# Patient Record
Sex: MALE | Race: WHITE | NOT HISPANIC OR LATINO | Employment: OTHER | URBAN - METROPOLITAN AREA
[De-identification: names, ages, dates, MRNs, and addresses within clinical notes are randomized per-mention and may not be internally consistent; named-entity substitution may affect disease eponyms.]

---

## 2018-02-13 ENCOUNTER — APPOINTMENT (EMERGENCY)
Dept: CT IMAGING | Facility: HOSPITAL | Age: 73
DRG: 394 | End: 2018-02-13
Payer: MEDICARE

## 2018-02-13 ENCOUNTER — HOSPITAL ENCOUNTER (INPATIENT)
Facility: HOSPITAL | Age: 73
LOS: 2 days | Discharge: HOME | DRG: 394 | End: 2018-02-15
Attending: EMERGENCY MEDICINE | Admitting: SURGERY
Payer: MEDICARE

## 2018-02-13 ENCOUNTER — APPOINTMENT (EMERGENCY)
Dept: RADIOLOGY | Facility: HOSPITAL | Age: 73
DRG: 394 | End: 2018-02-13
Payer: MEDICARE

## 2018-02-13 ENCOUNTER — APPOINTMENT (INPATIENT)
Dept: RADIOLOGY | Facility: HOSPITAL | Age: 73
DRG: 394 | End: 2018-02-13
Payer: MEDICARE

## 2018-02-13 DIAGNOSIS — I10 HYPERTENSION, UNSPECIFIED TYPE: ICD-10-CM

## 2018-02-13 DIAGNOSIS — Z86.73 HISTORY OF CARDIOEMBOLIC CEREBROVASCULAR ACCIDENT (CVA): ICD-10-CM

## 2018-02-13 DIAGNOSIS — E78.49 OTHER HYPERLIPIDEMIA: ICD-10-CM

## 2018-02-13 DIAGNOSIS — N17.9 AKI (ACUTE KIDNEY INJURY) (HCC): ICD-10-CM

## 2018-02-13 DIAGNOSIS — K56.609 SMALL BOWEL OBSTRUCTION (HCC): Primary | ICD-10-CM

## 2018-02-13 PROBLEM — R73.09 ELEVATED RANDOM BLOOD GLUCOSE LEVEL: Status: ACTIVE | Noted: 2018-02-13

## 2018-02-13 PROBLEM — F03.90 DEMENTIA (HCC): Status: ACTIVE | Noted: 2018-02-13

## 2018-02-13 LAB
ALBUMIN SERPL BCP-MCNC: 3.7 G/DL (ref 3.5–5)
ALP SERPL-CCNC: 113 U/L (ref 46–116)
ALT SERPL W P-5'-P-CCNC: 48 U/L (ref 12–78)
ANION GAP SERPL CALCULATED.3IONS-SCNC: 11 MMOL/L (ref 4–13)
AST SERPL W P-5'-P-CCNC: 31 U/L (ref 5–45)
BASOPHILS # BLD MANUAL: 0 THOUSAND/UL (ref 0–0.1)
BASOPHILS NFR MAR MANUAL: 0 % (ref 0–1)
BILIRUB SERPL-MCNC: 0.7 MG/DL (ref 0.2–1)
BUN SERPL-MCNC: 33 MG/DL (ref 5–25)
CALCIUM SERPL-MCNC: 9.7 MG/DL (ref 8.3–10.1)
CHLORIDE SERPL-SCNC: 96 MMOL/L (ref 100–108)
CO2 SERPL-SCNC: 29 MMOL/L (ref 21–32)
CREAT SERPL-MCNC: 1.99 MG/DL (ref 0.6–1.3)
EOSINOPHIL # BLD MANUAL: 0 THOUSAND/UL (ref 0–0.4)
EOSINOPHIL NFR BLD MANUAL: 0 % (ref 0–6)
ERYTHROCYTE [DISTWIDTH] IN BLOOD BY AUTOMATED COUNT: 12.5 % (ref 11.6–15.1)
GFR SERPL CREATININE-BSD FRML MDRD: 33 ML/MIN/1.73SQ M
GIANT PLATELETS BLD QL SMEAR: PRESENT
GLUCOSE SERPL-MCNC: 174 MG/DL (ref 65–140)
GLUCOSE SERPL-MCNC: 80 MG/DL (ref 65–140)
HCT VFR BLD AUTO: 44.5 % (ref 36.5–49.3)
HGB BLD-MCNC: 15 G/DL (ref 12–17)
LG PLATELETS BLD QL SMEAR: PRESENT
LIPASE SERPL-CCNC: 396 U/L (ref 73–393)
LYMPHOCYTES # BLD AUTO: 0.8 THOUSAND/UL (ref 0.6–4.47)
LYMPHOCYTES # BLD AUTO: 4 % (ref 14–44)
MCH RBC QN AUTO: 29.9 PG (ref 26.8–34.3)
MCHC RBC AUTO-ENTMCNC: 33.7 G/DL (ref 31.4–37.4)
MCV RBC AUTO: 89 FL (ref 82–98)
MONOCYTES # BLD AUTO: 0.8 THOUSAND/UL (ref 0–1.22)
MONOCYTES NFR BLD: 4 % (ref 4–12)
NEUTROPHILS # BLD MANUAL: 18.49 THOUSAND/UL (ref 1.85–7.62)
NEUTS SEG NFR BLD AUTO: 92 % (ref 43–75)
NRBC BLD AUTO-RTO: 0 /100 WBCS
PLATELET # BLD AUTO: 264 THOUSANDS/UL (ref 149–390)
PLATELET BLD QL SMEAR: ADEQUATE
PMV BLD AUTO: 10.7 FL (ref 8.9–12.7)
POTASSIUM SERPL-SCNC: 4.8 MMOL/L (ref 3.5–5.3)
PROT SERPL-MCNC: 8.2 G/DL (ref 6.4–8.2)
RBC # BLD AUTO: 5.02 MILLION/UL (ref 3.88–5.62)
SODIUM SERPL-SCNC: 136 MMOL/L (ref 136–145)
TOTAL CELLS COUNTED SPEC: 100
WBC # BLD AUTO: 20.1 THOUSAND/UL (ref 4.31–10.16)

## 2018-02-13 PROCEDURE — 71045 X-RAY EXAM CHEST 1 VIEW: CPT

## 2018-02-13 PROCEDURE — 96374 THER/PROPH/DIAG INJ IV PUSH: CPT

## 2018-02-13 PROCEDURE — 82948 REAGENT STRIP/BLOOD GLUCOSE: CPT

## 2018-02-13 PROCEDURE — 85007 BL SMEAR W/DIFF WBC COUNT: CPT | Performed by: EMERGENCY MEDICINE

## 2018-02-13 PROCEDURE — 85027 COMPLETE CBC AUTOMATED: CPT | Performed by: EMERGENCY MEDICINE

## 2018-02-13 PROCEDURE — 99222 1ST HOSP IP/OBS MODERATE 55: CPT | Performed by: PHYSICIAN ASSISTANT

## 2018-02-13 PROCEDURE — 74176 CT ABD & PELVIS W/O CONTRAST: CPT

## 2018-02-13 PROCEDURE — 99232 SBSQ HOSP IP/OBS MODERATE 35: CPT | Performed by: PHYSICIAN ASSISTANT

## 2018-02-13 PROCEDURE — 80053 COMPREHEN METABOLIC PANEL: CPT | Performed by: EMERGENCY MEDICINE

## 2018-02-13 PROCEDURE — 74022 RADEX COMPL AQT ABD SERIES: CPT

## 2018-02-13 PROCEDURE — 36415 COLL VENOUS BLD VENIPUNCTURE: CPT | Performed by: EMERGENCY MEDICINE

## 2018-02-13 PROCEDURE — 99285 EMERGENCY DEPT VISIT HI MDM: CPT

## 2018-02-13 PROCEDURE — 96361 HYDRATE IV INFUSION ADD-ON: CPT

## 2018-02-13 PROCEDURE — 83690 ASSAY OF LIPASE: CPT | Performed by: EMERGENCY MEDICINE

## 2018-02-13 RX ORDER — DEXTROSE AND SODIUM CHLORIDE 5; .9 G/100ML; G/100ML
50 INJECTION, SOLUTION INTRAVENOUS CONTINUOUS
Status: DISCONTINUED | OUTPATIENT
Start: 2018-02-13 | End: 2018-02-13

## 2018-02-13 RX ORDER — ONDANSETRON 2 MG/ML
4 INJECTION INTRAMUSCULAR; INTRAVENOUS EVERY 6 HOURS PRN
Status: DISCONTINUED | OUTPATIENT
Start: 2018-02-13 | End: 2018-02-15 | Stop reason: HOSPADM

## 2018-02-13 RX ORDER — SODIUM CHLORIDE 9 MG/ML
125 INJECTION, SOLUTION INTRAVENOUS CONTINUOUS
Status: DISCONTINUED | OUTPATIENT
Start: 2018-02-13 | End: 2018-02-13

## 2018-02-13 RX ORDER — ATORVASTATIN CALCIUM 10 MG/1
10 TABLET, FILM COATED ORAL DAILY
Status: ON HOLD | COMMUNITY
End: 2018-11-10

## 2018-02-13 RX ORDER — AMLODIPINE BESYLATE 5 MG/1
5 TABLET ORAL DAILY
Status: DISCONTINUED | OUTPATIENT
Start: 2018-02-14 | End: 2018-02-15 | Stop reason: HOSPADM

## 2018-02-13 RX ORDER — FUROSEMIDE 40 MG/1
40 TABLET ORAL DAILY
Status: DISCONTINUED | OUTPATIENT
Start: 2018-02-13 | End: 2018-02-15 | Stop reason: HOSPADM

## 2018-02-13 RX ORDER — FUROSEMIDE 40 MG/1
40 TABLET ORAL 2 TIMES DAILY
Status: ON HOLD | COMMUNITY
End: 2018-11-10

## 2018-02-13 RX ORDER — TRIAMTERENE AND HYDROCHLOROTHIAZIDE 37.5; 25 MG/1; MG/1
1 CAPSULE ORAL EVERY MORNING
COMMUNITY
End: 2018-02-15 | Stop reason: HOSPADM

## 2018-02-13 RX ORDER — DEXTROSE AND SODIUM CHLORIDE 5; .45 G/100ML; G/100ML
125 INJECTION, SOLUTION INTRAVENOUS CONTINUOUS
Status: DISCONTINUED | OUTPATIENT
Start: 2018-02-13 | End: 2018-02-13

## 2018-02-13 RX ORDER — DONEPEZIL HYDROCHLORIDE 5 MG/1
5 TABLET, FILM COATED ORAL 2 TIMES DAILY
Status: ON HOLD | COMMUNITY
End: 2018-11-10

## 2018-02-13 RX ORDER — HEPARIN SODIUM 5000 [USP'U]/ML
5000 INJECTION, SOLUTION INTRAVENOUS; SUBCUTANEOUS EVERY 8 HOURS SCHEDULED
Status: DISCONTINUED | OUTPATIENT
Start: 2018-02-13 | End: 2018-02-15 | Stop reason: HOSPADM

## 2018-02-13 RX ORDER — DONEPEZIL HYDROCHLORIDE 5 MG/1
5 TABLET, FILM COATED ORAL 2 TIMES DAILY
Status: DISCONTINUED | OUTPATIENT
Start: 2018-02-13 | End: 2018-02-15 | Stop reason: HOSPADM

## 2018-02-13 RX ORDER — LIDOCAINE HYDROCHLORIDE 20 MG/ML
JELLY TOPICAL ONCE
Status: COMPLETED | OUTPATIENT
Start: 2018-02-13 | End: 2018-02-13

## 2018-02-13 RX ORDER — TRIAMTERENE AND HYDROCHLOROTHIAZIDE 37.5; 25 MG/1; MG/1
1 TABLET ORAL DAILY
Status: DISCONTINUED | OUTPATIENT
Start: 2018-02-14 | End: 2018-02-13

## 2018-02-13 RX ORDER — AMLODIPINE BESYLATE 10 MG/1
10 TABLET ORAL DAILY
Status: ON HOLD | COMMUNITY
End: 2018-11-10

## 2018-02-13 RX ORDER — DEXTROSE AND SODIUM CHLORIDE 5; .9 G/100ML; G/100ML
50 INJECTION, SOLUTION INTRAVENOUS CONTINUOUS
Status: DISCONTINUED | OUTPATIENT
Start: 2018-02-13 | End: 2018-02-14

## 2018-02-13 RX ORDER — OXYMETAZOLINE HYDROCHLORIDE 0.05 G/100ML
1 SPRAY NASAL ONCE
Status: COMPLETED | OUTPATIENT
Start: 2018-02-13 | End: 2018-02-13

## 2018-02-13 RX ORDER — SPIRONOLACTONE 25 MG/1
12.5 TABLET ORAL EVERY 12 HOURS
Status: ON HOLD | COMMUNITY
End: 2018-11-10

## 2018-02-13 RX ORDER — ATORVASTATIN CALCIUM 10 MG/1
10 TABLET, FILM COATED ORAL
Status: DISCONTINUED | OUTPATIENT
Start: 2018-02-14 | End: 2018-02-15 | Stop reason: HOSPADM

## 2018-02-13 RX ADMIN — HYDROMORPHONE HYDROCHLORIDE 1 MG: 1 INJECTION, SOLUTION INTRAMUSCULAR; INTRAVENOUS; SUBCUTANEOUS at 09:05

## 2018-02-13 RX ADMIN — LIDOCAINE HYDROCHLORIDE: 20 JELLY TOPICAL at 14:54

## 2018-02-13 RX ADMIN — HYDROMORPHONE HYDROCHLORIDE 1 MG: 1 INJECTION, SOLUTION INTRAMUSCULAR; INTRAVENOUS; SUBCUTANEOUS at 16:10

## 2018-02-13 RX ADMIN — SODIUM CHLORIDE 125 ML/HR: 0.9 INJECTION, SOLUTION INTRAVENOUS at 16:11

## 2018-02-13 RX ADMIN — HYDROMORPHONE HYDROCHLORIDE 1 MG: 1 INJECTION, SOLUTION INTRAMUSCULAR; INTRAVENOUS; SUBCUTANEOUS at 12:20

## 2018-02-13 RX ADMIN — OXYMETAZOLINE HYDROCHLORIDE 1 SPRAY: 5 SPRAY NASAL at 15:04

## 2018-02-13 RX ADMIN — SODIUM CHLORIDE 1000 ML: 0.9 INJECTION, SOLUTION INTRAVENOUS at 09:09

## 2018-02-13 RX ADMIN — FUROSEMIDE 40 MG: 40 TABLET ORAL at 22:42

## 2018-02-13 RX ADMIN — DEXTROSE AND SODIUM CHLORIDE 50 ML/HR: 5; 900 INJECTION, SOLUTION INTRAVENOUS at 23:20

## 2018-02-13 RX ADMIN — HEPARIN SODIUM 5000 UNITS: 5000 INJECTION, SOLUTION INTRAVENOUS; SUBCUTANEOUS at 22:42

## 2018-02-13 RX ADMIN — DONEPEZIL HYDROCHLORIDE 5 MG: 5 TABLET ORAL at 22:42

## 2018-02-13 NOTE — PROGRESS NOTES
The clarification for NGT order obtained from Dr Sharan Mills  Low intermittent suction applied as ordered via phone

## 2018-02-13 NOTE — ED NOTES
Pt  Transported to X ray       Jf Pinto, RIVKA  02/13/18 Mari 69, RN  02/13/18 [place] Sherren Curia  02/13/18 2455

## 2018-02-13 NOTE — H&P
GENERAL SURGERY HISTORY AND PHYSICAL      Tutu Saba 67 y o  male MRN: [id number]  Unit/Bed#: ED 26 Encounter: [id number]      Assessment/Plan   SBO (Small Bowel Obstruction) -CT shows dilated small bowel loops in the upper mid abdomen with normal caliber of distal ileal loop suggesting partial small bowel obstruction  Wound transition mid abdomen and likely an adhesive obstruction  abdominal x-ray with no obstructive but bowel gas pattern  - abdominal pain controlled on IV medication, abdomen soft, large distension secondary to loss of domain  - hypoactive bowel sounds, no BM or flatus since last evening  HTN  HLD  History of CVA  Peripheral vascular disease-history of right femoral popliteal bypass    Plan:  Diet NPO  Place NG tube for abdominal decompression  Morning labs  Serial abdominal exams  Consider obstruction series if improved tomorrow  Consult SLIM for medical management, appreciate input  Pain medication and antiemetics, p r n  Chief Complaint I have a bowel obstruction    HPI: Tutu Saba is a 67y o  year old male with a past medical history of hypertension, hyperlipidemia, peripheral vascular disease status post right popliteal femoral bypass, history of 2 CVAs (1 hemorrhagic, 1 ischemic), small-bowel obstruction in December, who presented to the emergency department this morning with sharp abdominal pain since 7:00 p m  last night after eating dinner  He states the pain reminded him of his small-bowel obstruction a few months ago  The patient rates the pain as 9/10 when he arrived  Pain is now rated as a 0 with medication control  Patient denies any associated fever or chills  He denies nausea or vomiting however he did force himself to have 1 episode of emesis  Patient does have a previous history of small-bowel obstruction in December 2017 where he was treated at [facility]  He did have NG tube decompression at that time    Patient states that insertion of the NG tube caused epistaxis and a persistent migraine headache, therefore he is apprehensive about NG tube placement  The patient was treated with IV pain medication in the emergency department which help this pain  Patient denies any diarrhea or constipation  He recently had a colonoscopy and GI workup in the fall of 2017, for hematochezia which was unremarkable  He denies any current melena or hematochezia  Last bowel movement was yesterday afternoon  Patient's denies passing flatus since last evening  Patient has previous abdominal surgery history of open cholecystectomy, and 2 ventral hernia surgeries  He does have a large mesh placement in his abdominal wall  Other surgical history includes of popliteal femoral bypass of the right lower extremity  Review of Systems negative except as noted in HPI    Meds/Allergies     PTA meds:   Prior to Admission Medications   Prescriptions Last Dose Informant Patient Reported? Taking?    amLODIPine (NORVASC) 10 mg tablet   Yes Yes   Sig: Take 10 mg by mouth daily   atorvastatin (LIPITOR) 10 mg tablet   Yes Yes   Sig: Take 10 mg by mouth daily   donepezil (ARICEPT) 5 mg tablet   Yes Yes   Sig: Take 5 mg by mouth daily at bedtime   furosemide (LASIX) 40 mg tablet   Yes Yes   Sig: Take 40 mg by mouth 2 (two) times a day   spironolactone (ALDACTONE) 25 mg tablet   Yes Yes   Sig: Take 25 mg by mouth daily   triamterene-hydrochlorothiazide (DYAZIDE) 37 5-25 mg per capsule   Yes Yes   Sig: Take 1 capsule by mouth every morning      Facility-Administered Medications: None       Historical Information   Past Medical History:   Diagnosis Date    Hyperlipidemia     Hypertension     PVD (peripheral vascular disease) ([facility] [address] )     Small bowel obstruction     Ventral hernia      Past Surgical History:   Procedure Laterality Date    ABDOMINAL HERNIA REPAIR      ABDOMINAL SURGERY      CHOLECYSTECTOMY      open    FEMORAL BYPASS Right      Social History   History   Alcohol Use No     History   Drug Use No History   Smoking Status    Never Smoker   Smokeless Tobacco    Never Used     Family History: no pertinent family history  No Known Allergies        Objective   Vitals: Blood pressure 162/68, pulse 99, temperature 97 9 °F (36 6 °C), resp  rate 18, height 5' 9" (1 753 m), weight 124 kg (274 lb), SpO2 96 %  ,Body mass index is 40 46 kg/m²    No intake or output data in the 24 hours ending 02/13/18 1316  Invasive Devices     Peripheral Intravenous Line            Peripheral IV 02/13/18 Right Antecubital less than 1 day                Physical Exam      General appearance: alert, appears stated age and cooperative, NAD  HEENT: PERRLA, EOMI, sclera clear, anicterus, oral mucosa is moist  Neck: No carotid bruits, no thyromegaly, trachea is midline  midline  Back: no tenderness,deformity,   Lungs:clear throughout, no wheezes  Heart[de-identified] RRR, S1, S2 normal, no murmur  Abdomen: hypoactive BS, no tenderness to palpation, Distended abdomen 2/2 loss of domain, Large ventral hernia, no masses, soft  Extremities: FROM no joint deformities, motor,sensory intact,pedal edema mild, b/l LE venous stasis skin changes  Neurologic: CN II-XII grossly intact, no tremor, affect appropriate    Lab Results:   CBC with diff:   Lab Results   Component Value Date    WBC 20 10 (H) 02/13/2018    HGB 15 0 02/13/2018    HCT 44 5 02/13/2018    MCV 89 02/13/2018     02/13/2018    MCH 29 9 02/13/2018    MCHC 33 7 02/13/2018    RDW 12 5 02/13/2018    MPV 10 7 02/13/2018    NRBC 0 02/13/2018   , BMP/CMP:   Lab Results   Component Value Date     02/13/2018    K 4 8 02/13/2018    CL 96 (L) 02/13/2018    CO2 29 02/13/2018    ANIONGAP 11 02/13/2018    BUN 33 (H) 02/13/2018    CREATININE 1 99 (H) 02/13/2018    GLUCOSE 174 (H) 02/13/2018    CALCIUM 9 7 02/13/2018    AST 31 02/13/2018    ALT 48 02/13/2018    ALKPHOS 113 02/13/2018    PROT 8 2 02/13/2018    BILITOT 0 70 02/13/2018    EGFR 33 02/13/2018     Imaging Studies: Ct Abdomen Pelvis Wo Contrast    Result Date: 2/13/2018  Impression: Dilated the small bowel loops in the upper and mid abdomen with normal caliber of the distal ileal loop suggest partial small bowel obstruction  The zone of transition lies in the mid abdomen in image 42 this is likely adhesive small bowel obstruction Fatty infiltration of the liver Exophytic density from the posterior aspect of the left kidney with attenuation of 25 Hounsfield unit probable cyst   Would suggest a an ultrasound for confirmation  I personally discussed this study with Tania Fontanez on 2/13/2018 at 9:42 AM  Workstation performed: [id number]     Xr Abdomen Obstruction Series    Result Date: 2/13/2018  Impression: Nonobstructive bowel gas pattern  Workstation performed: [id number]     EKG, Pathology, and Other Studies: I have personally reviewed pertinent reports      VTE Prophylaxis: Sequential compression device (Venodyne)  and Enoxaparin (Lovenox)     Code Status: Level 1 - Full Code    Guero Morel PA-C  2/13/2018

## 2018-02-13 NOTE — ED PROVIDER NOTES
History  Chief Complaint   Patient presents with    Abdominal Pain     "I think I have a blockage, it hurts" pt last bm was yesterday, having nausea, hx of bowel obstruction     Patient presents to the emergency department for evaluation of abdominal pain that is increasing in intensity which began yesterday  He has no fever chills nausea vomiting or diarrhea  Does have a history of bowel obstruction which she is concerned about  He denies trauma fall or injury  Denies urinary symptoms  Denies ill contacts  Prior to Admission Medications   Prescriptions Last Dose Informant Patient Reported? Taking? amLODIPine (NORVASC) 10 mg tablet   Yes Yes   Sig: Take 10 mg by mouth daily   atorvastatin (LIPITOR) 10 mg tablet   Yes Yes   Sig: Take 10 mg by mouth daily   donepezil (ARICEPT) 5 mg tablet   Yes Yes   Sig: Take 5 mg by mouth daily at bedtime   furosemide (LASIX) 40 mg tablet   Yes Yes   Sig: Take 40 mg by mouth 2 (two) times a day   spironolactone (ALDACTONE) 25 mg tablet   Yes Yes   Sig: Take 25 mg by mouth daily   triamterene-hydrochlorothiazide (DYAZIDE) 37 5-25 mg per capsule   Yes Yes   Sig: Take 1 capsule by mouth every morning      Facility-Administered Medications: None       Past Medical History:   Diagnosis Date    Hyperlipidemia     Hypertension        Past Surgical History:   Procedure Laterality Date    ABDOMINAL SURGERY         History reviewed  No pertinent family history  I have reviewed and agree with the history as documented  Social History   Substance Use Topics    Smoking status: Never Smoker    Smokeless tobacco: Never Used    Alcohol use No        Review of Systems   Constitutional: Negative  Negative for activity change, appetite change, chills, diaphoresis, fatigue and fever  HENT: Negative  Negative for drooling  Eyes: Negative  Negative for photophobia and visual disturbance  Respiratory: Negative    Negative for cough, chest tightness and shortness of breath  Cardiovascular: Negative  Negative for chest pain, palpitations and leg swelling  Gastrointestinal: Positive for abdominal pain  Negative for diarrhea, nausea, rectal pain and vomiting  Endocrine: Negative  Genitourinary: Negative  Negative for decreased urine volume, dysuria, frequency, hematuria and urgency  Musculoskeletal: Negative  Negative for back pain, neck pain and neck stiffness  Skin: Negative  Negative for rash and wound  Allergic/Immunologic: Negative  Neurological: Negative  Negative for dizziness, tremors, seizures, syncope, facial asymmetry, speech difficulty, weakness, light-headedness, numbness and headaches  Hematological: Negative  Does not bruise/bleed easily  Psychiatric/Behavioral: Negative  Physical Exam  ED Triage Vitals   Temperature Pulse Respirations Blood Pressure SpO2   02/13/18 0751 02/13/18 0751 02/13/18 0751 02/13/18 0751 02/13/18 0751   97 9 °F (36 6 °C) 102 18 165/77 98 %      Temp src Heart Rate Source Patient Position - Orthostatic VS BP Location FiO2 (%)   -- 02/13/18 0951 02/13/18 0951 02/13/18 0951 --    Monitor Lying Left arm       Pain Score       02/13/18 0751       1           Orthostatic Vital Signs  Vitals:    02/13/18 0751 02/13/18 0951   BP: 165/77 146/57   Pulse: 102 89   Patient Position - Orthostatic VS:  Lying       Physical Exam   Constitutional: He is oriented to person, place, and time  He appears well-developed and well-nourished  HENT:   Head: Normocephalic and atraumatic  Right Ear: External ear normal    Left Ear: External ear normal    Mouth/Throat: Oropharynx is clear and moist    Eyes: Conjunctivae and EOM are normal  Pupils are equal, round, and reactive to light  Neck: Normal range of motion  Neck supple  Cardiovascular: Normal rate, regular rhythm, normal heart sounds and intact distal pulses  Pulmonary/Chest: Effort normal and breath sounds normal  No respiratory distress  He has no wheezes   He has no rales  He exhibits no tenderness  Abdominal: Soft  Bowel sounds are normal  He exhibits distension  He exhibits no mass  There is tenderness  There is no rebound and no guarding  No hernia  Generalized mild tenderness without peritoneal signs   Musculoskeletal: Normal range of motion  He exhibits no edema, tenderness or deformity  Neurological: He is alert and oriented to person, place, and time  He has normal reflexes  He displays normal reflexes  No cranial nerve deficit or sensory deficit  He exhibits normal muscle tone  Coordination normal    Skin: Skin is warm and dry  Psychiatric: He has a normal mood and affect  His behavior is normal  Judgment and thought content normal    Nursing note and vitals reviewed        ED Medications  Medications   sodium chloride 0 9 % bolus 1,000 mL (1,000 mL Intravenous New Bag 2/13/18 0909)   HYDROmorphone (DILAUDID) injection 1 mg (1 mg Intravenous Given 2/13/18 0905)       Diagnostic Studies  Results Reviewed     Procedure Component Value Units Date/Time    CBC and differential [[id number]]  (Abnormal) Collected:  02/13/18 0805    Lab Status:  Final result Specimen:  Blood from Arm, Right Updated:  02/13/18 0850     WBC 20 10 (H) Thousand/uL      RBC 5 02 Million/uL      Hemoglobin 15 0 g/dL      Hematocrit 44 5 %      MCV 89 fL      MCH 29 9 pg      MCHC 33 7 g/dL      RDW 12 5 %      MPV 10 7 fL      Platelets 157 Thousands/uL      nRBC 0 /100 WBCs     Comprehensive metabolic panel [[id number]]  (Abnormal) Collected:  02/13/18 0805    Lab Status:  Final result Specimen:  Blood from Arm, Right Updated:  02/13/18 0849     Sodium 136 mmol/L      Potassium 4 8 mmol/L      Chloride 96 (L) mmol/L      CO2 29 mmol/L      Anion Gap 11 mmol/L      BUN 33 (H) mg/dL      Creatinine 1 99 (H) mg/dL      Glucose 174 (H) mg/dL      Calcium 9 7 mg/dL      AST 31 U/L      ALT 48 U/L      Alkaline Phosphatase 113 U/L      Total Protein 8 2 g/dL      Albumin 3 7 g/dL      Total Bilirubin 0 70 mg/dL      eGFR 33 ml/min/1 73sq m     Narrative:         National Kidney Disease Education Program recommendations are as follows:  GFR calculation is accurate only with a steady state creatinine  Chronic Kidney disease less than 60 ml/min/1 73 sq  meters  Kidney failure less than 15 ml/min/1 73 sq  meters  Lipase [[id number]]  (Abnormal) Collected:  02/13/18 0805    Lab Status:  Final result Specimen:  Blood from Arm, Right Updated:  02/13/18 0849     Lipase 396 (H) u/L                  CT abdomen pelvis wo contrast   Final Result by Mellissa Mazariegos MD (02/13 1931)      Dilated the small bowel loops in the upper and mid abdomen with normal caliber of the distal ileal loop suggest partial small bowel obstruction  The zone of transition lies in the mid abdomen in image 42 this is likely adhesive small bowel obstruction      Fatty infiltration of the liver      Exophytic density from the posterior aspect of the left kidney with attenuation of 25 Hounsfield unit probable cyst   Would suggest a an ultrasound for confirmation  I personally discussed this study with [employer] on 2/13/2018 at 9:42 AM                   Workstation performed: [id number]         XR abdomen obstruction series   Final Result by Nilesh Gray MD (02/13 8870)      Nonobstructive bowel gas pattern  Workstation performed: [id number]                    Procedures  Procedures       Phone Contacts  ED Phone Contact    ED Course  ED Course as of Feb 13 1030   Tue Feb 13, 2018   2858 Attempted to get in touch with Dr Mendez the surgical attending on-call without success  Waiting to hear from the surgical physician assistant  0 CaseDiscussed with Dr Franky Lopez general surgery was accepted this patient on his service  Patient does not want an NG tube at this time as the last time he had 1 but [facility] he got a severe migraine    I discussed this with the general surgeon who is aware of this refusal at this time  MDM  CritCare Time    Disposition  Final diagnoses:   Small bowel obstruction     Time reflects when diagnosis was documented in both MDM as applicable and the Disposition within this note     Time User Action Codes Description Comment    2/13/2018  9:56 AM Henrry Castro Add [K56 609] Small bowel obstruction       ED Disposition     ED Disposition Condition Comment    Admit  Case was discussed with Dr Julia Damon  and the patient's admission status was agreed to be Admission Status: inpatient status to the service of Dr Robert Muñiz    None       Patient's Medications   Discharge Prescriptions    No medications on file     No discharge procedures on file      ED Provider  Electronically Signed by           Tana Cortez MD  02/13/18 5522

## 2018-02-14 ENCOUNTER — APPOINTMENT (INPATIENT)
Dept: RADIOLOGY | Facility: HOSPITAL | Age: 73
DRG: 394 | End: 2018-02-14
Payer: MEDICARE

## 2018-02-14 ENCOUNTER — APPOINTMENT (INPATIENT)
Dept: ULTRASOUND IMAGING | Facility: HOSPITAL | Age: 73
DRG: 394 | End: 2018-02-14
Payer: MEDICARE

## 2018-02-14 PROBLEM — N28.1 RENAL CYST, LEFT: Status: ACTIVE | Noted: 2018-02-14

## 2018-02-14 LAB
ALBUMIN SERPL BCP-MCNC: 2.9 G/DL (ref 3.5–5)
ALP SERPL-CCNC: 91 U/L (ref 46–116)
ALT SERPL W P-5'-P-CCNC: 38 U/L (ref 12–78)
ANION GAP SERPL CALCULATED.3IONS-SCNC: 8 MMOL/L (ref 4–13)
AST SERPL W P-5'-P-CCNC: 26 U/L (ref 5–45)
BILIRUB SERPL-MCNC: 0.7 MG/DL (ref 0.2–1)
BUN SERPL-MCNC: 33 MG/DL (ref 5–25)
CALCIUM SERPL-MCNC: 8.3 MG/DL (ref 8.3–10.1)
CHLORIDE SERPL-SCNC: 102 MMOL/L (ref 100–108)
CO2 SERPL-SCNC: 28 MMOL/L (ref 21–32)
CREAT SERPL-MCNC: 1.69 MG/DL (ref 0.6–1.3)
ERYTHROCYTE [DISTWIDTH] IN BLOOD BY AUTOMATED COUNT: 12.7 % (ref 11.6–15.1)
GFR SERPL CREATININE-BSD FRML MDRD: 40 ML/MIN/1.73SQ M
GLUCOSE SERPL-MCNC: 102 MG/DL (ref 65–140)
GLUCOSE SERPL-MCNC: 128 MG/DL (ref 65–140)
GLUCOSE SERPL-MCNC: 79 MG/DL (ref 65–140)
GLUCOSE SERPL-MCNC: 82 MG/DL (ref 65–140)
GLUCOSE SERPL-MCNC: 86 MG/DL (ref 65–140)
HCT VFR BLD AUTO: 39.2 % (ref 36.5–49.3)
HGB BLD-MCNC: 13.1 G/DL (ref 12–17)
MAGNESIUM SERPL-MCNC: 2 MG/DL (ref 1.6–2.6)
MCH RBC QN AUTO: 30.4 PG (ref 26.8–34.3)
MCHC RBC AUTO-ENTMCNC: 33.4 G/DL (ref 31.4–37.4)
MCV RBC AUTO: 91 FL (ref 82–98)
PLATELET # BLD AUTO: 203 THOUSANDS/UL (ref 149–390)
PMV BLD AUTO: 10.5 FL (ref 8.9–12.7)
POTASSIUM SERPL-SCNC: 4.1 MMOL/L (ref 3.5–5.3)
PROT SERPL-MCNC: 6.4 G/DL (ref 6.4–8.2)
RBC # BLD AUTO: 4.31 MILLION/UL (ref 3.88–5.62)
SODIUM SERPL-SCNC: 138 MMOL/L (ref 136–145)
WBC # BLD AUTO: 11.31 THOUSAND/UL (ref 4.31–10.16)

## 2018-02-14 PROCEDURE — 80053 COMPREHEN METABOLIC PANEL: CPT | Performed by: PHYSICIAN ASSISTANT

## 2018-02-14 PROCEDURE — 99232 SBSQ HOSP IP/OBS MODERATE 35: CPT | Performed by: PHYSICIAN ASSISTANT

## 2018-02-14 PROCEDURE — 83735 ASSAY OF MAGNESIUM: CPT | Performed by: PHYSICIAN ASSISTANT

## 2018-02-14 PROCEDURE — 76770 US EXAM ABDO BACK WALL COMP: CPT

## 2018-02-14 PROCEDURE — 82948 REAGENT STRIP/BLOOD GLUCOSE: CPT

## 2018-02-14 PROCEDURE — 85027 COMPLETE CBC AUTOMATED: CPT | Performed by: PHYSICIAN ASSISTANT

## 2018-02-14 PROCEDURE — 74022 RADEX COMPL AQT ABD SERIES: CPT

## 2018-02-14 RX ORDER — FUROSEMIDE 10 MG/ML
20 INJECTION INTRAMUSCULAR; INTRAVENOUS ONCE
Status: COMPLETED | OUTPATIENT
Start: 2018-02-14 | End: 2018-02-14

## 2018-02-14 RX ORDER — HYDRALAZINE HYDROCHLORIDE 20 MG/ML
5 INJECTION INTRAMUSCULAR; INTRAVENOUS EVERY 6 HOURS PRN
Status: DISCONTINUED | OUTPATIENT
Start: 2018-02-14 | End: 2018-02-15 | Stop reason: HOSPADM

## 2018-02-14 RX ORDER — FUROSEMIDE 10 MG/ML
20 INJECTION INTRAMUSCULAR; INTRAVENOUS ONCE
Status: DISCONTINUED | OUTPATIENT
Start: 2018-02-14 | End: 2018-02-15 | Stop reason: HOSPADM

## 2018-02-14 RX ADMIN — HEPARIN SODIUM 5000 UNITS: 5000 INJECTION, SOLUTION INTRAVENOUS; SUBCUTANEOUS at 05:53

## 2018-02-14 RX ADMIN — ATORVASTATIN CALCIUM 10 MG: 10 TABLET, FILM COATED ORAL at 18:01

## 2018-02-14 RX ADMIN — DONEPEZIL HYDROCHLORIDE 5 MG: 5 TABLET ORAL at 09:07

## 2018-02-14 RX ADMIN — FUROSEMIDE 20 MG: 10 INJECTION, SOLUTION INTRAMUSCULAR; INTRAVENOUS at 19:20

## 2018-02-14 RX ADMIN — AMLODIPINE BESYLATE 5 MG: 5 TABLET ORAL at 09:07

## 2018-02-14 RX ADMIN — HEPARIN SODIUM 5000 UNITS: 5000 INJECTION, SOLUTION INTRAVENOUS; SUBCUTANEOUS at 22:54

## 2018-02-14 RX ADMIN — HEPARIN SODIUM 5000 UNITS: 5000 INJECTION, SOLUTION INTRAVENOUS; SUBCUTANEOUS at 14:02

## 2018-02-14 RX ADMIN — DONEPEZIL HYDROCHLORIDE 5 MG: 5 TABLET ORAL at 18:01

## 2018-02-14 RX ADMIN — FUROSEMIDE 40 MG: 40 TABLET ORAL at 09:07

## 2018-02-14 NOTE — ASSESSMENT & PLAN NOTE
· Most recent /61  · Resume home antihypertensive medications including amlodipine 5 mg daily and Lasix 40 mg daily (start dose tonight due to lower extremity edema)  · Hold spirinolactone 25 mg daily and triamterene-hctz 37 5-25 mg daily due to creatinine   · Trend BPs

## 2018-02-14 NOTE — PLAN OF CARE

## 2018-02-14 NOTE — CONSULTS
Consult- Christopher Barrera 1945, 67 y o  male MRN: [id number]    Unit/Bed#: -01 Encounter: [id number]    Primary Care Provider: Edna Leonard DO   Date and time admitted to hospital: 2/13/2018  7:49 AM       DOS: 2/13/2018    Inpatient consult to Internal Medicine  Consult performed by: Christiana Baeza ordered by: Eleazar Layton          * Small bowel obstruction   Assessment & Plan    · Management per surgery  · Continue NGT  · Serial abdominal exams, no tenderness, bowel sounds normoactive  · Pain management and antiemetics per surgery  · CT - dilated small bowel loops in the upper mid abdomen with normal caliber distal ileal loops suggesting partial small bowel obstruction  · Leukocytosis of 20 10, monitor CBC tomorrow         Elevated random blood glucose level   Assessment & Plan    · Elevated at 174  · Patient denies history of diabetes  · Start glucose checks and low algorithm sliding scale coverage  · Obtain hemoglobin A1c        Dementia   Assessment & Plan    · Continue aricept 5 mg bid        HARVINDER (acute kidney injury) ([facility] [address] )   Assessment & Plan    · Cr  1 99 today, baseline unknown per records  · Likely due to patient fluid overloaded on exam with bilateral pitting edema, will continue diuresis with lasix 40 mg daily   · Monitor BMP in AM  · Switch DVT prophylaxis from lovenox to heparin         Hyperlipidemia   Assessment & Plan    · Continue lipitor 10 mg daily         Hypertension   Assessment & Plan    · Most recent /61  · Resume home antihypertensive medications including amlodipine 5 mg daily and Lasix 40 mg daily (start dose tonight due to lower extremity edema)  · Hold spirinolactone 25 mg daily and triamterene-hctz 37 5-25 mg daily due to creatinine   · Trend BPs     Left Kidney cyst  · Found on CT, with recommendations of further evaluation with ultrasound  · Obtain ultrasound kidney   · Monitor     VTE Prophylaxis: Heparin and Enoxaparin (Lovenox)  / sequential compression device     Recommendations for Discharge:  · Pending surgery evaluation  Patient still with NGT in place  Counseling / Coordination of Care Time: 30 minutes  Greater than 50% of total time spent on patient counseling and coordination of care  Collaboration of Care: Were Recommendations Directly Discussed with Primary Treatment Team? - No     History of Present Illness:    Su Chawla is a 67 y o  male with significant past medical history of hypertension , hyperlipidemia, dementia, peripheral vascular disease, and history of bowel obstruction who is originally admitted to the general surgery service on 2/13/2018 due to abdominal pain and decrease in bowel movements, resulting in partial bowel obstruction  We are consulted for medical management  Patient reports that he has a history of bowel obstruction in the past   States that he was having severe abdominal pain prior to coming into the hospital   States that his abdominal pain is minimal now  Currently denies nausea and vomiting  Reports that the IV in the right antecubital fossa is bothering him and he would like it to be taken out  Patient reports history of hypertension, but does not know any of the medications he takes  Denies history of kidney disease or diabetes mellitus  He currently does not have any other complaints besides his IV  Patient's pharmacy was contacted and medication list was updated  Patient is a history of popliteal femoral bypass and multiple abdominal surgeries  Review of Systems:    Review of Systems   Constitutional: Negative for chills, fatigue and fever  HENT: Negative for congestion, hearing loss, rhinorrhea, sinus pain, sinus pressure, sore throat and tinnitus  Eyes: Negative for pain, redness and visual disturbance  Respiratory: Negative for cough, chest tightness and shortness of breath  Cardiovascular: Positive for leg swelling  Negative for chest pain     Gastrointestinal: Positive for abdominal pain, constipation and nausea  Negative for diarrhea and vomiting  Genitourinary: Negative for difficulty urinating, dysuria and hematuria  Musculoskeletal: Negative for joint swelling and myalgias  Skin: Negative for pallor and rash  Neurological: Negative for dizziness, light-headedness and headaches  Past Medical and Surgical History:     Past Medical History:   Diagnosis Date    Hyperlipidemia     Hypertension     [facility]VD [facility]per[facility] vascular disease) ([facility] [address] [facility]     Small bowel obstruction     Ventral hernia        Past Surgical History:   Procedure Laterality Date    ABDOMINAL HERNIA REPAIR      ABDOMINAL SURGERY      CHOLECYSTECTOMY      open    FEMORAL BYPASS Right        Meds/Allergies:    all medications and allergies reviewed    Allergies: No Known Allergies    Social History:     Marital Status: /Civil Union    Substance Use History:   History   Alcohol Use No     History   Smoking Status    Never Smoker   Smokeless Tobacco    Never Used     History   Drug Use No       Family History:    History of diabetes on maternal and paternal sides    Physical Exam:     Vitals:   Blood Pressure: 142/61 (02/13/18 1536)  Pulse: 86 (02/13/18 1536)  Temperature: 98 5 °F (36 9 °C) (02/13/18 1536)  Temp Source: Oral (02/13/18 1536)  Respirations: 18 (02/13/18 1536)  Height: 5' 9" (175 3 cm) (02/13/18 0751)  Weight - Scale: 124 kg (274 lb) (02/13/18 0751)  SpO2: 92 % (02/13/18 1536)    Physical Exam   Constitutional: No distress  Patient is in no acute distress lying in hospital bed sleeping  Patient stating that he wants his IV taken out  NG tube in place   HENT:   Head: Normocephalic and atraumatic  Eyes: Conjunctivae are normal    Cardiovascular: Normal rate, regular rhythm and intact distal pulses  Murmur heard  Pulmonary/Chest: Effort normal  No respiratory distress  He has no wheezes  Decreased breath sounds bilaterally   Abdominal: Soft  Bowel sounds are normal  He exhibits distension  There is no tenderness  There is no rebound  Musculoskeletal: He exhibits edema (2+ bilateral pitting edema lower extremities bilaterally)  Neurological: He is alert  Skin: Skin is warm and dry  He is not diaphoretic  No erythema  Psychiatric: He has a normal mood and affect  Vitals reviewed  Additional Data:     Lab Results: I have personally reviewed pertinent reports  Results from last 7 days  Lab Units 02/13/18  0805   WBC Thousand/uL 20 10*   HEMOGLOBIN g/dL 15 0   HEMATOCRIT % 44 5   PLATELETS Thousands/uL 264   LYMPHO PCT % 4*   MONO PCT MAN % 4   EOSINO PCT MANUAL % 0       Results from last 7 days  Lab Units 02/13/18  0805   SODIUM mmol/L 136   POTASSIUM mmol/L 4 8   CHLORIDE mmol/L 96*   CO2 mmol/L 29   BUN mg/dL 33*   CREATININE mg/dL 1 99*   CALCIUM mg/dL 9 7   TOTAL PROTEIN g/dL 8 2   BILIRUBIN TOTAL mg/dL 0 70   ALK PHOS U/L 113   ALT U/L 48   AST U/L 31   GLUCOSE RANDOM mg/dL 174*             No results found for: HGBA1C    Imaging: I have personally reviewed pertinent reports  XR chest portable   Final Result by Magi Joshi DO (02/13 1623)   No consolidation or effusion  Chronic interstitial changes both lower lobes  NG tube extends into the stomach, with tip approximately 5 cm beyond the GE junction and should be advanced further into the stomach for optimal position         Workstation performed: [id number]         CT abdomen pelvis wo contrast   Final Result by Ladonna Marcus MD (02/13 1263)      Dilated the small bowel loops in the upper and mid abdomen with normal caliber of the distal ileal loop suggest partial small bowel obstruction  The zone of transition lies in the mid abdomen in image 42 this is likely adhesive small bowel obstruction      Fatty infiltration of the liver      Exophytic density from the posterior aspect of the left kidney with attenuation of 25 Hounsfield unit probable cyst   Would suggest a an ultrasound for confirmation  I personally discussed this study with Yovia on 2/13/2018 at 9:42 AM                   Workstation performed: [id number]         XR abdomen obstruction series   Final Result by Chayito Kuo MD (02/13 9312)      Nonobstructive bowel gas pattern  Workstation performed: [id number]             EKG, Pathology, and Other Studies Reviewed on Admission:   · Chest x-ray, CT abdomen, x-ray abdomen obstruction series reviewed    ** Please Note: This note has been constructed using a voice recognition system   **

## 2018-02-14 NOTE — PROGRESS NOTES
NGT placement verified by xray  Recommendations made to advance NGT further into the stomach for optimal placement  NGT advanced approximately 2  Inches, patient tolerated procedure well  Will continue to monitor patient

## 2018-02-14 NOTE — ASSESSMENT & PLAN NOTE
· Management per surgery  · Continue NGT  · Serial abdominal exams, no tenderness, bowel sounds normoactive  · Pain management and antiemetics per surgery  · CT - dilated small bowel loops in the upper mid abdomen with normal caliber distal ileal loops suggesting partial small bowel obstruction

## 2018-02-14 NOTE — PROGRESS NOTES
30ml of residual at this time, patient denies any pain, discomfort, or nausea, PATTIE Baum made aware of same- instructed to continue to keep NGT clamped

## 2018-02-14 NOTE — ASSESSMENT & PLAN NOTE
· Cr  1 69 today, baseline unknown per records  · Improved with addition of diuresis  · Patient appears fluid overloaded on exam with bilateral pitting edema, will continue diuresis with lasix 40 mg daily and 1 time dose Lasix 20 mg tonight   · Monitor BMP in AM

## 2018-02-14 NOTE — ASSESSMENT & PLAN NOTE
· Cr  1 99 today, baseline unknown per records  · Likely due to patient fluid overloaded on exam with bilateral pitting edema, will continue diuresis with lasix 40 mg daily   · Monitor BMP in AM

## 2018-02-14 NOTE — ASSESSMENT & PLAN NOTE
· Incidental finding on CT  · Ultrasound kidney revealing Bilateral simple renal cysts  No hydronephrosis    No solid lesions are detected

## 2018-02-14 NOTE — ASSESSMENT & PLAN NOTE
· Elevated at 174  · Patient denies history of diabetes  · Start glucose checks and low algorithm sliding scale coverage  · Obtain hemoglobin A1c

## 2018-02-14 NOTE — CASE MANAGEMENT
Initial Clinical Review    Admission: Date/Time/Statement: 2/13/18 @ 1030     Orders Placed This Encounter   Procedures    Inpatient Admission (expected length of stay for this patient is greater than two midnights)     Standing Status:   Standing     Number of Occurrences:   1     Order Specific Question:   Admitting Physician     Answer:   Gilbert Abbott [[id number]]     Order Specific Question:   Level of Care     Answer:   Med Surg [[id number]]     Order Specific Question:   Estimated length of stay     Answer:   More than 2 Midnights     Order Specific Question:   Certification     Answer:   I certify that inpatient services are medically necessary for this patient for a duration of greater than two midnights  See H&P and MD Progress Notes for additional information about the patient's course of treatment  ED: Date/Time/Mode of Arrival:   ED Arrival Information     Expected Arrival Acuity Means of Arrival Escorted By Service Admission Type    - 2/13/2018 07:45 Urgent Walk-In Self Surgery-General Urgent    Arrival Complaint    ABDOMINAL PAIN          Chief Complaint:   Chief Complaint   Patient presents with    Abdominal Pain     "I think I have a blockage, it hurts" pt last bm was yesterday, having nausea, hx of bowel obstruction       History of Illness: Gamal Doe is a 67y o  year old male with a past medical history of hypertension, hyperlipidemia, peripheral vascular disease status post right popliteal femoral bypass, history of 2 CVAs (1 hemorrhagic, 1 ischemic), small-bowel obstruction in December, who presented to the emergency department this morning with sharp abdominal pain since 7:00 p m  last night after eating dinner  He states the pain reminded him of his small-bowel obstruction a few months ago  The patient rates the pain as 9/10 when he arrived  Pain is now rated as a 0 with medication control  Patient denies any associated fever or chills    He denies nausea or vomiting however he did force himself to have 1 episode of emesis  Patient does have a previous history of small-bowel obstruction in December 2017 where he was treated at [facility]  He did have NG tube decompression at that time  Patient states that insertion of the NG tube caused epistaxis and a persistent migraine headache, therefore he is apprehensive about NG tube placement  The patient was treated with IV pain medication in the emergency department which help this pain  Patient denies any diarrhea or constipation  He recently had a colonoscopy and GI workup in the fall of 2017, for hematochezia which was unremarkable  He denies any current melena or hematochezia  Last bowel movement was yesterday afternoon  Patient's denies passing flatus since last evening  Patient has previous abdominal surgery history of open cholecystectomy, and 2 ventral hernia surgeries  He does have a large mesh placement in his abdominal wall  Other surgical history includes of popliteal femoral bypass of the right lower extremity  ED Vital Signs:   ED Triage Vitals   Temperature Pulse Respirations Blood Pressure SpO2   02/13/18 0751 02/13/18 0751 02/13/18 0751 02/13/18 0751 02/13/18 0751   97 9 °F (36 6 °C) 102 18 165/77 98 %      Temp Source Heart Rate Source Patient Position - Orthostatic VS BP Location FiO2 (%)   02/13/18 1300 02/13/18 0951 02/13/18 0951 02/13/18 0951 --   Oral Monitor Lying Left arm       Pain Score       02/13/18 0751       1        Wt Readings from Last 1 Encounters:   02/13/18 124 kg (274 lb)       Vital Signs (abnormal): WNL    Abnormal Labs/Diagnostic Test Results: WBC 20 10, CL  99, BUN CREAT  33  1 99, GLUC  174, LIPASE  396  CT ABD - Dilated the small bowel loops in the upper and mid abdomen with normal caliber of the distal ileal loop suggest partial small bowel obstruction    The zone of transition lies in the mid abdomen inimage 42 this is likely adhesive small bowel obstruction  Fatty infiltration of the liver  Exophytic density from the posterior aspect of the left kidney with attenuation of 25 Hounsfield unit probable cyst   Would suggest a an ultrasound for confirmation  OBS SERIES- Nonobstructive bowel gas pattern  ED Treatment:   Medication Administration from 02/13/2018 0745 to 02/13/2018 1327       Date/Time Order Dose Route Action Action by Comments     02/13/2018 1146 sodium chloride 0 9 % bolus 1,000 mL 0 mL Intravenous Stopped Ashley Bolden RN      02/13/2018 0909 sodium chloride 0 9 % bolus 1,000 mL 1,000 mL Intravenous New Bag Irene Li RN      02/13/2018 6265 HYDROmorphone (DILAUDID) injection 1 mg 1 mg Intravenous Given Irene Li RN      02/13/2018 1220 HYDROmorphone (DILAUDID) injection 1 mg 1 mg Intravenous Given Ashley Bolden RN           Past Medical/Surgical History: Active Ambulatory Problems     Diagnosis Date Noted    PVD (peripheral vascular disease) ([address] )      Resolved Ambulatory Problems     Diagnosis Date Noted    No Resolved Ambulatory Problems     Past Medical History:   Diagnosis Date    Hyperlipidemia     Hypertension     [facility]     Small bowel obstruction     Ventral hernia        Admitting Diagnosis: Small bowel obstruction [K56 609]  Abdominal pain [R10 9]  History of cardioembolic cerebrovascular accident (CVA) [Z86 73]  Other hyperlipidemia [E78 4]  Hypertension, unspecified type [I10]    Age/Sex: 67 y o  male    Assessment/Plan: Assessment/Plan      SBO (Small Bowel Obstruction) -CT shows dilated small bowel loops in the upper mid abdomen with normal caliber of distal ileal loop suggesting partial small bowel obstruction  Wound transition mid abdomen and likely an adhesive obstruction  abdominal x-ray with no obstructive but bowel gas pattern  - abdominal pain controlled on IV medication, abdomen soft, large distension secondary to loss of domain  - hypoactive bowel sounds, no BM or flatus since last evening    HTN  HLD  History of CVA  Peripheral vascular disease-history of right femoral popliteal bypass     Plan:  Diet NPO  Place NG tube for abdominal decompression  Morning labs  Serial abdominal exams  Consider obstruction series if improved tomorrow  Consult [facility] for medical management, appreciate input  Pain medication and antiemetics, p r n      Admission Orders:  Scheduled Meds:   Current Facility-Administered Medications:  amLODIPine 5 mg Oral Daily Neena B Blakeslee, PA-BHUPENDRA   atorvastatin 10 mg Oral Daily With Dinner Neena B PATTIE Paz-BHUPENDRA   donepezil 5 mg Oral BID Neena B Blakeslee, PA-BHUPENDRA   furosemide 40 mg Oral Daily Neena B PATTIE Paz-BHUPENDRA   heparin (porcine) 5,000 Units Subcutaneous Q8H [address] Karenrikki Miller PA-C   HYDROmorphone 1 mg Intravenous Q3H PRN Elzbieta Lucas PA-C   insulin lispro 1-5 Units Subcutaneous TID AC Neena B Blakeslee, ANGEL   insulin lispro 1-5 Units Subcutaneous HS Neena B ANGEL Paz   ondansetron 4 mg Intravenous Q6H PRN Elzbieta Lucas PA-C     Continuous Infusions:    PRN Meds: HYDROmorphone  Q3H PRN X1    ondansetron       FINGERSTICK AC AND HS   NPO   UP AND OOB AS [place]   INC SPIROM   NG TUBE  LIS  US KIDNEY AND BLADDER   SCD  CONSULT IM      IM CONSULT 2/13  Small bowel obstruction   Assessment & Plan     · Management per surgery  · Continue NGT  · Serial abdominal exams, no tenderness, bowel sounds normoactive  · Pain management and antiemetics per surgery  · CT - dilated small bowel loops in the upper mid abdomen with normal caliber distal ileal loops suggesting partial small bowel obstruction  · Leukocytosis of 20 10, monitor CBC tomorrow           Elevated random blood glucose level   Assessment & Plan     · Elevated at 174  · Patient denies history of diabetes  · Start glucose checks and low algorithm sliding scale coverage  · Obtain hemoglobin A1c          Dementia   Assessment & Plan     · Continue aricept 5 mg bid          HARVINDER (acute kidney injury) ([facility] [address] )   Assessment & Plan     · Cr  1 99 today, baseline unknown per records  · Likely due to patient fluid overloaded on exam with bilateral pitting edema, will continue diuresis with lasix 40 mg daily   · Monitor BMP in AM  · Switch DVT prophylaxis from lovenox to heparin           Hyperlipidemia   Assessment & Plan     · Continue lipitor 10 mg daily           Hypertension   Assessment & Plan     · Most recent /61  · Resume home antihypertensive medications including amlodipine 5 mg daily and Lasix 40 mg daily (start dose tonight due to lower extremity edema)  ? Hold spirinolactone 25 mg daily and triamterene-hctz 37 5-25 mg daily due to creatinine   ? Trend BPs      Left Kidney cyst  · Found on CT, with recommendations of further evaluation with ultrasound  · Obtain ultrasound kidney   · Monitor      VTE Prophylaxis: Heparin and Enoxaparin (Lovenox)  / sequential compression device      Recommendations for Discharge:  · Pending surgery evaluation   Patient still with NGT in place

## 2018-02-14 NOTE — MALNUTRITION/BMI
This medical record reflects one or more clinical indicators suggestive of malnutrition and/or morbid obesity  BMI Findings:  BMI Classifications: Morbid Obesity 40-44 9     Body mass index is 40 46 kg/m²  Morbid obesity related to energy intake > output over time as evidenced by BMI  See Nutrition note dated 2/14/18 for additional details  Completed nutrition assessment is viewable in the nutrition documentation

## 2018-02-14 NOTE — ASSESSMENT & PLAN NOTE
· Resolved  · Patient denies history of diabetes  · Continue glucose checks and low algorithm sliding scale

## 2018-02-14 NOTE — PROGRESS NOTES
Progress Note -Surgery PATTIE Cam Ra 67 y o  male MRN: [id number]  Unit/Bed#: -01 Encounter: [id number]      Assessment/Plan   SBO (Small Bowel Obstruction)   -improved today  Active bowel function  Bowel movement this morning  No abdominal pain  300 cc with NG tube repositioning at midnight  No output since that time  Leukocytosis -improved 11 today from 21 yesterday  HTN  HLD  History of CVA  Peripheral vascular disease-history of right femoral popliteal bypass     Plan:  Continue diet NPO, may advance to clear liquids later today  Clamp NG to, reconnect later today to check for residual   Abdominal obstruction series  Serial abdominal exams  SLIM following, input appreciated  Pain medication and antiemetics, p r n       ______________________________________________________________________  CC:  I am ready to have this thing out of my nose  Subjective:   Feeling much better today  No abdominal pain  No nausea or vomiting  Bowel movement this morning  Passing flatus  Denies fever chills  Denies diarrhea  Objective:       Vitals:  /65 (BP Location: Left arm)   Pulse 85   Temp 98 1 °F (36 7 °C) (Oral)   Resp 18   Ht 5' 9" (1 753 m)   Wt 124 kg (274 lb)   SpO2 94%   BMI 40 46 kg/m²     I/Os:  I/O last 3 completed shifts:  In: -   Out: 300 [Emesis/NG output:300]    I/O this shift:  In: -   Out: 200 [Urine:200]    Invasive Devices     Peripheral Intravenous Line            Peripheral IV 02/14/18 Ventral (anterior); Left Forearm less than 1 day          Drain            NG/OG/Enteral Tube Nasogastric Left nares less than 1 day                Medications:  Current Facility-Administered Medications   Medication Dose Route Frequency    amLODIPine (NORVASC) tablet 5 mg  5 mg Oral Daily    atorvastatin (LIPITOR) tablet 10 mg  10 mg Oral Daily With Dinner    donepezil (ARICEPT) tablet 5 mg  5 mg Oral BID    furosemide (LASIX) tablet 40 mg  40 mg Oral Daily    heparin (porcine) subcutaneous injection 5,000 Units  5,000 Units Subcutaneous Q8H [facility]    HYDROmorphone (DILAUDID) injection 1 mg  1 mg Intravenous Q3H PRN    insulin lispro (HumaLOG) 100 units/mL subcutaneous injection 1-5 Units  1-5 Units Subcutaneous TID AC    insulin lispro (HumaLOG) 100 units/mL subcutaneous injection 1-5 Units  1-5 Units Subcutaneous HS    ondansetron (ZOFRAN) injection 4 mg  4 mg Intravenous Q6H PRN                 Lab Results and Cultures:   CBC with diff:   Lab Results   Component Value Date    WBC 11 31 (H) 02/14/2018    HGB 13 1 02/14/2018    HCT 39 2 02/14/2018    MCV 91 02/14/2018     02/14/2018    MCH 30 4 02/14/2018    MCHC 33 4 02/14/2018    RDW 12 7 02/14/2018    MPV 10 5 02/14/2018    NRBC 0 02/13/2018       BMP/CMP:  Lab Results   Component Value Date     02/14/2018    K 4 1 02/14/2018     02/14/2018    CO2 28 02/14/2018    ANIONGAP 8 02/14/2018    BUN 33 (H) 02/14/2018    CREATININE 1 69 (H) 02/14/2018    GLUCOSE 128 02/14/2018    CALCIUM 8 3 02/14/2018    AST 26 02/14/2018    ALT 38 02/14/2018    ALKPHOS 91 02/14/2018    PROT 6 4 02/14/2018    BILITOT 0 70 02/14/2018    EGFR 40 02/14/2018           Physical Exam:  General Appearance:    Alert and orientated x 3, cooperative, no distress, appears stated age   Lungs:     Clear to auscultation bilaterally, respirations unlabored, no wheezes    Heart:    Regular rate and rhythm, S1 and S2 normal, no murmur   Abdomen:    Normoactive BS, soft, abdomen with large ventral hernia, likely secondary to loss of domain, non rigid, nontender no masses, no palpated organomegaly   Extremities:  Extremities normal, no calf tenderness, no cyanosis or edema   Pulses:   2+ and symmetric all extremities   Skin:   Skin color, texture, turgor normal, no rashes   Neurologic:   CNII-XII intact, normal strength, affect appropriate       Imaging:  Ct Abdomen Pelvis Wo Contrast    Result Date: 2/13/2018  Impression: Dilated the small bowel loops in the upper and mid abdomen with normal caliber of the distal ileal loop suggest partial small bowel obstruction  The zone of transition lies in the mid abdomen in image 42 this is likely adhesive small bowel obstruction Fatty infiltration of the liver Exophytic density from the posterior aspect of the left kidney with attenuation of 25 Hounsfield unit probable cyst   Would suggest a an ultrasound for confirmation  I personally discussed this study with Trixie Saucedo on 2/13/2018 at 9:42 AM  Workstation performed: [id number]     Xr Chest Portable    Result Date: 2/13/2018  Impression: No consolidation or effusion  Chronic interstitial changes both lower lobes  NG tube extends into the stomach, with tip approximately 5 cm beyond the GE junction and should be advanced further into the stomach for optimal position Workstation performed: [id number]     Xr Abdomen Obstruction Series    Result Date: 2/13/2018  Impression: Nonobstructive bowel gas pattern   Workstation performed: [id number]       VTE Pharmacologic Prophylaxis: Enoxaparin (Lovenox)  VTE Mechanical Prophylaxis: sequential compression device    Elyssa Hilario PA-C   2/14/2018

## 2018-02-14 NOTE — PROGRESS NOTES
Progress Note - Christopher Barrera 1945, 67 y o  male MRN: [id number]    Unit/Bed#: -01 Encounter: [id number]    Primary Care Provider: Edna Leonard DO   Date and time admitted to hospital: 2/13/2018  7:49 AM       DOS: 2/14/2018      * Small bowel obstruction   Assessment & Plan    · Management per surgery  · NGT removed today, start clear liquid diet and advance as tolerated, if patient to continue tolerate shin of the diet, likely to be discharged tomorrow per surgery  · Serial abdominal exams, no tenderness, bowel sounds normoactive  · Pain management and antiemetics per surgery  · CT - dilated small bowel loops in the upper mid abdomen with normal caliber distal ileal loops suggesting partial small bowel obstruction  · Leukocytosis improved today, continue to trend        HARVINDER (acute kidney injury) ([facility] [address] )   Assessment & Plan    · Cr  1 69 today, baseline unknown per records  · Improved with addition of diuresis  · Patient appears fluid overloaded on exam with bilateral pitting edema, will continue diuresis with lasix 40 mg daily and 1 time dose Lasix 20 mg tonight   · Monitor BMP in AM        Renal cyst, left   Assessment & Plan    · Incidental finding on CT  · Ultrasound kidney revealing Bilateral simple renal cysts  No hydronephrosis  No solid lesions are detected         Elevated random blood glucose level   Assessment & Plan    · Resolved  · Patient denies history of diabetes  · Continue glucose checks and low algorithm sliding scale          Dementia   Assessment & Plan    · Continue aricept 5 mg bid        Hyperlipidemia   Assessment & Plan    · Continue lipitor 10 mg daily         Hypertension   Assessment & Plan    · Most recent /71, mild elevations  · Continue amlodipine 5 mg daily and Lasix 40 mg daily (will give 1 time dose Lasix 20 mg due to volume overload)  · Continue to hold spirinolactone 25 mg daily and triamterene-hctz 37 5-25 mg daily due to creatinine   · Trend Bps  · P r n  hydralazine for systolic blood pressure over 170          VTE Pharmacologic Prophylaxis:   Pharmacologic: Heparin  Mechanical VTE Prophylaxis in Place: No    Patient Centered Rounds: I have performed bedside rounds with nursing staff today  Discussions with Specialists or Other Care Team Provider:  Discussed with surgery, RN and cm    Education and Discussions with Family / Patient:  Discussed with patient and patient's son at bedside  Time Spent for Care: 20 minutes  More than 50% of total time spent on counseling and coordination of care as described above  Current Length of Stay: 1 day(s)    Current Patient Status: Inpatient   Certification Statement: The patient will continue to require additional inpatient hospital stay due to Advancement and toleration of diet    Discharge Plan:  Likely tomorrow per surgery, continue to monitor creatinine  Code Status: Level 1 - Full Code      Subjective:   Patient states that he feels great today  Reports that he is very happy that his NG tube is out  Currently denies lightheadedness, dizziness, chest pain, shortness of breath, abdominal pain, nausea, vomiting, diarrhea, constipation or urinary difficulties  Patient reports that he is very hungry  Patient's son reports the patient has lower extremity edema is nothing compared to what he usually experiences  Objective:     Vitals:   Temp (24hrs), Av 1 °F (36 7 °C), Min:97 7 °F (36 5 °C), Max:98 6 °F (37 °C)    HR:  [78-90] 90  Resp:  [18] 18  BP: (131-167)/(56-71) 167/71  SpO2:  [92 %-100 %] 100 %  Body mass index is 40 46 kg/m²  Input and Output Summary (last 24 hours): Intake/Output Summary (Last 24 hours) at 18 1829  Last data filed at 18 1700   Gross per 24 hour   Intake              360 ml   Output             1250 ml   Net             -890 ml       Physical Exam:     Physical Exam   Constitutional: No distress     Patient is in no acute distress sitting in his hospital chair eating soup accompanied by his son  NOAM:   Head: Normocephalic and atraumatic  Eyes: Conjunctivae are normal    Cardiovascular: Normal rate, regular rhythm and intact distal pulses  Murmur heard  Pulmonary/Chest: No respiratory distress  He has no wheezes  Decreased breath sounds bilaterally   Abdominal: Soft  Bowel sounds are normal  He exhibits distension  There is tenderness (Mild generalized tenderness to palpation)  There is no rebound  Musculoskeletal: He exhibits edema (2+ pitting edema lower extremities bilaterally)  Neurological: He is alert  Skin: Skin is warm and dry  He is not diaphoretic  Psychiatric: He has a normal mood and affect  Vitals reviewed  Additional Data:     Labs:      Results from last 7 days  Lab Units 02/14/18  0533 02/13/18  0805   WBC Thousand/uL 11 31* 20 10*   HEMOGLOBIN g/dL 13 1 15 0   HEMATOCRIT % 39 2 44 5   PLATELETS Thousands/uL 203 264   LYMPHO PCT %  --  4*   MONO PCT MAN %  --  4   EOSINO PCT MANUAL %  --  0       Results from last 7 days  Lab Units 02/14/18  0533   SODIUM mmol/L 138   POTASSIUM mmol/L 4 1   CHLORIDE mmol/L 102   CO2 mmol/L 28   BUN mg/dL 33*   CREATININE mg/dL 1 69*   CALCIUM mg/dL 8 3   TOTAL PROTEIN g/dL 6 4   BILIRUBIN TOTAL mg/dL 0 70   ALK PHOS U/L 91   ALT U/L 38   AST U/L 26   GLUCOSE RANDOM mg/dL 128           * I Have Reviewed All Lab Data Listed Above  * Additional Pertinent Lab Tests Reviewed:  All Labs Within Last 24 Hours Reviewed    Imaging:    Imaging Reports Reviewed Today Include:  Ultrasound kidney  Imaging Personally Reviewed by Myself Includes:  None    Recent Cultures (last 7 days):           Last 24 Hours Medication List:     Current Facility-Administered Medications:  amLODIPine 5 mg Oral Daily Neena Paz PA-C   atorvastatin 10 mg Oral Daily With Dinner Neena Paz PA-C   donepezil 5 mg Oral BID Neena Paz PA-C   furosemide 20 mg Intravenous Once Neena Paz PA-C   furosemide 40 mg Oral Daily Debbie Paz PA-C   heparin (porcine) 5,000 Units Subcutaneous [facility] Latricia Pascal PA-C   HYDROmorphone 1 mg Intravenous Q3H PRN Kimberly Lucas PA-C   insulin lispro 1-5 Units Subcutaneous TID AC Neena Paz PA-C   insulin lispro 1-5 Units Subcutaneous HS Neena Paz PA-C   ondansetron 4 mg Intravenous Q6H PRN Steffanie Low PA-C        Today, Patient Was Seen By: Latricia Pascal PA-C    ** Please Note: Dictation voice to text software may have been used in the creation of this document   **

## 2018-02-14 NOTE — ASSESSMENT & PLAN NOTE
· Management per surgery  · NGT removed today, start clear liquid diet and advance as tolerated, if patient to continue tolerate shin of the diet, likely to be discharged tomorrow per surgery  · Serial abdominal exams, no tenderness, bowel sounds normoactive  · Pain management and antiemetics per surgery  · CT - dilated small bowel loops in the upper mid abdomen with normal caliber distal ileal loops suggesting partial small bowel obstruction  · Leukocytosis improved today, continue to trend

## 2018-02-14 NOTE — PLAN OF CARE
Problem: PAIN - ADULT  Goal: Verbalizes/displays adequate comfort level or baseline comfort level  Interventions:  - Encourage patient to monitor pain and request assistance  - Assess pain using appropriate pain scale  - Administer analgesics based on type and severity of pain and evaluate response  - Implement non-pharmacological measures as appropriate and evaluate response  - Consider cultural and social influences on pain and pain management  - Notify physician/advanced practitioner if interventions unsuccessful or patient reports new pain   Outcome: Progressing      Problem: INFECTION - ADULT  Goal: Absence or prevention of progression during hospitalization  INTERVENTIONS:  - Assess and monitor for signs and symptoms of infection  - Monitor lab/diagnostic results  - Monitor all insertion sites, i e  indwelling lines, tubes, and drains  - Monitor endotracheal (as able) and nasal secretions for changes in amount and color  - [place] appropriate cooling/warming therapies per order  - Administer medications as ordered  - Instruct and encourage patient and family to use good hand hygiene technique  - Identify and instruct in appropriate isolation precautions for identified infection/condition   Outcome: Progressing    Goal: Absence of fever/infection during neutropenic period  INTERVENTIONS:  - Monitor WBC  - Implement neutropenic guidelines   Outcome: Progressing      Problem: SAFETY ADULT  Goal: Patient will remain free of falls  INTERVENTIONS:  - Assess patient frequently for physical needs  -  Identify cognitive and physical deficits and behaviors that affect risk of falls    -  [place] fall precautions as indicated by assessment   - Educate patient/family on patient safety including physical limitations  - Instruct patient to call for assistance with activity based on assessment  - Modify environment to reduce risk of injury  - Consider OT/PT consult to assist with strengthening/mobility    Outcome: Progressing    Goal: Maintain or return to baseline ADL function  INTERVENTIONS:  -  Assess patient's ability to carry out ADLs; assess patient's baseline for ADL function and identify physical deficits which impact ability to perform ADLs (bathing, care of mouth/teeth, toileting, grooming, dressing, etc )  - Assess/evaluate cause of self-care deficits   - Assess range of motion  - Assess patient's mobility; develop plan if impaired  - Assess patient's need for assistive devices and provide as appropriate  - Encourage maximum independence but intervene and supervise when necessary  ¯ Involve family in performance of ADLs  ¯ Assess for home care needs following discharge   ¯ Request OT consult to assist with ADL evaluation and planning for discharge  ¯ Provide patient education as appropriate   Outcome: Progressing    Goal: Maintain or return mobility status to optimal level  INTERVENTIONS:  - Assess patient's baseline mobility status (ambulation, transfers, stairs, etc )    - Identify cognitive and physical deficits and behaviors that affect mobility  - Identify mobility aids required to assist with transfers and/or ambulation (gait belt, sit-to-stand, lift, walker, cane, etc )  - [place] fall precautions as indicated by assessment  - Record patient progress and toleration of activity level on Mobility SBAR; progress patient to next Phase/Stage  - Instruct patient to call for assistance with activity based on assessment  - Request Rehabilitation consult to assist with strengthening/weightbearing, etc    Outcome: Progressing      Problem: DISCHARGE PLANNING  Goal: Discharge to home or other facility with appropriate resources  INTERVENTIONS:  - Identify barriers to discharge w/patient and caregiver  - Arrange for needed discharge resources and transportation as appropriate  - Identify discharge learning needs (meds, wound care, etc )  - Arrange for interpretive services to assist at discharge as needed  - Refer to Case Management Department for coordinating discharge planning if the patient needs post-hospital services based on physician/advanced practitioner order or complex needs related to functional status, cognitive ability, or social support system   Outcome: Progressing      Problem: Knowledge Deficit  Goal: Patient/family/caregiver demonstrates understanding of disease process, treatment plan, medications, and discharge instructions  Complete learning assessment and assess knowledge base  Interventions:  - Provide teaching at level of understanding  - Provide teaching via preferred learning methods   Outcome: Progressing      Problem: Potential for Falls  Goal: Patient will remain free of falls  INTERVENTIONS:  - Assess patient frequently for physical needs  -  Identify cognitive and physical deficits and behaviors that affect risk of falls    -  [place] fall precautions as indicated by assessment   - Educate patient/family on patient safety including physical limitations  - Instruct patient to call for assistance with activity based on assessment  - Modify environment to reduce risk of injury  - Consider OT/PT consult to assist with strengthening/mobility    Outcome: Progressing

## 2018-02-14 NOTE — ASSESSMENT & PLAN NOTE
· Most recent /71, mild elevations  · Continue amlodipine 5 mg daily and Lasix 40 mg daily (will give 1 time dose Lasix 20 mg due to volume overload)  · Continue to hold spirinolactone 25 mg daily and triamterene-hctz 37 5-25 mg daily due to creatinine   · Trend Bps  · P r n  hydralazine for systolic blood pressure over 170

## 2018-02-15 VITALS
HEART RATE: 86 BPM | SYSTOLIC BLOOD PRESSURE: 151 MMHG | TEMPERATURE: 97.7 F | DIASTOLIC BLOOD PRESSURE: 65 MMHG | RESPIRATION RATE: 20 BRPM | BODY MASS INDEX: 40.58 KG/M2 | OXYGEN SATURATION: 97 % | WEIGHT: 274 LBS | HEIGHT: 69 IN

## 2018-02-15 LAB
ANION GAP SERPL CALCULATED.3IONS-SCNC: 8 MMOL/L (ref 4–13)
BUN SERPL-MCNC: 31 MG/DL (ref 5–25)
CALCIUM SERPL-MCNC: 8.7 MG/DL (ref 8.3–10.1)
CHLORIDE SERPL-SCNC: 102 MMOL/L (ref 100–108)
CO2 SERPL-SCNC: 29 MMOL/L (ref 21–32)
CREAT SERPL-MCNC: 1.72 MG/DL (ref 0.6–1.3)
GFR SERPL CREATININE-BSD FRML MDRD: 39 ML/MIN/1.73SQ M
GLUCOSE SERPL-MCNC: 102 MG/DL (ref 65–140)
GLUCOSE SERPL-MCNC: 102 MG/DL (ref 65–140)
POTASSIUM SERPL-SCNC: 3.7 MMOL/L (ref 3.5–5.3)
SODIUM SERPL-SCNC: 139 MMOL/L (ref 136–145)

## 2018-02-15 PROCEDURE — 80048 BASIC METABOLIC PNL TOTAL CA: CPT | Performed by: PHYSICIAN ASSISTANT

## 2018-02-15 PROCEDURE — 99232 SBSQ HOSP IP/OBS MODERATE 35: CPT | Performed by: NURSE PRACTITIONER

## 2018-02-15 PROCEDURE — G8979 MOBILITY GOAL STATUS: HCPCS

## 2018-02-15 PROCEDURE — 82948 REAGENT STRIP/BLOOD GLUCOSE: CPT

## 2018-02-15 PROCEDURE — G8978 MOBILITY CURRENT STATUS: HCPCS

## 2018-02-15 PROCEDURE — 99238 HOSP IP/OBS DSCHRG MGMT 30/<: CPT | Performed by: PHYSICIAN ASSISTANT

## 2018-02-15 PROCEDURE — 97162 PT EVAL MOD COMPLEX 30 MIN: CPT

## 2018-02-15 PROCEDURE — G8980 MOBILITY D/C STATUS: HCPCS

## 2018-02-15 RX ADMIN — AMLODIPINE BESYLATE 5 MG: 5 TABLET ORAL at 09:34

## 2018-02-15 RX ADMIN — HEPARIN SODIUM 5000 UNITS: 5000 INJECTION, SOLUTION INTRAVENOUS; SUBCUTANEOUS at 05:34

## 2018-02-15 RX ADMIN — DONEPEZIL HYDROCHLORIDE 5 MG: 5 TABLET ORAL at 09:34

## 2018-02-15 RX ADMIN — FUROSEMIDE 40 MG: 40 TABLET ORAL at 09:34

## 2018-02-15 NOTE — DISCHARGE INSTRUCTIONS
Follow up with your General Practitioner next week for blood work and discussion of medications  You have been given a prescription for blood work  Please have this taken early next week (Monday), a day or two before your appointment with your family Doctor  Stop taking Dyazide until you follow up with you Doctor and discuss further  Make an appointment in the next month with Dr Pedro Kang:   What is a bowel obstruction? A bowel obstruction occurs when your large or small intestine is completely or partly blocked  The blockage prevents food and waste from passing through normally  What causes a bowel obstruction? · Adhesions  are bands of scar tissue that may form after a surgery  An adhesion attaches your intestine to a nearby organ or to the wall of your abdomen  This may pull your intestine out of place and cause an obstruction  · A hernia  occurs when part of the intestine bulges through the muscle wall of the abdomen  The hernia may cause an obstruction if the intestine becomes trapped  · A tumor  may cause a blockage of the intestine  · A foreign body  can block the intestine  A foreign body is something other than food that is swallowed  · A sliding or folding of part of the intestine  into another portion of the intestine may cause a bowel obstruction  · Medical conditions  such as Crohn disease and diverticulitis cause changes to the intestine that may cause a bowel obstruction  · A twisting of the intestine  may cause a bowel obstruction  What are the signs and symptoms of a bowel obstruction? · Nausea and vomiting    · Abdominal pain    · Enlarged abdomen    · Decreased or no bowel movements or gas  How is a bowel obstruction diagnosed? · Blood tests  may show if you have an infection, or if you are dehydrated  Dehydration can develop when your intestines cannot absorb liquid properly       · An x-ray  takes pictures of the organs inside your abdomen  The pictures are used to look for an obstruction  · A CT or MRI scan  may be used to take pictures of your intestines  The pictures may show the location and cause of your blockage  You may be given a dye before the pictures are taken to help healthcare providers see the blockage better  Tell the healthcare provider if you have ever had an allergic reaction to contrast dye  Do not enter the MRI room with anything metal  Metal can cause serious injury  Tell the healthcare provider if you have any metal in or on your body  · An ultrasound  uses sound waves to show pictures of your intestines on a monitor  An ultrasound may be done to find the location of your obstruction  How is a bowel obstruction treated? · An IV  may be used to give you liquids and nutrition  You may not be able to eat or drink anything until your healthcare provider says it is okay  · A nasogastric tube  may be put into your nose  The tube passes through your throat and is guided into your stomach  The tube will be attached to a suction device that removes air and fluid from your stomach  · Antibiotics  may be given to help treat or prevent an infection caused by bacteria  · Surgery  may be done to treat the cause of the blockage  When should I contact my healthcare provider? · You have nausea and are vomiting  · Your abdomen is enlarged  · You cannot pass a bowel movement or gas  · You lose weight without trying  · You have blood in your bowel movement  · You have questions or concerns about your condition or care  When should I seek immediate care or call 911? · You have severe abdominal pain that does not get better  · Your heart is beating faster than normal for you  · You have a fever  CARE AGREEMENT:   You have the right to help plan your care  Learn about your health condition and how it may be treated   Discuss treatment options with your caregivers to decide what care you want to receive  You always have the right to refuse treatment  The above information is an  only  It is not intended as medical advice for individual conditions or treatments  Talk to your doctor, nurse or pharmacist before following any medical regimen to see if it is safe and effective for you  © 2017 [address] Information is for End User's use only and may not be sold, redistributed or otherwise used for commercial purposes  All illustrations and images included in CareNotes® are the copyrighted property of [employer]  or [employer]

## 2018-02-15 NOTE — DISCHARGE SUMMARY
Discharge Summary - General Quintero 67 y o  male MRN: [id number]    Unit/Bed#: -01 Encounter: [id number]      Admission Date: 2/13/2018   Discharge Date: 02/15/18    Admitting Diagnosis:  Small bowel obstruction [K56 609]  Abdominal pain [R10 9]  History of cardioembolic cerebrovascular accident (CVA) [Z86 73]  Other hyperlipidemia [E78 4]  Hypertension, unspecified type [I10]  Discharge Diagnoses: Principal Problem:    Small bowel obstruction  Active Problems:    Hypertension    Hyperlipidemia    Ventral hernia    HARVINDER (acute kidney injury) ([facility] [address] )    Dementia    Elevated random blood glucose level    Renal cyst, left      Consultations: SLIM    Procedures Performed: n/a    HPI: General Quintero is a 67y o  year old male with a past medical history of hypertension, hyperlipidemia, peripheral vascular disease status post right popliteal femoral bypass, history of 2 CVAs (1 hemorrhagic, 1 ischemic), small-bowel obstruction in December, who presented to the emergency department this morning with sharp abdominal pain since 7:00 p m  last night after eating dinner  He states the pain reminded him of his small-bowel obstruction a few months ago  The patient rates the pain as 9/10 when he arrived  Pain is now rated as a 0 with medication control  Patient denies any associated fever or chills  He denies nausea or vomiting however he did force himself to have 1 episode of emesis  Patient does have a previous history of small-bowel obstruction in December 2017 where he was treated at [facility]  He did have NG tube decompression at that time  Patient states that insertion of the NG tube caused epistaxis and a persistent migraine headache, therefore he is apprehensive about NG tube placement  The patient was treated with IV pain medication in the emergency department which help this pain  Patient denies any diarrhea or constipation    He recently had a colonoscopy and GI workup in the fall of 2017, for hematochezia which was unremarkable  He denies any current melena or hematochezia  Last bowel movement was yesterday afternoon  Patient's denies passing flatus since last evening  Patient has previous abdominal surgery history of open cholecystectomy, and 2 ventral hernia surgeries  He does have a large mesh placement in his abdominal wall  Other surgical history includes of popliteal femoral bypass of the right lower extremity  Hospital Course: Kaye Carter is a 67 y o  male admitted for small-bowel obstruction  He was made NPO and NG tube was placed  Slim was consulted for management of patient's other medical history  Overnight the patient has the abdominal pain was controlled  In the morning of 2/14/18, patient had bowel movement and was passing flatus  Obstruction series showed nonobstructive bowel gas pattern  Patient's diet was advanced to clear liquids, which he tolerated without nausea or vomiting or return of abdominal pain  His diet was advanced as tolerated  Patient tolerated regular diet in the evening and in the morning of 2/16/2018  The patient had a history of CKD and had a KI on admission  Internal medicine followed patient and discontinued his Dyazide  At discharge his creatinine was 1 7 to and patient is noted to have a baseline creatinine of 1 6  He was advised to have a BMP completed on Monday of next week and to follow up with his PCP as well  This was discussed with the patient and his son  And they are in understanding  The patient was discharged on 2/15/2018  He is tolerating regular diet  No abdominal pain, no nausea or vomiting, and having normal bowel function  He was also instructed that he could follow up in the General surgery office for discussion future possible repair of hernia  All discharge instructions were discussed with the patient and his son  They were in understanding  All questions were answered to their satisfaction    They were instructed to call with any other questions  See H and P for full details of admission and op note  Patient was seen and examined prior to discharge  COR: RRR, S1 and S2 normal, no murmur  LUNGS: No wheezes, CTA b/l  Abdomen: Ventral hernia with distension due to loss of domain, non rigid, no pain  To palpation, soft, normoactive bowel sounds  Condition at Discharge: good       Discharge instructions/Information to patient and family:   See after visit summary for information provided to patient and family  Post op instructions reviewed with patient and/or family  No RX given for discharge  Provisions for Follow-Up Care:  See after visit summary for information related to follow-up care and any pertinent home health orders  Disposition: Home    Planned Readmission: No    Discharge Statement   I spent 20 minutes discharging the patient  This time was spent on the day of discharge  I had direct contact with the patient on the day of discharge  Additional documentation is required if more than 30 minutes were spent on discharge  Discharge Medications:  See after visit summary for reconciled discharge medications provided to patient and family          Signature:   Mariam Forrest PA-C  Date: 2/15/2018 Time: 11:40 AM

## 2018-02-15 NOTE — PHYSICAL THERAPY NOTE
Physical Therapy Evaluation    Patient's Name: Casey Givens    Admitting Diagnosis  Small bowel obstruction [K56 609]  Abdominal pain [R10 9]  History of cardioembolic cerebrovascular accident (CVA) [Z86 73]  Other hyperlipidemia [E78 4]  Hypertension, unspecified type [I10]    Problem List  Patient Active Problem List   Diagnosis    Hypertension    Hyperlipidemia    Ventral hernia    Small bowel obstruction    PVD (peripheral vascular disease) ([facility] [address] )    [facility]    Dementia    Elevated random blood glucose level    Renal cyst, left       Past Medical History  Past Medical History:   Diagnosis Date    Hyperlipidemia     Hypertension     PVD [facility]per[facility]) [facility] )     Small bowel obstruction     Ventral hernia        Past Surgical History  Past Surgical History:   Procedure Laterality Date    ABDOMINAL HERNIA REPAIR      ABDOMINAL SURGERY      CHOLECYSTECTOMY      open    FEMORAL BYPASS Right       02/15/18 1045   Note Type   Note type Eval only   Pain Assessment   Pain Assessment No/denies pain   Pain Score No Pain   Home Living   Type of Home House  (bi-level)   Home Layout Two level;Performs ADLs on one level; Able to live on main level with bedroom/bathroom  (0 ROLA via garage, 7+7 steps up to 2nd floor)   [address] (no DME used at baseline)   Home Equipment (no AD at baseline)   Additional Comments Pt's son reporting that pt's wife has assistive device and stair glide  Prior Function   Level of [place] Independent with ADLs and functional mobility   Lives With Spouse; Son   Arnold Help From Family   ADL Assistance Independent   IADLs Independent   Falls in the last 6 months 0  ((+) fall history, none recently)   Vocational Retired   Comments (+) driving   Restrictions/Precautions   Wells Kayla Bearing Precautions Per Order No   Braces or Orthoses (none per pt)   Other Precautions Cognitive   General   Family/Caregiver Present Yes  (son)   Cognition   Overall Cognitive Status WFL   Arousal/Participation Alert   Orientation Level Oriented to person;Oriented to place;Oriented to situation  (inconsistent to time, knew February 2018)   Memory Within functional limits   Following Commands Follows all commands and directions without difficulty   Comments pt agreeable to PT IE, h/o dementia   RUE Assessment   RUE Assessment WFL   LUE Assessment   LUE Assessment WFL   RLE Assessment   RLE Assessment WFL   LLE Assessment   LLE Assessment WFL   Coordination   Movements are Fluid and Coordinated 1   Sensation WFL   Light Touch   RLE Light Touch Grossly intact   LLE Light Touch Grossly intact   Bed Mobility   Rolling R 7  Independent   Rolling L 7  Independent   Supine to Sit 7  Independent   Sit to Supine 7  Independent   Transfers   Sit to Stand 7  Independent   Stand to Sit 7  Independent   Toilet transfer 7  Independent   Additional items Standard toilet   Ambulation/Elevation   Gait pattern WNL   Gait Assistance 7  Independent   Assistive Device None   Distance 350'   Stair Management Assistance 7  Independent   Stair Management Technique No rails; Alternating pattern; Foreward   Number of Stairs 13   Balance   Static Sitting Normal   Dynamic Sitting Normal   Static Standing Normal   Dynamic Standing Normal   Ambulatory Normal   Higher level balance (Tinetti: 27/28)   Endurance Deficit   Endurance Deficit No   Activity Tolerance   Activity Tolerance Patient tolerated treatment well   Nurse Made Aware yes, RIVKA Lugo Second verbalized pt appropriate to see, made aware of session outcome/recs   Assessment   Prognosis Excellent   Assessment Pt is 67 y o  male seen for PT evaluation s/p admit to [facility] on 2/13/2018 w/ Small bowel obstruction  Pt presents with severe abdmoinal pain, decrease in bowel movements  PT consulted to assess pt's functional mobility and d/c needs  Order placed for PT eval and tx, w/ up and OOB as tolerated order  Comorbidities affecting pt's physical performance at time of assessment include: HLD, HTN, PVD, small bowel obstruction, ventral vernia  PTA, pt was independent w/ all functional mobility w/ no AD/DME, ambulates unrestricted distances and all terrain, has 0 ROLA, lives w/ wife and son in bi- level home and retired  Personal factors affecting pt at time of IE include: positive fall history  Please find objective findings from PT assessment regarding body systems outlined above  The following objective measures performed on IE: Barthel Index: 100/100, Modified [place]: 1 (no significant disability) and Tinetti/FLAVIO: 27/28 (low risk for falls)  Note pt able to navigate level tile surfaces and FF of stairs without LOB, able to change directions and velocity without gait deviations, as well as negotiate around obstacles without LOB  Pt's clinical presentation is currently evolving seen in pt's presentation of pending further medical/surgical management  From PT/mobility standpoint, pt appears to be functioning close to or at mobility baseline, therefore no further immediate skilled PT needs are warranted at this time  Pt currently performing all phases of functional mobility at independent level without need for AD  Recommend pt continue to mobilize ad khushbu while here  Recommend pt return to previous living environment once medically cleared and with continued family support  Will sign off, D/C PT  Please reconsult if further immediate skilled PT needs are warranted  Barriers to Discharge None   Goals   Patient Goals to return home   Recommendation   Recommendation D/C PT;Home with family support   Equipment Recommended (none)   PT - OK to Discharge Yes  (when medically cleared)   Additional Comments Pt educated to ease back into PLOF upon d/c home, pt verbalized understanding     Modified [place] Scale   Modified Kiera Scale 1   Barthel Index   Feeding 10   Bathing 5   Grooming Score 5   Dressing Score 10   Bladder Score 10   Bowels Score 10   Toilet Use Score 10   Transfers (Bed/Chair) Score 15   Mobility (Level Surface) Score 15   Stairs Score 10   Barthel Index Score 100           Linda Davis, PT, DPT

## 2018-02-15 NOTE — PROGRESS NOTES
[address] Internal Medicine Progress Note  Patient: Denis Solano 67 y o  male   MRN: [id number]  PCP: Eugene Madrid DO  Unit/Bed#: -01 Encounter: [id number]  Date Of Visit: 02/15/18    Assessment:    Principal Problem:    Small bowel obstruction  Active Problems:    Hypertension    Hyperlipidemia    Ventral hernia    HARVINDER (acute kidney injury) ([facility] [address] )    Dementia    Elevated random blood glucose level    Renal cyst, left      Plan:    · Small Bowel Obstruction  · Management per primary surgical team  Patient for d/c today  Tolerating regular diet without any n/v  No abd pain  Passing flatus  ·  ? CKD  · Patient has had elevated creatinine throughout hospitalization, spoke with RIVKA Dempsey from PCP office, patient does have baseline creatinine of 1 6  Creatinine 1 72 today  · Would recommend that he continue home medications at this point, with the exception of Dyazide  · Will have patient have a BMP done on Monday of next week with results to PCP and recommend follow up with PCP next week  Discussed this with patient and son   · Dementia  · Stable  · Continue Aricept  · HLD  · Continue statin  · Chronic lower extremity edema  · Continue oral lasix at d/c   · Son states that legs are actually better than baseline today  · HTN  · Continue Norvasc and lasix  Would recommend that Dyazide is held at discharge as creatinine is slightly above baseline  Patient to have repeat bmp done on Monday with results to PCP  Should also see PCP next week for follow up  Discussed with patient and son  VTE Pharmacologic Prophylaxis:   Pharmacologic: Heparin  Mechanical VTE Prophylaxis in Place: Yes    Patient Centered Rounds: I have performed bedside rounds with nursing staff today  Erma    Discussions with Specialists or Other Care Team Provider: nursing, case management, surgery    Education and Discussions with Family / Patient: Patient and patients son     Time Spent for Care: 30 minutes    More than 50% of total time spent on counseling and coordination of care as described above  Current Length of Stay: 2 day(s)    Current Patient Status: Inpatient   Certification Statement: The patient will continue to require additional inpatient hospital stay due to management per primary team, appears patient is for d/c today  Discharge Plan / Estimated Discharge Date: today     Code Status: Level 1 - Full Code      Subjective:   Patient feels "great" ready to go home  Denies any abd pain, n/v/  Reports passing flatus  Objective:     Vitals:   Temp (24hrs), Av 7 °F (36 5 °C), Min:97 7 °F (36 5 °C), Max:97 8 °F (36 6 °C)    HR:  [78-90] 86  Resp:  [18-20] 20  BP: (112-167)/(51-71) 151/65  SpO2:  [93 %-100 %] 97 %  Body mass index is 40 46 kg/m²  Input and Output Summary (last 24 hours): Intake/Output Summary (Last 24 hours) at 02/15/18 1036  Last data filed at 02/15/18 0901   Gross per 24 hour   Intake              970 ml   Output             1150 ml   Net             -180 ml       Physical Exam:     Physical Exam   Constitutional: He is oriented to person, place, and time  No distress  HENT:   Head: Normocephalic  Eyes: Pupils are equal, round, and reactive to light  Neck: Normal range of motion  Cardiovascular: Normal rate, regular rhythm, normal heart sounds and intact distal pulses  Bilateral LE edema, mod +1 pitting, chronic per patient and son   Pulmonary/Chest: Effort normal and breath sounds normal    Abdominal: Soft  Bowel sounds are normal  He exhibits no distension  There is no tenderness  Musculoskeletal: Normal range of motion  He exhibits edema  Neurological: He is alert and oriented to person, place, and time  Skin: Skin is warm and dry  Psychiatric: He has a normal mood and affect  Nursing note and vitals reviewed            Additional Data:     Labs:      Results from last 7 days  Lab Units 18  0533 18  0805   WBC Thousand/uL 11 31* 20 10* HEMOGLOBIN g/dL 13 1 15 0   HEMATOCRIT % 39 2 44 5   PLATELETS Thousands/uL 203 264   LYMPHO PCT %  --  4*   MONO PCT MAN %  --  4   EOSINO PCT MANUAL %  --  0       Results from last 7 days  Lab Units 02/15/18  0448 02/14/18  0533   SODIUM mmol/L 139 138   POTASSIUM mmol/L 3 7 4 1   CHLORIDE mmol/L 102 102   CO2 mmol/L 29 28   BUN mg/dL 31* 33*   CREATININE mg/dL 1 72* 1 69*   CALCIUM mg/dL 8 7 8 3   TOTAL PROTEIN g/dL  --  6 4   BILIRUBIN TOTAL mg/dL  --  0 70   ALK PHOS U/L  --  91   ALT U/L  --  38   AST U/L  --  26   GLUCOSE RANDOM mg/dL 102 128           * I Have Reviewed All Lab Data Listed Above  * Additional Pertinent Lab Tests Reviewed: All Labs Within Last 24 Hours Reviewed    Imaging:    Imaging Reports Reviewed Today Include: reviewed       Recent Cultures (last 7 days):           Last 24 Hours Medication List:     Current Facility-Administered Medications:  amLODIPine 5 mg Oral Daily Neena Paz PA-C   atorvastatin 10 mg Oral Daily With Dinner Neena Paz PA-C   donepezil 5 mg Oral BID Neena Paz PA-C   furosemide 20 mg Intravenous Once Catia Lebron PA-C   furosemide 40 mg Oral Daily Neena Paz PA-C   heparin (porcine) 5,000 Units Subcutaneous Q8H [address] Neena Paz PA-C   hydrALAZINE 5 mg Intravenous Q6H PRN Catia Lebron PA-C   HYDROmorphone 1 mg Intravenous Q3H PRN Elzbieta Lucas PA-C   insulin lispro 1-5 Units Subcutaneous TID AC Neena Paz PA-C   insulin lispro 1-5 Units Subcutaneous HS Neena Paz PA-C   ondansetron 4 mg Intravenous Q6H PRN Rosalba Zelaya PA-C        Today, Patient Was Seen By: ZACHARIAH Gonzalez    ** Please Note: This note has been constructed using a voice recognition system   **

## 2018-02-15 NOTE — ASSESSMENT & PLAN NOTE
· Management per surgery  · NGT removed yesterday, patient now tolerated diet without abd pain/n/v    · Serial abdominal exams, no tenderness, bowel sounds normoactive  · Patient for d/c today per primary team

## 2018-02-15 NOTE — PLAN OF CARE
Problem: PAIN - ADULT  Goal: Verbalizes/displays adequate comfort level or baseline comfort level  Interventions:  - Encourage patient to monitor pain and request assistance  - Assess pain using appropriate pain scale  - Administer analgesics based on type and severity of pain and evaluate response  - Implement non-pharmacological measures as appropriate and evaluate response  - Consider cultural and social influences on pain and pain management  - Notify physician/advanced practitioner if interventions unsuccessful or patient reports new pain   Outcome: Progressing      Problem: INFECTION - ADULT  Goal: Absence or prevention of progression during hospitalization  INTERVENTIONS:  - Assess and monitor for signs and symptoms of infection  - Monitor lab/diagnostic results  - Monitor all insertion sites, i e  indwelling lines, tubes, and drains  - Monitor endotracheal (as able) and nasal secretions for changes in amount and color  - [place] appropriate cooling/warming therapies per order  - Administer medications as ordered  - Instruct and encourage patient and family to use good hand hygiene technique  - Identify and instruct in appropriate isolation precautions for identified infection/condition   Outcome: Progressing    Goal: Absence of fever/infection during neutropenic period  INTERVENTIONS:  - Monitor WBC  - Implement neutropenic guidelines   Outcome: Progressing      Problem: SAFETY ADULT  Goal: Patient will remain free of falls  INTERVENTIONS:  - Assess patient frequently for physical needs  -  Identify cognitive and physical deficits and behaviors that affect risk of falls    -  [place] fall precautions as indicated by assessment   - Educate patient/family on patient safety including physical limitations  - Instruct patient to call for assistance with activity based on assessment  - Modify environment to reduce risk of injury  - Consider OT/PT consult to assist with strengthening/mobility    Outcome: Progressing    Goal: Maintain or return to baseline ADL function  INTERVENTIONS:  -  Assess patient's ability to carry out ADLs; assess patient's baseline for ADL function and identify physical deficits which impact ability to perform ADLs (bathing, care of mouth/teeth, toileting, grooming, dressing, etc )  - Assess/evaluate cause of self-care deficits   - Assess range of motion  - Assess patient's mobility; develop plan if impaired  - Assess patient's need for assistive devices and provide as appropriate  - Encourage maximum independence but intervene and supervise when necessary  ¯ Involve family in performance of ADLs  ¯ Assess for home care needs following discharge   ¯ Request OT consult to assist with ADL evaluation and planning for discharge  ¯ Provide patient education as appropriate   Outcome: Progressing    Goal: Maintain or return mobility status to optimal level  INTERVENTIONS:  - Assess patient's baseline mobility status (ambulation, transfers, stairs, etc )    - Identify cognitive and physical deficits and behaviors that affect mobility  - Identify mobility aids required to assist with transfers and/or ambulation (gait belt, sit-to-stand, lift, walker, cane, etc )  - [place] fall precautions as indicated by assessment  - Record patient progress and toleration of activity level on Mobility SBAR; progress patient to next Phase/Stage  - Instruct patient to call for assistance with activity based on assessment  - Request Rehabilitation consult to assist with strengthening/weightbearing, etc    Outcome: Progressing      Problem: DISCHARGE PLANNING  Goal: Discharge to home or other facility with appropriate resources  INTERVENTIONS:  - Identify barriers to discharge w/patient and caregiver  - Arrange for needed discharge resources and transportation as appropriate  - Identify discharge learning needs (meds, wound care, etc )  - Arrange for interpretive services to assist at discharge as needed  - Refer to Case Management Department for coordinating discharge planning if the patient needs post-hospital services based on physician/advanced practitioner order or complex needs related to functional status, cognitive ability, or social support system   Outcome: Progressing      Problem: Knowledge Deficit  Goal: Patient/family/caregiver demonstrates understanding of disease process, treatment plan, medications, and discharge instructions  Complete learning assessment and assess knowledge base  Interventions:  - Provide teaching at level of understanding  - Provide teaching via preferred learning methods   Outcome: Progressing      Problem: Potential for Falls  Goal: Patient will remain free of falls  INTERVENTIONS:  - Assess patient frequently for physical needs  -  Identify cognitive and physical deficits and behaviors that affect risk of falls  -  [place] fall precautions as indicated by assessment   - Educate patient/family on patient safety including physical limitations  - Instruct patient to call for assistance with activity based on assessment  - Modify environment to reduce risk of injury  - Consider OT/PT consult to assist with strengthening/mobility    Outcome: Progressing      Problem: Nutrition/Hydration-ADULT  Goal: Nutrient/Hydration intake appropriate for improving, restoring or maintaining nutritional needs  Monitor and assess patient's nutrition/hydration status for malnutrition (ex- brittle hair, bruises, dry skin, pale skin and conjunctiva, muscle wasting, smooth red tongue, and disorientation)  Collaborate with interdisciplinary team and initiate plan and interventions as ordered  Monitor patient's weight and dietary intake as ordered or per policy  Utilize nutrition screening tool and intervene per policy  Determine patient's food preferences and provide high-protein, high-caloric foods as appropriate       INTERVENTIONS:  - Monitor oral intake, urinary output, labs, and treatment plans  - Assess nutrition and hydration status and recommend course of action  - Evaluate amount of meals eaten  - Assist patient with eating if necessary   - Allow adequate time for meals  - Recommend/ encourage appropriate diets, oral nutritional supplements, and vitamin/mineral supplements  - Order, calculate, and assess calorie counts as needed  - Recommend, monitor, and adjust tube feedings and TPN/PPN based on assessed needs  - Assess need for intravenous fluids  - Provide specific nutrition/hydration education as appropriate  - Include patient/family/caregiver in decisions related to nutrition   Outcome: Progressing

## 2018-11-07 ENCOUNTER — HOSPITAL ENCOUNTER (INPATIENT)
Facility: HOSPITAL | Age: 73
LOS: 2 days | Discharge: HOME | DRG: 390 | End: 2018-11-10
Attending: EMERGENCY MEDICINE | Admitting: SURGERY
Payer: MEDICARE

## 2018-11-07 ENCOUNTER — APPOINTMENT (EMERGENCY)
Dept: CT IMAGING | Facility: HOSPITAL | Age: 73
DRG: 390 | End: 2018-11-07
Payer: MEDICARE

## 2018-11-07 DIAGNOSIS — I10 ESSENTIAL HYPERTENSION: Primary | ICD-10-CM

## 2018-11-07 DIAGNOSIS — K56.609 SBO (SMALL BOWEL OBSTRUCTION) (HCC): ICD-10-CM

## 2018-11-07 DIAGNOSIS — E78.5 HYPERLIPIDEMIA, UNSPECIFIED HYPERLIPIDEMIA TYPE: ICD-10-CM

## 2018-11-07 LAB
ALBUMIN SERPL BCP-MCNC: 3.4 G/DL (ref 3.5–5)
ALP SERPL-CCNC: 112 U/L (ref 46–116)
ALT SERPL W P-5'-P-CCNC: 32 U/L (ref 12–78)
ANION GAP SERPL CALCULATED.3IONS-SCNC: 6 MMOL/L (ref 4–13)
AST SERPL W P-5'-P-CCNC: 22 U/L (ref 5–45)
BASOPHILS # BLD AUTO: 0.04 THOUSANDS/ΜL (ref 0–0.1)
BASOPHILS NFR BLD AUTO: 0 % (ref 0–1)
BILIRUB SERPL-MCNC: 0.5 MG/DL (ref 0.2–1)
BUN SERPL-MCNC: 23 MG/DL (ref 5–25)
CALCIUM SERPL-MCNC: 9.4 MG/DL (ref 8.3–10.1)
CHLORIDE SERPL-SCNC: 100 MMOL/L (ref 100–108)
CO2 SERPL-SCNC: 31 MMOL/L (ref 21–32)
CREAT SERPL-MCNC: 1.68 MG/DL (ref 0.6–1.3)
EOSINOPHIL # BLD AUTO: 0.14 THOUSAND/ΜL (ref 0–0.61)
EOSINOPHIL NFR BLD AUTO: 1 % (ref 0–6)
ERYTHROCYTE [DISTWIDTH] IN BLOOD BY AUTOMATED COUNT: 12.4 % (ref 11.6–15.1)
GFR SERPL CREATININE-BSD FRML MDRD: 40 ML/MIN/1.73SQ M
GLUCOSE SERPL-MCNC: 116 MG/DL (ref 65–140)
HCT VFR BLD AUTO: 43 % (ref 36.5–49.3)
HGB BLD-MCNC: 14.5 G/DL (ref 12–17)
IMM GRANULOCYTES # BLD AUTO: 0.1 THOUSAND/UL (ref 0–0.2)
IMM GRANULOCYTES NFR BLD AUTO: 1 % (ref 0–2)
LACTATE SERPL-SCNC: 1.3 MMOL/L (ref 0.5–2)
LIPASE SERPL-CCNC: 147 U/L (ref 73–393)
LYMPHOCYTES # BLD AUTO: 1.02 THOUSANDS/ΜL (ref 0.6–4.47)
LYMPHOCYTES NFR BLD AUTO: 6 % (ref 14–44)
MCH RBC QN AUTO: 30.3 PG (ref 26.8–34.3)
MCHC RBC AUTO-ENTMCNC: 33.7 G/DL (ref 31.4–37.4)
MCV RBC AUTO: 90 FL (ref 82–98)
MONOCYTES # BLD AUTO: 0.96 THOUSAND/ΜL (ref 0.17–1.22)
MONOCYTES NFR BLD AUTO: 6 % (ref 4–12)
NEUTROPHILS # BLD AUTO: 13.76 THOUSANDS/ΜL (ref 1.85–7.62)
NEUTS SEG NFR BLD AUTO: 86 % (ref 43–75)
NRBC BLD AUTO-RTO: 0 /100 WBCS
PLATELET # BLD AUTO: 219 THOUSANDS/UL (ref 149–390)
PMV BLD AUTO: 10.6 FL (ref 8.9–12.7)
POTASSIUM SERPL-SCNC: 4.2 MMOL/L (ref 3.5–5.3)
PROT SERPL-MCNC: 7.4 G/DL (ref 6.4–8.2)
RBC # BLD AUTO: 4.78 MILLION/UL (ref 3.88–5.62)
SODIUM SERPL-SCNC: 137 MMOL/L (ref 136–145)
TROPONIN I SERPL-MCNC: <0.02 NG/ML
WBC # BLD AUTO: 16.02 THOUSAND/UL (ref 4.31–10.16)

## 2018-11-07 PROCEDURE — 83690 ASSAY OF LIPASE: CPT | Performed by: EMERGENCY MEDICINE

## 2018-11-07 PROCEDURE — 85025 COMPLETE CBC W/AUTO DIFF WBC: CPT | Performed by: EMERGENCY MEDICINE

## 2018-11-07 PROCEDURE — 36415 COLL VENOUS BLD VENIPUNCTURE: CPT | Performed by: EMERGENCY MEDICINE

## 2018-11-07 PROCEDURE — 84484 ASSAY OF TROPONIN QUANT: CPT | Performed by: EMERGENCY MEDICINE

## 2018-11-07 PROCEDURE — 80053 COMPREHEN METABOLIC PANEL: CPT | Performed by: EMERGENCY MEDICINE

## 2018-11-07 PROCEDURE — 83605 ASSAY OF LACTIC ACID: CPT | Performed by: EMERGENCY MEDICINE

## 2018-11-07 PROCEDURE — 1124F ACP DISCUSS-NO DSCNMKR DOCD: CPT | Performed by: NURSE PRACTITIONER

## 2018-11-07 PROCEDURE — 93005 ELECTROCARDIOGRAM TRACING: CPT

## 2018-11-07 PROCEDURE — 99285 EMERGENCY DEPT VISIT HI MDM: CPT

## 2018-11-07 PROCEDURE — 74176 CT ABD & PELVIS W/O CONTRAST: CPT

## 2018-11-08 ENCOUNTER — APPOINTMENT (INPATIENT)
Dept: RADIOLOGY | Facility: HOSPITAL | Age: 73
DRG: 390 | End: 2018-11-08
Payer: MEDICARE

## 2018-11-08 PROBLEM — Z86.73 HISTORY OF CVA (CEREBROVASCULAR ACCIDENT): Status: ACTIVE | Noted: 2018-09-21

## 2018-11-08 PROBLEM — N18.9 CKD (CHRONIC KIDNEY DISEASE): Status: ACTIVE | Noted: 2018-11-08

## 2018-11-08 PROBLEM — E78.5 DYSLIPIDEMIA: Status: ACTIVE | Noted: 2018-09-21

## 2018-11-08 LAB
ALBUMIN SERPL BCP-MCNC: 3.1 G/DL (ref 3.5–5)
ALP SERPL-CCNC: 100 U/L (ref 46–116)
ALT SERPL W P-5'-P-CCNC: 30 U/L (ref 12–78)
ANION GAP SERPL CALCULATED.3IONS-SCNC: 5 MMOL/L (ref 4–13)
ANION GAP SERPL CALCULATED.3IONS-SCNC: 7 MMOL/L (ref 4–13)
AST SERPL W P-5'-P-CCNC: 19 U/L (ref 5–45)
ATRIAL RATE: 84 BPM
BASOPHILS # BLD AUTO: 0.03 THOUSANDS/ΜL (ref 0–0.1)
BASOPHILS # BLD AUTO: 0.04 THOUSANDS/ΜL (ref 0–0.1)
BASOPHILS NFR BLD AUTO: 0 % (ref 0–1)
BASOPHILS NFR BLD AUTO: 0 % (ref 0–1)
BILIRUB SERPL-MCNC: 0.6 MG/DL (ref 0.2–1)
BILIRUB UR QL STRIP: NEGATIVE
BUN SERPL-MCNC: 24 MG/DL (ref 5–25)
BUN SERPL-MCNC: 24 MG/DL (ref 5–25)
CALCIUM SERPL-MCNC: 9 MG/DL (ref 8.3–10.1)
CALCIUM SERPL-MCNC: 9.1 MG/DL (ref 8.3–10.1)
CHLORIDE SERPL-SCNC: 103 MMOL/L (ref 100–108)
CHLORIDE SERPL-SCNC: 103 MMOL/L (ref 100–108)
CLARITY UR: CLEAR
CO2 SERPL-SCNC: 29 MMOL/L (ref 21–32)
CO2 SERPL-SCNC: 32 MMOL/L (ref 21–32)
COLOR UR: YELLOW
CREAT SERPL-MCNC: 1.64 MG/DL (ref 0.6–1.3)
CREAT SERPL-MCNC: 1.67 MG/DL (ref 0.6–1.3)
EOSINOPHIL # BLD AUTO: 0.12 THOUSAND/ΜL (ref 0–0.61)
EOSINOPHIL # BLD AUTO: 0.26 THOUSAND/ΜL (ref 0–0.61)
EOSINOPHIL NFR BLD AUTO: 1 % (ref 0–6)
EOSINOPHIL NFR BLD AUTO: 2 % (ref 0–6)
ERYTHROCYTE [DISTWIDTH] IN BLOOD BY AUTOMATED COUNT: 12.5 % (ref 11.6–15.1)
ERYTHROCYTE [DISTWIDTH] IN BLOOD BY AUTOMATED COUNT: 12.6 % (ref 11.6–15.1)
GFR SERPL CREATININE-BSD FRML MDRD: 40 ML/MIN/1.73SQ M
GFR SERPL CREATININE-BSD FRML MDRD: 41 ML/MIN/1.73SQ M
GLUCOSE SERPL-MCNC: 106 MG/DL (ref 65–140)
GLUCOSE SERPL-MCNC: 81 MG/DL (ref 65–140)
GLUCOSE UR STRIP-MCNC: NEGATIVE MG/DL
HCT VFR BLD AUTO: 41 % (ref 36.5–49.3)
HCT VFR BLD AUTO: 43 % (ref 36.5–49.3)
HGB BLD-MCNC: 13.7 G/DL (ref 12–17)
HGB BLD-MCNC: 14.3 G/DL (ref 12–17)
HGB UR QL STRIP.AUTO: NEGATIVE
IMM GRANULOCYTES # BLD AUTO: 0.09 THOUSAND/UL (ref 0–0.2)
IMM GRANULOCYTES # BLD AUTO: 0.1 THOUSAND/UL (ref 0–0.2)
IMM GRANULOCYTES NFR BLD AUTO: 1 % (ref 0–2)
IMM GRANULOCYTES NFR BLD AUTO: 1 % (ref 0–2)
KETONES UR STRIP-MCNC: NEGATIVE MG/DL
LEUKOCYTE ESTERASE UR QL STRIP: NEGATIVE
LYMPHOCYTES # BLD AUTO: 1.79 THOUSANDS/ΜL (ref 0.6–4.47)
LYMPHOCYTES # BLD AUTO: 2.02 THOUSANDS/ΜL (ref 0.6–4.47)
LYMPHOCYTES NFR BLD AUTO: 12 % (ref 14–44)
LYMPHOCYTES NFR BLD AUTO: 16 % (ref 14–44)
MAGNESIUM SERPL-MCNC: 1.8 MG/DL (ref 1.6–2.6)
MCH RBC QN AUTO: 30.1 PG (ref 26.8–34.3)
MCH RBC QN AUTO: 30.6 PG (ref 26.8–34.3)
MCHC RBC AUTO-ENTMCNC: 33.3 G/DL (ref 31.4–37.4)
MCHC RBC AUTO-ENTMCNC: 33.4 G/DL (ref 31.4–37.4)
MCV RBC AUTO: 90 FL (ref 82–98)
MCV RBC AUTO: 92 FL (ref 82–98)
MONOCYTES # BLD AUTO: 0.96 THOUSAND/ΜL (ref 0.17–1.22)
MONOCYTES # BLD AUTO: 1 THOUSAND/ΜL (ref 0.17–1.22)
MONOCYTES NFR BLD AUTO: 7 % (ref 4–12)
MONOCYTES NFR BLD AUTO: 8 % (ref 4–12)
NEUTROPHILS # BLD AUTO: 11.97 THOUSANDS/ΜL (ref 1.85–7.62)
NEUTROPHILS # BLD AUTO: 9.17 THOUSANDS/ΜL (ref 1.85–7.62)
NEUTS SEG NFR BLD AUTO: 73 % (ref 43–75)
NEUTS SEG NFR BLD AUTO: 79 % (ref 43–75)
NITRITE UR QL STRIP: NEGATIVE
NRBC BLD AUTO-RTO: 0 /100 WBCS
NRBC BLD AUTO-RTO: 0 /100 WBCS
P AXIS: 73 DEGREES
PH UR STRIP.AUTO: 6 [PH] (ref 4.5–8)
PHOSPHATE SERPL-MCNC: 4.1 MG/DL (ref 2.3–4.1)
PLATELET # BLD AUTO: 199 THOUSANDS/UL (ref 149–390)
PLATELET # BLD AUTO: 218 THOUSANDS/UL (ref 149–390)
PMV BLD AUTO: 10.5 FL (ref 8.9–12.7)
PMV BLD AUTO: 10.6 FL (ref 8.9–12.7)
POTASSIUM SERPL-SCNC: 4.2 MMOL/L (ref 3.5–5.3)
POTASSIUM SERPL-SCNC: 4.2 MMOL/L (ref 3.5–5.3)
PR INTERVAL: 224 MS
PROT SERPL-MCNC: 6.9 G/DL (ref 6.4–8.2)
PROT UR STRIP-MCNC: NEGATIVE MG/DL
QRS AXIS: 63 DEGREES
QRSD INTERVAL: 148 MS
QT INTERVAL: 426 MS
QTC INTERVAL: 503 MS
RBC # BLD AUTO: 4.55 MILLION/UL (ref 3.88–5.62)
RBC # BLD AUTO: 4.67 MILLION/UL (ref 3.88–5.62)
SODIUM SERPL-SCNC: 139 MMOL/L (ref 136–145)
SODIUM SERPL-SCNC: 140 MMOL/L (ref 136–145)
SP GR UR STRIP.AUTO: 1.02 (ref 1–1.03)
T WAVE AXIS: 39 DEGREES
UROBILINOGEN UR QL STRIP.AUTO: 0.2 E.U./DL
VENTRICULAR RATE: 84 BPM
WBC # BLD AUTO: 12.54 THOUSAND/UL (ref 4.31–10.16)
WBC # BLD AUTO: 15.01 THOUSAND/UL (ref 4.31–10.16)

## 2018-11-08 PROCEDURE — 99232 SBSQ HOSP IP/OBS MODERATE 35: CPT | Performed by: NURSE PRACTITIONER

## 2018-11-08 PROCEDURE — 96374 THER/PROPH/DIAG INJ IV PUSH: CPT

## 2018-11-08 PROCEDURE — 85025 COMPLETE CBC W/AUTO DIFF WBC: CPT | Performed by: SURGERY

## 2018-11-08 PROCEDURE — 74018 RADEX ABDOMEN 1 VIEW: CPT

## 2018-11-08 PROCEDURE — 81003 URINALYSIS AUTO W/O SCOPE: CPT | Performed by: EMERGENCY MEDICINE

## 2018-11-08 PROCEDURE — 84100 ASSAY OF PHOSPHORUS: CPT | Performed by: SURGERY

## 2018-11-08 PROCEDURE — 85025 COMPLETE CBC W/AUTO DIFF WBC: CPT | Performed by: PHYSICIAN ASSISTANT

## 2018-11-08 PROCEDURE — 83735 ASSAY OF MAGNESIUM: CPT | Performed by: SURGERY

## 2018-11-08 PROCEDURE — 80053 COMPREHEN METABOLIC PANEL: CPT | Performed by: SURGERY

## 2018-11-08 PROCEDURE — 80048 BASIC METABOLIC PNL TOTAL CA: CPT | Performed by: PHYSICIAN ASSISTANT

## 2018-11-08 PROCEDURE — 99223 1ST HOSP IP/OBS HIGH 75: CPT | Performed by: SURGERY

## 2018-11-08 PROCEDURE — 93010 ELECTROCARDIOGRAM REPORT: CPT | Performed by: INTERNAL MEDICINE

## 2018-11-08 PROCEDURE — 74022 RADEX COMPL AQT ABD SERIES: CPT

## 2018-11-08 RX ORDER — ONDANSETRON 2 MG/ML
INJECTION INTRAMUSCULAR; INTRAVENOUS
Status: COMPLETED
Start: 2018-11-08 | End: 2018-11-08

## 2018-11-08 RX ORDER — SODIUM CHLORIDE 9 MG/ML
125 INJECTION, SOLUTION INTRAVENOUS CONTINUOUS
Status: DISCONTINUED | OUTPATIENT
Start: 2018-11-08 | End: 2018-11-09

## 2018-11-08 RX ORDER — ONDANSETRON 2 MG/ML
4 INJECTION INTRAMUSCULAR; INTRAVENOUS EVERY 6 HOURS PRN
Status: DISCONTINUED | OUTPATIENT
Start: 2018-11-08 | End: 2018-11-10 | Stop reason: HOSPADM

## 2018-11-08 RX ORDER — FENTANYL CITRATE 50 UG/ML
25 INJECTION, SOLUTION INTRAMUSCULAR; INTRAVENOUS EVERY 2 HOUR PRN
Status: DISCONTINUED | OUTPATIENT
Start: 2018-11-08 | End: 2018-11-10 | Stop reason: HOSPADM

## 2018-11-08 RX ORDER — SODIUM CHLORIDE, SODIUM GLUCONATE, SODIUM ACETATE, POTASSIUM CHLORIDE, MAGNESIUM CHLORIDE, SODIUM PHOSPHATE, DIBASIC, AND POTASSIUM PHOSPHATE .53; .5; .37; .037; .03; .012; .00082 G/100ML; G/100ML; G/100ML; G/100ML; G/100ML; G/100ML; G/100ML
125 INJECTION, SOLUTION INTRAVENOUS CONTINUOUS
Status: DISCONTINUED | OUTPATIENT
Start: 2018-11-08 | End: 2018-11-09

## 2018-11-08 RX ORDER — FUROSEMIDE 10 MG/ML
40 INJECTION INTRAMUSCULAR; INTRAVENOUS
Status: DISCONTINUED | OUTPATIENT
Start: 2018-11-08 | End: 2018-11-09

## 2018-11-08 RX ADMIN — SODIUM CHLORIDE, SODIUM GLUCONATE, SODIUM ACETATE, POTASSIUM CHLORIDE, MAGNESIUM CHLORIDE, SODIUM PHOSPHATE, DIBASIC, AND POTASSIUM PHOSPHATE 125 ML/HR: .53; .5; .37; .037; .03; .012; .00082 INJECTION, SOLUTION INTRAVENOUS at 00:37

## 2018-11-08 RX ADMIN — FUROSEMIDE 40 MG: 10 INJECTION, SOLUTION INTRAMUSCULAR; INTRAVENOUS at 17:14

## 2018-11-08 RX ADMIN — MORPHINE SULFATE 2 MG: 2 INJECTION, SOLUTION INTRAMUSCULAR; INTRAVENOUS at 05:10

## 2018-11-08 RX ADMIN — FENTANYL CITRATE 25 MCG: 50 INJECTION INTRAMUSCULAR; INTRAVENOUS at 01:01

## 2018-11-08 RX ADMIN — ENOXAPARIN SODIUM 40 MG: 40 INJECTION SUBCUTANEOUS at 09:48

## 2018-11-08 RX ADMIN — FAMOTIDINE 20 MG: 10 INJECTION, SOLUTION INTRAVENOUS at 17:14

## 2018-11-08 RX ADMIN — SODIUM CHLORIDE 125 ML/HR: 0.9 INJECTION, SOLUTION INTRAVENOUS at 17:15

## 2018-11-08 RX ADMIN — FAMOTIDINE 20 MG: 10 INJECTION, SOLUTION INTRAVENOUS at 09:48

## 2018-11-08 RX ADMIN — SODIUM CHLORIDE 125 ML/HR: 0.9 INJECTION, SOLUTION INTRAVENOUS at 02:41

## 2018-11-08 RX ADMIN — ONDANSETRON HYDROCHLORIDE 4 MG: 2 SOLUTION INTRAMUSCULAR; INTRAVENOUS at 00:12

## 2018-11-08 NOTE — H&P
GENERAL SURGERY HISTORY AND PHYSICAL    Alaina Rosa 68 y o  male MRN: [id number]  Unit/Bed#: -01 Encounter: [id number]      Assessment/Plan   Past Medical History:   Diagnosis Date    CKD (chronic kidney disease) 11/8/2018    Hyperlipidemia     Hypertension     [facility]VD [facility]per[facility]) [facility] [address]     [facility] bowel obstruction ([facility] )     Ventral hernia      77-year-old male with past history of small-bowel obstructions with acute onset of abdominal pain around noon yesterday  No associated emesis  Leukocytosis of 15 and CT scan finding consistent with small-bowel obstruction with transition point in the right lower quadrant proximal to cecum  Obstruction series shows nonspecific bowel gas pattern with past study of small bowel air  Patient with intermittent pain and significant distention he states this has improved since yesterday  Patient is refusing NG tube currently, but hemodynamically stable with no current nausea or vomiting  Will continue with NPO and IV fluids  Did discuss with patient that his symptoms would become worse he may require an NG tube at that time  Plan  - diet NPO  - IV fluids  - serial abdominal exams and labs  - bowel rest  - DVT prophylaxis  - internal medicine consult for medical management, appreciate input    ______________________________________________________________________  Chief Complaint: I feel okay right now except for my stomach  HPI: Alaina Rosa is a 68y o  year old male with PMHx of small-bowel obstruction left seen here for similar symptoms in February of 2018  He was in his normal state of health yesterday and till around noon he started to experience sharp onset of cramping abdominal pain around noon yesterday  Occasional nausea and "queasiness", but no emesis  He states that the pain would come and go but it most resolved by the time he presented to the emergency department    This pain is similar to previous episodes of small-bowel obstruction in the past   He will occasionally get similar pain but it will resolve on its own  He states that staying hydrated and watching when he is eating helps in avoid these episodes  He did have intermittent pain overnight  Currently more comfortable this morning but still feeling distended with occasional pains     But states he had an episode of sharp cramping pain when he went down to x-ray early this morning  He denies fever, chills, vomiting, diarrhea, constipation, hematochezia, melena  He denies recent sick contacts or feeling of change in health before this episode started yesterday  He had a normal breakfast of eggs yesterday morning  Previous history of multiple abdominal surgeries in the past has large ventral hernia  Denies chest pain, shortness of breath, palpitations, headache, of cramping or tenderness and extremities  Past medical history includes hypertension, hyperlipidemia, chronic kidney disease, peripheral vascular disease, ventral hernia      Review of Systems   Negative except as noted in HPI    Meds/Allergies   No Known Allergies  current meds:   Current Facility-Administered Medications   Medication Dose Route Frequency    enoxaparin (LOVENOX) subcutaneous injection 40 mg  40 mg Subcutaneous Daily    famotidine (PEPCID) injection 20 mg  20 mg Intravenous BID    fentanyl citrate (PF) 100 MCG/2ML 25 mcg  25 mcg Intravenous Q2H PRN    morphine injection 2 mg  2 mg Intravenous Q1H PRN    multi-electrolyte (ISOLYTE-S PH 7 4 equivalent) IV solution  125 mL/hr Intravenous Continuous    ondansetron (ZOFRAN) injection 4 mg  4 mg Intravenous Q6H PRN    sodium chloride 0 9 % infusion  125 mL/hr Intravenous Continuous       Historical Information   Past Medical History:   Diagnosis Date    CKD (chronic kidney disease) 11/8/2018    Hyperlipidemia     Hypertension     [facility]VD [facility]peripheral [facility]) [facility] [address] [facility]     Small bowel obstruction (HCC)     Ventral hernia      Past Surgical History:   Procedure Laterality Date    ABDOMINAL HERNIA REPAIR      ABDOMINAL SURGERY      CHOLECYSTECTOMY      open    FEMORAL BYPASS Right      Social History   History   Alcohol Use No     History   Drug Use No     History   Smoking Status    Never Smoker   Smokeless Tobacco    Never Used       Family History:  Negative/unremarkable except as detailed in HPI  Objective   Vitals: /62 (BP Location: Left arm)   Pulse 79   Temp 98 9 °F (37 2 °C) (Oral)   Resp 18   Ht 5' 10" (1 778 m)   Wt 126 kg (278 lb)   SpO2 93%   BMI 39 89 kg/m² ,Body mass index is 39 89 kg/m²  No intake or output data in the 24 hours ending 11/08/18 0934  Invasive Devices     Peripheral Intravenous Line            Peripheral IV 11/07/18 Right Antecubital less than 1 day                Lab Results:   CBC with diff:   Lab Results   Component Value Date    WBC 15 01 (H) 11/08/2018    HGB 13 7 11/08/2018    HCT 41 0 11/08/2018    MCV 90 11/08/2018     11/08/2018    MCH 30 1 11/08/2018    MCHC 33 4 11/08/2018    RDW 12 5 11/08/2018    MPV 10 5 11/08/2018    NRBC 0 11/08/2018   , BMP/CMP:   Lab Results   Component Value Date    SODIUM 139 11/08/2018    K 4 2 11/08/2018     11/08/2018    CO2 29 11/08/2018    BUN 24 11/08/2018    CREATININE 1 67 (H) 11/08/2018    CALCIUM 9 1 11/08/2018    AST 19 11/08/2018    ALT 30 11/08/2018    ALKPHOS 100 11/08/2018    EGFR 40 11/08/2018       Physical Exam  Vitals: /62 (BP Location: Left arm)   Pulse 79   Temp 98 9 °F (37 2 °C) (Oral)   Resp 18   Ht 5' 10" (1 778 m)   Wt 126 kg (278 lb)   SpO2 93%   BMI 39 89 kg/m² ,Body mass index is 39 89 kg/m²    General appearance: AAO x3, no distress, appears stated age, cooperative  HEENT: PERRL, sclera clear, anicterus, oral mucosa is moist  Neck: No carotid bruits, trachea is midline    Back: no tenderness,deformity,   Lungs:clear throughout, no wheezes or rhonchi  Heart[de-identified] RRR, S1, S2 normal, no murmur  Abdomen:  Tympanic bowel sounds, abdomen markedly distended, tympany to percussion, the supraumbilical tenderness to palpation  Extremities:  Bilateral peripheral edema in lower extremities  Skin:  Warm, dry, no rashes  Neurologic: CN II-XII grossly intact, no tremor, affect appropriate    Imaging Studies: Ct Abdomen Pelvis Wo Contrast    Result Date: 11/7/2018  Impression: Small bowel obstruction at the level the distal ileum  Workstation performed: [id number]     Xr Abdomen Obstruction Series    Result Date: 11/8/2018  Impression: 1  Nonspecific bowel gas pattern due to paucity of small bowel air 2  Diffuse interstitial opacities are unchanged  Workstation performed: [id number]     EKG, Pathology, and Other Studies: I have personally reviewed pertinent reports      VTE Prophylaxis: Sequential compression device (Venodyne)  and Enoxaparin (Lovenox)     Code Status: Level 1 - Full Code    Ritika Johnson PA-C  11/8/2018

## 2018-11-08 NOTE — ASSESSMENT & PLAN NOTE
· Hypertensive episode during admission, may be related to abdominal pain, appears to be improving  · Hold amlodipine at this time given NPO status will initiate IV Lasix  · Monitor closely may need additional p r n   Meds if hypertension reoccurs

## 2018-11-08 NOTE — UTILIZATION REVIEW
Initial Clinical Review    Admission: Date/Time/Statement: 11/8/18 @ 0043     Orders Placed This Encounter   Procedures    Inpatient Admission     Standing Status:   Standing     Number of Occurrences:   1     Order Specific Question:   Admitting Physician     Answer:   Valdez Black     Order Specific Question:   Level of Care     Answer:   Med Surg [16]     Order Specific Question:   Estimated length of stay     Answer:   More than 2 Midnights     Order Specific Question:   Certification     Answer:   I certify that inpatient services are medically necessary for this patient for a duration of greater than two midnights  See H&P and MD Progress Notes for additional information about the patient's course of treatment  ED: Date/Time/Mode of Arrival:   ED Arrival Information     Expected Arrival Acuity Means of Arrival Escorted By Service Admission Type    - 11/7/2018 20:05 Urgent [facility]In [facility] Surgery-General Urgent    Arrival Complaint    ABDOMINAL PAIN          Chief Complaint:   Chief Complaint   Patient presents with    Abdominal Pain     Pt presents to ED with mid abdominal pain starting today  Pt denies n/v/d Pt has history of bowel obstruction        History of Illness:  43-year-old male with past history of small-bowel obstructions with acute onset of abdominal pain around noon yesterday  No associated emesis     - CT scan finding consistent with small-bowel obstruction with transition point in the right lower quadrant proximal to cecum     - Obstruction series shows nonspecific bowel gas pattern with past study of small bowel air     - intermittent pain and significant distention he states this has improved since yesterday  -  refusing NG tube currently, but hemodynamically stable with no current nausea or vomiting                     11/08/18 0125  --  80  20  146/65  94 %  None (Room air)  Lying   11/07/18 2315  --  87  15   175/74  94 %  None (Room air)  Lying 11/07/18 2016  97 9 °F (36 6 °C)  93  17   216/88  93 %  None (Room air)  Sitting     126 kg (278 lb)    Abnormal Labs/Diagnostic Test Results:   crt  1 68   1 67  gfr  40    40  Wbc  16 02   15 01  hgb  14 5   13 7    11/8  OBS series -    1   Nonspecific bowel gas pattern due to paucity of small bowel air  2   Diffuse interstitial opacities are unchanged  ED Treatment:   Medication Administration from 11/07/2018 2004 to 11/08/2018 0226       Date/Time Order Dose Route Action Action by Comments     11/08/2018 0012 ondansetron (ZOFRAN) 4 mg/2 mL injection **ADS Override Pull** 4 mg  Given Lieutenant Claudy RN      11/08/2018 0037 multi-electrolyte (ISOLYTE-S PH 7 4 equivalent) IV solution 125 mL/hr Intravenous [address] [address] [facility]      11/08/2018 0101 fentanyl citrate (PF) 100 MCG/2ML 25 mcg 25 mcg Intravenous Given Lieutenant Claudy RN           Past Medical/Surgical History:    Active Ambulatory Problems     Diagnosis Date Noted    Hypertension     Hyperlipidemia     Ventral hernia     Small bowel obstruction (HCC)     [facility]) [facility] [address] [facility]     HARVINDER (acute kidney injury) ([facility] [address] ) 02/13/2018    Dementia 02/13/2018    Elevated random blood glucose level 02/13/2018    Renal cyst, left 02/14/2018    Dyslipidemia 09/21/2018    History of CVA (cerebrovascular accident) 09/21/2018     Resolved Ambulatory Problems     Diagnosis Date Noted    No Resolved Ambulatory Problems     Past Medical History:   Diagnosis Date    CKD (chronic kidney disease) 11/8/2018    Hyperlipidemia     Hypertension     PVD (peripheral vascular disease) (HCC)     Small bowel obstruction (HCC)     Ventral hernia        Admitting Diagnosis: Essential hypertension [I10]  Abdominal pain [R10 9]  Hyperlipidemia, unspecified hyperlipidemia type [E78 5]    Assessment/Plan:   SBO  Plan  - diet NPO  - IV fluids  - serial abdominal exams and labs  - bowel rest  - DVT prophylaxis  - internal medicine consult for medical management, appreciate input       Admission Orders:   diet NPO  IV fluids @ 125  serial abdominal exams and labs  Sequential compression device to b/l LE  I/O q shift    Scheduled Meds:   Current Facility-Administered Medications:  enoxaparin 40 mg Subcutaneous Daily Maykel Marroquin MD    famotidine 20 mg Intravenous BID Maykel Marroquin MD    fentanyl citrate (PF) 25 mcg Intravenous Q2H PRN Johnny Ocasio MD    furosemide 40 mg Intravenous BID (diuretic) ZACHARIAH Parrish    morphine injection 2 mg Intravenous Q1H PRN Maykel Marroquin MD    multi-electrolyte 125 mL/hr Intravenous Continuous Johnny Ocasio MD Last Rate: 125 mL/hr (11/08/18 0037)   ondansetron 4 mg Intravenous Q6H PRN Maykel Marroquin MD    sodium chloride 125 mL/hr Intravenous Continuous Maykel Marroquin MD Last Rate: 125 mL/hr (11/08/18 0241)

## 2018-11-08 NOTE — CONSULTS
Consult- Israel Sanches 1945, 68 y o  male MRN: [id number]    Unit/Bed#: -01 Encounter: [id number]    Primary Care Provider: Gordon Maldonado DO   Date and time admitted to hospital: 11/7/2018 10:01 PM      Inpatient consult to Internal Medicine  Consult performed by: aCrlos Arango ordered by: Kae Storm          CKD (chronic kidney disease)   Assessment & Plan    · Stable at 1 6 baseline appears to be anywhere from 1 6-1 7  · Continue IVF     Hyperlipidemia   Assessment & Plan    · Patient is on statin at home, will hold off currently given patient's NPO status and need for bowel rest can resume once there is resolution of small-bowel obstruction     Hypertension   Assessment & Plan    · Hypertensive episode during admission, may be related to abdominal pain, appears to be improving  · Hold amlodipine at this time given NPO status will initiate IV Lasix  · Monitor closely may need additional p r n  Meds if hypertension reoccurs     * Small bowel obstruction (HCC)   Assessment & Plan    · Being managed primarily by surgery, patient is NPO with bowel rest currently refusing an NG tube, if symptoms persist NG tube may need to be initiated, however this will be handled by surgery            History of Present Illness     HPI: Israel Sanches is a 68y o  year old male who presents with nausea and sharp onset of cramping abdominal pain yesterday afternoon he reports nausea and queasiness however heat no emesis at this time  He has a history of small-bowel obstructions  Reports pain is rather transient and would come and go and resolve on its own, reports that pain is similar to his previous episodes of small-bowel obstructions reports feeling distended with occasional cramping pain  Denies fevers chills vomiting diarrhea constipation  Denies any chest pain chest tightness shortness of breath or difficulty breathing    Review of Systems   Constitutional: Positive for appetite change     HENT: Negative  Eyes: Negative  Respiratory: Negative for chest tightness and shortness of breath  Gastrointestinal: Positive for abdominal distention, abdominal pain, nausea and vomiting  Endocrine: Negative  Genitourinary: Negative  Musculoskeletal: Negative  Skin: Negative  Historical Information   Past Medical History:   Diagnosis Date    CKD (chronic kidney disease) 11/8/2018    Hyperlipidemia     Hypertension     [facility]peripheral [facility]) [facility] [address]     Small bowel obstruction (HCC)     Ventral hernia      Past Surgical History:   Procedure Laterality Date    ABDOMINAL HERNIA REPAIR      ABDOMINAL SURGERY      CHOLECYSTECTOMY      open    FEMORAL BYPASS Right      Social History   History   Alcohol Use No     History   Drug Use No     History   Smoking Status    Never Smoker   Smokeless Tobacco    Never Used     Family History: History reviewed  No pertinent family history  Meds/Allergies   all current active meds have been reviewed  No Known Allergies    Objective   Vitals: Blood pressure 129/62, pulse 79, temperature 98 9 °F (37 2 °C), temperature source Oral, resp  rate 18, height 5' 10" (1 778 m), weight 126 kg (278 lb), SpO2 93 %  No intake or output data in the 24 hours ending 11/08/18 1208  Invasive Devices     Peripheral Intravenous Line            Peripheral IV 11/07/18 Right Antecubital less than 1 day                Physical Exam   Constitutional: He is oriented to person, place, and time  He appears well-developed  HENT:   Head: Normocephalic  Neck: Normal range of motion  Neck supple  Cardiovascular: Normal rate  Pulmonary/Chest: Effort normal and breath sounds normal    Abdominal: He exhibits distension  Bowel sounds are decreased  There is tenderness  Tinkaling bowel sounds, positive flatus   Neurological: He is alert and oriented to person, place, and time  Skin: Skin is warm and dry  Psychiatric: He has a normal mood and affect   His behavior is normal  Judgment and thought content normal    Nursing note and vitals reviewed  Lab Results: I have personally reviewed pertinent reports  Imaging Studies: I have personally reviewed pertinent reports  EKG, Pathology, and Other Studies: I have personally reviewed pertinent reports  VTE Prophylaxis: Sequential compression device (Venodyne)     Code Status: Level 1 - Full Code  Advance Directive and Living Will:      Power of :    POLST:      Counseling / Coordination of Care  Total floor / unit time spent today 30 minutes  Greater than 50% of total time was spent with the patient and / or family counseling and / or coordination of care  A description of the counseling / coordination of care:  Reviewed patient's prior to admission medications, and reviewed current plan from surgery    Initiated appropriate medications given patient's NPO status and will monitor patient's blood pressure and creatinine during hospitalization and initiate appropriate medications when diet advancement is achieved

## 2018-11-08 NOTE — PLAN OF CARE
DISCHARGE PLANNING     Discharge to home or other facility with appropriate resources Not Progressing        INFECTION - ADULT     Absence or prevention of progression during hospitalization Not Progressing        Knowledge Deficit     Patient/family/caregiver demonstrates understanding of disease process, treatment plan, medications, and discharge instructions Not Progressing        PAIN - ADULT     Verbalizes/displays adequate comfort level or baseline comfort level Not Progressing        Potential for Falls     Patient will remain free of falls Not Progressing        SAFETY ADULT     Patient will remain free of falls Not Progressing     Maintain or return to baseline ADL function Not Progressing     Maintain or return mobility status to optimal level Not Progressing

## 2018-11-08 NOTE — ASSESSMENT & PLAN NOTE
· Being managed primarily by surgery, patient is NPO with bowel rest currently refusing an NG tube, if symptoms persist NG tube may need to be initiated, however this will be handled by surgery

## 2018-11-08 NOTE — ED PROVIDER NOTES
History  Chief Complaint   Patient presents with    Abdominal Pain     Pt presents to ED with mid abdominal pain starting today  Pt denies n/v/d Pt has history of bowel obstruction      68year-old male presents with 10 hours of mid abdominal pain without radiation  Patient describes moderate sharp pain that waxes and wanes in intensity and is currently mostly resolved  Patient states nothing clearly aggravates the pain and nothing obviously alleviates it  ROS: Patient affirms abdominal distension and denies fever/chills, nausea/vomiting, diarrhea, dyspnea, anorexia, constipation, diaphoresis, chest pain, groin pain, dysuria, hematuria, melena, or back/neck pain  All other systems reviewed and negative  Patient had normal bowel movement this morning  Patient denies any recent illnesses  Patient denies any recent use of antibiotics, international travel, or trauma  Objective:   Vital signs reviewed  Constitutional:  acute distress  Eyes: No scleral icterus  HENT: Head normocephalic  Pharynx moist    CV: Regular rate and rhythm  Respiratory: Lungs clear to auscultation bilaterally without adventitious sounds  Abdomen: Inspection of an abdomen with previous abdominal surgical incisions noted without erythema, rashes or ecchymosis noted  No abdominal pulsations noted  Normal bowel sounds with no bruit auscultated  Soft abdomen  Palpitation noted diffuse tenderness with no apparent reducible mass  No masses or pulsatile aorta noted on examination  No rebound or guarding noted on examination, non-peritoneal exam    Back: Normal inspection with no rash or signs of herpes zoster  Costovertebral tenderness not present  Skin: No ecchymosis of the umbilicus (negative Roge's sign) or flank (negative Grey Lua's sign)  Warm and dry  Extremities: Non-tender lower extremities without asymmetry  Neuro: Alert  Answers questions appropriately  Psych: Normal mood and affect       Medical Decision Making   Abdominal pain with a broad differential  Will establish IV access and make patient NPO considering possibility of surgical intervention required  Will administer fentanyl for pain control  Will initiate fluids at this point in the assessment  Differential includes significant likelihood of intra-abdominal pathology and based on patient's exam findings and history  Will obtain EKG and troponin to evaluate for potential referred pain related to ACS  Will obtain CBC to evaluate for anemia and leukocytosis  Will obtain CMP to evaluate electrolytes, renal and hepatic function  Will obtain lipase to evaluate for potential pancreatitis  Will obtain lactate to evaluate for mesenteric ischemia and sepsis  Will obtain urinalysis to evaluate for possible urinary infectious sources and ketones to evaluate potential hydration state  Based on patient's history, physical exam and presenting complaint, will obtain contrast enhanced CT imaging of patient's abdomen and pelvis to further evaluate for possible intraabdominal pathology including appendicitis, diverticulitis, or obstruction  Prior to Admission Medications   Prescriptions Last Dose Informant Patient Reported? Taking?    Cholecalciferol 2000 units CAPS   Yes No   Sig: Take 1 capsule by mouth   Omega-3 Fatty Acids (FISH OIL PO)   Yes No   Sig: Take 2 g by mouth   amLODIPine (NORVASC) 5 mg tablet   Yes Yes   Sig: TK 1 T PO D   atorvastatin (LIPITOR) 10 mg tablet   Yes Yes   Sig: TK 1 T PO D   donepezil (ARICEPT) 10 mg tablet   Yes Yes   Sig: TK 1 T PO HS   furosemide (LASIX) 40 mg tablet   Yes Yes   Sig: TK 1 T PO D IN THE MORNING   omeprazole (PriLOSEC) 40 MG capsule   Yes No   Sig: Take 40 mg by mouth   spironolactone (ALDACTONE) 25 mg tablet   Yes No   Sig: Take 12 5 mg by mouth   triamterene-hydrochlorothiazide (MAXZIDE-25) 37 5-25 mg per tablet   Yes Yes   Sig: Take by mouth      Facility-Administered Medications: None       Past Medical History:   Diagnosis Date    CKD (chronic kidney disease) 11/8/2018    Hyperlipidemia     Hypertension     PVD [facility]peripheral vascular disease) ([facility] [address] )     Small bowel obstruction (HCC)     Ventral hernia        Past Surgical History:   Procedure Laterality Date    ABDOMINAL HERNIA REPAIR      ABDOMINAL SURGERY      CHOLECYSTECTOMY      open    FEMORAL BYPASS Right        History reviewed  No pertinent family history  I have reviewed and agree with the history as documented  Social History   Substance Use Topics    Smoking status: Never Smoker    Smokeless tobacco: Never Used    Alcohol use No        Review of Systems   All other systems reviewed and are negative        Physical Exam  Physical Exam    Vital Signs  ED Triage Vitals [11/07/18 2016]   Temperature Pulse Respirations Blood Pressure SpO2   97 9 °F (36 6 °C) 93 17 (!) 216/88 93 %      Temp Source Heart Rate Source Patient Position - Orthostatic VS BP Location FiO2 (%)   Oral Monitor Sitting Left arm --      Pain Score       3           Vitals:    11/09/18 0700 11/09/18 1500 11/09/18 2300 11/10/18 0700   BP: 134/100 135/62 127/56 161/71   Pulse: 74 70 73 65   Patient Position - Orthostatic VS: Lying Lying Lying Sitting       Visual Acuity      ED Medications  Medications   ondansetron (ZOFRAN) 4 mg/2 mL injection **ADS Override Pull** (4 mg  Given 11/8/18 0012)       Diagnostic Studies  Results Reviewed     Procedure Component Value Units Date/Time    UA w Reflex to Microscopic [[id number]] Collected:  11/08/18 1741    Lab Status:  Final result Specimen:  Urine from Urine, Clean Catch Updated:  11/08/18 1751     Color, UA Yellow     Clarity, UA Clear     Specific [address], UA 1 020     pH, UA 6 0     Leukocytes, UA Negative     Nitrite, UA Negative     Protein, UA Negative mg/dl      Glucose, UA Negative mg/dl      Ketones, UA Negative mg/dl      Urobilinogen, UA 0 2 E U /dl      Bilirubin, UA Negative     Blood, UA Negative    Comprehensive metabolic panel [[id number]]  (Abnormal) Collected:  11/08/18 0452    Lab Status:  Final result Specimen:  Blood from Hand, Right Updated:  11/08/18 0534     Sodium 139 mmol/L      Potassium 4 2 mmol/L      Chloride 103 mmol/L      CO2 29 mmol/L      ANION GAP 7 mmol/L      BUN 24 mg/dL      Creatinine 1 67 (H) mg/dL      Glucose 106 mg/dL      Calcium 9 1 mg/dL      AST 19 U/L      ALT 30 U/L      Alkaline Phosphatase 100 U/L      Total Protein 6 9 g/dL      Albumin 3 1 (L) g/dL      Total Bilirubin 0 60 mg/dL      eGFR 40 ml/min/1 73sq m     Narrative:         National Kidney Disease Education Program recommendations are as follows:  GFR calculation is accurate only with a steady state creatinine  Chronic Kidney disease less than 60 ml/min/1 73 sq  meters  Kidney failure less than 15 ml/min/1 73 sq  meters      Magnesium [[id number]]  (Normal) Collected:  11/08/18 0452    Lab Status:  Final result Specimen:  Blood from Hand, Right Updated:  11/08/18 0534     Magnesium 1 8 mg/dL     Phosphorus [[id number]]  (Normal) Collected:  11/08/18 0452    Lab Status:  Final result Specimen:  Blood from Hand, Right Updated:  11/08/18 0534     Phosphorus 4 1 mg/dL     CBC and differential [[id number]]  (Abnormal) Collected:  11/08/18 0452    Lab Status:  Final result Specimen:  Blood from Hand, Right Updated:  11/08/18 0459     WBC 15 01 (H) Thousand/uL      RBC 4 55 Million/uL      Hemoglobin 13 7 g/dL      Hematocrit 41 0 %      MCV 90 fL      MCH 30 1 pg      MCHC 33 4 g/dL      RDW 12 5 %      MPV 10 5 fL      Platelets 782 Thousands/uL      nRBC 0 /100 WBCs      Neutrophils Relative 79 (H) %      Immat GRANS % 1 %      Lymphocytes Relative 12 (L) %      Monocytes Relative 7 %      Eosinophils Relative 1 %      Basophils Relative 0 %      Neutrophils Absolute 11 97 (H) Thousands/µL      Immature Grans Absolute 0 09 Thousand/uL      Lymphocytes Absolute 1 79 Thousands/µL      Monocytes Absolute 1 00 Thousand/µL      Eosinophils Absolute 0 12 Thousand/µL      Basophils Absolute 0 04 Thousands/µL     Lactic acid, plasma [[id number]]  (Normal) Collected:  11/07/18 2301    Lab Status:  Final result Specimen:  Blood from Arm, Right Updated:  11/07/18 2335     LACTIC ACID 1 3 mmol/L     Narrative:         Result may be elevated if tourniquet was used during collection  Troponin I [[id number]]  (Normal) Collected:  11/07/18 2301    Lab Status:  Final result Specimen:  Blood from Arm, Right Updated:  11/07/18 2334     Troponin I <0 02 ng/mL     CMP [[id number]]  (Abnormal) Collected:  11/07/18 2301    Lab Status:  Final result Specimen:  Blood from Arm, Right Updated:  11/07/18 2328     Sodium 137 mmol/L      Potassium 4 2 mmol/L      Chloride 100 mmol/L      CO2 31 mmol/L      ANION GAP 6 mmol/L      BUN 23 mg/dL      Creatinine 1 68 (H) mg/dL      Glucose 116 mg/dL      Calcium 9 4 mg/dL      AST 22 U/L      ALT 32 U/L      Alkaline Phosphatase 112 U/L      Total Protein 7 4 g/dL      Albumin 3 4 (L) g/dL      Total Bilirubin 0 50 mg/dL      eGFR 40 ml/min/1 73sq m     Narrative:         National Kidney Disease Education Program recommendations are as follows:  GFR calculation is accurate only with a steady state creatinine  Chronic Kidney disease less than 60 ml/min/1 73 sq  meters  Kidney failure less than 15 ml/min/1 73 sq  meters      Lipase [[id number]]  (Normal) Collected:  11/07/18 2301    Lab Status:  Final result Specimen:  Blood from Arm, Right Updated:  11/07/18 2328     Lipase 147 u/L     CBC and differential [[id number]]  (Abnormal) Collected:  11/07/18 2301    Lab Status:  Final result Specimen:  Blood from Arm, Right Updated:  11/07/18 2310     WBC 16 02 (H) Thousand/uL      RBC 4 78 Million/uL      Hemoglobin 14 5 g/dL      Hematocrit 43 0 %      MCV 90 fL      MCH 30 3 pg      MCHC 33 7 g/dL      RDW 12 4 %      MPV 10 6 fL      Platelets 834 Thousands/uL      nRBC 0 /100 WBCs      Neutrophils Relative 86 (H) %      Immat GRANS % 1 %      Lymphocytes Relative 6 (L) %      Monocytes Relative 6 %      Eosinophils Relative 1 %      Basophils Relative 0 %      Neutrophils Absolute 13 76 (H) Thousands/µL      Immature Grans Absolute 0 10 Thousand/uL      Lymphocytes Absolute 1 02 Thousands/µL      Monocytes Absolute 0 96 Thousand/µL      Eosinophils Absolute 0 14 Thousand/µL      Basophils Absolute 0 04 Thousands/µL                  XR abdomen 1 view kub   Final Result by Jey Jameson MD (11/09 4738)      Paucity of small bowel gas, nonspecific pattern      Workstation performed: [id number]         XR abdomen obstruction series   Final Result by Jey Jameson MD (11/08 0831)      1  Nonspecific bowel gas pattern due to paucity of small bowel air   2  Diffuse interstitial opacities are unchanged  Workstation performed: [id number]         CT abdomen pelvis wo contrast   Final Result by Maya Walker MD (11/07 4060)      Small bowel obstruction at the level the distal ileum  Workstation performed: [id number]                    Procedures  Procedures       Phone Contacts  ED Phone Contact    ED Course  ED Course as of Nov 11 2246   Thu Nov 08, 2018   0044 EKG demonstrates sinus rhythm with prolonged MO interval   Nonspecific changes, no prior to compare against   No acute ST segment changes  Identification of Seniors at Risk      Most Recent Value   (ISAR) Identification of Seniors at Risk   Before the illness or injury that brought you to the Emergency, did you need someone to help you on a regular basis? 0 Filed at: 11/07/2018 2017   In the last 24 hours, have you needed more help than usual?  0 Filed at: 11/07/2018 2017   Have you been hospitalized for one or more nights during the past 6 months? 0 Filed at: 11/07/2018 2017   In general, do you see well?  0 Filed at: 11/07/2018 2017   In general, do you have serious problems with your memory?   1 Filed at: 11/07/2018 2017   Do you take more than three different medications every day? 1 Filed at: 11/07/2018 2017   ISAR Score  2 Filed at: 11/07/2018 2017                          MDM  CritCare Time    Disposition  Final diagnoses:   SBO (small bowel obstruction) ([address] )     Time reflects when diagnosis was documented in both MDM as applicable and the Disposition within this note     Time User Action Codes Description Comment    11/8/2018 12:43 AM Raphael, [address] Essential hypertension     11/8/2018 12:43 AM Terence De La Cruz Modify [I10] Essential hypertension     11/8/2018 12:43 AM Terence De La Cruz Add [E78 5] Hyperlipidemia, unspecified hyperlipidemia type     11/8/2018 12:43 AM Terence De La Cruz Modify [E78 5] Hyperlipidemia, unspecified hyperlipidemia type     11/11/2018 10:44 PM Franci Puri Add [P58 534] SBO (small bowel obstruction) [facility])       ED Disposition     ED Disposition Condition Comment    Admit  Case was discussed with surgery and the patient's admission status was agreed to be Admission Status: inpatient status to the service of Dr Ambrose Oakley           Follow-up Information     Follow up With Specialties Details Why [address] [place] [address],  Internal Medicine Schedule an appointment as soon as possible for a visit in 1 week(s) Follow up with BMP to monitor kidney functioning  [address]  [phone]            Discharge Medication List as of 11/10/2018 11:29 AM      CONTINUE these medications which have NOT CHANGED    Details   amLODIPine (NORVASC) 10 mg tablet Take 10 mg by mouth daily, Historical Med      atorvastatin (LIPITOR) 10 mg tablet Take 10 mg by mouth daily, Historical Med      donepezil (ARICEPT) 5 mg tablet Take 5 mg by mouth 2 (two) times a day  , Historical Med      furosemide (LASIX) 40 mg tablet Take 40 mg by mouth 2 (two) times a day, Historical Med      spironolactone (ALDACTONE) 25 mg tablet Take 12 5 mg by mouth every 12 (twelve) hours  , Historical Med           No discharge procedures on file     ED Provider  Electronically Signed by           George Phelps MD  11/11/18 9766

## 2018-11-08 NOTE — ASSESSMENT & PLAN NOTE
· Patient is on statin at home, will hold off currently given patient's NPO status and need for bowel rest can resume once there is resolution of small-bowel obstruction

## 2018-11-09 LAB
ANION GAP SERPL CALCULATED.3IONS-SCNC: 8 MMOL/L (ref 4–13)
BUN SERPL-MCNC: 26 MG/DL (ref 5–25)
CALCIUM SERPL-MCNC: 9 MG/DL (ref 8.3–10.1)
CHLORIDE SERPL-SCNC: 104 MMOL/L (ref 100–108)
CO2 SERPL-SCNC: 30 MMOL/L (ref 21–32)
CREAT SERPL-MCNC: 1.8 MG/DL (ref 0.6–1.3)
ERYTHROCYTE [DISTWIDTH] IN BLOOD BY AUTOMATED COUNT: 12.6 % (ref 11.6–15.1)
GFR SERPL CREATININE-BSD FRML MDRD: 37 ML/MIN/1.73SQ M
GLUCOSE SERPL-MCNC: 79 MG/DL (ref 65–140)
HCT VFR BLD AUTO: 42 % (ref 36.5–49.3)
HGB BLD-MCNC: 13.9 G/DL (ref 12–17)
MCH RBC QN AUTO: 30.2 PG (ref 26.8–34.3)
MCHC RBC AUTO-ENTMCNC: 33.1 G/DL (ref 31.4–37.4)
MCV RBC AUTO: 91 FL (ref 82–98)
NT-PROBNP SERPL-MCNC: 296 PG/ML
PLATELET # BLD AUTO: 205 THOUSANDS/UL (ref 149–390)
PMV BLD AUTO: 11.2 FL (ref 8.9–12.7)
POTASSIUM SERPL-SCNC: 3.7 MMOL/L (ref 3.5–5.3)
RBC # BLD AUTO: 4.61 MILLION/UL (ref 3.88–5.62)
SODIUM SERPL-SCNC: 142 MMOL/L (ref 136–145)
WBC # BLD AUTO: 9.98 THOUSAND/UL (ref 4.31–10.16)

## 2018-11-09 PROCEDURE — 99232 SBSQ HOSP IP/OBS MODERATE 35: CPT | Performed by: PHYSICIAN ASSISTANT

## 2018-11-09 PROCEDURE — 80048 BASIC METABOLIC PNL TOTAL CA: CPT | Performed by: PHYSICIAN ASSISTANT

## 2018-11-09 PROCEDURE — 85027 COMPLETE CBC AUTOMATED: CPT | Performed by: PHYSICIAN ASSISTANT

## 2018-11-09 PROCEDURE — 83880 ASSAY OF NATRIURETIC PEPTIDE: CPT | Performed by: PHYSICIAN ASSISTANT

## 2018-11-09 RX ORDER — HEPARIN SODIUM 5000 [USP'U]/ML
5000 INJECTION, SOLUTION INTRAVENOUS; SUBCUTANEOUS EVERY 8 HOURS SCHEDULED
Status: DISCONTINUED | OUTPATIENT
Start: 2018-11-09 | End: 2018-11-10 | Stop reason: HOSPADM

## 2018-11-09 RX ORDER — FUROSEMIDE 10 MG/ML
40 INJECTION INTRAMUSCULAR; INTRAVENOUS
Status: DISCONTINUED | OUTPATIENT
Start: 2018-11-09 | End: 2018-11-09

## 2018-11-09 RX ORDER — FUROSEMIDE 10 MG/ML
40 INJECTION INTRAMUSCULAR; INTRAVENOUS DAILY
Status: DISCONTINUED | OUTPATIENT
Start: 2018-11-10 | End: 2018-11-10

## 2018-11-09 RX ORDER — FUROSEMIDE 10 MG/ML
40 INJECTION INTRAMUSCULAR; INTRAVENOUS DAILY
Status: DISCONTINUED | OUTPATIENT
Start: 2018-11-09 | End: 2018-11-09

## 2018-11-09 RX ADMIN — FUROSEMIDE 40 MG: 10 INJECTION, SOLUTION INTRAMUSCULAR; INTRAVENOUS at 08:36

## 2018-11-09 RX ADMIN — FAMOTIDINE 20 MG: 10 INJECTION, SOLUTION INTRAVENOUS at 08:36

## 2018-11-09 RX ADMIN — HEPARIN SODIUM 5000 UNITS: 5000 INJECTION, SOLUTION INTRAVENOUS; SUBCUTANEOUS at 22:17

## 2018-11-09 RX ADMIN — FAMOTIDINE 20 MG: 10 INJECTION, SOLUTION INTRAVENOUS at 17:19

## 2018-11-09 RX ADMIN — ENOXAPARIN SODIUM 40 MG: 40 INJECTION SUBCUTANEOUS at 08:35

## 2018-11-09 NOTE — ASSESSMENT & PLAN NOTE
· Patient is on statin at home, continue to hold PO medications until patient able to tolerate solid foods per surgery

## 2018-11-09 NOTE — ASSESSMENT & PLAN NOTE
· Stable at 1 8, baseline appears to be anywhere from 1 6-1 7  · D/c IVF as patient on clear liquid diet now  · Continue IV lasix as patient appears to be mildly volume overloaded   · Monitor BMP in the Am

## 2018-11-09 NOTE — ASSESSMENT & PLAN NOTE
· Noted on CT and abdominal Xray   · Primary team is general surgery   · Pt advanced to clear liquids as pt reports significant improvement and bowel movement, however patient reports that he will not eat the clear liquids if he doesn't like it  · No need for NGT/surgery at this time, continue to monitor  · Serial abdominal exams

## 2018-11-09 NOTE — ASSESSMENT & PLAN NOTE
· Pt follows with vascular surgery as an outpatient  · Continue outpatient management  · Pt also with history of CVA (1 ischemic and 1 hemorrhagic), pt is not currently on any ASA due to history of hemorrhage per review of records  · Resume statin once able to tolerate solids

## 2018-11-09 NOTE — PROGRESS NOTES
Progress Note - General Surgery   Bobby Kidd 68 y o  male MRN: [id number]  Unit/Bed#: -01 Encounter: [id number]    Assessment:  1  Bobby Kidd is a 68 y o  male presenting with small bowel obstruction  - Abdominal pain resolved  No nausea, vomiting  Passing flatus, had bowel movement this morning   - KUB of abdomen showed paucity of small bowel gas, nonspecific pattern    Plan:  - Will advance diet to a clear liquid diet as tolerated  Informed patient to be judicious with intake and to refrain if he develops any new abdominal pain, nausea, or vomiting   - Encourage ambulation/OOB  - Serial abdominal exams  - Trend albs  - Will decrease IV fluids after patient is able to tolerate adequate PO intake   - DVT prophylaxis  - SLIM following     Subjective/Objective     Subjective: Sitting in chair in no acute distress  States abdominal pain and bloating have completely resolved  Denies any nausea or vomiting  Continues to pass flatus and had a bowel movement this morning  Able to urinate  No fevers, chills, chest pain, shortness of breath, or weakness  No other complaints     Objective:     Blood pressure 134/100, pulse 74, temperature 98 4 °F (36 9 °C), temperature source Oral, resp  rate 18, height 5' 10" (1 778 m), weight 126 kg (278 lb), SpO2 93 %  ,Body mass index is 39 89 kg/m²        Intake/Output Summary (Last 24 hours) at 11/09/18 0859  Last data filed at 11/09/18 0501   Gross per 24 hour   Intake          1820 83 ml   Output              775 ml   Net          1045 83 ml       Invasive Devices     Peripheral Intravenous Line            Peripheral IV 11/07/18 Right Antecubital 1 day                Physical Exam: /100 (BP Location: Left arm)   Pulse 74   Temp 98 4 °F (36 9 °C) (Oral)   Resp 18   Ht 5' 10" (1 778 m)   Wt 126 kg (278 lb)   SpO2 93%   BMI 39 89 kg/m²   General appearance: alert and oriented, in no acute distress  Lungs: clear to auscultation bilaterally  Heart: regular rate and rhythm, S1, S2 normal, no murmur, click, rub or gallop  Abdomen: soft, large ventral hernia, nontympanic to percussion, active bowel sounds, non-tender to palpation    Lab, Imaging and other studies:I have personally reviewed pertinent lab results       VTE Pharmacologic Prophylaxis: Enoxaparin (Lovenox)  VTE Mechanical Prophylaxis: sequential compression device    Recent Results (from the past 36 hour(s))   ECG 12 lead    Collection Time: 11/07/18 10:17 PM   Result Value Ref Range    Ventricular Rate 84 BPM    Atrial Rate 84 BPM    CO Interval 224 ms    QRSD Interval 148 ms    QT Interval 426 ms    QTC Interval 503 ms    P Axis 73 degrees    QRS Axis 63 degrees    T Wave Axis 39 degrees   CBC and differential    Collection Time: 11/07/18 11:01 PM   Result Value Ref Range    WBC 16 02 (H) 4 31 - 10 16 Thousand/uL    RBC 4 78 3 88 - 5 62 Million/uL    Hemoglobin 14 5 12 0 - 17 0 g/dL    Hematocrit 43 0 36 5 - 49 3 %    MCV 90 82 - 98 fL    MCH 30 3 26 8 - 34 3 pg    MCHC 33 7 31 4 - 37 4 g/dL    RDW 12 4 11 6 - 15 1 %    MPV 10 6 8 9 - 12 7 fL    Platelets 655 177 - 502 Thousands/uL    nRBC 0 /100 WBCs    Neutrophils Relative 86 (H) 43 - 75 %    Immat GRANS % 1 0 - 2 %    Lymphocytes Relative 6 (L) 14 - 44 %    Monocytes Relative 6 4 - 12 %    Eosinophils Relative 1 0 - 6 %    Basophils Relative 0 0 - 1 %    Neutrophils Absolute 13 76 (H) 1 85 - 7 62 Thousands/µL    Immature Grans Absolute 0 10 0 00 - 0 20 Thousand/uL    Lymphocytes Absolute 1 02 0 60 - 4 47 Thousands/µL    Monocytes Absolute 0 96 0 17 - 1 22 Thousand/µL    Eosinophils Absolute 0 14 0 00 - 0 61 Thousand/µL    Basophils Absolute 0 04 0 00 - 0 10 Thousands/µL   CMP    Collection Time: 11/07/18 11:01 PM   Result Value Ref Range    Sodium 137 136 - 145 mmol/L    Potassium 4 2 3 5 - 5 3 mmol/L    Chloride 100 100 - 108 mmol/L    CO2 31 21 - 32 mmol/L    ANION GAP 6 4 - 13 mmol/L    BUN 23 5 - 25 mg/dL    Creatinine 1 68 (H) 0 60 - 1 30 mg/dL    Glucose 116 65 - 140 mg/dL    Calcium 9 4 8 3 - 10 1 mg/dL    AST 22 5 - 45 U/L    ALT 32 12 - 78 U/L    Alkaline Phosphatase 112 46 - 116 U/L    Total Protein 7 4 6 4 - 8 2 g/dL    Albumin 3 4 (L) 3 5 - 5 0 g/dL    Total Bilirubin 0 50 0 20 - 1 00 mg/dL    eGFR 40 ml/min/1 73sq m   Lipase    Collection Time: 11/07/18 11:01 PM   Result Value Ref Range    Lipase 147 73 - 393 u/L   Troponin I    Collection Time: 11/07/18 11:01 PM   Result Value Ref Range    Troponin I <0 02 <=0 04 ng/mL   Lactic acid, plasma    Collection Time: 11/07/18 11:01 PM   Result Value Ref Range    LACTIC ACID 1 3 0 5 - 2 0 mmol/L   CBC and differential    Collection Time: 11/08/18  4:52 AM   Result Value Ref Range    WBC 15 01 (H) 4 31 - 10 16 Thousand/uL    RBC 4 55 3 88 - 5 62 Million/uL    Hemoglobin 13 7 12 0 - 17 0 g/dL    Hematocrit 41 0 36 5 - 49 3 %    MCV 90 82 - 98 fL    MCH 30 1 26 8 - 34 3 pg    MCHC 33 4 31 4 - 37 4 g/dL    RDW 12 5 11 6 - 15 1 %    MPV 10 5 8 9 - 12 7 fL    Platelets 845 535 - 382 Thousands/uL    nRBC 0 /100 WBCs    Neutrophils Relative 79 (H) 43 - 75 %    Immat GRANS % 1 0 - 2 %    Lymphocytes Relative 12 (L) 14 - 44 %    Monocytes Relative 7 4 - 12 %    Eosinophils Relative 1 0 - 6 %    Basophils Relative 0 0 - 1 %    Neutrophils Absolute 11 97 (H) 1 85 - 7 62 Thousands/µL    Immature Grans Absolute 0 09 0 00 - 0 20 Thousand/uL    Lymphocytes Absolute 1 79 0 60 - 4 47 Thousands/µL    Monocytes Absolute 1 00 0 17 - 1 22 Thousand/µL    Eosinophils Absolute 0 12 0 00 - 0 61 Thousand/µL    Basophils Absolute 0 04 0 00 - 0 10 Thousands/µL   Comprehensive metabolic panel    Collection Time: 11/08/18  4:52 AM   Result Value Ref Range    Sodium 139 136 - 145 mmol/L    Potassium 4 2 3 5 - 5 3 mmol/L    Chloride 103 100 - 108 mmol/L    CO2 29 21 - 32 mmol/L    ANION GAP 7 4 - 13 mmol/L    BUN 24 5 - 25 mg/dL    Creatinine 1 67 (H) 0 60 - 1 30 mg/dL    Glucose 106 65 - 140 mg/dL    Calcium 9 1 8 3 - 10 1 mg/dL    AST 19 5 - 45 U/L ALT 30 12 - 78 U/L    Alkaline Phosphatase 100 46 - 116 U/L    Total Protein 6 9 6 4 - 8 2 g/dL    Albumin 3 1 (L) 3 5 - 5 0 g/dL    Total Bilirubin 0 60 0 20 - 1 00 mg/dL    eGFR 40 ml/min/1 73sq m   Magnesium    Collection Time: 11/08/18  4:52 AM   Result Value Ref Range    Magnesium 1 8 1 6 - 2 6 mg/dL   Phosphorus    Collection Time: 11/08/18  4:52 AM   Result Value Ref Range    Phosphorus 4 1 2 3 - 4 1 mg/dL   UA w Reflex to Microscopic    Collection Time: 11/08/18  5:41 PM   Result Value Ref Range    Color, UA Yellow     Clarity, UA Clear     Specific Gravity, UA 1 020 1 003 - 1 030    pH, UA 6 0 4 5 - 8 0    Leukocytes, UA Negative Negative    Nitrite, UA Negative Negative    Protein, UA Negative Negative mg/dl    Glucose, UA Negative Negative mg/dl    Ketones, UA Negative Negative mg/dl    Urobilinogen, UA 0 2 0 2, 1 0 E U /dl E U /dl    Bilirubin, UA Negative Negative    Blood, UA Negative Negative   CBC and differential    Collection Time: 11/08/18  5:41 PM   Result Value Ref Range    WBC 12 54 (H) 4 31 - 10 16 Thousand/uL    RBC 4 67 3 88 - 5 62 Million/uL    Hemoglobin 14 3 12 0 - 17 0 g/dL    Hematocrit 43 0 36 5 - 49 3 %    MCV 92 82 - 98 fL    MCH 30 6 26 8 - 34 3 pg    MCHC 33 3 31 4 - 37 4 g/dL    RDW 12 6 11 6 - 15 1 %    MPV 10 6 8 9 - 12 7 fL    Platelets 103 717 - 277 Thousands/uL    nRBC 0 /100 WBCs    Neutrophils Relative 73 43 - 75 %    Immat GRANS % 1 0 - 2 %    Lymphocytes Relative 16 14 - 44 %    Monocytes Relative 8 4 - 12 %    Eosinophils Relative 2 0 - 6 %    Basophils Relative 0 0 - 1 %    Neutrophils Absolute 9 17 (H) 1 85 - 7 62 Thousands/µL    Immature Grans Absolute 0 10 0 00 - 0 20 Thousand/uL    Lymphocytes Absolute 2 02 0 60 - 4 47 Thousands/µL    Monocytes Absolute 0 96 0 17 - 1 22 Thousand/µL    Eosinophils Absolute 0 26 0 00 - 0 61 Thousand/µL    Basophils Absolute 0 03 0 00 - 0 10 Thousands/µL   Basic metabolic panel    Collection Time: 11/08/18  5:41 PM   Result Value Ref Range    Sodium 140 136 - 145 mmol/L    Potassium 4 2 3 5 - 5 3 mmol/L    Chloride 103 100 - 108 mmol/L    CO2 32 21 - 32 mmol/L    ANION GAP 5 4 - 13 mmol/L    BUN 24 5 - 25 mg/dL    Creatinine 1 64 (H) 0 60 - 1 30 mg/dL    Glucose 81 65 - 140 mg/dL    Calcium 9 0 8 3 - 10 1 mg/dL    eGFR 41 ml/min/1 73sq m

## 2018-11-09 NOTE — ASSESSMENT & PLAN NOTE
· Hypertensive episode on admission likely secondary to pain, significantly improved as pain is also improving    · Pt does not know his home medications, based on medication rec, will hold PO amlodipine, spironolactone as pt is only on clear liquids currently    · Pt is also on lasix 40 mg bid at home, continue IV lasix for now until patient able to take PO medications once cleared by surgery   · Continue to monitor, BP now appears to be fairly controlled

## 2018-11-09 NOTE — PROGRESS NOTES
Progress Note - Natalie Harris 1945, 68 y o  male MRN: [id number]    Unit/Bed#: -Jasmin Encounter: [id number]    Primary Care Provider: Albertina Ochoa DO   Date and time admitted to hospital: 11/7/2018 10:01 PM        DOS: 11/9/2018      * Small bowel obstruction ([facility] [address] )   Assessment & Plan    · Noted on CT and abdominal Xray   · Primary team is general surgery   · Pt advanced to clear liquids as pt reports significant improvement and bowel movement, however patient reports that he will not eat the clear liquids if he doesn't like it  · No need for NGT/surgery at this time, continue to monitor  · Serial abdominal exams      CKD (chronic kidney disease)   Assessment & Plan    · Stable at 1 8, baseline appears to be anywhere from 1 6-1 7  · D/c IVF as patient on clear liquid diet now  · Continue IV lasix as patient appears to be mildly volume overloaded   · Monitor BMP in the Am     PVD (peripheral vascular disease) ([facility] [address] )   Assessment & Plan    · Pt follows with vascular surgery as an outpatient  · Continue outpatient management  · Pt also with history of CVA (1 ischemic and 1 hemorrhagic), pt is not currently on any ASA due to history of hemorrhage per review of records  · Resume statin once able to tolerate solids       Hyperlipidemia   Assessment & Plan    · Patient is on statin at home, continue to hold PO medications until patient able to tolerate solid foods per surgery     Hypertension   Assessment & Plan    · Hypertensive episode on admission likely secondary to pain, significantly improved as pain is also improving    · Pt does not know his home medications, based on medication rec, will hold PO amlodipine, spironolactone as pt is only on clear liquids currently    · Pt is also on lasix 40 mg bid at home, continue IV lasix for now until patient able to take PO medications once cleared by surgery   · Continue to monitor, BP now appears to be fairly controlled       VTE Pharmacologic Prophylaxis: Pharmacologic: Heparin  Mechanical VTE Prophylaxis in Place: No    Patient Centered Rounds: I have evaluated patient without nursing staff present due to Speaking to nurse outside patient's room    Discussions with Specialists or Other Care Team Provider:  Discussed with general surgery, RN, cm and reviewed previous notes    Education and Discussions with Family / Patient:  Discussed with patient at bedside, when asked update friends or family members patient politely declined stating that they will be coming in shortly  Time Spent for Care: 20 minutes  More than 50% of total time spent on counseling and coordination of care as described above  Current Length of Stay: 1 day(s)    Current Patient Status: Inpatient   Certification Statement: The patient will continue to require additional inpatient hospital stay due to Treatment for small-bowel obstruction    Discharge Plan:  Per General surgery    Code Status: Level 1 - Full Code      Subjective:   Patient reports that he feels much better today  States that his abdomen is less distended and he has no more abdominal pain  He denies any lightheadedness, dizziness, chest pain, shortness of breath, nausea, vomiting, diarrhea, constipation or urinary difficulties  He reports he is passing flatus and did have a bowel movement this morning  No additional complaints  Patient does report that he does not like clear liquid diet and not when he eat broth  Advised patient that he will need to eat before being discharged  Objective:     Vitals:   Temp (24hrs), Av 4 °F (36 9 °C), Min:98 4 °F (36 9 °C), Max:98 4 °F (36 9 °C)    Temp:  [98 4 °F (36 9 °C)] 98 4 °F (36 9 °C)  HR:  [68-74] 74  Resp:  [18] 18  BP: (130-151)/() 134/100  SpO2:  [93 %] 93 %  Body mass index is 39 89 kg/m²  Input and Output Summary (last 24 hours):        Intake/Output Summary (Last 24 hours) at 18 1459  Last data filed at 18 1436   Gross per 24 hour   Intake 4489 58 ml   Output             1750 ml   Net          2739 58 ml       Physical Exam:     Physical Exam   Constitutional: No distress  Patient is in no acute distress standing in his hospital room  Obese abdomen  Slow to respond at times  HENT:   Head: Normocephalic and atraumatic  Eyes: Conjunctivae are normal  No scleral icterus  Cardiovascular: Normal rate, regular rhythm and intact distal pulses  Pulmonary/Chest: Effort normal  No respiratory distress  He has no wheezes  He has no rales  Decreased breath sounds bilaterally   Abdominal: Soft  He exhibits distension  There is no tenderness  There is no rebound  Hypoactive bowel sounds   Musculoskeletal: He exhibits edema (Trace lower extremities bilaterally)  Neurological: He is alert  Skin: Skin is warm and dry  He is not diaphoretic  No erythema  Vitals reviewed  Additional Data:     Labs:      Results from last 7 days  Lab Units 11/09/18  1055 11/08/18  1741   WBC Thousand/uL 9 98 12 54*   HEMOGLOBIN g/dL 13 9 14 3   HEMATOCRIT % 42 0 43 0   PLATELETS Thousands/uL 205 218   NEUTROS PCT %  --  73   LYMPHS PCT %  --  16   MONOS PCT %  --  8   EOS PCT %  --  2       Results from last 7 days  Lab Units 11/09/18  1055  11/08/18  0452   POTASSIUM mmol/L 3 7  < > 4 2   CHLORIDE mmol/L 104  < > 103   CO2 mmol/L 30  < > 29   BUN mg/dL 26*  < > 24   CREATININE mg/dL 1 80*  < > 1 67*   CALCIUM mg/dL 9 0  < > 9 1   ALK PHOS U/L  --   --  100   ALT U/L  --   --  30   AST U/L  --   --  19   < > = values in this interval not displayed  * I Have Reviewed All Lab Data Listed Above  * Additional Pertinent Lab Tests Reviewed:  All Labs Within Last 24 Hours Reviewed    Imaging:    Imaging Reports Reviewed Today Include: XR abdomen, CT a/p  Imaging Personally Reviewed by Myself Includes:  None    Recent Cultures (last 7 days):           Last 24 Hours Medication List:     Current Facility-Administered Medications:  enoxaparin 40 mg Subcutaneous Daily Leonid Herrera MD   famotidine 20 mg Intravenous BID Leonid Herrera MD   fentanyl citrate (PF) 25 mcg Intravenous Q2H PRN Demetra Baron MD   furosemide 40 mg Intravenous Daily Clifford Peña PA-C   morphine injection 2 mg Intravenous Q1H PRN Leonid Herrera MD   ondansetron 4 mg Intravenous Q6H PRN Leonid Herrera MD        Today, Patient Was Seen By: Clifford Peña PA-C    ** Please Note: Dictation voice to text software may have been used in the creation of this document   **

## 2018-11-10 VITALS
WEIGHT: 278 LBS | RESPIRATION RATE: 18 BRPM | TEMPERATURE: 97.6 F | OXYGEN SATURATION: 95 % | HEIGHT: 70 IN | HEART RATE: 65 BPM | DIASTOLIC BLOOD PRESSURE: 71 MMHG | SYSTOLIC BLOOD PRESSURE: 161 MMHG | BODY MASS INDEX: 39.8 KG/M2

## 2018-11-10 LAB
ANION GAP SERPL CALCULATED.3IONS-SCNC: 8 MMOL/L (ref 4–13)
BUN SERPL-MCNC: 25 MG/DL (ref 5–25)
CALCIUM SERPL-MCNC: 8.6 MG/DL (ref 8.3–10.1)
CHLORIDE SERPL-SCNC: 103 MMOL/L (ref 100–108)
CO2 SERPL-SCNC: 29 MMOL/L (ref 21–32)
CREAT SERPL-MCNC: 1.67 MG/DL (ref 0.6–1.3)
GFR SERPL CREATININE-BSD FRML MDRD: 40 ML/MIN/1.73SQ M
GLUCOSE SERPL-MCNC: 89 MG/DL (ref 65–140)
POTASSIUM SERPL-SCNC: 3.7 MMOL/L (ref 3.5–5.3)
SODIUM SERPL-SCNC: 140 MMOL/L (ref 136–145)

## 2018-11-10 PROCEDURE — 99239 HOSP IP/OBS DSCHRG MGMT >30: CPT | Performed by: SURGERY

## 2018-11-10 PROCEDURE — 99231 SBSQ HOSP IP/OBS SF/LOW 25: CPT | Performed by: PHYSICIAN ASSISTANT

## 2018-11-10 PROCEDURE — 80048 BASIC METABOLIC PNL TOTAL CA: CPT | Performed by: PHYSICIAN ASSISTANT

## 2018-11-10 RX ORDER — FUROSEMIDE 40 MG/1
80 TABLET ORAL DAILY
COMMUNITY
Start: 2018-09-12

## 2018-11-10 RX ORDER — TRIAMTERENE AND HYDROCHLOROTHIAZIDE 37.5; 25 MG/1; MG/1
TABLET ORAL DAILY
COMMUNITY
Start: 2018-09-13

## 2018-11-10 RX ORDER — DONEPEZIL HYDROCHLORIDE 10 MG/1
TABLET, FILM COATED ORAL
COMMUNITY
Start: 2018-08-28

## 2018-11-10 RX ORDER — AMLODIPINE BESYLATE 5 MG/1
TABLET ORAL
COMMUNITY
Start: 2018-07-11

## 2018-11-10 RX ORDER — ATORVASTATIN CALCIUM 10 MG/1
TABLET, FILM COATED ORAL
COMMUNITY
Start: 2018-07-11

## 2018-11-10 RX ORDER — OMEPRAZOLE 40 MG/1
40 CAPSULE, DELAYED RELEASE ORAL
COMMUNITY

## 2018-11-10 RX ORDER — SPIRONOLACTONE 25 MG/1
12.5 TABLET ORAL 2 TIMES DAILY
COMMUNITY

## 2018-11-10 RX ADMIN — FUROSEMIDE 40 MG: 10 INJECTION, SOLUTION INTRAMUSCULAR; INTRAVENOUS at 08:18

## 2018-11-10 RX ADMIN — FAMOTIDINE 20 MG: 10 INJECTION, SOLUTION INTRAVENOUS at 08:19

## 2018-11-10 RX ADMIN — HEPARIN SODIUM 5000 UNITS: 5000 INJECTION, SOLUTION INTRAVENOUS; SUBCUTANEOUS at 05:29

## 2018-11-10 NOTE — PROGRESS NOTES
Progress Note - Renée Coreas 1945, 68 y o  male MRN: [id number]    Unit/Bed#: -Jasmin Encounter: [id number]    Primary Care Provider: Judy Henley DO   Date and time admitted to hospital: 11/7/2018 10:01 PM       DOS: 11/10/2018      * Small bowel obstruction ([facility] [address] )   Assessment & Plan    · Noted on CT and abdominal Xray   · Primary team is general surgery   · Pt advanced to a regular diet per surgery, tolerating well   · No need for NGT/surgery  · Pt cleared for discharge from surgery standpoint with follow up in the office     CKD (chronic kidney disease)   Assessment & Plan    · Stable at 1 67, baseline appears to be anywhere from 1 6-1 7  · D/c IV lasix, pt to resume home diuretics as outpatient  · Pt to follow up with PCP as an outpatient to monitor renal functioning      PVD (peripheral vascular disease) ([facility] [address] )   Assessment & Plan    · Pt follows with vascular surgery as an outpatient  · Continue outpatient management  · Pt also with history of CVA (1 ischemic and 1 hemorrhagic), pt is not currently on any ASA due to history of hemorrhage per review of records  · Pt advised to resume statin at home     Ventral hernia   Assessment & Plan    · Large, not incarcerated   · Stable      Hyperlipidemia   Assessment & Plan    · Patient is on statin at home, resume as pt able to take PO     Hypertension   Assessment & Plan    · Hypertensive episode on admission likely secondary to pain, significantly improved as pain is also improving    · D/c IV lasix, pt to resume his outpatient blood pressure regimen as he is now tolerating PO   · Bp stable on review       VTE Pharmacologic Prophylaxis:   Pharmacologic: Heparin  Mechanical VTE Prophylaxis in Place: No    Patient Centered Rounds: I have evaluated patient without nursing staff present due to Speaking to nurse outside patient's room    Discussions with Specialists or Other Care Team Provider:  Discussed with RN    Education and Discussions with Family / Patient: Discussed with patient and patient's wife at bedside    Time Spent for Care: 15 minutes  More than 50% of total time spent on counseling and coordination of care as described above  Current Length of Stay: 2 day(s)    Current Patient Status: Inpatient   Certification Statement: The patient will continue to require additional inpatient hospital stay due to treatment of bowel obstruction    Discharge Plan: D/c today per general surgery     Code Status: Level 1 - Full Code      Subjective:   Pt reports that he feels well today  Reports that he was tolerating his solid diet  Currently denies any lightheadedness, dizziness, chest pain, shortness of breath, abdominal pain, nausea, vomiting, diarrhea, constipation or urinary difficulties  Patient reports he had a large bowel movement today and is passing flatus  He is ready to go home  Advised patient and patient's wife that patient should continue his blood pressure medications as an outpatient and resume all of his home medications  Objective:     Vitals:   Temp (24hrs), Av 6 °F (36 4 °C), Min:97 5 °F (36 4 °C), Max:97 6 °F (36 4 °C)    Temp:  [97 5 °F (36 4 °C)-97 6 °F (36 4 °C)] 97 6 °F (36 4 °C)  HR:  [65-73] 65  Resp:  [18] 18  BP: (127-161)/(56-71) 161/71  SpO2:  [93 %-95 %] 95 %  Body mass index is 39 89 kg/m²  Input and Output Summary (last 24 hours): Intake/Output Summary (Last 24 hours) at 11/10/18 1101  Last data filed at 11/10/18 0801   Gross per 24 hour   Intake          2668 75 ml   Output             1200 ml   Net          1468 75 ml       Physical Exam:     Physical Exam   Constitutional: No distress  Patient is in no acute distress sitting in his hospital chair resting comfortably  Accompanied by his wife  HENT:   Head: Normocephalic and atraumatic  Eyes: Conjunctivae are normal  No scleral icterus  Cardiovascular: Normal rate, regular rhythm and intact distal pulses      Pulmonary/Chest: Effort normal and breath sounds normal  No respiratory distress  He has no wheezes  He has no rales  Abdominal: Soft  Bowel sounds are normal  He exhibits distension (Improved, with noted ventral hernia)  There is no tenderness  There is no rebound  Musculoskeletal: He exhibits edema (Trace lower extremities bilaterally, reports is baseline)  Neurological: He is alert  Skin: Skin is warm and dry  He is not diaphoretic  No erythema  Psychiatric: He has a normal mood and affect  Vitals reviewed  Additional Data:     Labs:      Results from last 7 days  Lab Units 11/09/18  1055 11/08/18  1741   WBC Thousand/uL 9 98 12 54*   HEMOGLOBIN g/dL 13 9 14 3   HEMATOCRIT % 42 0 43 0   PLATELETS Thousands/uL 205 218   NEUTROS PCT %  --  73   LYMPHS PCT %  --  16   MONOS PCT %  --  8   EOS PCT %  --  2       Results from last 7 days  Lab Units 11/10/18  0545  11/08/18  0452   POTASSIUM mmol/L 3 7  < > 4 2   CHLORIDE mmol/L 103  < > 103   CO2 mmol/L 29  < > 29   BUN mg/dL 25  < > 24   CREATININE mg/dL 1 67*  < > 1 67*   CALCIUM mg/dL 8 6  < > 9 1   ALK PHOS U/L  --   --  100   ALT U/L  --   --  30   AST U/L  --   --  19   < > = values in this interval not displayed  * I Have Reviewed All Lab Data Listed Above  * Additional Pertinent Lab Tests Reviewed:  All Labs Within Last 24 Hours Reviewed    Imaging:    Imaging Reports Reviewed Today Include: XR abdomen  Imaging Personally Reviewed by Myself Includes:  None    Recent Cultures (last 7 days):           Last 24 Hours Medication List:     Current Facility-Administered Medications:  famotidine 20 mg Intravenous BID Poonam Farley MD   fentanyl citrate (PF) 25 mcg Intravenous Q2H PRN Pastora Barnes MD   furosemide 40 mg Intravenous Daily Kenia Montejo PA-C   heparin (porcine) 5,000 Units Subcutaneous [facility] Kenia Montejo PA-C   morphine injection 2 mg Intravenous Q1H PRN Poonam Farley MD   ondansetron 4 mg Intravenous Q6H PRN Poonam Farley MD        Today, Patient Was Seen By: Kristy Calderon PA-C    ** Please Note: Dictation voice to text software may have been used in the creation of this document   **

## 2018-11-10 NOTE — ASSESSMENT & PLAN NOTE
· Hypertensive episode on admission likely secondary to pain, significantly improved as pain is also improving    · D/c IV lasix, pt to resume his outpatient blood pressure regimen as he is now tolerating PO   · Bp stable on review

## 2018-11-10 NOTE — DISCHARGE INSTRUCTIONS
Call office with recurrence of the symptoms  [phone]   No diet restrictions  May shower every day  Call office for appointment in 2 weeks  Resume home meds

## 2018-11-10 NOTE — PLAN OF CARE
DISCHARGE PLANNING     Discharge to home or other facility with appropriate resources Adequate for Discharge        INFECTION - ADULT     Absence or prevention of progression during hospitalization Adequate for Discharge        Knowledge Deficit     Patient/family/caregiver demonstrates understanding of disease process, treatment plan, medications, and discharge instructions Adequate for Discharge        PAIN - ADULT     Verbalizes/displays adequate comfort level or baseline comfort level Adequate for Discharge        Potential for Falls     Patient will remain free of falls Adequate for Discharge        SAFETY ADULT     Patient will remain free of falls Adequate for Discharge     Maintain or return to baseline ADL function Adequate for Discharge     Maintain or return mobility status to optimal level Adequate for Discharge

## 2018-11-10 NOTE — ASSESSMENT & PLAN NOTE
· Pt follows with vascular surgery as an outpatient  · Continue outpatient management  · Pt also with history of CVA (1 ischemic and 1 hemorrhagic), pt is not currently on any ASA due to history of hemorrhage per review of records  · Pt advised to resume statin at home

## 2018-11-10 NOTE — ASSESSMENT & PLAN NOTE
· Stable at 1 67, baseline appears to be anywhere from 1 6-1 7  · D/c IV lasix, pt to resume home diuretics as outpatient  · Pt to follow up with PCP as an outpatient to monitor renal functioning

## 2018-11-10 NOTE — ASSESSMENT & PLAN NOTE
· Noted on CT and abdominal Xray   · Primary team is general surgery   · Pt advanced to a regular diet per surgery, tolerating well   · No need for NGT/surgery  · Pt cleared for discharge from surgery standpoint with follow up in the office

## 2019-03-02 ENCOUNTER — HOSPITAL ENCOUNTER (INPATIENT)
Facility: HOSPITAL | Age: 74
LOS: 1 days | Discharge: HOME | DRG: 389 | End: 2019-03-04
Attending: EMERGENCY MEDICINE | Admitting: INTERNAL MEDICINE
Payer: MEDICARE

## 2019-03-02 DIAGNOSIS — K56.600 PARTIAL SMALL BOWEL OBSTRUCTION (HCC): Primary | ICD-10-CM

## 2019-03-02 PROCEDURE — 99285 EMERGENCY DEPT VISIT HI MDM: CPT

## 2019-03-03 ENCOUNTER — APPOINTMENT (EMERGENCY)
Dept: CT IMAGING | Facility: HOSPITAL | Age: 74
DRG: 389 | End: 2019-03-03
Payer: MEDICARE

## 2019-03-03 LAB
ALBUMIN SERPL BCP-MCNC: 3.8 G/DL (ref 3.5–5)
ALP SERPL-CCNC: 99 U/L (ref 46–116)
ALT SERPL W P-5'-P-CCNC: 34 U/L (ref 12–78)
ANION GAP SERPL CALCULATED.3IONS-SCNC: 8 MMOL/L (ref 4–13)
AST SERPL W P-5'-P-CCNC: 26 U/L (ref 5–45)
BASOPHILS # BLD AUTO: 0.03 THOUSANDS/ΜL (ref 0–0.1)
BASOPHILS # BLD AUTO: 0.04 THOUSANDS/ΜL (ref 0–0.1)
BASOPHILS NFR BLD AUTO: 0 % (ref 0–1)
BASOPHILS NFR BLD AUTO: 0 % (ref 0–1)
BILIRUB SERPL-MCNC: 0.6 MG/DL (ref 0.2–1)
BUN SERPL-MCNC: 23 MG/DL (ref 5–25)
CALCIUM SERPL-MCNC: 9.7 MG/DL (ref 8.3–10.1)
CHLORIDE SERPL-SCNC: 98 MMOL/L (ref 100–108)
CO2 SERPL-SCNC: 30 MMOL/L (ref 21–32)
CREAT SERPL-MCNC: 1.72 MG/DL (ref 0.6–1.3)
EOSINOPHIL # BLD AUTO: 0.08 THOUSAND/ΜL (ref 0–0.61)
EOSINOPHIL # BLD AUTO: 0.19 THOUSAND/ΜL (ref 0–0.61)
EOSINOPHIL NFR BLD AUTO: 1 % (ref 0–6)
EOSINOPHIL NFR BLD AUTO: 1 % (ref 0–6)
ERYTHROCYTE [DISTWIDTH] IN BLOOD BY AUTOMATED COUNT: 12.4 % (ref 11.6–15.1)
ERYTHROCYTE [DISTWIDTH] IN BLOOD BY AUTOMATED COUNT: 12.6 % (ref 11.6–15.1)
GFR SERPL CREATININE-BSD FRML MDRD: 39 ML/MIN/1.73SQ M
GLUCOSE SERPL-MCNC: 128 MG/DL (ref 65–140)
HCT VFR BLD AUTO: 39.3 % (ref 36.5–49.3)
HCT VFR BLD AUTO: 46.4 % (ref 36.5–49.3)
HGB BLD-MCNC: 13 G/DL (ref 12–17)
HGB BLD-MCNC: 15.3 G/DL (ref 12–17)
IMM GRANULOCYTES # BLD AUTO: 0.07 THOUSAND/UL (ref 0–0.2)
IMM GRANULOCYTES # BLD AUTO: 0.11 THOUSAND/UL (ref 0–0.2)
IMM GRANULOCYTES NFR BLD AUTO: 1 % (ref 0–2)
IMM GRANULOCYTES NFR BLD AUTO: 1 % (ref 0–2)
LACTATE SERPL-SCNC: 2.1 MMOL/L (ref 0.5–2)
LIPASE SERPL-CCNC: 239 U/L (ref 73–393)
LYMPHOCYTES # BLD AUTO: 1.53 THOUSANDS/ΜL (ref 0.6–4.47)
LYMPHOCYTES # BLD AUTO: 1.53 THOUSANDS/ΜL (ref 0.6–4.47)
LYMPHOCYTES NFR BLD AUTO: 11 % (ref 14–44)
LYMPHOCYTES NFR BLD AUTO: 8 % (ref 14–44)
MCH RBC QN AUTO: 29.8 PG (ref 26.8–34.3)
MCH RBC QN AUTO: 30 PG (ref 26.8–34.3)
MCHC RBC AUTO-ENTMCNC: 33 G/DL (ref 31.4–37.4)
MCHC RBC AUTO-ENTMCNC: 33.1 G/DL (ref 31.4–37.4)
MCV RBC AUTO: 90 FL (ref 82–98)
MCV RBC AUTO: 91 FL (ref 82–98)
MONOCYTES # BLD AUTO: 1.05 THOUSAND/ΜL (ref 0.17–1.22)
MONOCYTES # BLD AUTO: 1.09 THOUSAND/ΜL (ref 0.17–1.22)
MONOCYTES NFR BLD AUTO: 6 % (ref 4–12)
MONOCYTES NFR BLD AUTO: 7 % (ref 4–12)
NEUTROPHILS # BLD AUTO: 11.59 THOUSANDS/ΜL (ref 1.85–7.62)
NEUTROPHILS # BLD AUTO: 15.27 THOUSANDS/ΜL (ref 1.85–7.62)
NEUTS SEG NFR BLD AUTO: 80 % (ref 43–75)
NEUTS SEG NFR BLD AUTO: 84 % (ref 43–75)
NRBC BLD AUTO-RTO: 0 /100 WBCS
NRBC BLD AUTO-RTO: 0 /100 WBCS
PLATELET # BLD AUTO: 249 THOUSANDS/UL (ref 149–390)
PLATELET # BLD AUTO: 294 THOUSANDS/UL (ref 149–390)
PMV BLD AUTO: 10.8 FL (ref 8.9–12.7)
PMV BLD AUTO: 11.3 FL (ref 8.9–12.7)
POTASSIUM SERPL-SCNC: 4.3 MMOL/L (ref 3.5–5.3)
PROT SERPL-MCNC: 8.1 G/DL (ref 6.4–8.2)
RBC # BLD AUTO: 4.34 MILLION/UL (ref 3.88–5.62)
RBC # BLD AUTO: 5.14 MILLION/UL (ref 3.88–5.62)
SODIUM SERPL-SCNC: 136 MMOL/L (ref 136–145)
WBC # BLD AUTO: 14.35 THOUSAND/UL (ref 4.31–10.16)
WBC # BLD AUTO: 18.23 THOUSAND/UL (ref 4.31–10.16)

## 2019-03-03 PROCEDURE — 74177 CT ABD & PELVIS W/CONTRAST: CPT

## 2019-03-03 PROCEDURE — 83690 ASSAY OF LIPASE: CPT | Performed by: PHYSICIAN ASSISTANT

## 2019-03-03 PROCEDURE — 85025 COMPLETE CBC W/AUTO DIFF WBC: CPT | Performed by: PHYSICIAN ASSISTANT

## 2019-03-03 PROCEDURE — 96361 HYDRATE IV INFUSION ADD-ON: CPT

## 2019-03-03 PROCEDURE — 85025 COMPLETE CBC W/AUTO DIFF WBC: CPT | Performed by: INTERNAL MEDICINE

## 2019-03-03 PROCEDURE — 99222 1ST HOSP IP/OBS MODERATE 55: CPT | Performed by: SURGERY

## 2019-03-03 PROCEDURE — 36415 COLL VENOUS BLD VENIPUNCTURE: CPT | Performed by: PHYSICIAN ASSISTANT

## 2019-03-03 PROCEDURE — 96374 THER/PROPH/DIAG INJ IV PUSH: CPT

## 2019-03-03 PROCEDURE — 96375 TX/PRO/DX INJ NEW DRUG ADDON: CPT

## 2019-03-03 PROCEDURE — 80053 COMPREHEN METABOLIC PANEL: CPT | Performed by: PHYSICIAN ASSISTANT

## 2019-03-03 PROCEDURE — 96376 TX/PRO/DX INJ SAME DRUG ADON: CPT

## 2019-03-03 PROCEDURE — 83605 ASSAY OF LACTIC ACID: CPT | Performed by: PHYSICIAN ASSISTANT

## 2019-03-03 PROCEDURE — 99223 1ST HOSP IP/OBS HIGH 75: CPT | Performed by: INTERNAL MEDICINE

## 2019-03-03 RX ORDER — SODIUM CHLORIDE 9 MG/ML
100 INJECTION, SOLUTION INTRAVENOUS CONTINUOUS
Status: DISCONTINUED | OUTPATIENT
Start: 2019-03-03 | End: 2019-03-04

## 2019-03-03 RX ORDER — HYDROMORPHONE HCL/PF 1 MG/ML
0.5 SYRINGE (ML) INJECTION ONCE
Status: COMPLETED | OUTPATIENT
Start: 2019-03-03 | End: 2019-03-03

## 2019-03-03 RX ORDER — ONDANSETRON 2 MG/ML
4 INJECTION INTRAMUSCULAR; INTRAVENOUS EVERY 6 HOURS PRN
Status: DISCONTINUED | OUTPATIENT
Start: 2019-03-03 | End: 2019-03-04 | Stop reason: HOSPADM

## 2019-03-03 RX ORDER — ONDANSETRON 2 MG/ML
4 INJECTION INTRAMUSCULAR; INTRAVENOUS ONCE
Status: COMPLETED | OUTPATIENT
Start: 2019-03-03 | End: 2019-03-03

## 2019-03-03 RX ORDER — HYDRALAZINE HYDROCHLORIDE 20 MG/ML
5 INJECTION INTRAMUSCULAR; INTRAVENOUS EVERY 6 HOURS PRN
Status: DISCONTINUED | OUTPATIENT
Start: 2019-03-03 | End: 2019-03-04 | Stop reason: HOSPADM

## 2019-03-03 RX ORDER — MORPHINE SULFATE 4 MG/ML
4 INJECTION, SOLUTION INTRAMUSCULAR; INTRAVENOUS EVERY 4 HOURS PRN
Status: DISCONTINUED | OUTPATIENT
Start: 2019-03-03 | End: 2019-03-04 | Stop reason: HOSPADM

## 2019-03-03 RX ADMIN — MORPHINE SULFATE 2 MG: 2 INJECTION, SOLUTION INTRAMUSCULAR; INTRAVENOUS at 00:19

## 2019-03-03 RX ADMIN — HYDROMORPHONE HYDROCHLORIDE 0.5 MG: 1 INJECTION, SOLUTION INTRAMUSCULAR; INTRAVENOUS; SUBCUTANEOUS at 03:13

## 2019-03-03 RX ADMIN — SODIUM CHLORIDE 1000 ML: 0.9 INJECTION, SOLUTION INTRAVENOUS at 00:17

## 2019-03-03 RX ADMIN — MORPHINE SULFATE 1 MG: 2 INJECTION, SOLUTION INTRAMUSCULAR; INTRAVENOUS at 01:45

## 2019-03-03 RX ADMIN — SODIUM CHLORIDE 100 ML/HR: 0.9 INJECTION, SOLUTION INTRAVENOUS at 19:27

## 2019-03-03 RX ADMIN — SODIUM CHLORIDE 1000 ML: 0.9 INJECTION, SOLUTION INTRAVENOUS at 03:13

## 2019-03-03 RX ADMIN — ONDANSETRON 4 MG: 2 INJECTION INTRAMUSCULAR; INTRAVENOUS at 00:20

## 2019-03-03 RX ADMIN — ENOXAPARIN SODIUM 40 MG: 40 INJECTION SUBCUTANEOUS at 08:25

## 2019-03-03 RX ADMIN — IODIXANOL 100 ML: 320 INJECTION, SOLUTION INTRAVASCULAR at 01:10

## 2019-03-03 RX ADMIN — SODIUM CHLORIDE 100 ML/HR: 0.9 INJECTION, SOLUTION INTRAVENOUS at 05:57

## 2019-03-03 NOTE — ED PROVIDER NOTES
History  Chief Complaint   Patient presents with    Abdominal Pain     Pt c/o of abdominal pain that started earlier today, +n/v, pt states he had a normal bowel movement earlier today     Patient is a 77-year-old male that presents emergency department complaints of abdominal pain, nausea, vomiting  He states symptoms began this afternoon  He patient states he has history of bowel obstruction and feels as though he has another bowel obstruction  Patient states he has never required surgical procedure 1st bowel obstructions  He states that occasionally he requires an NG tube but not always  Patient has had multiple abdominal surgeries in the past for bypass surgeries and cholecystectomy  Patient denies fever, chills, chest pain, shortness of breath  Prior to Admission Medications   Prescriptions Last Dose Informant Patient Reported? Taking?    Cholecalciferol 2000 units CAPS   Yes Yes   Sig: Take 1 capsule by mouth   Omega-3 Fatty Acids (FISH OIL PO)   Yes Yes   Sig: Take 2 g by mouth   amLODIPine (NORVASC) 5 mg tablet   Yes Yes   Sig: TK 1 T PO D   atorvastatin (LIPITOR) 10 mg tablet   Yes Yes   Sig: TK 1 T PO D   donepezil (ARICEPT) 10 mg tablet   Yes Yes   Sig: TK 1 T PO HS   furosemide (LASIX) 40 mg tablet   Yes Yes   Sig: TK 1 T PO D IN THE MORNING   omeprazole (PriLOSEC) 40 MG capsule   Yes Yes   Sig: Take 40 mg by mouth   spironolactone (ALDACTONE) 25 mg tablet   Yes Yes   Sig: Take 12 5 mg by mouth   triamterene-hydrochlorothiazide (MAXZIDE-25) 37 5-25 mg per tablet   Yes Yes   Sig: Take by mouth      Facility-Administered Medications: None       Past Medical History:   Diagnosis Date    CKD (chronic kidney disease) 11/8/2018    Hyperlipidemia     Hypertension     PVD [facility]peripheral vascular disease) ([facility] [address] )     Small bowel obstruction (HCC)     Ventral hernia        Past Surgical History:   Procedure Laterality Date    ABDOMINAL HERNIA REPAIR      ABDOMINAL SURGERY      CHOLECYSTECTOMY open    FEMORAL BYPASS Right        History reviewed  No pertinent family history  I have reviewed and agree with the history as documented  Social History     Tobacco Use    Smoking status: Never Smoker    Smokeless tobacco: Never Used   Substance Use Topics    Alcohol use: No    Drug use: No        Review of Systems   Constitutional: Negative for fever  Gastrointestinal: Positive for abdominal pain, nausea and vomiting  All other systems reviewed and are negative  Physical Exam  Physical Exam   Constitutional: He is oriented to person, place, and time  He appears well-developed and well-nourished  HENT:   Head: Normocephalic and atraumatic  Eyes: Pupils are equal, round, and reactive to light  EOM are normal    Cardiovascular: Normal rate, regular rhythm, normal heart sounds and intact distal pulses  Pulmonary/Chest: Effort normal and breath sounds normal    Abdominal: Normal appearance  Bowel sounds are decreased  There is generalized tenderness and tenderness in the epigastric area  Neurological: He is alert and oriented to person, place, and time  Skin: Skin is warm  Psychiatric: He has a normal mood and affect  His behavior is normal    Vitals reviewed        Vital Signs  ED Triage Vitals [03/02/19 2354]   Temperature Pulse Respirations Blood Pressure SpO2   (!) 97 4 °F (36 3 °C) 99 19 162/73 96 %      Temp Source Heart Rate Source Patient Position - Orthostatic VS BP Location FiO2 (%)   Oral Monitor Sitting Right arm --      Pain Score       2           Vitals:    03/02/19 2354   BP: 162/73   Pulse: 99   Patient Position - Orthostatic VS: Sitting       Visual Acuity      ED Medications  Medications   sodium chloride 0 9 % bolus 1,000 mL (0 mL Intravenous Stopped 3/3/19 0147)   ondansetron (ZOFRAN) injection 4 mg (4 mg Intravenous Given 3/3/19 0020)   morphine injection 2 mg (2 mg Intravenous Given 3/3/19 0019)   iodixanol (VISIPAQUE) 320 MG/ML injection 100 mL (100 mL Intravenous Given 3/3/19 0110)   morphine injection 1 mg (1 mg Intravenous Given 3/3/19 0145)       Diagnostic Studies  Results Reviewed     Procedure Component Value Units Date/Time    Lactic acid, plasma [[id number]]  (Abnormal) Collected:  03/03/19 0017    Lab Status:  Final result Specimen:  Blood from Arm, Right Updated:  03/03/19 0052     LACTIC ACID 2 1 mmol/L     Narrative:       Result may be elevated if tourniquet was used during collection  Comprehensive metabolic panel [[id number]]  (Abnormal) Collected:  03/03/19 0017    Lab Status:  Final result Specimen:  Blood from Arm, Right Updated:  03/03/19 0046     Sodium 136 mmol/L      Potassium 4 3 mmol/L      Chloride 98 mmol/L      CO2 30 mmol/L      ANION GAP 8 mmol/L      BUN 23 mg/dL      Creatinine 1 72 mg/dL      Glucose 128 mg/dL      Calcium 9 7 mg/dL      AST 26 U/L      ALT 34 U/L      Alkaline Phosphatase 99 U/L      Total Protein 8 1 g/dL      Albumin 3 8 g/dL      Total Bilirubin 0 60 mg/dL      eGFR 39 ml/min/1 73sq m     Narrative:       National Kidney Disease Education Program recommendations are as follows:  GFR calculation is accurate only with a steady state creatinine  Chronic Kidney disease less than 60 ml/min/1 73 sq  meters  Kidney failure less than 15 ml/min/1 73 sq  meters      Lipase [[id number]]  (Normal) Collected:  03/03/19 0017    Lab Status:  Final result Specimen:  Blood from Arm, Right Updated:  03/03/19 0046     Lipase 239 u/L     CBC and differential [[id number]]  (Abnormal) Collected:  03/03/19 0017    Lab Status:  Final result Specimen:  Blood from Arm, Right Updated:  03/03/19 0029     WBC 18 23 Thousand/uL      RBC 5 14 Million/uL      Hemoglobin 15 3 g/dL      Hematocrit 46 4 %      MCV 90 fL      MCH 29 8 pg      MCHC 33 0 g/dL      RDW 12 6 %      MPV 11 3 fL      Platelets 305 Thousands/uL      nRBC 0 /100 WBCs      Neutrophils Relative 84 %      Immat GRANS % 1 %      Lymphocytes Relative 8 %      Monocytes Relative 6 %      Eosinophils Relative 1 %      Basophils Relative 0 %      Neutrophils Absolute 15 27 Thousands/µL      Immature Grans Absolute 0 11 Thousand/uL      Lymphocytes Absolute 1 53 Thousands/µL      Monocytes Absolute 1 09 Thousand/µL      Eosinophils Absolute 0 19 Thousand/µL      Basophils Absolute 0 04 Thousands/µL                  CT abdomen pelvis w contrast   Final Result by Hunter Melvin MD (03/03 0126)      There is moderate small bowel dilatation with a focal diameter change in the mid abdomen  Stool is seen within the colon  Favor ileus/incomplete obstruction over a high-grade obstruction  Rectus diastases  Supraumbilical fat-containing hernia  I personally discussed this study with Freida Bonilla MD on 3/3/2019 at 1:25 AM                Workstation performed: [id number]                    Procedures  Procedures       Phone Contacts  ED Phone Contact    ED Course                               MDM  Number of Diagnoses or Management Options  Partial small bowel obstruction [facility]):   Diagnosis management comments: Patient 77-year-old male with history of bowel obstruction that presents to emergency department with were recurrent abdominal pain  Patient relates he has a bowel obstruction because feels similar to in the past   CT scan of the abdomen pelvis with and without contrast was obtained and is consistent with partial small-bowel obstruction  Patient will be admitted to the hospital for bowel rest and IV fluids  Patient was discussed with internal medicine and inpatient admission was agreed         Amount and/or Complexity of Data Reviewed  Clinical lab tests: ordered and reviewed  Tests in the radiology section of CPT®: ordered and reviewed  Discuss the patient with other providers: yes    Risk of Complications, Morbidity, and/or Mortality  Presenting problems: moderate  Diagnostic procedures: moderate  Management options: moderate    Patient Progress  Patient progress: stable      Disposition  Final diagnoses:   Partial small bowel obstruction ([address] )     Time reflects when diagnosis was documented in both MDM as applicable and the Disposition within this note     Time User Action Codes Description Comment    3/3/2019  2:11 AM Andrei Cheung Add [K56 600] Partial small bowel obstruction [facility])       ED Disposition     ED Disposition Condition Date/Time Comment    Admit Joey Mckenzie Mar 3, 2019  2:11 AM Case was discussed with Dr Hossein Adams and the patient's admission status was agreed to be Admission Status: inpatient status to the service of Dr Hossein Adams  Follow-up Information    None         Patient's Medications   Discharge Prescriptions    No medications on file     No discharge procedures on file      ED Provider  Electronically Signed by           Ambar Parks PA-C  03/03/19 0221

## 2019-03-03 NOTE — H&P
H&P- Jody Kwan 1945, 68 y o  male MRN: [id number]    Unit/Bed#: ED 17 Encounter: [id number]    Primary Care Provider: Araceli Becker DO   Date and time admitted to hospital: 3/2/2019 11:48 PM        * Partial small bowel obstruction ([facility] [address] )  Assessment & Plan  -admit to [facility]  -NPO  -IVF  -Consult Surgery  -Morphine PRN  -Zofran PRN    CKD (chronic kidney disease)  Assessment & Plan  Stage IV  -stable, cont to monitor    Hypertension  Assessment & Plan  -Hydralazine IV PRN while NPO    VTE Prophylaxis: Enoxaparin (Lovenox)  / sequential compression device   Code Status:  Full code  POLST: POLST form is not discussed and not completed at this time  Discussion with family:  Patient    Anticipated Length of Stay:  Patient will be admitted on an Inpatient basis with an anticipated length of stay of  more than 2 midnights  Justification for Hospital Stay:  Management of partial small bowel obstruction    Total Time for Visit, including Counseling / Coordination of Care: 45 minutes  Greater than 50% of this total time spent on direct patient counseling and coordination of care  Chief Complaint:   Abdominal pain    History of Present Illness:    Jody Kwan is a 68 y o  male who presents with abdominal pain sudden onset at noon, intermittent, episode lasted 1-2 min, every 5-10 minutes, sharp pain, supraumbilical and epigastric, associated with multiple episodes of nausea and vomiting  Patient has similar episode in the diagnosed as partial small bowel obstruction, so symptoms were similar can't emergency room  ER course:  CT abdomen suggestive for PSBO, started on IV fluids symptomatic medication will admit for bowel rest and surgical evaluation  Review of Systems:    Review of Systems  A comprehensive 10 point review system conducted all negative except as mentioned in HPI     Past Medical and Surgical History:     Past Medical History:   Diagnosis Date    CKD (chronic kidney disease) 11/8/2018    Hyperlipidemia     Hypertension     PVD (peripheral vascular disease) ([facility] [address] )     Small bowel obstruction (HCC)     Ventral hernia        Past Surgical History:   Procedure Laterality Date    ABDOMINAL HERNIA REPAIR      ABDOMINAL SURGERY      CHOLECYSTECTOMY      open    FEMORAL BYPASS Right        Meds/Allergies:    Prior to Admission medications    Medication Sig Start Date End Date Taking? Authorizing Provider   amLODIPine (NORVASC) 5 mg tablet TK 1 T PO D 7/11/18  Yes Historical Provider, MD   atorvastatin (LIPITOR) 10 mg tablet TK 1 T PO D 7/11/18  Yes Historical Provider, MD   Cholecalciferol 2000 units CAPS Take 1 capsule by mouth   Yes Historical Provider, MD   donepezil (ARICEPT) 10 mg tablet TK 1 T PO HS 8/28/18  Yes Historical Provider, MD   furosemide (LASIX) 40 mg tablet TK 1 T PO D IN THE MORNING 9/12/18  Yes Historical Provider, MD   Omega-3 Fatty Acids (FISH OIL PO) Take 2 g by mouth   Yes Historical Provider, MD   omeprazole (PriLOSEC) 40 MG capsule Take 40 mg by mouth   Yes Historical Provider, MD   spironolactone (ALDACTONE) 25 mg tablet Take 12 5 mg by mouth   Yes Historical Provider, MD   triamterene-hydrochlorothiazide (MAXZIDE-25) 37 5-25 mg per tablet Take by mouth 9/13/18  Yes Historical Provider, MD     I have reviewed home medications with a medical source (PCP, Pharmacy, other)      Allergies: No Known Allergies    Social History:     Marital Status: /Civil Union   Occupation:  Retired  Patient Pre-hospital Living Situation:  Home  Patient Pre-hospital Level of Mobility:  Number  Patient Pre-hospital Diet Restrictions:  Cardiac  Substance Use History:   Social History     Substance and Sexual Activity   Alcohol Use No     Social History     Tobacco Use   Smoking Status Never Smoker   Smokeless Tobacco Never Used     Social History     Substance and Sexual Activity   Drug Use No       Family History:    non-contributory    Physical Exam:     Vitals:   Blood Pressure: 128/62 (03/03/19 0323)  Pulse: 91 (03/03/19 0323)  Temperature: 98 9 °F (37 2 °C) (03/03/19 0323)  Temp Source: Oral (03/03/19 0323)  Respirations: 20 (03/03/19 0323)  Height: 5' 10" (177 8 cm) (03/02/19 2354)  Weight - Scale: 130 kg (285 lb 15 oz) (03/03/19 0003)  SpO2: 93 % (03/03/19 0328)    Physical Exam   Constitutional: He is oriented to person, place, and time  He appears well-developed and well-nourished  Obese male   HENT:   Head: Normocephalic and atraumatic  Eyes: Pupils are equal, round, and reactive to light  EOM are normal    Neck: Normal range of motion  Neck supple  Cardiovascular: Normal rate and regular rhythm  No murmur heard  Pulmonary/Chest: Effort normal and breath sounds normal    Abdominal: Soft  Bowel sounds are normal  He exhibits no distension  There is tenderness (Supraumbilical mild)  There is no rebound and no guarding  Musculoskeletal: Normal range of motion  He exhibits no edema or tenderness  Neurological: He is alert and oriented to person, place, and time  Skin: Skin is warm and dry  Psychiatric: He has a normal mood and affect  Additional Data:     Lab Results: I have personally reviewed pertinent reports  Results from last 7 days   Lab Units 03/03/19  0017   WBC Thousand/uL 18 23*   HEMOGLOBIN g/dL 15 3   HEMATOCRIT % 46 4   PLATELETS Thousands/uL 294   NEUTROS PCT % 84*   LYMPHS PCT % 8*   MONOS PCT % 6   EOS PCT % 1     Results from last 7 days   Lab Units 03/03/19  0017   SODIUM mmol/L 136   POTASSIUM mmol/L 4 3   CHLORIDE mmol/L 98*   CO2 mmol/L 30   BUN mg/dL 23   CREATININE mg/dL 1 72*   ANION GAP mmol/L 8   CALCIUM mg/dL 9 7   ALBUMIN g/dL 3 8   TOTAL BILIRUBIN mg/dL 0 60   ALK PHOS U/L 99   ALT U/L 34   AST U/L 26   GLUCOSE RANDOM mg/dL 128                 Results from last 7 days   Lab Units 03/03/19  0017   LACTIC ACID mmol/L 2 1*       Imaging: I have personally reviewed pertinent reports        CT abdomen pelvis w contrast   Final Result by Hunter Melvin MD (03/03 0126)      There is moderate small bowel dilatation with a focal diameter change in the mid abdomen  Stool is seen within the colon  Favor ileus/incomplete obstruction over a high-grade obstruction  Rectus diastases  Supraumbilical fat-containing hernia  I personally discussed this study with Freida Bonilla MD on 3/3/2019 at 1:25 AM                Workstation performed: [id number]               ** Please Note: This note has been constructed using a voice recognition system   **

## 2019-03-03 NOTE — CONSULTS
Consultation - General Surgery   Loan Velasco 68 y o  male MRN: [id number]  Unit/Bed#: ED 17 Encounter: [id number]    Assessment/Plan     Assessment:  Recurrent small bowel obstruction  Chronic kidney disease  Hypertension  Morbid obesity  Plan:  The patient is doing much better this morning, no further nausea, vomiting or increasing abdominal pain, no passing flatus or bowel movement  No surgical intervention is needed at this time  History of Present Illness     HPI:  Loan Velasco is a 68 y o  male who presents to the emergency room with abdominal pain localized to the mid abdomen since Friday afternoon, associated with nausea and vomiting a couple times, he does not recall any single event the provoked the symptoms  He has had a least 2 episodes in the past the require admission, the last admission was November 2018  He has been in the hospital and February 2018 for the same  He also confessed being at [facility] for the same  He has a history of vascular bypass via abdomen performed at a different facility  He also had open cholecystectomy and incisional hernia repair  His last bowel movement was 2 days ago  He denies passing any flatus or bowel movement today  Consults    Review of Systems   Constitutional: Negative for chills and fever  HENT: Negative for nosebleeds and sore throat  Eyes: Negative for pain and discharge  Respiratory: Negative for cough and shortness of breath  Cardiovascular: Negative for chest pain and palpitations  Gastrointestinal:        As per history of present illness  Endocrine: Negative for cold intolerance and heat intolerance  Genitourinary: Negative for dysuria and hematuria  Neurological: Negative for seizures and headaches  Hematological: Negative for adenopathy  Does not bruise/bleed easily  Psychiatric/Behavioral: Negative for confusion  The patient is not nervous/anxious          Historical Information   Past Medical History:   Diagnosis Date  CKD (chronic kidney disease) 11/8/2018    Hyperlipidemia     Hypertension     PVD (peripheral vascular disease) (HCC)     Small bowel obstruction (HCC)     Ventral hernia      Past Surgical History:   Procedure Laterality Date    ABDOMINAL HERNIA REPAIR      ABDOMINAL SURGERY      CHOLECYSTECTOMY      open    FEMORAL BYPASS Right      Social History   Social History     Substance and Sexual Activity   Alcohol Use No     Social History     Substance and Sexual Activity   Drug Use No     Social History     Tobacco Use   Smoking Status Never Smoker   Smokeless Tobacco Never Used     Family History: non-contributory    Meds/Allergies   all current active meds have been reviewed, current meds:   Current Facility-Administered Medications   Medication Dose Route Frequency    enoxaparin (LOVENOX) subcutaneous injection 40 mg  40 mg Subcutaneous Daily    hydrALAZINE (APRESOLINE) injection 5 mg  5 mg Intravenous Q6H PRN    morphine (PF) 4 mg/mL injection 4 mg  4 mg Intravenous Q4H PRN    morphine injection 2 mg  2 mg Intravenous Q4H PRN    ondansetron (ZOFRAN) injection 4 mg  4 mg Intravenous Q6H PRN    sodium chloride 0 9 % infusion  100 mL/hr Intravenous Continuous    and PTA meds:   Prior to Admission Medications   Prescriptions Last Dose Informant Patient Reported? Taking?    Cholecalciferol 2000 units CAPS   Yes Yes   Sig: Take 1 capsule by mouth   Omega-3 Fatty Acids (FISH OIL PO)   Yes Yes   Sig: Take 2 g by mouth   amLODIPine (NORVASC) 5 mg tablet   Yes Yes   Sig: TK 1 T PO D   atorvastatin (LIPITOR) 10 mg tablet   Yes Yes   Sig: TK 1 T PO D   donepezil (ARICEPT) 10 mg tablet   Yes Yes   Sig: TK 1 T PO HS   furosemide (LASIX) 40 mg tablet   Yes Yes   Sig: TK 1 T PO D IN THE MORNING   omeprazole (PriLOSEC) 40 MG capsule   Yes Yes   Sig: Take 40 mg by mouth   spironolactone (ALDACTONE) 25 mg tablet   Yes Yes   Sig: Take 12 5 mg by mouth   triamterene-hydrochlorothiazide (MAXZIDE-25) 37 5-25 mg per tablet   Yes Yes   Sig: Take by mouth      Facility-Administered Medications: None     No Known Allergies    Objective   First Vitals:   Blood Pressure: 162/73 (03/02/19 2354)  Pulse: 99 (03/02/19 2354)  Temperature: (!) 97 4 °F (36 3 °C) (03/02/19 2354)  Temp Source: Oral (03/02/19 2354)  Respirations: 19 (03/02/19 2354)  Height: 5' 10" (177 8 cm) (03/02/19 2354)  Weight - Scale: 130 kg (285 lb 15 oz) (03/03/19 0003)  SpO2: 96 % (03/02/19 2354)    Current Vitals:   Blood Pressure: 124/59 (03/03/19 1100)  Pulse: 77 (03/03/19 1100)  Temperature: 97 8 °F (36 6 °C) (03/03/19 0727)  Temp Source: Oral (03/03/19 0727)  Respirations: 21 (03/03/19 1100)  Height: 5' 10" (177 8 cm) (03/02/19 2354)  Weight - Scale: 130 kg (285 lb 15 oz) (03/03/19 0003)  SpO2: 92 % (03/03/19 1100)      Intake/Output Summary (Last 24 hours) at 3/3/2019 1110  Last data filed at 3/3/2019 3230  Gross per 24 hour   Intake 2000 ml   Output --   Net 2000 ml       Invasive Devices     Peripheral Intravenous Line            Peripheral IV 03/03/19 Right Forearm less than 1 day                Physical Exam   Constitutional: He is oriented to person, place, and time  He appears well-developed and well-nourished  No distress  Morbidly obese  HENT:   Head: Normocephalic  Mouth/Throat: No oropharyngeal exudate  Eyes: Pupils are equal, round, and reactive to light  No scleral icterus  Neck: Normal range of motion  Cardiovascular: Normal rate and regular rhythm  No murmur heard  Pulmonary/Chest: Effort normal and breath sounds normal  No respiratory distress  Abdominal:   Abdomen is obese, soft, mildly tender in the mid abdomen without guarding or rebound  There is a midline surgical scar and right subcostal surgical scar  There is no evidence of incisional hernia  There is no visceromegaly or mass palpable  Musculoskeletal: He exhibits no edema or deformity  Lymphadenopathy:     He has no cervical adenopathy     Neurological: He is alert and oriented to person, place, and time  No cranial nerve deficit  Skin: No erythema  No pallor  Psychiatric: He has a normal mood and affect  His behavior is normal        Lab Results:   CBC:   Lab Results   Component Value Date    WBC 14 35 (H) 03/03/2019    HGB 13 0 03/03/2019    HCT 39 3 03/03/2019    MCV 91 03/03/2019     03/03/2019    MCH 30 0 03/03/2019    MCHC 33 1 03/03/2019    RDW 12 4 03/03/2019    MPV 10 8 03/03/2019    NRBC 0 03/03/2019   , CMP:   Lab Results   Component Value Date    SODIUM 136 03/03/2019    K 4 3 03/03/2019    CL 98 (L) 03/03/2019    CO2 30 03/03/2019    BUN 23 03/03/2019    CREATININE 1 72 (H) 03/03/2019    CALCIUM 9 7 03/03/2019    AST 26 03/03/2019    ALT 34 03/03/2019    ALKPHOS 99 03/03/2019    EGFR 39 03/03/2019   , Coagulation: No results found for: PT, INR, APTT, Urinalysis: No results found for: Chandana Climes, SPECGRAV, PHUR, LEUKOCYTESUR, NITRITE, PROTEINUA, GLUCOSEU, KETONESU, BILIRUBINUR, BLOODU, Amylase: No results found for: AMYLASE, Lipase:   Lab Results   Component Value Date    LIPASE 239 03/03/2019     Imaging: I have personally reviewed pertinent reports  EKG, Pathology, and Other Studies: I have personally reviewed pertinent films in PACS  CT abdomen pelvis w contrast [[id number]] Collected: 03/03/19 0117   Order Status: Completed Updated: 03/03/19 0127   Narrative:     CT ABDOMEN AND PELVIS WITH IV CONTRAST    INDICATION:   Bowel obstruction, high-grade  COMPARISON:  11/7/2018    TECHNIQUE:  CT examination of the abdomen and pelvis was performed  Axial, sagittal, and coronal 2D reformatted images were created from the source data and submitted for interpretation      Radiation dose length product (DLP) for this visit:  3055 mGy-cm    This examination, like all CT scans performed in the [facility], was performed utilizing techniques to minimize radiation dose exposure, including the use of iterative   reconstruction and automated exposure control  IV Contrast:  100 mL of iodixanol (VISIPAQUE)  Enteric Contrast:  Enteric contrast was not administered  FINDINGS:    ABDOMEN    LOWER CHEST:  No clinically significant abnormality identified in the visualized lower chest     LIVER/BILIARY TREE:  Unremarkable  GALLBLADDER: Quigley Fritter is surgically absent  SPLEEN:  Calcified granulomata are noted in the spleen   No suspicious splenic mass  PANCREAS:  Unremarkable  ADRENAL GLANDS:  Unremarkable  KIDNEYS/URETERS:  One or more simple renal cyst(s) is noted   Otherwise unremarkable kidneys   No hydronephrosis  STOMACH AND BOWEL: Harjit Champ is moderate small bowel dilatation with a focal diameter change in the mid abdomen   Stool is seen within the colon   Favor ileus over for this incomplete obstruction over a high-grade obstruction  There is colonic diverticulosis without evidence of acute diverticulitis  APPENDIX:  A normal appendix was visualized  ABDOMINOPELVIC CAVITY:  No ascites or free intraperitoneal air  No lymphadenopathy  VESSELS:  Unremarkable for patient's age  PELVIS    REPRODUCTIVE ORGANS:  Unremarkable for patient's age  URINARY BLADDER:  Unremarkable  ABDOMINAL WALL/INGUINAL REGIONS:  Rectus diastases   Supraumbilical fat-containing hernia  OSSEOUS STRUCTURES:  No acute fracture or destructive osseous lesion  Impression:       There is moderate small bowel dilatation with a focal diameter change in the mid abdomen   Stool is seen within the colon   Favor ileus/incomplete obstruction over a high-grade obstruction  Rectus diastases   Supraumbilical fat-containing hernia

## 2019-03-04 VITALS
HEIGHT: 70 IN | HEART RATE: 75 BPM | WEIGHT: 285.94 LBS | SYSTOLIC BLOOD PRESSURE: 104 MMHG | RESPIRATION RATE: 18 BRPM | DIASTOLIC BLOOD PRESSURE: 45 MMHG | BODY MASS INDEX: 40.94 KG/M2 | OXYGEN SATURATION: 90 % | TEMPERATURE: 98.2 F

## 2019-03-04 LAB
ANION GAP SERPL CALCULATED.3IONS-SCNC: 8 MMOL/L (ref 4–13)
BUN SERPL-MCNC: 23 MG/DL (ref 5–25)
CALCIUM SERPL-MCNC: 8.8 MG/DL (ref 8.3–10.1)
CHLORIDE SERPL-SCNC: 104 MMOL/L (ref 100–108)
CO2 SERPL-SCNC: 28 MMOL/L (ref 21–32)
CREAT SERPL-MCNC: 1.61 MG/DL (ref 0.6–1.3)
GFR SERPL CREATININE-BSD FRML MDRD: 42 ML/MIN/1.73SQ M
GLUCOSE SERPL-MCNC: 84 MG/DL (ref 65–140)
POTASSIUM SERPL-SCNC: 4.6 MMOL/L (ref 3.5–5.3)
SODIUM SERPL-SCNC: 140 MMOL/L (ref 136–145)

## 2019-03-04 PROCEDURE — 99233 SBSQ HOSP IP/OBS HIGH 50: CPT | Performed by: SURGERY

## 2019-03-04 PROCEDURE — 80048 BASIC METABOLIC PNL TOTAL CA: CPT | Performed by: INTERNAL MEDICINE

## 2019-03-04 PROCEDURE — 99239 HOSP IP/OBS DSCHRG MGMT >30: CPT | Performed by: INTERNAL MEDICINE

## 2019-03-04 RX ADMIN — ENOXAPARIN SODIUM 40 MG: 40 INJECTION SUBCUTANEOUS at 10:09

## 2019-03-04 NOTE — DISCHARGE SUMMARY
Discharge Summary - [facility] Internal Medicine    Patient Information: Emeli Castro 68 y o  male MRN: [id number]  Unit/Bed#: -01 Encounter: [id number]    Discharging Physician / Practitioner: Zuly Boateng MD  PCP: Maranda Barahona DO  Admission Date: 3/2/2019  Discharge Date: 03/04/19    Disposition:     Home    Reason for Admission: Abdominal pain    Discharge Diagnoses:     Principal Problem:    Partial small bowel obstruction ([address] )  Active Problems:    Hypertension    CKD (chronic kidney disease)  Resolved Problems:    * No resolved hospital problems  *      Consultations During Hospital Stay:  · surgery    Procedures Performed:     · none    Significant Findings / Test Results:     · CT abdomen and pelvis with IV contrast  Impression:       There is moderate small bowel dilatation with a focal diameter change in the mid abdomen   Stool is seen within the colon   Favor ileus/incomplete obstruction over a high-grade obstruction  Rectus diastases   Supraumbilical fat-containing hernia  ·     Incidental Findings:   ·      Test Results Pending at Discharge (will require follow up):   ·      Outpatient Tests Requested:  ·     Complications:  None    History of Present Illness:     Emeli Castro is a 68 y o  male who presents with abdominal pain sudden onset at noon, intermittent, episode lasted 1-2 min, every 5-10 minutes, sharp pain, supraumbilical and epigastric, associated with multiple episodes of nausea and vomiting  Patient has similar episode in the diagnosed as partial small bowel obstruction, so symptoms were similar can't emergency room  ER course:  CT abdomen suggestive for PSBO, started on IV fluids symptomatic medication will admit for bowel rest and surgical evaluation            Hospital Course:     Emeli Castro is a 68 y o  male patient who originally presented to the hospital on 3/2/2019 due to complaints of abdominal and found to have partial SBO  He was given IVF and supportive care  Surgery was consulted and no surgical intervention was needed at this time  Patient was restarted back on surgical diet and tolerated it well  He is hemodynamically stable and will be discharged home  Patient to followup with his PCP as outpatient  Condition at Discharge: stable     Discharge Day Visit / Exam:     Subjective:  Patient seen and examined at bedside  Patient has no new complaints  Vitals: Blood Pressure: (!) 104/45 (03/03/19 2308)  Pulse: 75 (03/03/19 2308)  Temperature: 98 2 °F (36 8 °C) (03/03/19 2308)  Temp Source: Oral (03/03/19 1548)  Respirations: 18 (03/03/19 2308)  Height: 5' 10" (177 8 cm) (03/02/19 2354)  Weight - Scale: 130 kg (285 lb 15 oz) (03/03/19 0003)  SpO2: 90 % (03/03/19 2308)  Exam:   Physical Exam   Constitutional: He is oriented to person, place, and time  He appears well-developed and well-nourished  HENT:   Head: Normocephalic and atraumatic  Eyes: Pupils are equal, round, and reactive to light  EOM are normal    Neck: Normal range of motion  Neck supple  No JVD present  No tracheal deviation present  No thyromegaly present  Cardiovascular: Normal rate, regular rhythm and normal heart sounds  Exam reveals no gallop and no friction rub  No murmur heard  Pulmonary/Chest: Effort normal and breath sounds normal  No stridor  No respiratory distress  He has no wheezes  Abdominal: Soft  Bowel sounds are normal  He exhibits no distension  There is no tenderness  There is no guarding  Musculoskeletal: Normal range of motion  He exhibits no edema  Neurological: He is alert and oriented to person, place, and time  Vitals reviewed  Discussion with Family:     Discharge instructions/Information to patient and family:   See after visit summary for information provided to patient and family  Provisions for Follow-Up Care:  See after visit summary for information related to follow-up care and any pertinent home health orders        Planned Readmission: Discharge Statement:  I spent 50 minutes discharging the patient  This time was spent on the day of discharge  I had direct contact with the patient on the day of discharge  Greater than 50% of the total time was spent examining patient, answering all patient questions, arranging and discussing plan of care with patient as well as directly providing post-discharge instructions  Additional time then spent on discharge activities  Discharge Medications:  See after visit summary for reconciled discharge medications provided to patient and family        ** Please Note: This note has been constructed using a voice recognition system **

## 2019-03-04 NOTE — PROGRESS NOTES
Progress Note - General Surgery   Renée Coreas 68 y o  male MRN: [id number]  Unit/Bed#: -01 Encounter: [id number]    Assessment/Plan  Recurrent small bowel obstruction  Chronic kidney disease  Hypertension  Morbid obesity  Plan:  Advance diet as tolerated   No surgical intervention at this time          Chief Complaint: none   I had a bowel movement, and passing  Gas,       Objective Directed Exam: pt sitting in chair NAD   LUNGS CLEAR  RRR  ABD:  Soft, distended, but pt states this is his normalsize, NBS  Extrem:  +2 pitting edema  No calf tenderness   Blood pressure (!) 104/45, pulse 75, temperature 98 2 °F (36 8 °C), resp  rate 18, height 5' 10" (1 778 m), weight 130 kg (285 lb 15 oz), SpO2 90 %  ,Body mass index is 41 03 kg/m²        Intake/Output Summary (Last 24 hours) at 3/4/2019 1126  Last data filed at 3/4/2019 1007  Gross per 24 hour   Intake 2250 ml   Output --   Net 2250 ml       Invasive Devices     Peripheral Intravenous Line            Peripheral IV 03/03/19 Right Forearm 1 day                Physical Exam:   General Appearance:    Alert and orientated x 3, cooperative, no distress   Lungs:     Clear to auscultation bilaterally, respirations unlabored    Heart:    Regular rate and rhythm   Abdomen:     As above          Extremities:   Extremities normal,  no cyanosis or edema   Pulses:   2+ and symmetric all extremities, no calf tenderness   Skin:   Skin color, texture, turgor normal, no rashes or lesions   Neurologic:   CNII-XII intact, normal strength, sensation and reflexes     Throughout, affect appropriate                           Labs:   CBC with diff:   Lab Results   Component Value Date    WBC 14 35 (H) 03/03/2019    HGB 13 0 03/03/2019    HCT 39 3 03/03/2019    MCV 91 03/03/2019     03/03/2019    MCH 30 0 03/03/2019    MCHC 33 1 03/03/2019    RDW 12 4 03/03/2019    MPV 10 8 03/03/2019    NRBC 0 03/03/2019   ,   BMP/CMP:  Lab Results   Component Value Date    K 4 6 03/04/2019    CL 104 03/04/2019    CO2 28 03/04/2019    BUN 23 03/04/2019    CREATININE 1 61 (H) 03/04/2019    CALCIUM 8 8 03/04/2019    AST 26 03/03/2019    ALT 34 03/03/2019    ALKPHOS 99 03/03/2019    EGFR 42 03/04/2019   ,   Lipid Panel: No results found for: CHOL,   Coags: No results found for: PT, PTT, INR,     Blood Culture: No results found for: BLOODCX,   Urinalysis:   Lab Results   Component Value Date    COLORU Yellow 11/08/2018    CLARITYU Clear 11/08/2018    SPECGRAV 1 020 11/08/2018    PHUR 6 0 11/08/2018    LEUKOCYTESUR Negative 11/08/2018    NITRITE Negative 11/08/2018    GLUCOSEU Negative 11/08/2018    KETONESU Negative 11/08/2018    BILIRUBINUR Negative 11/08/2018    BLOODU Negative 11/08/2018   ,   Urine Culture: No results found for: URINECX,   Wound Culure: No results found for: WOUNDCULT      Imaging: Ct Abdomen Pelvis W Contrast    Result Date: 3/3/2019  Impression: There is moderate small bowel dilatation with a focal diameter change in the mid abdomen  Stool is seen within the colon  Favor ileus/incomplete obstruction over a high-grade obstruction  Rectus diastases  Supraumbilical fat-containing hernia    I personally discussed this study with Patrice De Jesus MD on 3/3/2019 at 1:25 AM  Workstation performed: [id number]         Jose Angel Soliz PA-C  3/4/2019

## 2020-04-14 ENCOUNTER — APPOINTMENT (EMERGENCY)
Dept: CT IMAGING | Facility: HOSPITAL | Age: 75
DRG: 389 | End: 2020-04-14
Payer: MEDICARE

## 2020-04-14 ENCOUNTER — HOSPITAL ENCOUNTER (INPATIENT)
Facility: HOSPITAL | Age: 75
LOS: 2 days | Discharge: HOME | DRG: 389 | End: 2020-04-16
Attending: EMERGENCY MEDICINE | Admitting: SURGERY
Payer: MEDICARE

## 2020-04-14 DIAGNOSIS — R73.09 ELEVATED RANDOM BLOOD GLUCOSE LEVEL: ICD-10-CM

## 2020-04-14 DIAGNOSIS — K56.609 SMALL BOWEL OBSTRUCTION (HCC): Primary | ICD-10-CM

## 2020-04-14 DIAGNOSIS — I10 ESSENTIAL HYPERTENSION: ICD-10-CM

## 2020-04-14 DIAGNOSIS — F03.90 DEMENTIA WITHOUT BEHAVIORAL DISTURBANCE, UNSPECIFIED DEMENTIA TYPE (HCC): ICD-10-CM

## 2020-04-14 DIAGNOSIS — N18.4 STAGE 4 CHRONIC KIDNEY DISEASE (HCC): ICD-10-CM

## 2020-04-14 DIAGNOSIS — N17.9 AKI (ACUTE KIDNEY INJURY) (HCC): ICD-10-CM

## 2020-04-14 DIAGNOSIS — E78.5 HYPERLIPIDEMIA, UNSPECIFIED HYPERLIPIDEMIA TYPE: ICD-10-CM

## 2020-04-14 LAB
ALBUMIN SERPL BCP-MCNC: 3.7 G/DL (ref 3.5–5)
ALP SERPL-CCNC: 107 U/L (ref 46–116)
ALT SERPL W P-5'-P-CCNC: 30 U/L (ref 12–78)
ANION GAP SERPL CALCULATED.3IONS-SCNC: 12 MMOL/L (ref 4–13)
AST SERPL W P-5'-P-CCNC: 18 U/L (ref 5–45)
BASOPHILS # BLD AUTO: 0.04 THOUSANDS/ΜL (ref 0–0.1)
BASOPHILS NFR BLD AUTO: 0 % (ref 0–1)
BILIRUB SERPL-MCNC: 0.8 MG/DL (ref 0.2–1)
BUN SERPL-MCNC: 21 MG/DL (ref 5–25)
CALCIUM SERPL-MCNC: 9 MG/DL (ref 8.3–10.1)
CHLORIDE SERPL-SCNC: 96 MMOL/L (ref 100–108)
CO2 SERPL-SCNC: 29 MMOL/L (ref 21–32)
CREAT SERPL-MCNC: 1.82 MG/DL (ref 0.6–1.3)
EOSINOPHIL # BLD AUTO: 0.05 THOUSAND/ΜL (ref 0–0.61)
EOSINOPHIL NFR BLD AUTO: 0 % (ref 0–6)
ERYTHROCYTE [DISTWIDTH] IN BLOOD BY AUTOMATED COUNT: 12.6 % (ref 11.6–15.1)
GFR SERPL CREATININE-BSD FRML MDRD: 36 ML/MIN/1.73SQ M
GLUCOSE SERPL-MCNC: 156 MG/DL (ref 65–140)
HCT VFR BLD AUTO: 49.3 % (ref 36.5–49.3)
HGB BLD-MCNC: 15.7 G/DL (ref 12–17)
IMM GRANULOCYTES # BLD AUTO: 0.13 THOUSAND/UL (ref 0–0.2)
IMM GRANULOCYTES NFR BLD AUTO: 1 % (ref 0–2)
LACTATE SERPL-SCNC: 2 MMOL/L (ref 0.5–2)
LACTATE SERPL-SCNC: 3.5 MMOL/L (ref 0.5–2)
LIPASE SERPL-CCNC: 130 U/L (ref 73–393)
LYMPHOCYTES # BLD AUTO: 1.21 THOUSANDS/ΜL (ref 0.6–4.47)
LYMPHOCYTES NFR BLD AUTO: 7 % (ref 14–44)
MCH RBC QN AUTO: 29.6 PG (ref 26.8–34.3)
MCHC RBC AUTO-ENTMCNC: 31.8 G/DL (ref 31.4–37.4)
MCV RBC AUTO: 93 FL (ref 82–98)
MONOCYTES # BLD AUTO: 0.96 THOUSAND/ΜL (ref 0.17–1.22)
MONOCYTES NFR BLD AUTO: 6 % (ref 4–12)
NEUTROPHILS # BLD AUTO: 15.13 THOUSANDS/ΜL (ref 1.85–7.62)
NEUTS SEG NFR BLD AUTO: 86 % (ref 43–75)
NRBC BLD AUTO-RTO: 0 /100 WBCS
PLATELET # BLD AUTO: 233 THOUSANDS/UL (ref 149–390)
PLATELET # BLD AUTO: 316 THOUSANDS/UL (ref 149–390)
PMV BLD AUTO: 10.5 FL (ref 8.9–12.7)
PMV BLD AUTO: 11.4 FL (ref 8.9–12.7)
POTASSIUM SERPL-SCNC: 4.2 MMOL/L (ref 3.5–5.3)
PROT SERPL-MCNC: 7.9 G/DL (ref 6.4–8.2)
RBC # BLD AUTO: 5.3 MILLION/UL (ref 3.88–5.62)
SODIUM SERPL-SCNC: 137 MMOL/L (ref 136–145)
WBC # BLD AUTO: 17.52 THOUSAND/UL (ref 4.31–10.16)

## 2020-04-14 PROCEDURE — 99222 1ST HOSP IP/OBS MODERATE 55: CPT | Performed by: SURGERY

## 2020-04-14 PROCEDURE — 83605 ASSAY OF LACTIC ACID: CPT | Performed by: EMERGENCY MEDICINE

## 2020-04-14 PROCEDURE — 96374 THER/PROPH/DIAG INJ IV PUSH: CPT

## 2020-04-14 PROCEDURE — 80053 COMPREHEN METABOLIC PANEL: CPT | Performed by: EMERGENCY MEDICINE

## 2020-04-14 PROCEDURE — 96361 HYDRATE IV INFUSION ADD-ON: CPT

## 2020-04-14 PROCEDURE — 85025 COMPLETE CBC W/AUTO DIFF WBC: CPT | Performed by: EMERGENCY MEDICINE

## 2020-04-14 PROCEDURE — 83690 ASSAY OF LIPASE: CPT | Performed by: EMERGENCY MEDICINE

## 2020-04-14 PROCEDURE — 96375 TX/PRO/DX INJ NEW DRUG ADDON: CPT

## 2020-04-14 PROCEDURE — 36415 COLL VENOUS BLD VENIPUNCTURE: CPT | Performed by: EMERGENCY MEDICINE

## 2020-04-14 PROCEDURE — 99285 EMERGENCY DEPT VISIT HI MDM: CPT | Performed by: EMERGENCY MEDICINE

## 2020-04-14 PROCEDURE — 74177 CT ABD & PELVIS W/CONTRAST: CPT

## 2020-04-14 PROCEDURE — 85049 AUTOMATED PLATELET COUNT: CPT | Performed by: CLINICAL NURSE SPECIALIST

## 2020-04-14 PROCEDURE — 99285 EMERGENCY DEPT VISIT HI MDM: CPT

## 2020-04-14 RX ORDER — HEPARIN SODIUM 5000 [USP'U]/ML
5000 INJECTION, SOLUTION INTRAVENOUS; SUBCUTANEOUS EVERY 8 HOURS SCHEDULED
Status: DISCONTINUED | OUTPATIENT
Start: 2020-04-14 | End: 2020-04-16 | Stop reason: HOSPADM

## 2020-04-14 RX ORDER — DEXTROSE, SODIUM CHLORIDE, AND POTASSIUM CHLORIDE 5; .45; .15 G/100ML; G/100ML; G/100ML
100 INJECTION INTRAVENOUS CONTINUOUS
Status: DISCONTINUED | OUTPATIENT
Start: 2020-04-14 | End: 2020-04-16

## 2020-04-14 RX ORDER — ONDANSETRON 2 MG/ML
4 INJECTION INTRAMUSCULAR; INTRAVENOUS ONCE
Status: COMPLETED | OUTPATIENT
Start: 2020-04-14 | End: 2020-04-14

## 2020-04-14 RX ORDER — ONDANSETRON 2 MG/ML
4 INJECTION INTRAMUSCULAR; INTRAVENOUS EVERY 6 HOURS PRN
Status: DISCONTINUED | OUTPATIENT
Start: 2020-04-14 | End: 2020-04-16 | Stop reason: HOSPADM

## 2020-04-14 RX ADMIN — IOHEXOL 100 ML: 350 INJECTION, SOLUTION INTRAVENOUS at 08:54

## 2020-04-14 RX ADMIN — HEPARIN SODIUM 5000 UNITS: 5000 INJECTION INTRAVENOUS; SUBCUTANEOUS at 21:10

## 2020-04-14 RX ADMIN — SODIUM CHLORIDE 1000 ML: 0.9 INJECTION, SOLUTION INTRAVENOUS at 09:11

## 2020-04-14 RX ADMIN — MORPHINE SULFATE 2 MG: 2 INJECTION, SOLUTION INTRAMUSCULAR; INTRAVENOUS at 08:12

## 2020-04-14 RX ADMIN — SODIUM CHLORIDE 1000 ML: 0.9 INJECTION, SOLUTION INTRAVENOUS at 08:11

## 2020-04-14 RX ADMIN — DEXTROSE, SODIUM CHLORIDE, AND POTASSIUM CHLORIDE 100 ML/HR: 5; .45; .15 INJECTION INTRAVENOUS at 12:19

## 2020-04-14 RX ADMIN — HEPARIN SODIUM 5000 UNITS: 5000 INJECTION INTRAVENOUS; SUBCUTANEOUS at 14:19

## 2020-04-14 RX ADMIN — ONDANSETRON 4 MG: 2 INJECTION INTRAMUSCULAR; INTRAVENOUS at 08:12

## 2020-04-15 LAB
ALBUMIN SERPL BCP-MCNC: 3.2 G/DL (ref 3.5–5)
ALP SERPL-CCNC: 91 U/L (ref 46–116)
ALT SERPL W P-5'-P-CCNC: 29 U/L (ref 12–78)
ANION GAP SERPL CALCULATED.3IONS-SCNC: 5 MMOL/L (ref 4–13)
AST SERPL W P-5'-P-CCNC: 19 U/L (ref 5–45)
BILIRUB SERPL-MCNC: 0.9 MG/DL (ref 0.2–1)
BUN SERPL-MCNC: 21 MG/DL (ref 5–25)
CALCIUM SERPL-MCNC: 8 MG/DL (ref 8.3–10.1)
CHLORIDE SERPL-SCNC: 102 MMOL/L (ref 100–108)
CO2 SERPL-SCNC: 32 MMOL/L (ref 21–32)
CREAT SERPL-MCNC: 1.55 MG/DL (ref 0.6–1.3)
ERYTHROCYTE [DISTWIDTH] IN BLOOD BY AUTOMATED COUNT: 12.7 % (ref 11.6–15.1)
GFR SERPL CREATININE-BSD FRML MDRD: 43 ML/MIN/1.73SQ M
GLUCOSE SERPL-MCNC: 122 MG/DL (ref 65–140)
HCT VFR BLD AUTO: 44.7 % (ref 36.5–49.3)
HGB BLD-MCNC: 14.1 G/DL (ref 12–17)
MAGNESIUM SERPL-MCNC: 2 MG/DL (ref 1.6–2.6)
MCH RBC QN AUTO: 30.1 PG (ref 26.8–34.3)
MCHC RBC AUTO-ENTMCNC: 31.5 G/DL (ref 31.4–37.4)
MCV RBC AUTO: 95 FL (ref 82–98)
PHOSPHATE SERPL-MCNC: 2.8 MG/DL (ref 2.3–4.1)
PLATELET # BLD AUTO: 250 THOUSANDS/UL (ref 149–390)
PMV BLD AUTO: 10.6 FL (ref 8.9–12.7)
POTASSIUM SERPL-SCNC: 4 MMOL/L (ref 3.5–5.3)
PROT SERPL-MCNC: 6.8 G/DL (ref 6.4–8.2)
RBC # BLD AUTO: 4.69 MILLION/UL (ref 3.88–5.62)
SODIUM SERPL-SCNC: 139 MMOL/L (ref 136–145)
WBC # BLD AUTO: 13.5 THOUSAND/UL (ref 4.31–10.16)

## 2020-04-15 PROCEDURE — 99222 1ST HOSP IP/OBS MODERATE 55: CPT | Performed by: NURSE PRACTITIONER

## 2020-04-15 PROCEDURE — 84100 ASSAY OF PHOSPHORUS: CPT | Performed by: CLINICAL NURSE SPECIALIST

## 2020-04-15 PROCEDURE — 80053 COMPREHEN METABOLIC PANEL: CPT | Performed by: CLINICAL NURSE SPECIALIST

## 2020-04-15 PROCEDURE — 83735 ASSAY OF MAGNESIUM: CPT | Performed by: CLINICAL NURSE SPECIALIST

## 2020-04-15 PROCEDURE — 85027 COMPLETE CBC AUTOMATED: CPT | Performed by: CLINICAL NURSE SPECIALIST

## 2020-04-15 PROCEDURE — 99232 SBSQ HOSP IP/OBS MODERATE 35: CPT | Performed by: SURGERY

## 2020-04-15 RX ADMIN — HEPARIN SODIUM 5000 UNITS: 5000 INJECTION INTRAVENOUS; SUBCUTANEOUS at 14:30

## 2020-04-15 RX ADMIN — HEPARIN SODIUM 5000 UNITS: 5000 INJECTION INTRAVENOUS; SUBCUTANEOUS at 05:26

## 2020-04-15 RX ADMIN — DEXTROSE, SODIUM CHLORIDE, AND POTASSIUM CHLORIDE 100 ML/HR: 5; .45; .15 INJECTION INTRAVENOUS at 20:06

## 2020-04-15 RX ADMIN — DEXTROSE, SODIUM CHLORIDE, AND POTASSIUM CHLORIDE 100 ML/HR: 5; .45; .15 INJECTION INTRAVENOUS at 08:16

## 2020-04-15 RX ADMIN — HEPARIN SODIUM 5000 UNITS: 5000 INJECTION INTRAVENOUS; SUBCUTANEOUS at 22:17

## 2020-04-16 VITALS
BODY MASS INDEX: 42.07 KG/M2 | HEIGHT: 70 IN | RESPIRATION RATE: 17 BRPM | HEART RATE: 61 BPM | WEIGHT: 293.87 LBS | TEMPERATURE: 98 F | DIASTOLIC BLOOD PRESSURE: 63 MMHG | OXYGEN SATURATION: 96 % | SYSTOLIC BLOOD PRESSURE: 143 MMHG

## 2020-04-16 LAB
ANION GAP SERPL CALCULATED.3IONS-SCNC: 5 MMOL/L (ref 4–13)
BUN SERPL-MCNC: 18 MG/DL (ref 5–25)
CALCIUM SERPL-MCNC: 8 MG/DL (ref 8.3–10.1)
CHLORIDE SERPL-SCNC: 103 MMOL/L (ref 100–108)
CO2 SERPL-SCNC: 32 MMOL/L (ref 21–32)
CREAT SERPL-MCNC: 1.45 MG/DL (ref 0.6–1.3)
ERYTHROCYTE [DISTWIDTH] IN BLOOD BY AUTOMATED COUNT: 12.5 % (ref 11.6–15.1)
GFR SERPL CREATININE-BSD FRML MDRD: 47 ML/MIN/1.73SQ M
GLUCOSE SERPL-MCNC: 103 MG/DL (ref 65–140)
HCT VFR BLD AUTO: 41.7 % (ref 36.5–49.3)
HGB BLD-MCNC: 13.2 G/DL (ref 12–17)
MAGNESIUM SERPL-MCNC: 2 MG/DL (ref 1.6–2.6)
MCH RBC QN AUTO: 30.3 PG (ref 26.8–34.3)
MCHC RBC AUTO-ENTMCNC: 31.7 G/DL (ref 31.4–37.4)
MCV RBC AUTO: 96 FL (ref 82–98)
PLATELET # BLD AUTO: 212 THOUSANDS/UL (ref 149–390)
PMV BLD AUTO: 11.2 FL (ref 8.9–12.7)
POTASSIUM SERPL-SCNC: 4.1 MMOL/L (ref 3.5–5.3)
RBC # BLD AUTO: 4.36 MILLION/UL (ref 3.88–5.62)
SODIUM SERPL-SCNC: 140 MMOL/L (ref 136–145)
WBC # BLD AUTO: 10.08 THOUSAND/UL (ref 4.31–10.16)

## 2020-04-16 PROCEDURE — 80048 BASIC METABOLIC PNL TOTAL CA: CPT | Performed by: NURSE PRACTITIONER

## 2020-04-16 PROCEDURE — 83735 ASSAY OF MAGNESIUM: CPT | Performed by: NURSE PRACTITIONER

## 2020-04-16 PROCEDURE — NC001 PR NO CHARGE: Performed by: SURGERY

## 2020-04-16 PROCEDURE — 99238 HOSP IP/OBS DSCHRG MGMT 30/<: CPT | Performed by: SURGERY

## 2020-04-16 PROCEDURE — 85027 COMPLETE CBC AUTOMATED: CPT | Performed by: NURSE PRACTITIONER

## 2020-04-16 PROCEDURE — 99232 SBSQ HOSP IP/OBS MODERATE 35: CPT | Performed by: NURSE PRACTITIONER

## 2020-04-16 RX ORDER — DONEPEZIL HYDROCHLORIDE 5 MG/1
10 TABLET, FILM COATED ORAL
Status: DISCONTINUED | OUTPATIENT
Start: 2020-04-16 | End: 2020-04-16 | Stop reason: HOSPADM

## 2020-04-16 RX ORDER — PANTOPRAZOLE SODIUM 40 MG/1
40 TABLET, DELAYED RELEASE ORAL
Status: DISCONTINUED | OUTPATIENT
Start: 2020-04-16 | End: 2020-04-16 | Stop reason: HOSPADM

## 2020-04-16 RX ORDER — ATORVASTATIN CALCIUM 10 MG/1
10 TABLET, FILM COATED ORAL
Status: DISCONTINUED | OUTPATIENT
Start: 2020-04-16 | End: 2020-04-16 | Stop reason: HOSPADM

## 2020-04-16 RX ADMIN — HEPARIN SODIUM 5000 UNITS: 5000 INJECTION INTRAVENOUS; SUBCUTANEOUS at 05:59

## 2020-04-16 RX ADMIN — PANTOPRAZOLE SODIUM 40 MG: 40 TABLET, DELAYED RELEASE ORAL at 09:37

## 2020-04-16 RX ADMIN — DEXTROSE, SODIUM CHLORIDE, AND POTASSIUM CHLORIDE 100 ML/HR: 5; .45; .15 INJECTION INTRAVENOUS at 06:01

## 2020-05-04 ENCOUNTER — APPOINTMENT (EMERGENCY)
Dept: CT IMAGING | Facility: HOSPITAL | Age: 75
DRG: 389 | End: 2020-05-04
Payer: MEDICARE

## 2020-05-04 ENCOUNTER — HOSPITAL ENCOUNTER (INPATIENT)
Facility: HOSPITAL | Age: 75
LOS: 1 days | Discharge: HOME | DRG: 389 | End: 2020-05-05
Attending: EMERGENCY MEDICINE | Admitting: SURGERY
Payer: MEDICARE

## 2020-05-04 DIAGNOSIS — I10 ESSENTIAL HYPERTENSION: ICD-10-CM

## 2020-05-04 DIAGNOSIS — R79.89 ELEVATED LACTIC ACID LEVEL: ICD-10-CM

## 2020-05-04 DIAGNOSIS — E78.5 HYPERLIPIDEMIA, UNSPECIFIED HYPERLIPIDEMIA TYPE: ICD-10-CM

## 2020-05-04 DIAGNOSIS — R73.09 ELEVATED RANDOM BLOOD GLUCOSE LEVEL: ICD-10-CM

## 2020-05-04 DIAGNOSIS — D72.829 LEUKOCYTOSIS: ICD-10-CM

## 2020-05-04 DIAGNOSIS — I73.9 PVD (PERIPHERAL VASCULAR DISEASE) (HCC): ICD-10-CM

## 2020-05-04 DIAGNOSIS — N18.30 STAGE 3 CHRONIC KIDNEY DISEASE (HCC): ICD-10-CM

## 2020-05-04 DIAGNOSIS — K56.600 PARTIAL SMALL BOWEL OBSTRUCTION (HCC): Primary | ICD-10-CM

## 2020-05-04 DIAGNOSIS — E78.5 DYSLIPIDEMIA: ICD-10-CM

## 2020-05-04 DIAGNOSIS — R10.9 ABDOMINAL PAIN: ICD-10-CM

## 2020-05-04 DIAGNOSIS — Z86.73 HISTORY OF CVA (CEREBROVASCULAR ACCIDENT): ICD-10-CM

## 2020-05-04 DIAGNOSIS — N17.9 AKI (ACUTE KIDNEY INJURY) (HCC): ICD-10-CM

## 2020-05-04 DIAGNOSIS — R11.2 NAUSEA AND VOMITING: ICD-10-CM

## 2020-05-04 LAB
ANION GAP SERPL CALCULATED.3IONS-SCNC: 9 MMOL/L (ref 4–13)
BASOPHILS # BLD AUTO: 0.05 THOUSANDS/ΜL (ref 0–0.1)
BASOPHILS NFR BLD AUTO: 0 % (ref 0–1)
BUN SERPL-MCNC: 25 MG/DL (ref 5–25)
CALCIUM SERPL-MCNC: 9.6 MG/DL (ref 8.3–10.1)
CHLORIDE SERPL-SCNC: 100 MMOL/L (ref 100–108)
CO2 SERPL-SCNC: 32 MMOL/L (ref 21–32)
CREAT SERPL-MCNC: 1.69 MG/DL (ref 0.6–1.3)
EOSINOPHIL # BLD AUTO: 0.07 THOUSAND/ΜL (ref 0–0.61)
EOSINOPHIL NFR BLD AUTO: 0 % (ref 0–6)
ERYTHROCYTE [DISTWIDTH] IN BLOOD BY AUTOMATED COUNT: 12.7 % (ref 11.6–15.1)
GFR SERPL CREATININE-BSD FRML MDRD: 39 ML/MIN/1.73SQ M
GLUCOSE SERPL-MCNC: 133 MG/DL (ref 65–140)
HCT VFR BLD AUTO: 48.8 % (ref 36.5–49.3)
HGB BLD-MCNC: 15.7 G/DL (ref 12–17)
IMM GRANULOCYTES # BLD AUTO: 0.15 THOUSAND/UL (ref 0–0.2)
IMM GRANULOCYTES NFR BLD AUTO: 1 % (ref 0–2)
LACTATE SERPL-SCNC: 0.9 MMOL/L (ref 0.5–2)
LACTATE SERPL-SCNC: 2.3 MMOL/L (ref 0.5–2)
LACTATE SERPL-SCNC: 2.6 MMOL/L (ref 0.5–2)
LYMPHOCYTES # BLD AUTO: 1.34 THOUSANDS/ΜL (ref 0.6–4.47)
LYMPHOCYTES NFR BLD AUTO: 7 % (ref 14–44)
MCH RBC QN AUTO: 29.8 PG (ref 26.8–34.3)
MCHC RBC AUTO-ENTMCNC: 32.2 G/DL (ref 31.4–37.4)
MCV RBC AUTO: 93 FL (ref 82–98)
MONOCYTES # BLD AUTO: 1.09 THOUSAND/ΜL (ref 0.17–1.22)
MONOCYTES NFR BLD AUTO: 6 % (ref 4–12)
NEUTROPHILS # BLD AUTO: 15.62 THOUSANDS/ΜL (ref 1.85–7.62)
NEUTS SEG NFR BLD AUTO: 86 % (ref 43–75)
NRBC BLD AUTO-RTO: 0 /100 WBCS
PLATELET # BLD AUTO: 239 THOUSANDS/UL (ref 149–390)
PLATELET # BLD AUTO: 321 THOUSANDS/UL (ref 149–390)
PMV BLD AUTO: 10.8 FL (ref 8.9–12.7)
PMV BLD AUTO: 11.2 FL (ref 8.9–12.7)
POTASSIUM SERPL-SCNC: 4.3 MMOL/L (ref 3.5–5.3)
RBC # BLD AUTO: 5.27 MILLION/UL (ref 3.88–5.62)
SARS-COV-2 RNA RESP QL NAA+PROBE: NEGATIVE
SODIUM SERPL-SCNC: 141 MMOL/L (ref 136–145)
WBC # BLD AUTO: 18.32 THOUSAND/UL (ref 4.31–10.16)

## 2020-05-04 PROCEDURE — 80048 BASIC METABOLIC PNL TOTAL CA: CPT | Performed by: EMERGENCY MEDICINE

## 2020-05-04 PROCEDURE — 85025 COMPLETE CBC W/AUTO DIFF WBC: CPT | Performed by: EMERGENCY MEDICINE

## 2020-05-04 PROCEDURE — 36415 COLL VENOUS BLD VENIPUNCTURE: CPT | Performed by: EMERGENCY MEDICINE

## 2020-05-04 PROCEDURE — 74177 CT ABD & PELVIS W/CONTRAST: CPT

## 2020-05-04 PROCEDURE — 99222 1ST HOSP IP/OBS MODERATE 55: CPT | Performed by: SURGERY

## 2020-05-04 PROCEDURE — 99285 EMERGENCY DEPT VISIT HI MDM: CPT

## 2020-05-04 PROCEDURE — 96374 THER/PROPH/DIAG INJ IV PUSH: CPT

## 2020-05-04 PROCEDURE — 85049 AUTOMATED PLATELET COUNT: CPT | Performed by: CLINICAL NURSE SPECIALIST

## 2020-05-04 PROCEDURE — 83605 ASSAY OF LACTIC ACID: CPT | Performed by: CLINICAL NURSE SPECIALIST

## 2020-05-04 PROCEDURE — 96375 TX/PRO/DX INJ NEW DRUG ADDON: CPT

## 2020-05-04 PROCEDURE — 99285 EMERGENCY DEPT VISIT HI MDM: CPT | Performed by: EMERGENCY MEDICINE

## 2020-05-04 PROCEDURE — 83605 ASSAY OF LACTIC ACID: CPT | Performed by: EMERGENCY MEDICINE

## 2020-05-04 PROCEDURE — 87635 SARS-COV-2 COVID-19 AMP PRB: CPT | Performed by: EMERGENCY MEDICINE

## 2020-05-04 PROCEDURE — 96361 HYDRATE IV INFUSION ADD-ON: CPT

## 2020-05-04 RX ORDER — LIDOCAINE HYDROCHLORIDE 10 MG/ML
4 INJECTION, SOLUTION EPIDURAL; INFILTRATION; INTRACAUDAL; PERINEURAL ONCE
Status: DISCONTINUED | OUTPATIENT
Start: 2020-05-04 | End: 2020-05-05 | Stop reason: HOSPADM

## 2020-05-04 RX ORDER — ONDANSETRON 2 MG/ML
4 INJECTION INTRAMUSCULAR; INTRAVENOUS EVERY 6 HOURS PRN
Status: DISCONTINUED | OUTPATIENT
Start: 2020-05-04 | End: 2020-05-05 | Stop reason: HOSPADM

## 2020-05-04 RX ORDER — HEPARIN SODIUM 5000 [USP'U]/ML
5000 INJECTION, SOLUTION INTRAVENOUS; SUBCUTANEOUS EVERY 8 HOURS SCHEDULED
Status: DISCONTINUED | OUTPATIENT
Start: 2020-05-04 | End: 2020-05-05 | Stop reason: HOSPADM

## 2020-05-04 RX ORDER — ACETAMINOPHEN 325 MG/1
650 TABLET ORAL EVERY 6 HOURS PRN
Status: DISCONTINUED | OUTPATIENT
Start: 2020-05-04 | End: 2020-05-05 | Stop reason: HOSPADM

## 2020-05-04 RX ORDER — DEXTROSE AND SODIUM CHLORIDE 5; .45 G/100ML; G/100ML
100 INJECTION, SOLUTION INTRAVENOUS CONTINUOUS
Status: DISCONTINUED | OUTPATIENT
Start: 2020-05-04 | End: 2020-05-05

## 2020-05-04 RX ORDER — ONDANSETRON 2 MG/ML
4 INJECTION INTRAMUSCULAR; INTRAVENOUS ONCE
Status: COMPLETED | OUTPATIENT
Start: 2020-05-04 | End: 2020-05-04

## 2020-05-04 RX ORDER — HYDROCODONE BITARTRATE AND ACETAMINOPHEN 5; 325 MG/1; MG/1
1 TABLET ORAL EVERY 4 HOURS PRN
Status: DISCONTINUED | OUTPATIENT
Start: 2020-05-04 | End: 2020-05-05 | Stop reason: HOSPADM

## 2020-05-04 RX ADMIN — ONDANSETRON 4 MG: 2 INJECTION INTRAMUSCULAR; INTRAVENOUS at 11:57

## 2020-05-04 RX ADMIN — HEPARIN SODIUM 5000 UNITS: 5000 INJECTION INTRAVENOUS; SUBCUTANEOUS at 21:21

## 2020-05-04 RX ADMIN — MORPHINE SULFATE 2 MG: 2 INJECTION, SOLUTION INTRAMUSCULAR; INTRAVENOUS at 11:57

## 2020-05-04 RX ADMIN — SODIUM CHLORIDE 1000 ML: 0.9 INJECTION, SOLUTION INTRAVENOUS at 12:48

## 2020-05-04 RX ADMIN — SODIUM CHLORIDE 1000 ML: 0.9 INJECTION, SOLUTION INTRAVENOUS at 11:57

## 2020-05-04 RX ADMIN — DEXTROSE AND SODIUM CHLORIDE 100 ML/HR: 5; .45 INJECTION, SOLUTION INTRAVENOUS at 16:17

## 2020-05-04 RX ADMIN — HEPARIN SODIUM 5000 UNITS: 5000 INJECTION INTRAVENOUS; SUBCUTANEOUS at 16:23

## 2020-05-04 RX ADMIN — IOHEXOL 100 ML: 350 INJECTION, SOLUTION INTRAVENOUS at 13:32

## 2020-05-05 VITALS
DIASTOLIC BLOOD PRESSURE: 73 MMHG | SYSTOLIC BLOOD PRESSURE: 114 MMHG | TEMPERATURE: 97.9 F | HEART RATE: 72 BPM | WEIGHT: 297.18 LBS | RESPIRATION RATE: 20 BRPM | BODY MASS INDEX: 42.55 KG/M2 | OXYGEN SATURATION: 94 % | HEIGHT: 70 IN

## 2020-05-05 LAB
ALBUMIN SERPL BCP-MCNC: 2.8 G/DL (ref 3.5–5)
ALP SERPL-CCNC: 84 U/L (ref 46–116)
ALT SERPL W P-5'-P-CCNC: 26 U/L (ref 12–78)
ANION GAP SERPL CALCULATED.3IONS-SCNC: 7 MMOL/L (ref 4–13)
AST SERPL W P-5'-P-CCNC: 18 U/L (ref 5–45)
BASOPHILS # BLD AUTO: 0.03 THOUSANDS/ΜL (ref 0–0.1)
BASOPHILS NFR BLD AUTO: 0 % (ref 0–1)
BILIRUB SERPL-MCNC: 0.7 MG/DL (ref 0.2–1)
BUN SERPL-MCNC: 23 MG/DL (ref 5–25)
CALCIUM SERPL-MCNC: 8.4 MG/DL (ref 8.3–10.1)
CHLORIDE SERPL-SCNC: 106 MMOL/L (ref 100–108)
CO2 SERPL-SCNC: 29 MMOL/L (ref 21–32)
CREAT SERPL-MCNC: 1.5 MG/DL (ref 0.6–1.3)
EOSINOPHIL # BLD AUTO: 0.34 THOUSAND/ΜL (ref 0–0.61)
EOSINOPHIL NFR BLD AUTO: 4 % (ref 0–6)
ERYTHROCYTE [DISTWIDTH] IN BLOOD BY AUTOMATED COUNT: 12.9 % (ref 11.6–15.1)
GFR SERPL CREATININE-BSD FRML MDRD: 45 ML/MIN/1.73SQ M
GLUCOSE SERPL-MCNC: 113 MG/DL (ref 65–140)
HCT VFR BLD AUTO: 40.6 % (ref 36.5–49.3)
HGB BLD-MCNC: 12.9 G/DL (ref 12–17)
IMM GRANULOCYTES # BLD AUTO: 0.08 THOUSAND/UL (ref 0–0.2)
IMM GRANULOCYTES NFR BLD AUTO: 1 % (ref 0–2)
LYMPHOCYTES # BLD AUTO: 1.55 THOUSANDS/ΜL (ref 0.6–4.47)
LYMPHOCYTES NFR BLD AUTO: 16 % (ref 14–44)
MCH RBC QN AUTO: 30.3 PG (ref 26.8–34.3)
MCHC RBC AUTO-ENTMCNC: 31.8 G/DL (ref 31.4–37.4)
MCV RBC AUTO: 95 FL (ref 82–98)
MONOCYTES # BLD AUTO: 0.82 THOUSAND/ΜL (ref 0.17–1.22)
MONOCYTES NFR BLD AUTO: 9 % (ref 4–12)
NEUTROPHILS # BLD AUTO: 6.81 THOUSANDS/ΜL (ref 1.85–7.62)
NEUTS SEG NFR BLD AUTO: 70 % (ref 43–75)
NRBC BLD AUTO-RTO: 0 /100 WBCS
PLATELET # BLD AUTO: 217 THOUSANDS/UL (ref 149–390)
PMV BLD AUTO: 10.6 FL (ref 8.9–12.7)
POTASSIUM SERPL-SCNC: 3.9 MMOL/L (ref 3.5–5.3)
PROT SERPL-MCNC: 6.3 G/DL (ref 6.4–8.2)
RBC # BLD AUTO: 4.26 MILLION/UL (ref 3.88–5.62)
SODIUM SERPL-SCNC: 142 MMOL/L (ref 136–145)
WBC # BLD AUTO: 9.63 THOUSAND/UL (ref 4.31–10.16)

## 2020-05-05 PROCEDURE — 80053 COMPREHEN METABOLIC PANEL: CPT | Performed by: CLINICAL NURSE SPECIALIST

## 2020-05-05 PROCEDURE — 85025 COMPLETE CBC W/AUTO DIFF WBC: CPT | Performed by: CLINICAL NURSE SPECIALIST

## 2020-05-05 PROCEDURE — NC001 PR NO CHARGE: Performed by: SURGERY

## 2020-05-05 PROCEDURE — 99238 HOSP IP/OBS DSCHRG MGMT 30/<: CPT | Performed by: SURGERY

## 2020-05-05 PROCEDURE — 99221 1ST HOSP IP/OBS SF/LOW 40: CPT | Performed by: INTERNAL MEDICINE

## 2020-05-05 RX ADMIN — HEPARIN SODIUM 5000 UNITS: 5000 INJECTION INTRAVENOUS; SUBCUTANEOUS at 05:39

## 2020-09-19 NOTE — DISCHARGE SUMMARY
Discharge Summary - Loan Velasco 68 y o  male MRN: [id number]    Unit/Bed#: -01 Encounter: [id number]    Admission Date:   Admission Orders     Ordered        11/08/18 0046  Inpatient Admission  Once               Admitting Diagnosis: Essential hypertension [I10]  Abdominal pain [R10 9]  Hyperlipidemia, unspecified hyperlipidemia type [E78 5]    HPI:  80-year-old male with past history of a small-bowel obstructions with acute onset of abdominal pain around noon the day before admission  The patient had the abdominal pain not associated with nausea or vomiting  He came into the emergency room because of increasing abdominal pain  In the emergency room the patient was found to have elevation of the white cell count to 15,000 and CT scan findings consistent with small-bowel obstruction  The patient had prior admission back in February of this year  The patient refused the NG tube at this time  Procedures Performed:  None    Summary of Hospital Course: The patient was admitted to the hospital kept NPO and IV fluids  He also had obstruction series which revealed nonspecific bowel gas pattern  The patient started passing flatus and bowel movement on admission day 2  He was started on clear liquid diet and advanced to regular diet without having any abdominal pain, nausea, vomiting or any other constitutional symptoms  Significant Findings, Care, Treatment and Services Provided:  CT scan of the abdomen and pelvis, IV fluids, bowel rest    Complications:  None    Discharge Diagnosis:  Partial small bowel obstruction secondary to intra-abdominal adhesions, morbid obesity, hypertension, hypercholesterolemia, early dementia    Resolved Problems  Date Reviewed: 11/10/2018    None          Condition at Discharge: fair         Discharge instructions/Information to patient and family:   See after visit summary for information provided to patient and family        Provisions for Follow-Up Care:  See after visit summary for information related to follow-up care and any pertinent home health orders  PCP: Maranda Barahona DO    Disposition: Home    Planned Readmission: No    Discharge Statement   I spent 30 minutes discharging the patient  This time was spent on the day of discharge  I had direct contact with the patient on the day of discharge  Additional documentation is required if more than 30 minutes were spent on discharge  Discharge Medications:  See after visit summary for reconciled discharge medications provided to patient and family  normal...

## 2021-08-12 ENCOUNTER — APPOINTMENT (EMERGENCY)
Dept: RADIOLOGY | Facility: HOSPITAL | Age: 76
DRG: 389 | End: 2021-08-12
Payer: MEDICARE

## 2021-08-12 ENCOUNTER — HOSPITAL ENCOUNTER (INPATIENT)
Facility: HOSPITAL | Age: 76
LOS: 3 days | Discharge: HOME | DRG: 389 | End: 2021-08-15
Attending: EMERGENCY MEDICINE | Admitting: STUDENT IN AN ORGANIZED HEALTH CARE EDUCATION/TRAINING PROGRAM
Payer: MEDICARE

## 2021-08-12 ENCOUNTER — APPOINTMENT (EMERGENCY)
Dept: CT IMAGING | Facility: HOSPITAL | Age: 76
DRG: 389 | End: 2021-08-12
Payer: MEDICARE

## 2021-08-12 DIAGNOSIS — K59.00 CONSTIPATION: ICD-10-CM

## 2021-08-12 DIAGNOSIS — R11.2 NAUSEA AND VOMITING: ICD-10-CM

## 2021-08-12 DIAGNOSIS — K56.600 PARTIAL SMALL BOWEL OBSTRUCTION (HCC): Primary | ICD-10-CM

## 2021-08-12 DIAGNOSIS — R10.32 ACUTE LEFT LOWER QUADRANT PAIN: ICD-10-CM

## 2021-08-12 DIAGNOSIS — N28.9 ACUTE ON CHRONIC RENAL INSUFFICIENCY: ICD-10-CM

## 2021-08-12 DIAGNOSIS — N18.9 ACUTE ON CHRONIC RENAL INSUFFICIENCY: ICD-10-CM

## 2021-08-12 DIAGNOSIS — R79.89 ELEVATED LACTIC ACID LEVEL: ICD-10-CM

## 2021-08-12 PROBLEM — E87.2 LACTIC ACIDOSIS: Status: ACTIVE | Noted: 2021-08-12

## 2021-08-12 PROBLEM — E87.20 LACTIC ACIDOSIS: Status: ACTIVE | Noted: 2021-08-12

## 2021-08-12 LAB
ALBUMIN SERPL BCP-MCNC: 3.6 G/DL (ref 3.5–5)
ALP SERPL-CCNC: 79 U/L (ref 46–116)
ALT SERPL W P-5'-P-CCNC: 40 U/L (ref 12–78)
ANION GAP SERPL CALCULATED.3IONS-SCNC: 9 MMOL/L (ref 4–13)
AST SERPL W P-5'-P-CCNC: 25 U/L (ref 5–45)
BACTERIA UR QL AUTO: ABNORMAL /HPF
BASOPHILS # BLD AUTO: 0.05 THOUSANDS/ΜL (ref 0–0.1)
BASOPHILS NFR BLD AUTO: 0 % (ref 0–1)
BILIRUB DIRECT SERPL-MCNC: 0.17 MG/DL (ref 0–0.2)
BILIRUB SERPL-MCNC: 0.65 MG/DL (ref 0.2–1)
BILIRUB UR QL STRIP: NEGATIVE
BUN SERPL-MCNC: 35 MG/DL (ref 5–25)
CALCIUM SERPL-MCNC: 9 MG/DL (ref 8.3–10.1)
CHLORIDE SERPL-SCNC: 98 MMOL/L (ref 100–108)
CLARITY UR: CLEAR
CO2 SERPL-SCNC: 31 MMOL/L (ref 21–32)
COLOR UR: YELLOW
CREAT SERPL-MCNC: 2.16 MG/DL (ref 0.6–1.3)
EOSINOPHIL # BLD AUTO: 0.19 THOUSAND/ΜL (ref 0–0.61)
EOSINOPHIL NFR BLD AUTO: 1 % (ref 0–6)
ERYTHROCYTE [DISTWIDTH] IN BLOOD BY AUTOMATED COUNT: 13 % (ref 11.6–15.1)
GFR SERPL CREATININE-BSD FRML MDRD: 29 ML/MIN/1.73SQ M
GLUCOSE SERPL-MCNC: 112 MG/DL (ref 65–140)
GLUCOSE SERPL-MCNC: 182 MG/DL (ref 65–140)
GLUCOSE UR STRIP-MCNC: NEGATIVE MG/DL
HCT VFR BLD AUTO: 45.9 % (ref 36.5–49.3)
HGB BLD-MCNC: 14.9 G/DL (ref 12–17)
HGB UR QL STRIP.AUTO: NEGATIVE
HYALINE CASTS #/AREA URNS LPF: ABNORMAL /LPF
IMM GRANULOCYTES # BLD AUTO: 0.17 THOUSAND/UL (ref 0–0.2)
IMM GRANULOCYTES NFR BLD AUTO: 1 % (ref 0–2)
KETONES UR STRIP-MCNC: NEGATIVE MG/DL
LACTATE SERPL-SCNC: 3.2 MMOL/L (ref 0.5–2)
LACTATE SERPL-SCNC: 5 MMOL/L (ref 0.5–2)
LEUKOCYTE ESTERASE UR QL STRIP: NEGATIVE
LIPASE SERPL-CCNC: 169 U/L (ref 73–393)
LYMPHOCYTES # BLD AUTO: 1.22 THOUSANDS/ΜL (ref 0.6–4.47)
LYMPHOCYTES NFR BLD AUTO: 9 % (ref 14–44)
MAGNESIUM SERPL-MCNC: 1.9 MG/DL (ref 1.6–2.6)
MCH RBC QN AUTO: 29.9 PG (ref 26.8–34.3)
MCHC RBC AUTO-ENTMCNC: 32.5 G/DL (ref 31.4–37.4)
MCV RBC AUTO: 92 FL (ref 82–98)
MONOCYTES # BLD AUTO: 1.06 THOUSAND/ΜL (ref 0.17–1.22)
MONOCYTES NFR BLD AUTO: 8 % (ref 4–12)
NEUTROPHILS # BLD AUTO: 11.22 THOUSANDS/ΜL (ref 1.85–7.62)
NEUTS SEG NFR BLD AUTO: 81 % (ref 43–75)
NITRITE UR QL STRIP: NEGATIVE
NON-SQ EPI CELLS URNS QL MICRO: ABNORMAL /HPF
NRBC BLD AUTO-RTO: 0 /100 WBCS
PH UR STRIP.AUTO: 6 [PH]
PLATELET # BLD AUTO: 240 THOUSANDS/UL (ref 149–390)
PMV BLD AUTO: 11.1 FL (ref 8.9–12.7)
POTASSIUM SERPL-SCNC: 4.2 MMOL/L (ref 3.5–5.3)
PROT SERPL-MCNC: 8 G/DL (ref 6.4–8.2)
PROT UR STRIP-MCNC: ABNORMAL MG/DL
RBC # BLD AUTO: 4.98 MILLION/UL (ref 3.88–5.62)
RBC #/AREA URNS AUTO: ABNORMAL /HPF
SODIUM SERPL-SCNC: 138 MMOL/L (ref 136–145)
SP GR UR STRIP.AUTO: 1.02 (ref 1–1.03)
TROPONIN I SERPL-MCNC: <0.02 NG/ML
UROBILINOGEN UR QL STRIP.AUTO: 0.2 E.U./DL
WBC # BLD AUTO: 13.91 THOUSAND/UL (ref 4.31–10.16)
WBC #/AREA URNS AUTO: ABNORMAL /HPF

## 2021-08-12 PROCEDURE — 80076 HEPATIC FUNCTION PANEL: CPT | Performed by: EMERGENCY MEDICINE

## 2021-08-12 PROCEDURE — 83605 ASSAY OF LACTIC ACID: CPT | Performed by: STUDENT IN AN ORGANIZED HEALTH CARE EDUCATION/TRAINING PROGRAM

## 2021-08-12 PROCEDURE — 96361 HYDRATE IV INFUSION ADD-ON: CPT

## 2021-08-12 PROCEDURE — 74176 CT ABD & PELVIS W/O CONTRAST: CPT

## 2021-08-12 PROCEDURE — 99285 EMERGENCY DEPT VISIT HI MDM: CPT | Performed by: EMERGENCY MEDICINE

## 2021-08-12 PROCEDURE — 96375 TX/PRO/DX INJ NEW DRUG ADDON: CPT

## 2021-08-12 PROCEDURE — 99222 1ST HOSP IP/OBS MODERATE 55: CPT | Performed by: STUDENT IN AN ORGANIZED HEALTH CARE EDUCATION/TRAINING PROGRAM

## 2021-08-12 PROCEDURE — 99285 EMERGENCY DEPT VISIT HI MDM: CPT

## 2021-08-12 PROCEDURE — 81001 URINALYSIS AUTO W/SCOPE: CPT | Performed by: EMERGENCY MEDICINE

## 2021-08-12 PROCEDURE — 83605 ASSAY OF LACTIC ACID: CPT | Performed by: EMERGENCY MEDICINE

## 2021-08-12 PROCEDURE — 71045 X-RAY EXAM CHEST 1 VIEW: CPT

## 2021-08-12 PROCEDURE — G1004 CDSM NDSC: HCPCS

## 2021-08-12 PROCEDURE — 83735 ASSAY OF MAGNESIUM: CPT | Performed by: EMERGENCY MEDICINE

## 2021-08-12 PROCEDURE — 82948 REAGENT STRIP/BLOOD GLUCOSE: CPT

## 2021-08-12 PROCEDURE — 83690 ASSAY OF LIPASE: CPT | Performed by: EMERGENCY MEDICINE

## 2021-08-12 PROCEDURE — 96374 THER/PROPH/DIAG INJ IV PUSH: CPT

## 2021-08-12 PROCEDURE — 36415 COLL VENOUS BLD VENIPUNCTURE: CPT | Performed by: EMERGENCY MEDICINE

## 2021-08-12 PROCEDURE — 84484 ASSAY OF TROPONIN QUANT: CPT | Performed by: EMERGENCY MEDICINE

## 2021-08-12 PROCEDURE — 84145 PROCALCITONIN (PCT): CPT | Performed by: EMERGENCY MEDICINE

## 2021-08-12 PROCEDURE — 93005 ELECTROCARDIOGRAM TRACING: CPT

## 2021-08-12 PROCEDURE — 80048 BASIC METABOLIC PNL TOTAL CA: CPT | Performed by: EMERGENCY MEDICINE

## 2021-08-12 PROCEDURE — 85025 COMPLETE CBC W/AUTO DIFF WBC: CPT | Performed by: EMERGENCY MEDICINE

## 2021-08-12 PROCEDURE — 99223 1ST HOSP IP/OBS HIGH 75: CPT | Performed by: STUDENT IN AN ORGANIZED HEALTH CARE EDUCATION/TRAINING PROGRAM

## 2021-08-12 PROCEDURE — 1124F ACP DISCUSS-NO DSCNMKR DOCD: CPT | Performed by: EMERGENCY MEDICINE

## 2021-08-12 PROCEDURE — 87040 BLOOD CULTURE FOR BACTERIA: CPT | Performed by: EMERGENCY MEDICINE

## 2021-08-12 RX ORDER — HYDROMORPHONE HCL/PF 1 MG/ML
0.5 SYRINGE (ML) INJECTION EVERY 4 HOURS PRN
Status: DISCONTINUED | OUTPATIENT
Start: 2021-08-12 | End: 2021-08-15 | Stop reason: HOSPADM

## 2021-08-12 RX ORDER — SODIUM CHLORIDE, SODIUM GLUCONATE, SODIUM ACETATE, POTASSIUM CHLORIDE, MAGNESIUM CHLORIDE, SODIUM PHOSPHATE, DIBASIC, AND POTASSIUM PHOSPHATE .53; .5; .37; .037; .03; .012; .00082 G/100ML; G/100ML; G/100ML; G/100ML; G/100ML; G/100ML; G/100ML
1000 INJECTION, SOLUTION INTRAVENOUS ONCE
Status: DISCONTINUED | OUTPATIENT
Start: 2021-08-12 | End: 2021-08-12

## 2021-08-12 RX ORDER — SODIUM CHLORIDE, SODIUM GLUCONATE, SODIUM ACETATE, POTASSIUM CHLORIDE, MAGNESIUM CHLORIDE, SODIUM PHOSPHATE, DIBASIC, AND POTASSIUM PHOSPHATE .53; .5; .37; .037; .03; .012; .00082 G/100ML; G/100ML; G/100ML; G/100ML; G/100ML; G/100ML; G/100ML
2000 INJECTION, SOLUTION INTRAVENOUS ONCE
Status: COMPLETED | OUTPATIENT
Start: 2021-08-12 | End: 2021-08-12

## 2021-08-12 RX ORDER — ONDANSETRON 2 MG/ML
4 INJECTION INTRAMUSCULAR; INTRAVENOUS EVERY 6 HOURS PRN
Status: DISCONTINUED | OUTPATIENT
Start: 2021-08-12 | End: 2021-08-15 | Stop reason: HOSPADM

## 2021-08-12 RX ORDER — HYDROMORPHONE HCL/PF 1 MG/ML
1 SYRINGE (ML) INJECTION ONCE
Status: COMPLETED | OUTPATIENT
Start: 2021-08-12 | End: 2021-08-12

## 2021-08-12 RX ORDER — SODIUM CHLORIDE 9 MG/ML
75 INJECTION, SOLUTION INTRAVENOUS CONTINUOUS
Status: DISCONTINUED | OUTPATIENT
Start: 2021-08-12 | End: 2021-08-14

## 2021-08-12 RX ORDER — HEPARIN SODIUM 5000 [USP'U]/ML
5000 INJECTION, SOLUTION INTRAVENOUS; SUBCUTANEOUS EVERY 8 HOURS SCHEDULED
Status: DISCONTINUED | OUTPATIENT
Start: 2021-08-12 | End: 2021-08-15 | Stop reason: HOSPADM

## 2021-08-12 RX ORDER — BUDESONIDE AND FORMOTEROL FUMARATE DIHYDRATE 80; 4.5 UG/1; UG/1
2 AEROSOL RESPIRATORY (INHALATION) 2 TIMES DAILY
COMMUNITY

## 2021-08-12 RX ORDER — HYDROMORPHONE HCL/PF 1 MG/ML
1 SYRINGE (ML) INJECTION ONCE
Status: DISCONTINUED | OUTPATIENT
Start: 2021-08-12 | End: 2021-08-14

## 2021-08-12 RX ORDER — FAMOTIDINE 20 MG/1
20 TABLET, FILM COATED ORAL 2 TIMES DAILY
Status: DISCONTINUED | OUTPATIENT
Start: 2021-08-12 | End: 2021-08-15 | Stop reason: HOSPADM

## 2021-08-12 RX ORDER — ONDANSETRON 2 MG/ML
4 INJECTION INTRAMUSCULAR; INTRAVENOUS ONCE
Status: COMPLETED | OUTPATIENT
Start: 2021-08-12 | End: 2021-08-12

## 2021-08-12 RX ORDER — HYDRALAZINE HYDROCHLORIDE 20 MG/ML
10 INJECTION INTRAMUSCULAR; INTRAVENOUS EVERY 6 HOURS PRN
Status: DISCONTINUED | OUTPATIENT
Start: 2021-08-12 | End: 2021-08-15 | Stop reason: HOSPADM

## 2021-08-12 RX ORDER — HYDROMORPHONE HCL/PF 1 MG/ML
0.5 SYRINGE (ML) INJECTION ONCE
Status: COMPLETED | OUTPATIENT
Start: 2021-08-12 | End: 2021-08-12

## 2021-08-12 RX ADMIN — SODIUM CHLORIDE 125 ML/HR: 0.9 INJECTION, SOLUTION INTRAVENOUS at 22:49

## 2021-08-12 RX ADMIN — SODIUM CHLORIDE, SODIUM GLUCONATE, SODIUM ACETATE, POTASSIUM CHLORIDE, MAGNESIUM CHLORIDE, SODIUM PHOSPHATE, DIBASIC, AND POTASSIUM PHOSPHATE 2000 ML: .53; .5; .37; .037; .03; .012; .00082 INJECTION, SOLUTION INTRAVENOUS at 14:57

## 2021-08-12 RX ADMIN — PIPERACILLIN SODIUM AND TAZOBACTAM SODIUM 3.38 G: 36; 4.5 INJECTION, POWDER, LYOPHILIZED, FOR SOLUTION INTRAVENOUS at 13:23

## 2021-08-12 RX ADMIN — ONDANSETRON 4 MG: 2 INJECTION INTRAMUSCULAR; INTRAVENOUS at 11:36

## 2021-08-12 RX ADMIN — SODIUM CHLORIDE 1000 ML: 0.9 INJECTION, SOLUTION INTRAVENOUS at 09:03

## 2021-08-12 RX ADMIN — HYDROMORPHONE HYDROCHLORIDE 1 MG: 1 INJECTION, SOLUTION INTRAMUSCULAR; INTRAVENOUS; SUBCUTANEOUS at 14:19

## 2021-08-12 RX ADMIN — HEPARIN SODIUM 5000 UNITS: 5000 INJECTION INTRAVENOUS; SUBCUTANEOUS at 17:50

## 2021-08-12 RX ADMIN — HYDROMORPHONE HYDROCHLORIDE 0.5 MG: 1 INJECTION, SOLUTION INTRAMUSCULAR; INTRAVENOUS; SUBCUTANEOUS at 21:57

## 2021-08-12 RX ADMIN — FAMOTIDINE 20 MG: 10 INJECTION, SOLUTION INTRAVENOUS at 17:49

## 2021-08-12 RX ADMIN — HYDROMORPHONE HYDROCHLORIDE 0.5 MG: 1 INJECTION, SOLUTION INTRAMUSCULAR; INTRAVENOUS; SUBCUTANEOUS at 11:36

## 2021-08-12 RX ADMIN — HYDROMORPHONE HYDROCHLORIDE 0.5 MG: 1 INJECTION, SOLUTION INTRAMUSCULAR; INTRAVENOUS; SUBCUTANEOUS at 17:50

## 2021-08-12 RX ADMIN — HEPARIN SODIUM 5000 UNITS: 5000 INJECTION INTRAVENOUS; SUBCUTANEOUS at 23:26

## 2021-08-12 NOTE — ASSESSMENT & PLAN NOTE
· Chronic  · Takes Aricept at home; will continue when able to take PO  · Supportive measures  · Fall precautions

## 2021-08-12 NOTE — ASSESSMENT & PLAN NOTE
Lab Results   Component Value Date    EGFR 29 08/12/2021    EGFR 45 05/05/2020    EGFR 39 05/04/2020    CREATININE 2 16 (H) 08/12/2021    CREATININE 1 50 (H) 05/05/2020    CREATININE 1 69 (H) 05/04/2020     · Present on admission, as evidenced by creatinine of 2 16  · Baseline creatinine appears to be 1 5-1 7, in 2020; no recent 2021 labs to review  · Will check urine sodium, urine osmo  · Likely elevated in setting of dehydration, also noted to have lactic acidosis  · Patient with multiple IVF boluses; continue IVF for hydration    · Urinary retention protocol  · Bladder scan

## 2021-08-12 NOTE — CONSULTS
Consultation - General Surgery   Kassidy Grant 68 y o  male MRN: [id number]  Unit/Bed#: ED 10 Encounter: [id number]      Assessment/ Plan:    Recurrent small bowel obstruction  CT abdomen/pelvis:   1  Dilated small bowel loops in the anterior mid abdomen with transition points adjacent to the anterior abdominal wall suggesting adhesions  Findings compatible with partial small bowel obstruction and essentially unchanged from multiple prior studies   dating back to 2/13/2018  2   Moderate colonic stool burden  3   Severe hepatic steatosis  Mild leukocytosis 13 91; lactic acid 3 2 and 5    Admit to hospitalist service  -Recommend observation with conservative management and bowel rest  -NPO/IVF, NG tube for decompression  Patient declined NG tube after discussion that this was our recommendation for treatment of his condition  He states the NG tube gives him a migraine and refused   -Pain and nausea control prn, currently pain well-controlled  -Serial abdominal exams  -Will follow labs        _______________________________________________________________  Physician Requesting Consult: Tera Goldmann, DO      Reason for Consult / Principal Problem: small bowel obstruction    HPI: Kassidy Grant is a 68y o  year old male with history of recurrent partial small bowel obstruction, ventral hernia following femoral bypass surgery repaired with mesh, HTN, CHF, COPD, CKD, PVD, hyperlipidemia, carotid stenosis, who presents with abdominal pain that started last night and blood streaked stool the past few days  He vomited today while in the ED  He has some memory issues partially limiting his history, but his son Michelle Shukla is also present and is able to provide information  He has had similar symptoms in the past requiring a hospital stay and did well with conservative management  His last partial SBO was in May 2020   He has had some decrease in appetite the past few days but also states he was trying to only eat one meal a day to lose weight  Last food was yesterday at 11pm  He had a colonoscopy several years, which was normal per son  Historical Information   Past Medical History:   Diagnosis Date    CKD (chronic kidney disease) 11/8/2018    Hyperlipidemia     Hypertension     [facility]VD [facility]per[facility] vascular disease) ([facility] [address] [facility]     Sleep apnea     Small bowel obstruction (HCC)     Ventral hernia      Past Surgical History:   Procedure Laterality Date    ABDOMINAL HERNIA REPAIR      ABDOMINAL SURGERY      CHOLECYSTECTOMY      open    FEMORAL BYPASS Right      Social History   Family History: non-contributory    Meds/Allergies   Home meds:   Prior to Admission medications    Medication Sig Start Date End Date Taking?  Authorizing Provider   amLODIPine (NORVASC) 5 mg tablet TK 1 T PO D 7/11/18   Historical Provider, MD   atorvastatin (LIPITOR) 10 mg tablet TK 1 T PO D 7/11/18   Historical Provider, MD   Cholecalciferol 2000 units CAPS Take 1 capsule by mouth    Historical Provider, MD   donepezil (ARICEPT) 10 mg tablet TK 1 T PO HS 8/28/18   Historical Provider, MD   furosemide (LASIX) 40 mg tablet TK 1 T PO D IN THE MORNING 9/12/18   Historical Provider, MD   Omega-3 Fatty Acids (FISH OIL PO) Take 2 g by mouth    Historical Provider, MD   omeprazole (PriLOSEC) 40 MG capsule Take 40 mg by mouth    Historical Provider, MD   spironolactone (ALDACTONE) 25 mg tablet Take 12 5 mg by mouth    Historical Provider, MD   triamterene-hydrochlorothiazide (MAXZIDE-25) 37 5-25 mg per tablet Take by mouth 9/13/18   Historical Provider, MD     Scheduled Meds:  Current Facility-Administered Medications   Medication Dose Route Frequency Provider Last Rate    multi-electrolyte  2,000 mL Intravenous Once Deanne E Aufiero, DO      piperacillin-tazobactam  3 375 g Intravenous Once Deanne E Aufiero, DO      sodium chloride  125 mL/hr Intravenous Continuous Deanne E Aufiero, DO       Continuous Infusions:sodium chloride, 125 mL/hr        ALLERGIES: No Known Allergies    Review of Systems:  General: negative  Cardiovascular: no chest pain  Respiratory: negative, Shortness of breath at baseline for his COPD  Gastrointestinal: positive for - abdominal pain, appetite loss, blood in stools, gas/bloating and nausea/vomiting  Genitourinary: negative  Musculoskeletal: negative  Neurological: no TIA or stroke symptoms, prior history  Hematological and Lymphatic: negative  Dermatological : negative  Psychological: positive for - memory difficulties    Objective   Vitals:  Blood pressure 152/67, pulse 89, temperature 97 9 °F (36 6 °C), temperature source Oral, resp  rate 16, SpO2 95 %  There is no height or weight on file to calculate BMI  I/Os:  I/O       08/10 0701 - 08/11 0700 08/11 0701 - 08/12 0700 08/12 0701 - 08/13 0700    IV Piggyback   1000    Total Intake   1000    Net   +1000                 Invasive Lines/Tubes:  Invasive Devices     Peripheral Intravenous Line            Peripheral IV 08/12/21 Right Antecubital <1 day                Physical Exam  Vitals and nursing note reviewed  Constitutional:       General: He is not in acute distress  Appearance: He is obese  He is not ill-appearing, toxic-appearing or diaphoretic  HENT:      Head: Normocephalic and atraumatic  Right Ear: External ear normal       Left Ear: External ear normal       Mouth/Throat:      Mouth: Mucous membranes are moist    Eyes:      General: No scleral icterus  Extraocular Movements: Extraocular movements intact  Conjunctiva/sclera: Conjunctivae normal    Neck:      Vascular: No carotid bruit  Cardiovascular:      Rate and Rhythm: Normal rate and regular rhythm  Heart sounds: Normal heart sounds  Pulmonary:      Effort: Pulmonary effort is normal  No respiratory distress  Breath sounds: Normal breath sounds  No stridor  No wheezing, rhonchi or rales  Chest:      Chest wall: No tenderness  Abdominal:      General: Abdomen is protuberant   A surgical scar is present  Bowel sounds are decreased  There is distension  Palpations: Abdomen is soft  There is no mass  Tenderness: There is abdominal tenderness  There is no right CVA tenderness, left CVA tenderness, guarding or rebound  Hernia: A hernia is present  Hernia is present in the ventral area  Comments: Surgical scars RUQ and midline; Tenderness mid-abdomen   Musculoskeletal:      Cervical back: Normal range of motion and neck supple  Right lower leg: Edema present  Left lower leg: Edema present  Skin:     General: Skin is warm and dry  Coloration: Skin is not jaundiced or pale  Neurological:      General: No focal deficit present  Mental Status: He is alert and oriented to person, place, and time  Mental status is at baseline  Psychiatric:         Mood and Affect: Mood normal          Behavior: Behavior normal          Thought Content: Thought content normal            Lab Results and Cultures:   COVID:   Last COVID19 Screening Values     None         CBC:   Results from last 7 days   Lab Units 08/12/21  0903   WBC Thousand/uL 13 91*   HEMOGLOBIN g/dL 14 9   HEMATOCRIT % 45 9   PLATELETS Thousands/uL 240     BMP/CMP:  Results from last 7 days   Lab Units 08/12/21  0903   POTASSIUM mmol/L 4 2   CHLORIDE mmol/L 98*   CO2 mmol/L 31   BUN mg/dL 35*   CREATININE mg/dL 2 16*   CALCIUM mg/dL 9 0     Lactic Acid: 3 2    Urinalysis:   Lab Results   Component Value Date    COLORU Yellow 08/12/2021    CLARITYU Clear 08/12/2021    SPECGRAV 1 025 08/12/2021    PHUR 6 0 08/12/2021    PHUR 6 0 11/08/2018    LEUKOCYTESUR Negative 08/12/2021    NITRITE Negative 08/12/2021    GLUCOSEU Negative 08/12/2021    KETONESU Negative 08/12/2021    BILIRUBINUR Negative 08/12/2021    BLOODU Negative 08/12/2021   ,   Imaging Studies: I have personally reviewed pertinent reports           VTE Prophylaxis: Sequential compression device Kwon Nam)      Code Status: Prior    Counseling / Coordination of Care  Counseling/Coordination of Care: Total floor / unit time spent today 25 minutes  Greater than 50% of total time was spent with the patient and / or family counseling and / or coordination of care       Carmen Harrell PA-C  8/12/2021

## 2021-08-12 NOTE — ASSESSMENT & PLAN NOTE
· Chronic  · Takes Maxzide, Aldactone, Lasix and Amlodipine at home; will hold as he is now NPO  Restart once tolerating PO  · Will order IV medications  · Monitor vital signs closely

## 2021-08-12 NOTE — H&P
[address]  1945, 68 y o  male MRN: [id number]  Unit/Bed#: ED 10 Encounter: [id number]  Primary Care Provider: Will Mccormick DO   Date and time admitted to hospital: 8/12/2021  8:14 AM    * SBO (small bowel obstruction) [facility])  Assessment & Plan  · Patient presents to the ED with complaints constipation starting yesterday  Denied nausea/vomiting  Also with LLQ abdominal pain  · Patient with history of SBO in the past   · CT Abdomen/Pelvis (8/12/21): 1  Dilated small bowel loops in the anterior mid abdomen with transition points adjacent to the anterior abdominal wall suggesting adhesions  Findings compatible with partial small bowel obstruction and essentially unchanged from multiple prior studies dating back to 2/13/2018  2   Moderate colonic stool burden  3   Severe hepatic steatosis  · General surgery consulted, appreciate input  · NPO for bowel rest  · NG tube for decompression  · Conservative management  · IVF  · Anti-emetics, Analgesics PRN  · Serial abdominal exams  · Encourage ambulation, OOB  · Mild leukocytosis noted on admission, 13 91 with lactic acidosis, 3 2 with re-check of 5    Acute kidney injury superimposed on chronic kidney disease [facility])  Assessment & Plan  Lab Results   Component Value Date    EGFR 29 08/12/2021    EGFR 45 05/05/2020    EGFR 39 05/04/2020    CREATININE 2 16 (H) 08/12/2021    CREATININE 1 50 (H) 05/05/2020    CREATININE 1 69 (H) 05/04/2020     · Present on admission, as evidenced by creatinine of 2 16  · Baseline creatinine appears to be 1 5-1 7, in 2020; no recent 2021 labs to review  · Will check urine sodium, urine osmo  · Likely elevated in setting of dehydration, also noted to have lactic acidosis  · Patient with multiple IVF boluses; continue IVF for hydration    · Urinary retention protocol  · Bladder scan    Essential hypertension  Assessment & Plan  · Chronic  · Takes Maxzide, Aldactone, Lasix and Amlodipine at home; will hold as he is now NPO  Restart once tolerating PO  · Will order IV medications  · Monitor vital signs closely  Dementia ([address] )  Assessment & Plan  · Chronic  · Takes Aricept at home; will continue when able to take PO  · Supportive measures  · Fall precautions    Hyperlipidemia  Assessment & Plan  · Chronic  · Resume Lipitor when able to take PO    Lactic acidosis  Assessment & Plan  · Present on admission  · Lactic 3 2, re-check 5  · Administer IVF  · Monitor    VTE Pharmacologic Prophylaxis: VTE Score: 7 High Risk (Score >/= 5) - Pharmacological DVT Prophylaxis Ordered: heparin  Sequential Compression Devices Ordered  Code Status: Prior Full Code  Discussion with family: Updated  (son) at bedside  Anticipated Length of Stay: Patient will be admitted on an inpatient basis with an anticipated length of stay of greater than 2 midnights secondary to SBO  Total Time for Visit, including Counseling / Coordination of Care: 60 minutes Greater than 50% of this total time spent on direct patient counseling and coordination of care  Chief Complaint: Constipation, LLQ Abdominal Pain    History of Present Illness:  Jonatan Alexis is a 68 y o  male with a PMH of CKD, HLD, HTN, PVD, history of SBO who presents with constipation that began yesterday  Also reported LLQ pain  Patient reports that he had a bowel movement which was liquid in nature prior to becoming constipated  Denied any blood per rectum or in his stool  Denies any radiation with pain  Denies fever or chills  Denies nausea/vomiting  Reports some issue with urination, where he's only voiding a little  Denies dysuria, frequency or urgency  Review of Systems:  Review of Systems   Constitutional: Negative for activity change, appetite change, fatigue and fever  HENT: Negative for congestion, sinus pressure, sinus pain and sneezing  Eyes: Negative for pain, discharge, redness and itching     Respiratory: Negative for apnea, cough, choking and shortness of breath  Cardiovascular: Negative for chest pain, palpitations and leg swelling  Gastrointestinal: Positive for abdominal pain (LLQ), constipation and nausea  Negative for abdominal distention, diarrhea and vomiting  Endocrine: Negative for cold intolerance, heat intolerance, polydipsia and polyphagia  Genitourinary: Negative for difficulty urinating, dysuria, hematuria and urgency  Musculoskeletal: Negative for arthralgias, back pain, gait problem and myalgias  Skin: Negative for color change, pallor, rash and wound  Allergic/Immunologic: Negative for environmental allergies, food allergies and immunocompromised state  Neurological: Negative for dizziness, syncope, light-headedness and numbness  Hematological: Negative for adenopathy  Does not bruise/bleed easily  Psychiatric/Behavioral: Negative for agitation and hallucinations  The patient is not nervous/anxious and is not hyperactive  Past Medical and Surgical History:   Past Medical History:   Diagnosis Date    CKD (chronic kidney disease) 11/8/2018    Hyperlipidemia     Hypertension     [facility]) [facility] [address] [facility]     Sleep apnea     [facility]     Ventral hernia        Past Surgical History:   Procedure Laterality Date    ABDOMINAL HERNIA REPAIR      ABDOMINAL SURGERY      CHOLECYSTECTOMY      open    FEMORAL BYPASS Right        Meds/Allergies:  Prior to Admission medications    Medication Sig Start Date End Date Taking?  Authorizing Provider   amLODIPine (NORVASC) 5 mg tablet TK 1 T PO D 7/11/18   Historical Provider, MD   atorvastatin (LIPITOR) 10 mg tablet TK 1 T PO D 7/11/18   Historical Provider, MD   Cholecalciferol 2000 units CAPS Take 1 capsule by mouth    Historical Provider, MD   donepezil (ARICEPT) 10 mg tablet TK 1 T PO HS 8/28/18   Historical Provider, MD   furosemide (LASIX) 40 mg tablet TK 1 T PO D IN THE MORNING 9/12/18   Historical Provider, MD Omega-3 Fatty Acids (FISH OIL PO) Take 2 g by mouth    Historical Provider, MD   omeprazole (PriLOSEC) 40 MG capsule Take 40 mg by mouth    Historical Provider, MD   spironolactone (ALDACTONE) 25 mg tablet Take 12 5 mg by mouth    Historical Provider, MD   triamterene-hydrochlorothiazide (MAXZIDE-25) 37 5-25 mg per tablet Take by mouth 9/13/18   Historical Provider, MD     I have reviewed home medications with patient personally  Allergies: No Known Allergies    Social History:  Marital Status: /Civil Union   Occupation:   Patient Pre-hospital Living Situation: Home  Patient Pre-hospital Level of Mobility: walks  Patient Pre-hospital Diet Restrictions: None  Substance Use History:   Social History     Substance and Sexual Activity   Alcohol Use Never     Social History     Tobacco Use   Smoking Status Never Smoker   Smokeless Tobacco Never Used     Social History     Substance and Sexual Activity   Drug Use No       Family History:  History reviewed  No pertinent family history  Physical Exam:     Vitals:   Blood Pressure: 152/67 (08/12/21 0845)  Pulse: 89 (08/12/21 0845)  Temperature: 97 9 °F (36 6 °C) (08/12/21 1007)  Temp Source: Oral (08/12/21 1007)  Respirations: 16 (08/12/21 0845)  SpO2: 95 % (08/12/21 0845)    Physical Exam  Vitals and nursing note reviewed  Constitutional:       General: He is not in acute distress  Appearance: He is obese  He is ill-appearing  Cardiovascular:      Rate and Rhythm: Normal rate  Pulses: Normal pulses  Heart sounds: Normal heart sounds  Pulmonary:      Effort: Pulmonary effort is normal       Breath sounds: Normal breath sounds  Abdominal:      General: Bowel sounds are normal  There is no distension  Palpations: Abdomen is soft  Tenderness: There is abdominal tenderness (LLQ)  Musculoskeletal:         General: No tenderness  Normal range of motion  Skin:     General: Skin is warm and dry        Capillary Refill: Capillary refill takes less than 2 seconds  Neurological:      Mental Status: He is alert and oriented to person, place, and time  Psychiatric:         Mood and Affect: Mood normal          Additional Data:     Lab Results:  Results from last 7 days   Lab Units 08/12/21  0903   WBC Thousand/uL 13 91*   HEMOGLOBIN g/dL 14 9   HEMATOCRIT % 45 9   PLATELETS Thousands/uL 240   NEUTROS PCT % 81*   LYMPHS PCT % 9*   MONOS PCT % 8   EOS PCT % 1     Results from last 7 days   Lab Units 08/12/21  0903   SODIUM mmol/L 138   POTASSIUM mmol/L 4 2   CHLORIDE mmol/L 98*   CO2 mmol/L 31   BUN mg/dL 35*   CREATININE mg/dL 2 16*   ANION GAP mmol/L 9   CALCIUM mg/dL 9 0   ALBUMIN g/dL 3 6   TOTAL BILIRUBIN mg/dL 0 65   ALK PHOS U/L 79   ALT U/L 40   AST U/L 25   GLUCOSE RANDOM mg/dL 182*                 Results from last 7 days   Lab Units 08/12/21  1138 08/12/21  0903   LACTIC ACID mmol/L 5 0* 3 2*       Imaging: Reviewed radiology reports from this admission including: abdominal/pelvic CT  CT abdomen pelvis wo contrast   Final Result by Shukri Snell MD (08/12 0814)   1  Dilated small bowel loops in the anterior mid abdomen with transition points adjacent to the anterior abdominal wall suggesting adhesions  Findings compatible with partial small bowel obstruction and essentially unchanged from multiple prior studies    dating back to 2/13/2018  2   Moderate colonic stool burden  3   Severe hepatic steatosis  The study was marked in [facility] for immediate notification  Workstation performed: [id number]         XR chest 1 view portable   Final Result by Rohan Martin MD (08/12 6685)      No acute cardiopulmonary disease  Workstation performed: [id number]             EKG and Other Studies Reviewed on Admission:   · EKG: NSR  HR 90     ** Please Note: This note has been constructed using a voice recognition system   **

## 2021-08-12 NOTE — ASSESSMENT & PLAN NOTE
· Patient presents to the ED with complaints constipation starting yesterday  Denied nausea/vomiting  Also with LLQ abdominal pain  · Patient with history of SBO in the past   · CT Abdomen/Pelvis (8/12/21): 1  Dilated small bowel loops in the anterior mid abdomen with transition points adjacent to the anterior abdominal wall suggesting adhesions  Findings compatible with partial small bowel obstruction and essentially unchanged from multiple prior studies dating back to 2/13/2018  2   Moderate colonic stool burden  3   Severe hepatic steatosis    · General surgery consulted, appreciate input  · NPO for bowel rest  · NG tube for decompression  · Conservative management  · IVF  · Anti-emetics, Analgesics PRN  · Serial abdominal exams  · Encourage ambulation, OOB  · Mild leukocytosis noted on admission, 13 91 with lactic acidosis, 3 2 with re-check of 5

## 2021-08-12 NOTE — ED PROVIDER NOTES
History  Chief Complaint   Patient presents with    Constipation     pt reports constipation starting yesterday  pt denies nausea and vomiting, reports LLQ abdominal pain reports hx of bowel obstructions believes to be related  Patient is 20-year-old male with past medical history of hypertension, hyperlipidemia, peripheral vascular disease status post right fem-pop bypass surgery, chronic kidney disease, dementia, sleep apnea, ventral abdominal hernia repair, cholecystectomy, prior small-bowel obstruction, presents to the emergency department complaining of constipation and left lower quadrant abdominal pain  Patient reports he has been constipated since yesterday  Yesterday he did have a bowel movement that was loose but would not characterize it is diarrhea and he denies needing to strain to have the bowel movement  He denies any blood per rectum or melena  He states starting at 3:00 a m  today he has had constant left lower quadrant pain getting progressively worse  Denies radiation of pain  He is unable to describe what the pain feels like  Denies alleviating or aggravating factors  He denies any fever but does report feeling cold and having chills  Patient also reports today having difficulty urinating and was only able to get a few drops out of urine at a time which is unusual for him  He denies any recent dysuria or hematuria  He denies headache, dizziness or near syncope, cough, URI symptoms, chest pain, shortness of breath, palpitations, abdominal distension, nausea, vomiting, flank pain, skin rash or color change, extremity weakness or paresthesia or other focal neurologic deficits  History provided by:  Patient   used: No    Constipation  Associated symptoms: abdominal pain    Associated symptoms: no back pain, no diarrhea, no dysuria, no fever, no nausea and no vomiting        Prior to Admission Medications   Prescriptions Last Dose Informant Patient Reported? Taking? Cholecalciferol 2000 units CAPS   Yes No   Sig: Take 1 capsule by mouth   Omega-3 Fatty Acids (FISH OIL PO)   Yes No   Sig: Take 2 g by mouth   amLODIPine (NORVASC) 5 mg tablet   Yes No   Sig: TK 1 T PO D   atorvastatin (LIPITOR) 10 mg tablet   Yes No   Sig: TK 1 T PO D   donepezil (ARICEPT) 10 mg tablet   Yes No   Sig: TK 1 T PO HS   furosemide (LASIX) 40 mg tablet   Yes No   Sig: TK 1 T PO D IN THE MORNING   omeprazole (PriLOSEC) 40 MG capsule   Yes No   Sig: Take 40 mg by mouth   spironolactone (ALDACTONE) 25 mg tablet   Yes No   Sig: Take 12 5 mg by mouth   triamterene-hydrochlorothiazide (MAXZIDE-25) 37 5-25 mg per tablet   Yes No   Sig: Take by mouth      Facility-Administered Medications: None       Past Medical History:   Diagnosis Date    CKD (chronic kidney disease) 11/8/2018    Hyperlipidemia     Hypertension     [facility]VD [facility]peripheral vascular disease) ([facility] [address] )     Sleep apnea     Small bowel obstruction (HCC)     Ventral hernia        Past Surgical History:   Procedure Laterality Date    ABDOMINAL HERNIA REPAIR      ABDOMINAL SURGERY      CHOLECYSTECTOMY      open    FEMORAL BYPASS Right        History reviewed  No pertinent family history  I have reviewed and agree with the history as documented  E-Cigarette/Vaping    E-Cigarette Use Never User      E-Cigarette/Vaping Substances     Social History     Tobacco Use    Smoking status: Never Smoker    Smokeless tobacco: Never Used   Vaping Use    Vaping Use: Never used   Substance Use Topics    Alcohol use: Never    Drug use: No       Review of Systems   Constitutional: Positive for chills  Negative for fever  HENT: Negative for congestion, ear pain, rhinorrhea and sore throat  Respiratory: Negative for cough, chest tightness, shortness of breath and wheezing  Cardiovascular: Negative for chest pain and palpitations  Gastrointestinal: Positive for abdominal pain and constipation   Negative for abdominal distention, blood in stool, diarrhea, nausea and vomiting  Genitourinary: Positive for difficulty urinating  Negative for dysuria, flank pain, frequency and hematuria  Musculoskeletal: Negative for back pain, neck pain and neck stiffness  Skin: Negative for color change, pallor, rash and wound  Allergic/Immunologic: Negative for immunocompromised state  Neurological: Negative for dizziness, syncope, weakness, light-headedness, numbness and headaches  Hematological: Negative for adenopathy  Psychiatric/Behavioral: Negative for confusion and decreased concentration  All other systems reviewed and are negative  Physical Exam  Physical Exam  Vitals and nursing note reviewed  Constitutional:       General: He is not in acute distress  Appearance: Normal appearance  He is well-developed  He is not ill-appearing, toxic-appearing or diaphoretic  HENT:      Head: Normocephalic and atraumatic  Right Ear: External ear normal       Left Ear: External ear normal       Nose: Nose normal       Mouth/Throat:      Comments: Orpharyngeal exam deferred at this time due to risk of exposure to COVID-19 during current pandemic  Patient has no oropharyngeal complaints  Eyes:      Extraocular Movements: Extraocular movements intact  Conjunctiva/sclera: Conjunctivae normal    Neck:      Vascular: No JVD  Cardiovascular:      Rate and Rhythm: Normal rate and regular rhythm  Pulses: Normal pulses  Heart sounds: Normal heart sounds  No murmur heard  No friction rub  No gallop  Pulmonary:      Effort: Pulmonary effort is normal  No respiratory distress  Breath sounds: Normal breath sounds  No wheezing, rhonchi or rales  Abdominal:      General: There is no distension  Palpations: Abdomen is soft  Tenderness: There is abdominal tenderness  There is no guarding or rebound  Comments: +LLQ abdominal tenderness  Musculoskeletal:         General: No swelling or tenderness   Normal range of motion  Cervical back: Normal range of motion and neck supple  No rigidity  Skin:     General: Skin is warm and dry  Coloration: Skin is not pale  Findings: No erythema or rash  Neurological:      General: No focal deficit present  Mental Status: He is alert and oriented to person, place, and time  Sensory: No sensory deficit  Motor: No weakness     Psychiatric:         Mood and Affect: Mood normal          Behavior: Behavior normal          Vital Signs  ED Triage Vitals   Temperature Pulse Respirations Blood Pressure SpO2   08/12/21 1007 08/12/21 0845 08/12/21 0845 08/12/21 0845 08/12/21 0845   97 9 °F (36 6 °C) 89 16 152/67 95 %      Temp Source Heart Rate Source Patient Position - Orthostatic VS BP Location FiO2 (%)   08/12/21 1007 08/12/21 0845 08/12/21 0845 08/12/21 0845 --   Oral Monitor Lying Left arm       Pain Score       08/12/21 1136       Worst Possible Pain         Vitals:    08/12/21 0845 08/12/21 1007   BP: 152/67    BP Location: Left arm    Pulse: 89    Resp: 16    Temp:  97 9 °F (36 6 °C)   TempSrc:  Oral   SpO2: 95%        Visual Acuity      ED Medications  Medications   sodium chloride 0 9 % infusion (has no administration in time range)   HYDROmorphone (DILAUDID) injection 1 mg ( Intravenous Canceled Entry 8/12/21 1355)   ondansetron (ZOFRAN) injection 4 mg (has no administration in time range)   famotidine (PEPCID) tablet 20 mg (has no administration in time range)     Or   famotidine (PEPCID) injection 20 mg (has no administration in time range)   heparin (porcine) subcutaneous injection 5,000 Units (has no administration in time range)   hydrALAZINE (APRESOLINE) injection 10 mg (has no administration in time range)   HYDROmorphone (DILAUDID) injection 0 5 mg (has no administration in time range)   sodium chloride 0 9 % bolus 1,000 mL (0 mL Intravenous Stopped 8/12/21 1121)   ondansetron (ZOFRAN) injection 4 mg (4 mg Intravenous Given 8/12/21 1136) HYDROmorphone (DILAUDID) injection 0 5 mg (0 5 mg Intravenous Given 8/12/21 1136)   piperacillin-tazobactam (ZOSYN) 3 375 g in sodium chloride 0 9 % 100 mL IVPB (3 375 g Intravenous New Bag 8/12/21 1323)   multi-electrolyte (ISOLYTE-S PH 7 4) bolus 2,000 mL (2,000 mL Intravenous New Bag 8/12/21 1457)   HYDROmorphone (DILAUDID) injection 1 mg (1 mg Intravenous Given 8/12/21 1419)       Diagnostic Studies  Results Reviewed     Procedure Component Value Units Date/Time    Platelet count [[id number]]     Lab Status: No result Specimen: Blood     Procalcitonin with AM Reflex [[id number]] Updated: 08/12/21 1402    Lab Status: No result Specimen: Blood from Arm, Left     Blood culture #2 [[id number]] Collected: 08/12/21 1347    Lab Status: In process Specimen: Blood from Arm, Left Updated: 08/12/21 1350    Lactic acid 2 Hours [[id number]]  (Abnormal) Collected: 08/12/21 1138    Lab Status: Final result Specimen: Blood from Arm, Right Updated: 08/12/21 1210     LACTIC ACID 5 0 mmol/L     Narrative:      Result may be elevated if tourniquet was used during collection      Urine Microscopic [[id number]]  (Abnormal) Collected: 08/12/21 1138    Lab Status: Final result Specimen: Urine, Clean Catch Updated: 08/12/21 1207     RBC, UA None Seen /hpf      WBC, UA None Seen /hpf      Epithelial Cells None Seen /hpf      Bacteria, UA None Seen /hpf      Hyaline Casts, UA 0-1 /lpf     UA (URINE) with reflex to Scope [[id number]]  (Abnormal) Collected: 08/12/21 1138    Lab Status: Final result Specimen: Urine, Clean Catch Updated: 08/12/21 1159     Color, UA Yellow     Clarity, UA Clear     Specific Gravity, UA 1 025     pH, UA 6 0     Leukocytes, UA Negative     Nitrite, UA Negative     Protein,  (2+) mg/dl      Glucose, UA Negative mg/dl      Ketones, UA Negative mg/dl      Urobilinogen, UA 0 2 E U /dl      Bilirubin, UA Negative     Blood, UA Negative    Blood culture #1 [[id number]]     Lab Status: No result Specimen: Blood Lactic acid [[id number]]  (Abnormal) Collected: 08/12/21 0903    Lab Status: Final result Specimen: Blood from Arm, Right Updated: 08/12/21 0953     LACTIC ACID 3 2 mmol/L     Narrative:      Result may be elevated if tourniquet was used during collection      Troponin I [[id number]]  (Normal) Collected: 08/12/21 0903    Lab Status: Final result Specimen: Blood from Arm, Right Updated: 08/12/21 0925     Troponin I <0 02 ng/mL     Hepatic function panel [[id number]]  (Normal) Collected: 08/12/21 0903    Lab Status: Final result Specimen: Blood from Arm, Right Updated: 08/12/21 0923     Total Bilirubin 0 65 mg/dL      Bilirubin, Direct 0 17 mg/dL      Alkaline Phosphatase 79 U/L      AST 25 U/L      ALT 40 U/L      Total Protein 8 0 g/dL      Albumin 3 6 g/dL     Lipase [[id number]]  (Normal) Collected: 08/12/21 0903    Lab Status: Final result Specimen: Blood from Arm, Right Updated: 08/12/21 0923     Lipase 169 u/L     Basic metabolic panel [[id number]]  (Abnormal) Collected: 08/12/21 0903    Lab Status: Final result Specimen: Blood from Arm, Right Updated: 08/12/21 0923     Sodium 138 mmol/L      Potassium 4 2 mmol/L      Chloride 98 mmol/L      CO2 31 mmol/L      ANION GAP 9 mmol/L      BUN 35 mg/dL      Creatinine 2 16 mg/dL      Glucose 182 mg/dL      Calcium 9 0 mg/dL      eGFR 29 ml/min/1 73sq m     Narrative:      Marie guidelines for Chronic Kidney Disease (CKD):     Stage 1 with normal or high GFR (GFR > 90 mL/min/1 73 square meters)    Stage 2 Mild CKD (GFR = 60-89 mL/min/1 73 square meters)    Stage 3A Moderate CKD (GFR = 45-59 mL/min/1 73 square meters)    Stage 3B Moderate CKD (GFR = 30-44 mL/min/1 73 square meters)    Stage 4 Severe CKD (GFR = 15-29 mL/min/1 73 square meters)    Stage 5 End Stage CKD (GFR <15 mL/min/1 73 square meters)  Note: GFR calculation is accurate only with a steady state creatinine    Magnesium [[id number]]  (Normal) Collected: 08/12/21 0903    Lab Status: Final result Specimen: Blood from Arm, Right Updated: 08/12/21 0923     Magnesium 1 9 mg/dL     CBC and differential [[id number]]  (Abnormal) Collected: 08/12/21 0903    Lab Status: Final result Specimen: Blood from Arm, Right Updated: 08/12/21 0910     WBC 13 91 Thousand/uL      RBC 4 98 Million/uL      Hemoglobin 14 9 g/dL      Hematocrit 45 9 %      MCV 92 fL      MCH 29 9 pg      MCHC 32 5 g/dL      RDW 13 0 %      MPV 11 1 fL      Platelets 154 Thousands/uL      nRBC 0 /100 WBCs      Neutrophils Relative 81 %      Immat GRANS % 1 %      Lymphocytes Relative 9 %      Monocytes Relative 8 %      Eosinophils Relative 1 %      Basophils Relative 0 %      Neutrophils Absolute 11 22 Thousands/µL      Immature Grans Absolute 0 17 Thousand/uL      Lymphocytes Absolute 1 22 Thousands/µL      Monocytes Absolute 1 06 Thousand/µL      Eosinophils Absolute 0 19 Thousand/µL      Basophils Absolute 0 05 Thousands/µL                  CT abdomen pelvis wo contrast   Final Result by Eleanor Madrid MD (08/12 7759)   1  Dilated small bowel loops in the anterior mid abdomen with transition points adjacent to the anterior abdominal wall suggesting adhesions  Findings compatible with partial small bowel obstruction and essentially unchanged from multiple prior studies    dating back to 2/13/2018  2   Moderate colonic stool burden  3   Severe hepatic steatosis  The study was marked in [facility] for immediate notification  Workstation performed: [id number]         XR chest 1 view portable   Final Result by Vicki Rosen MD (08/12 6301)      No acute cardiopulmonary disease                    Workstation performed: [id number]                    Procedures  ECG 12 Lead Documentation Only    Date/Time: 8/12/2021 9:16 AM  Performed by: Roxana Gracia DO  Authorized by: Roxana Gracia DO     ECG reviewed by me, the ED Provider: yes    Patient location:  ED  Previous ECG:     Previous ECG:  Compared to current Comparison ECG info:  PACs now present compared to prior EKG on 11-7-2018  Rate:     ECG rate:  87    ECG rate assessment: normal    Rhythm:     Rhythm: sinus rhythm and A-V block      Rhythm comment:  1st degree AV block  Ectopy:     Ectopy: PAC    QRS:     QRS axis:  Right    QRS intervals: Wide  Conduction:     Conduction: abnormal      Abnormal conduction: complete RBBB and 1st degree    ST segments:     ST segments:  Normal  T waves:     T waves: normal               ED Course  ED Course as of Aug 12 1659   Thu Aug 12, 2021   0943 Creatinine(!): 2 16   8417 Patient has acute on chronic renal insufficiency  Will obtain CT scan without contrast due to GFR less than 30  Patient getting IV fluid hydration currently  eGFR: 29   1111 CT scan shows partial bowel obstruction  Given elevated lactic acid, leukocytosis and HARVINDER, will admit and consult General surgery  1124 Patient reassessed and had an episode of vomiting (nb/nb)      1128 Tiger texted on-call general surgeon, Dr Gary Wadsworth, however he is currently in the operating room and unable to take the call  I spoke with surgery AP, Heidy Wills, who given age in medical comorbidities recommended admission to Internal Medicine with surgical consultation  Given acute vomiting he did recommend placing NG tube  1128 Although elevated lactic acid and leukocytosis, no source of infection identified as of yet  Waiting on urinalysis  Numbers could be elevated due to acute stress response and inflammation due to bowel obstruction  1209 Bacteria, UA: None Seen   1209 WBC, UA: None Seen   1209 Lactic acid increasing to 5 0  Will consult General surgery as there is concern for possible ischemic bowel  No signs of UTI or other infection however giving rise in lactic acid will cover with Zon  Internal medicine made aware via tiger text  Further IV fluids ordered        [address] Heidy Wills with general surgery (AP on call; attending in [address])      1216 Nursing reported that patient had refused NG tube insertion  Currently surgical AP is at bedside and discussing this with patient  MDM  Number of Diagnoses or Management Options  Diagnosis management comments: 70-year-old male presents to the ED for acute left lower quadrant abdominal pain as well as constipation since yesterday  Differential includes acute diverticulitis, recurrent small-bowel obstruction, nonspecific enteritis or colitis, gas or constipation related pain, urinary tract infection, pyelonephritis, kidney stone, bladder spasm secondary to urinary retention  Will workup with cardiac and abdominal labs including lactic acid, bladder scan to assess for retention, UA, EKG, chest x-ray and CT abdomen and pelvis with IV contrast   Will provide IV fluids  Will hold off on any constipation related medications until CT back         Amount and/or Complexity of Data Reviewed  Clinical lab tests: ordered and reviewed  Tests in the radiology section of CPT®: ordered and reviewed  Tests in the medicine section of CPT®: reviewed and ordered  Independent visualization of images, tracings, or specimens: yes        Disposition  Final diagnoses:   Partial small bowel obstruction (HCC)   Constipation   Nausea and vomiting   Acute on chronic renal insufficiency   Elevated lactic acid level   Acute left lower quadrant pain     Time reflects when diagnosis was documented in both MDM as applicable and the Disposition within this note     Time User Action Codes Description Comment    8/12/2021 11:27 AM Gauri Spire E Add [K56 600] Partial small bowel obstruction ([address] )     8/12/2021 11:27 AM Gauri Spire E Add [K59 00] Constipation     8/12/2021 11:27 AM Gauri Spire E Add [R11 2] Nausea and vomiting     8/12/2021 11:27 AM Gauri Spire E Add [N28 9,  N18 9] Acute on chronic renal insufficiency     8/12/2021 11:27 AM Gauri Spire E Add [R79 89] Elevated lactic acid level     8/12/2021 11:27 AM Temo Shrestha [R10 32] Acute left lower quadrant pain       ED Disposition     ED Disposition Condition Date/Time Comment    Admit Stable Thu Aug 12, 2021 11:56 AM Case was discussed with DWAIN and the patient's admission status was agreed to be Admission Status: inpatient status to the service of Dr Ashly Mcneil   Follow-up Information    None         Patient's Medications   Discharge Prescriptions    No medications on file     No discharge procedures on file      PDMP Review     None          ED Provider  Electronically Signed by           Sheila Deshpande DO  08/12/21 9612

## 2021-08-13 ENCOUNTER — APPOINTMENT (INPATIENT)
Dept: RADIOLOGY | Facility: HOSPITAL | Age: 76
DRG: 389 | End: 2021-08-13
Payer: MEDICARE

## 2021-08-13 LAB
ALBUMIN SERPL BCP-MCNC: 3 G/DL (ref 3.5–5)
ALP SERPL-CCNC: 73 U/L (ref 46–116)
ALT SERPL W P-5'-P-CCNC: 122 U/L (ref 12–78)
ANION GAP SERPL CALCULATED.3IONS-SCNC: 8 MMOL/L (ref 4–13)
AST SERPL W P-5'-P-CCNC: 77 U/L (ref 5–45)
BASOPHILS # BLD AUTO: 0.05 THOUSANDS/ΜL (ref 0–0.1)
BASOPHILS NFR BLD AUTO: 0 % (ref 0–1)
BILIRUB SERPL-MCNC: 0.67 MG/DL (ref 0.2–1)
BUN SERPL-MCNC: 32 MG/DL (ref 5–25)
CALCIUM ALBUM COR SERPL-MCNC: 8.7 MG/DL (ref 8.3–10.1)
CALCIUM SERPL-MCNC: 7.9 MG/DL (ref 8.3–10.1)
CHLORIDE SERPL-SCNC: 104 MMOL/L (ref 100–108)
CO2 SERPL-SCNC: 29 MMOL/L (ref 21–32)
CREAT SERPL-MCNC: 1.87 MG/DL (ref 0.6–1.3)
EOSINOPHIL # BLD AUTO: 0.22 THOUSAND/ΜL (ref 0–0.61)
EOSINOPHIL NFR BLD AUTO: 2 % (ref 0–6)
ERYTHROCYTE [DISTWIDTH] IN BLOOD BY AUTOMATED COUNT: 13.2 % (ref 11.6–15.1)
GFR SERPL CREATININE-BSD FRML MDRD: 34 ML/MIN/1.73SQ M
GLUCOSE SERPL-MCNC: 131 MG/DL (ref 65–140)
HCT VFR BLD AUTO: 41.1 % (ref 36.5–49.3)
HGB BLD-MCNC: 13.1 G/DL (ref 12–17)
IMM GRANULOCYTES # BLD AUTO: 0.12 THOUSAND/UL (ref 0–0.2)
IMM GRANULOCYTES NFR BLD AUTO: 1 % (ref 0–2)
LACTATE SERPL-SCNC: 1.1 MMOL/L (ref 0.5–2)
LYMPHOCYTES # BLD AUTO: 1.33 THOUSANDS/ΜL (ref 0.6–4.47)
LYMPHOCYTES NFR BLD AUTO: 11 % (ref 14–44)
MAGNESIUM SERPL-MCNC: 2.1 MG/DL (ref 1.6–2.6)
MCH RBC QN AUTO: 29.8 PG (ref 26.8–34.3)
MCHC RBC AUTO-ENTMCNC: 31.9 G/DL (ref 31.4–37.4)
MCV RBC AUTO: 94 FL (ref 82–98)
MONOCYTES # BLD AUTO: 1.01 THOUSAND/ΜL (ref 0.17–1.22)
MONOCYTES NFR BLD AUTO: 8 % (ref 4–12)
NEUTROPHILS # BLD AUTO: 9.55 THOUSANDS/ΜL (ref 1.85–7.62)
NEUTS SEG NFR BLD AUTO: 78 % (ref 43–75)
NRBC BLD AUTO-RTO: 0 /100 WBCS
PHOSPHATE SERPL-MCNC: 3.4 MG/DL (ref 2.3–4.1)
PLATELET # BLD AUTO: 211 THOUSANDS/UL (ref 149–390)
PMV BLD AUTO: 10.9 FL (ref 8.9–12.7)
POTASSIUM SERPL-SCNC: 4.1 MMOL/L (ref 3.5–5.3)
PROCALCITONIN SERPL-MCNC: <0.05 NG/ML
PROT SERPL-MCNC: 6.6 G/DL (ref 6.4–8.2)
RBC # BLD AUTO: 4.39 MILLION/UL (ref 3.88–5.62)
SARS-COV-2 RNA RESP QL NAA+PROBE: NEGATIVE
SODIUM SERPL-SCNC: 141 MMOL/L (ref 136–145)
WBC # BLD AUTO: 12.28 THOUSAND/UL (ref 4.31–10.16)

## 2021-08-13 PROCEDURE — 85025 COMPLETE CBC W/AUTO DIFF WBC: CPT | Performed by: NURSE PRACTITIONER

## 2021-08-13 PROCEDURE — 84100 ASSAY OF PHOSPHORUS: CPT | Performed by: NURSE PRACTITIONER

## 2021-08-13 PROCEDURE — 99232 SBSQ HOSP IP/OBS MODERATE 35: CPT | Performed by: STUDENT IN AN ORGANIZED HEALTH CARE EDUCATION/TRAINING PROGRAM

## 2021-08-13 PROCEDURE — 83735 ASSAY OF MAGNESIUM: CPT | Performed by: NURSE PRACTITIONER

## 2021-08-13 PROCEDURE — 71045 X-RAY EXAM CHEST 1 VIEW: CPT

## 2021-08-13 PROCEDURE — U0003 INFECTIOUS AGENT DETECTION BY NUCLEIC ACID (DNA OR RNA); SEVERE ACUTE RESPIRATORY SYNDROME CORONAVIRUS 2 (SARS-COV-2) (CORONAVIRUS DISEASE [COVID-19]), AMPLIFIED PROBE TECHNIQUE, MAKING USE OF HIGH THROUGHPUT TECHNOLOGIES AS DESCRIBED BY CMS-2020-01-R: HCPCS | Performed by: INTERNAL MEDICINE

## 2021-08-13 PROCEDURE — U0005 INFEC AGEN DETEC AMPLI PROBE: HCPCS | Performed by: INTERNAL MEDICINE

## 2021-08-13 PROCEDURE — 80053 COMPREHEN METABOLIC PANEL: CPT | Performed by: NURSE PRACTITIONER

## 2021-08-13 RX ADMIN — FAMOTIDINE 20 MG: 10 INJECTION, SOLUTION INTRAVENOUS at 18:37

## 2021-08-13 RX ADMIN — HEPARIN SODIUM 5000 UNITS: 5000 INJECTION INTRAVENOUS; SUBCUTANEOUS at 15:25

## 2021-08-13 RX ADMIN — SODIUM CHLORIDE 125 ML/HR: 0.9 INJECTION, SOLUTION INTRAVENOUS at 07:05

## 2021-08-13 RX ADMIN — HYDROMORPHONE HYDROCHLORIDE 0.5 MG: 1 INJECTION, SOLUTION INTRAMUSCULAR; INTRAVENOUS; SUBCUTANEOUS at 01:53

## 2021-08-13 RX ADMIN — HYDROMORPHONE HYDROCHLORIDE 0.5 MG: 1 INJECTION, SOLUTION INTRAMUSCULAR; INTRAVENOUS; SUBCUTANEOUS at 09:49

## 2021-08-13 RX ADMIN — HYDROMORPHONE HYDROCHLORIDE 0.5 MG: 1 INJECTION, SOLUTION INTRAMUSCULAR; INTRAVENOUS; SUBCUTANEOUS at 18:55

## 2021-08-13 RX ADMIN — FAMOTIDINE 20 MG: 10 INJECTION, SOLUTION INTRAVENOUS at 09:49

## 2021-08-13 RX ADMIN — HYDROMORPHONE HYDROCHLORIDE 0.5 MG: 1 INJECTION, SOLUTION INTRAMUSCULAR; INTRAVENOUS; SUBCUTANEOUS at 05:53

## 2021-08-13 RX ADMIN — SODIUM CHLORIDE 125 ML/HR: 0.9 INJECTION, SOLUTION INTRAVENOUS at 18:37

## 2021-08-13 NOTE — CASE MANAGEMENT
Case Management Assessment & Discharge Planning Note    Patient name Sergio Jennings  Location [facility] [address] [id number] MRN [id number]  : 1945 Date 2021       Current Admission Date: 2021  Current Admission Diagnosis:  SBO (small bowel obstruction) [facility])   Patient Active Problem List   Diagnosis    Essential hypertension    Hyperlipidemia    Ventral hernia    SBO (small bowel obstruction) ([facility] )    PVD (peripheral vascular disease) ([place] [address] )    Acute kidney injury superimposed on chronic kidney disease ([facility] )    Dementia ([place] [address] )    Elevated random blood glucose level    Renal cyst, left    Dyslipidemia    History of CVA (cerebrovascular accident)    CKD (chronic kidney disease)    Lactic acidosis    Previous Admission - Discharge Date:20   LOS (days): 1  Geometric Mean LOS (GMLOS) (days): 3 20  Days to GMLOS:2 1 Previous Discharge Diagnosis:  There are no discharge diagnoses documented for the most recent discharge         Risk of Unplanned Readmission Score  Predictive Model Details          17 (Low)  Factor Value    Calculated 2021 12:03 14% Number of active Rx orders 18    Risk of Unplanned Readmission Model 10% ECG/EKG order present in last 6 months     10% Latest calcium low (7 9 mg/dL)     9% Latest BUN high (32 mg/dL)     8% Diagnosis of electrolyte disorder present     7% Imaging order present in last 6 months     7% Age 68     7% Phosphorous result present     6% Number of ED visits in last six months 1     5% Charlson Comorbidity Index 4     5% Active anticoagulant Rx order present     5% Latest creatinine high (1 87 mg/dL)     4% Diagnosis of renal failure present     1% Current length of stay 1 005 days     1% Active ulcer medication Rx order present         BUNDLE:      Reason for Referral:    OBJECTIVE:  Pt is a 68y o  year old /Civil Union, white or  [1], male with Yazdanism preference of Hoahaoism admitted on 2021  8:14 AM  Pt is admitted to [facility] [address] [phone] at [address] with complaints of SBO (small bowel obstruction) ([address] )   Current admission status: Inpatient       Preferred Pharmacy:   [facility] [address] [place], [place] - [address] [facility]  [address] [address]  Phone: [phone] Fax: [phone]    [id number] [place] [address] [place] [address] - [address]  [address] [facility] [address] [address]  Phone: [phone] Fax: [phone]    Primary Care Provider: Anayeli Garay DO    Primary Insurance: MEDICARE  Secondary Insurance: [employer]    ASSESSMENT:  Boyce Proc  Issa Zach 1 Representative - Son   Primary Phone: [phone] (Mobile)               Advance Directives  Does patient have a [address]?: No  Was patient offered paperwork?: Yes  Does patient currently have a Health Care decision maker?: Yes, please see Health Care Proxy section  Does patient have Advance Directives?: No  Was patient offered paperwork?: Yes              Readmission Root Cause  30 Day Readmission: No    Patient Information  Mental Status: Alert  During Assessment patient was accompanied by: Son  Assessment information provided by[de-identified] Patient  Primary Caregiver: Self  Support Systems: Son  Home entry access options   Select all that apply : Garage (no steps from garage)  Type of Current Residence: [address] home  Upon entering residence, is there a bedroom on the main floor (no further steps)?: No (there is a chair lift to the second floor)  A bedroom is located on the following floor levels of residence (select all that apply):: 2nd Floor  Upon entering residence, is there a bathroom on the main floor (no further steps)?: Yes  Indicate which floors of current residence have a bathroom (select all the apply):: 2nd Floor, 3rd Floor  Number of steps to basement from main floor: One Flight (pt does not go to the basement)  Number of steps to 2nd floor from main floor: One Flight (Chair lift)  Living Arrangements: Lives w/ Son  Is patient a ?: Yes  Is patient active with [facility])?: No    Activities of Daily Living Prior to Admission  Functional Status: Independent  Completes ADLs independently?: Yes (except his feet)  Ambulates independently?: Yes  Does patient use assisted devices?: No  Does patient currently own DME?: Yes (family has a W/C, rollator, bedside commode, candace lift, but was for his wife  he does not use these)  What DME does the patient currently own?: Sanchez Letters, Wheelchair, Bedside Commode, Shower Chair  Does patient have a history of Outpatient Therapy (PT/OT)?: No  Does the patient have a history of Short-Term Rehab?: Yes Jessica Bell)  Does patient currently have [facility]?: No         Patient Information Continued  Income Source: Unemployed  Does patient have prescription coverage?: Yes  Does patient receive dialysis treatments?: No  Does patient have a history of substance abuse?: No  Does patient have a history of Mental Health Diagnosis?: Yes  Is patient receiving treatment for mental health?: No  Treatment options were provided  Has patient received inpatient treatment related to mental health in the last 2 years?: No         Means of Transportation  Means of Transport to Appts[de-identified] Family transport  In the past 12 months, has lack of transportation kept you from medical appointments or from getting medications?: No  In the past 12 months, has lack of transportation kept you from meetings, work, or from getting things needed for daily living?: No  Was application for public transport provided?: No    DISCHARGE DETAILS:    Discharge planning discussed with[de-identified] patient and son  Freedom of Choice: Yes    Contacts  Patient Contacts: Son Herminio Gracia to Patient[de-identified] Family  Contact Method:  In Person  Reason/Outcome: Continuity of [facility]         Is the patient interested in [facility] at discharge?: No    DME Referral Provided  Referral made for DME?: No Discharge Destination Plan[de-identified] Home  Transportation at Discharge?: Yes  Type of Transport: Automobile                 Transfer Mode: Self  Accompanied by: Alone

## 2021-08-13 NOTE — PROGRESS NOTES
Progress Note - General Surgery   Jonatan Alexis 68 y o  male MRN: [id number]  Unit/Bed#: -01 Encounter: [id number]      Assessment:   68-year-old male with recurrent small-bowel obstructions  - patient refusing NGT  - abdomen moderately distended with some epigastric tenderness to palpation hyperactive bowel sounds  - patient denies any return of bowel function  States last bowel movement he had was yesterday but has not passed any gas    Plan:  - continue with conservative management with NPO/bowel rest, IVF  Patient continues to refuse NGT and it was explained to him that we recommend NGT placement for decompression, specially if he develops any nausea or vomiting   - analgesics and antiemetics p r n   - serial abdominal exams  - continue to monitor vitals and lab results  - courage ambulation and out of bed    Subjective/Objective   Chief Complaint:  Not passing any gas    Subjective:  No acute events overnight  Patient states that his abdominal distention and pain have slightly improved but still has not had any bowel function  Patient continues to refuse NGT placement states that the migraines he gets from the NGT being in place are much worse than his bowel obstructions  Patient denies any chest pain, shortness of breath, palpitations, fevers, chills    Objective:     Blood pressure 140/58, pulse 80, temperature 98 4 °F (36 9 °C), resp  rate 19, SpO2 96 %  There is no height or weight on file to calculate BMI  I/O       08/11 0701 - 08/12 0700 08/12 0701 - 08/13 0700 08/13 0701 - 08/14 0700    P  O   0     I V   362 5 1033 3    IV Piggyback  1000     Total Intake  1362 5 1033 3    Net  +1362 5 +1033 3           Unmeasured Urine Occurrence  1 x           Invasive Devices     Peripheral Intravenous Line            Peripheral IV 08/12/21 Right Antecubital 1 day                Physical Exam: /58   Pulse 80   Temp 98 4 °F (36 9 °C)   Resp 19   SpO2 96%   General appearance: alert and oriented, in no acute distress  Lungs: clear to auscultation bilaterally  Heart: regular rate and rhythm and S1, S2 normal  Abdomen:  Moderate distention, tenderness to palpation in the epigastric region, hyperactive bowel sounds present, large mid abdominal scar from prior surgery noted  Extremities: extremities normal, warm and well-perfused; no cyanosis, clubbing, or edema    Labs   Recent Results (from the past 24 hour(s))   Fingerstick Glucose (POCT)    Collection Time: 08/12/21  9:51 PM   Result Value Ref Range    POC Glucose 112 65 - 140 mg/dl   Procalcitonin with AM Reflex    Collection Time: 08/12/21 11:42 PM   Result Value Ref Range    Procalcitonin <0 05 <=0 25 ng/ml   Lactic acid, plasma    Collection Time: 08/12/21 11:42 PM   Result Value Ref Range    LACTIC ACID 1 1 0 5 - 2 0 mmol/L   Comprehensive metabolic panel    Collection Time: 08/13/21  5:30 AM   Result Value Ref Range    Sodium 141 136 - 145 mmol/L    Potassium 4 1 3 5 - 5 3 mmol/L    Chloride 104 100 - 108 mmol/L    CO2 29 21 - 32 mmol/L    ANION GAP 8 4 - 13 mmol/L    BUN 32 (H) 5 - 25 mg/dL    Creatinine 1 87 (H) 0 60 - 1 30 mg/dL    Glucose 131 65 - 140 mg/dL    Calcium 7 9 (L) 8 3 - 10 1 mg/dL    Corrected Calcium 8 7 8 3 - 10 1 mg/dL    AST 77 (H) 5 - 45 U/L     (H) 12 - 78 U/L    Alkaline Phosphatase 73 46 - 116 U/L    Total Protein 6 6 6 4 - 8 2 g/dL    Albumin 3 0 (L) 3 5 - 5 0 g/dL    Total Bilirubin 0 67 0 20 - 1 00 mg/dL    eGFR 34 ml/min/1 73sq m   Magnesium    Collection Time: 08/13/21  5:30 AM   Result Value Ref Range    Magnesium 2 1 1 6 - 2 6 mg/dL   Phosphorus    Collection Time: 08/13/21  5:30 AM   Result Value Ref Range    Phosphorus 3 4 2 3 - 4 1 mg/dL   CBC and differential    Collection Time: 08/13/21  5:30 AM   Result Value Ref Range    WBC 12 28 (H) 4 31 - 10 16 Thousand/uL    RBC 4 39 3 88 - 5 62 Million/uL    Hemoglobin 13 1 12 0 - 17 0 g/dL    Hematocrit 41 1 36 5 - 49 3 %    MCV 94 82 - 98 fL    MCH 29 8 26 8 - 34 3 pg MCHC 31 9 31 4 - 37 4 g/dL    RDW 13 2 11 6 - 15 1 %    MPV 10 9 8 9 - 12 7 fL    Platelets 741 558 - 705 Thousands/uL    nRBC 0 /100 WBCs    Neutrophils Relative 78 (H) 43 - 75 %    Immat GRANS % 1 0 - 2 %    Lymphocytes Relative 11 (L) 14 - 44 %    Monocytes Relative 8 4 - 12 %    Eosinophils Relative 2 0 - 6 %    Basophils Relative 0 0 - 1 %    Neutrophils Absolute 9 55 (H) 1 85 - 7 62 Thousands/µL    Immature Grans Absolute 0 12 0 00 - 0 20 Thousand/uL    Lymphocytes Absolute 1 33 0 60 - 4 47 Thousands/µL    Monocytes Absolute 1 01 0 17 - 1 22 Thousand/µL    Eosinophils Absolute 0 22 0 00 - 0 61 Thousand/µL    Basophils Absolute 0 05 0 00 - 0 10 Thousands/µL   NOVEL CORONAVIRUS (COVID-19), PCR [facility]    Collection Time: 08/13/21  1:45 PM    Specimen: Nose; Nares   Result Value Ref Range    SARS-CoV-2 Negative Negative        Imaging and other studies:  CT abdomen pelvis wo contrast    Result Date: 8/12/2021  Impression: 1  Dilated small bowel loops in the anterior mid abdomen with transition points adjacent to the anterior abdominal wall suggesting adhesions  Findings compatible with partial small bowel obstruction and essentially unchanged from multiple prior studies  dating back to 2/13/2018  2   Moderate colonic stool burden  3   Severe hepatic steatosis  The study was marked in [facility] for immediate notification  Workstation performed: [id number]     XR chest portable    Result Date: 8/13/2021  Impression: No acute cardiopulmonary disease  Workstation performed: [id number]     XR chest 1 view portable    Result Date: 8/12/2021  Impression: No acute cardiopulmonary disease   Workstation performed: [id number]       VTE Pharmacologic Prophylaxis: Heparin  VTE Mechanical Prophylaxis: sequential compression device    Rocío Clark PA-C  8/13/2021

## 2021-08-13 NOTE — PROGRESS NOTES
[facility]  Progress Note - Mariam Glass 1945, 68 y o  male MRN: [id number]  Unit/Bed#: -01 Encounter: [id number]  Primary Care Provider: Radha Moffett DO   Date and time admitted to hospital: 8/12/2021  8:14 AM    * SBO (small bowel obstruction) ([facility] )  Assessment & Plan  · Partial small-bowel obstruction most likely 2/2 agitation as Pt has H/o laparotomy for aortoiliac bypass surgery  · Presented with 1 day H/o constipation and LLQ abdominal pain  · CT AP noted partial bowel obstruction with dilated small bowel loops anterior mid abdomen with transition point  · H/o recurrent partial small-bowel obstruction in past  · General surgery was consulted from ED  · Pt refused NG tube given H/o migraine after placing NG tube in past     Plan:-  -general surgery consulted, recommended NPO, consider 80 management  -continue IV fluid  -antiemetics, analgesics p r n   -serial abdominal examination  -encourage ambulation  -NG tube decompression if Pt starts to vomit    Lactic acidosis  Assessment & Plan  · POA treated with IV fluid  Dementia ([facility] )  Assessment & Plan  · Chronic  · Takes Aricept at home; will continue when able to take PO  · Supportive measures  · Fall precautions    Acute kidney injury superimposed on chronic kidney disease ([facility] )  Assessment & Plan    · Elevated Cr 2 1 on presentation  Baseline around 1 5-1 7 in 2020  · Most likely 2/2 dehydration  · Treated with IV hydration    Plan:-  -continue IV fluid  -monitor Cr    Hyperlipidemia  Assessment & Plan  · Chronic  · Resume Lipitor when able to take PO    Essential hypertension  Assessment & Plan  · Chronic  · Takes Maxzide, Aldactone, Lasix and Amlodipine at home; will hold as he is now NPO  Restart once tolerating PO  · Will order IV medications  · Monitor vital signs closely            Pharmacologic: Heparin  Mechanical VTE Prophylaxis in Place: Yes    Discussions with Specialists or Other Care Team Provider:  Acute surgery    Education and Discussions with Family / Patient:  Spoke to patient's son regarding patient's condition, treatment plan  Current Length of Stay: 1 day(s)    Current Patient Status: Inpatient     Discharge Plan / Estimated Discharge Date:  Needs further stay for partial small-bowel obstruction    Code Status: Level 1 - Full Code      Subjective:   Patient was seen and examined by me at bedside  Communicated clearly  No particular overnight event reported  Hemodynamically stable and afebrile  Patient states improvement in abdominal pain however still persistent  Noted worsening abdominal distention  Hemodynamically stable  On room air  Objective:     Vitals:   Temp (24hrs), Av 2 °F (36 8 °C), Min:97 3 °F (36 3 °C), Max:98 4 °F (36 9 °C)    Temp:  [97 3 °F (36 3 °C)-98 4 °F (36 9 °C)] 98 4 °F (36 9 °C)  HR:  [80-87] 80  Resp:  [16-19] 19  BP: (100-140)/(58-69) 140/58  SpO2:  [85 %-96 %] 96 %  There is no height or weight on file to calculate BMI  Input and Output Summary (last 24 hours): Intake/Output Summary (Last 24 hours) at 2021 1628  Last data filed at 2021 0705  Gross per 24 hour   Intake 1395 83 ml   Output --   Net 1395 83 ml       Physical Exam:     Physical Exam  Vitals reviewed  Constitutional:       General: He is not in acute distress  Appearance: He is well-developed  Cardiovascular:      Rate and Rhythm: Normal rate and regular rhythm  Pulses: Normal pulses  Heart sounds: Normal heart sounds  Pulmonary:      Effort: Pulmonary effort is normal       Breath sounds: Normal breath sounds  Chest:      Chest wall: No tenderness  Abdominal:      General: There is distension  Palpations: Abdomen is soft  Tenderness: There is abdominal tenderness  Comments: Diminished bowel sounds   Musculoskeletal:      Right lower leg: No edema  Left lower leg: No edema  Skin:     General: Skin is warm        Coloration: Skin is not pale    Neurological:      General: No focal deficit present  Mental Status: He is alert and oriented to person, place, and time  Mental status is at baseline  Psychiatric:         Mood and Affect: Mood normal          Behavior: Behavior normal          Additional Data:     Labs:    Results from last 7 days   Lab Units 08/13/21  0530   WBC Thousand/uL 12 28*   HEMOGLOBIN g/dL 13 1   HEMATOCRIT % 41 1   PLATELETS Thousands/uL 211   NEUTROS PCT % 78*   LYMPHS PCT % 11*   MONOS PCT % 8   EOS PCT % 2     Results from last 7 days   Lab Units 08/13/21  0530   POTASSIUM mmol/L 4 1   CHLORIDE mmol/L 104   CO2 mmol/L 29   BUN mg/dL 32*   CREATININE mg/dL 1 87*   CALCIUM mg/dL 7 9*   ALK PHOS U/L 73   ALT U/L 122*   AST U/L 77*           * I Have Reviewed All Lab Data Listed Above  * Additional Pertinent Lab Tests Reviewed: Bethany Rosales Admission Reviewed    Imaging:  XR chest portable   Final Result by Dhiraj Ortiz MD (08/13 1337)      No acute cardiopulmonary disease  Workstation performed: [id number]         CT abdomen pelvis wo contrast   Final Result by Ruben Roach MD (08/12 7809)   1  Dilated small bowel loops in the anterior mid abdomen with transition points adjacent to the anterior abdominal wall suggesting adhesions  Findings compatible with partial small bowel obstruction and essentially unchanged from multiple prior studies    dating back to 2/13/2018  2   Moderate colonic stool burden  3   Severe hepatic steatosis  The study was marked in [facility] for immediate notification  Workstation performed: [id number]         XR chest 1 view portable   Final Result by Dhiraj Ortiz MD (08/12 8009)      No acute cardiopulmonary disease                    Workstation performed: [id number]            Imaging Reports Reviewed Today Include:  CT AP CXR  Imaging Personally Reviewed by Myself Includes:  Same as above    Recent Cultures (last 7 days):     Results from last 7 days   Lab Units 08/12/21  1347   BLOOD CULTURE  Received in Microbiology Lab  Culture in Progress  Last 24 Hours Medication List:   Current Facility-Administered Medications   Medication Dose Route Frequency Provider Last Rate    famotidine  20 mg Oral BID Arlington Brush, CRNP      Or    famotidine  20 mg Intravenous BID Arlington Brush, CRNP      heparin (porcine)  5,000 Units Subcutaneous [facility] Arlington Brush, CRNP      hydrALAZINE  10 mg Intravenous Q6H PRN Arlington Brush, CRNP      HYDROmorphone  0 5 mg Intravenous Q4H PRN Juan Brush, CRNP      HYDROmorphone  1 mg Intravenous Once Deanne Paz, DO      ondansetron  4 mg Intravenous Q6H PRN [place] Brush, CRNP      sodium chloride  125 mL/hr Intravenous Continuous Ministerio Vasquez,  mL/hr (08/13/21 9570)        Today, Patient Was Seen By: Sandra De Leon MD    ** Please Note: This note has been constructed using a voice recognition system   **

## 2021-08-13 NOTE — PLAN OF CARE
Problem: MOBILITY - ADULT  Goal: Maintain or return to baseline ADL function  Description: INTERVENTIONS:  -  Assess patient's ability to carry out ADLs; assess patient's baseline for ADL function and identify physical deficits which impact ability to perform ADLs (bathing, care of mouth/teeth, toileting, grooming, dressing, etc )  - Assess/evaluate cause of self-care deficits   - Assess range of motion  - Assess patient's mobility; develop plan if impaired  - Assess patient's need for assistive devices and provide as appropriate  - Encourage maximum independence but intervene and supervise when necessary  - Involve family in performance of ADLs  - Assess for home care needs following discharge   - Consider OT consult to assist with ADL evaluation and planning for discharge  - Provide patient education as appropriate  8/13/2021 0230 by Koby Patricia RN  Outcome: Progressing     Problem: Potential for Falls  Goal: Patient will remain free of falls  Description: INTERVENTIONS:  - Educate patient/family on patient safety including physical limitations  - Instruct patient to call for assistance with activity   - Consult OT/PT to assist with strengthening/mobility   - Keep Call bell within reach  - Keep bed low and locked with side rails adjusted as appropriate  - Keep care items and personal belongings within reach  - Initiate and maintain comfort rounds  - Make Fall Risk Sign visible to staff  - Offer Toileting every 2 Hours, in advance of need  - Initiate/Maintain bedalarm  - Obtain necessary fall risk management equipment: non skid slippers  - Apply yellow socks and bracelet for high fall risk patients  - Consider moving patient to room near nurses station  8/13/2021 0230 by Koby Patricia RN  Outcome: Progressing  8/13/2021 0230 by Koby Patricia RN  Outcome: Progressing     Problem: Nutrition/Hydration-ADULT  Goal: Nutrient/Hydration intake appropriate for improving, restoring or maintaining nutritional needs  Description: Monitor and assess patient's nutrition/hydration status for malnutrition  Collaborate with interdisciplinary team and initiate plan and interventions as ordered  Monitor patient's weight and dietary intake as ordered or per policy  Utilize nutrition screening tool and intervene as necessary  Determine patient's food preferences and provide high-protein, high-caloric foods as appropriate       INTERVENTIONS:  - Monitor oral intake, urinary output, labs, and treatment plans  - Assess nutrition and hydration status and recommend course of action  - Evaluate amount of meals eaten  - Assist patient with eating if necessary   - Allow adequate time for meals  - Recommend/ encourage appropriate diets, oral nutritional supplements, and vitamin/mineral supplements  - Order, calculate, and assess calorie counts as needed  - Recommend, monitor, and adjust tube feedings and TPN/PPN based on assessed needs  - Assess need for intravenous fluids  - Provide specific nutrition/hydration education as appropriate  - Include patient/family/caregiver in decisions related to nutrition  8/13/2021 0230 by Santa Snyder RN  Outcome: Progressing  8/13/2021 0230 by Santa Snyder RN  Outcome: Progressing

## 2021-08-13 NOTE — ASSESSMENT & PLAN NOTE
· Partial small-bowel obstruction most likely 2/2 agitation as Pt has H/o laparotomy for aortoiliac bypass surgery    · Presented with 1 day H/o constipation and LLQ abdominal pain  · CT AP noted partial bowel obstruction with dilated small bowel loops anterior mid abdomen with transition point  · H/o recurrent partial small-bowel obstruction in past  · General surgery was consulted from ED  · Pt refused NG tube given H/o migraine after placing NG tube in past     Plan:-  -general surgery consulted, recommended NPO, consider 80 management  -continue IV fluid  -antiemetics, analgesics p r n   -serial abdominal examination  -encourage ambulation  -NG tube decompression if Pt starts to vomit

## 2021-08-13 NOTE — ASSESSMENT & PLAN NOTE
· Elevated Cr 2 1 on presentation    Baseline around 1 5-1 7 in 2020  · Most likely 2/2 dehydration  · Treated with IV hydration    Plan:-  -continue IV fluid  -monitor Cr

## 2021-08-14 LAB
ALBUMIN SERPL BCP-MCNC: 3 G/DL (ref 3.5–5)
ALP SERPL-CCNC: 74 U/L (ref 46–116)
ALT SERPL W P-5'-P-CCNC: 84 U/L (ref 12–78)
ANION GAP SERPL CALCULATED.3IONS-SCNC: 10 MMOL/L (ref 4–13)
ANION GAP SERPL CALCULATED.3IONS-SCNC: 6 MMOL/L (ref 4–13)
AST SERPL W P-5'-P-CCNC: 45 U/L (ref 5–45)
BASOPHILS # BLD AUTO: 0.03 THOUSANDS/ΜL (ref 0–0.1)
BASOPHILS NFR BLD AUTO: 0 % (ref 0–1)
BILIRUB SERPL-MCNC: 0.63 MG/DL (ref 0.2–1)
BUN SERPL-MCNC: 25 MG/DL (ref 5–25)
BUN SERPL-MCNC: 25 MG/DL (ref 5–25)
CALCIUM ALBUM COR SERPL-MCNC: 8.7 MG/DL (ref 8.3–10.1)
CALCIUM SERPL-MCNC: 7.9 MG/DL (ref 8.3–10.1)
CALCIUM SERPL-MCNC: 8.1 MG/DL (ref 8.3–10.1)
CHLORIDE SERPL-SCNC: 105 MMOL/L (ref 100–108)
CHLORIDE SERPL-SCNC: 105 MMOL/L (ref 100–108)
CO2 SERPL-SCNC: 25 MMOL/L (ref 21–32)
CO2 SERPL-SCNC: 29 MMOL/L (ref 21–32)
CREAT SERPL-MCNC: 1.7 MG/DL (ref 0.6–1.3)
CREAT SERPL-MCNC: 1.71 MG/DL (ref 0.6–1.3)
EOSINOPHIL # BLD AUTO: 0.41 THOUSAND/ΜL (ref 0–0.61)
EOSINOPHIL NFR BLD AUTO: 4 % (ref 0–6)
ERYTHROCYTE [DISTWIDTH] IN BLOOD BY AUTOMATED COUNT: 13 % (ref 11.6–15.1)
ERYTHROCYTE [DISTWIDTH] IN BLOOD BY AUTOMATED COUNT: 13.1 % (ref 11.6–15.1)
GFR SERPL CREATININE-BSD FRML MDRD: 38 ML/MIN/1.73SQ M
GFR SERPL CREATININE-BSD FRML MDRD: 38 ML/MIN/1.73SQ M
GLUCOSE SERPL-MCNC: 116 MG/DL (ref 65–140)
GLUCOSE SERPL-MCNC: 117 MG/DL (ref 65–140)
HCT VFR BLD AUTO: 38.8 % (ref 36.5–49.3)
HCT VFR BLD AUTO: 39.2 % (ref 36.5–49.3)
HGB BLD-MCNC: 12.4 G/DL (ref 12–17)
HGB BLD-MCNC: 12.5 G/DL (ref 12–17)
IMM GRANULOCYTES # BLD AUTO: 0.1 THOUSAND/UL (ref 0–0.2)
IMM GRANULOCYTES NFR BLD AUTO: 1 % (ref 0–2)
LYMPHOCYTES # BLD AUTO: 1.64 THOUSANDS/ΜL (ref 0.6–4.47)
LYMPHOCYTES NFR BLD AUTO: 14 % (ref 14–44)
MCH RBC QN AUTO: 30.1 PG (ref 26.8–34.3)
MCH RBC QN AUTO: 30.3 PG (ref 26.8–34.3)
MCHC RBC AUTO-ENTMCNC: 31.9 G/DL (ref 31.4–37.4)
MCHC RBC AUTO-ENTMCNC: 32 G/DL (ref 31.4–37.4)
MCV RBC AUTO: 94 FL (ref 82–98)
MCV RBC AUTO: 95 FL (ref 82–98)
MONOCYTES # BLD AUTO: 1.28 THOUSAND/ΜL (ref 0.17–1.22)
MONOCYTES NFR BLD AUTO: 11 % (ref 4–12)
NEUTROPHILS # BLD AUTO: 8.34 THOUSANDS/ΜL (ref 1.85–7.62)
NEUTS SEG NFR BLD AUTO: 70 % (ref 43–75)
NRBC BLD AUTO-RTO: 0 /100 WBCS
PLATELET # BLD AUTO: 187 THOUSANDS/UL (ref 149–390)
PLATELET # BLD AUTO: 201 THOUSANDS/UL (ref 149–390)
PMV BLD AUTO: 10.7 FL (ref 8.9–12.7)
PMV BLD AUTO: 11.2 FL (ref 8.9–12.7)
POTASSIUM SERPL-SCNC: 3.9 MMOL/L (ref 3.5–5.3)
POTASSIUM SERPL-SCNC: 4.9 MMOL/L (ref 3.5–5.3)
PROCALCITONIN SERPL-MCNC: <0.05 NG/ML
PROT SERPL-MCNC: 6.8 G/DL (ref 6.4–8.2)
RBC # BLD AUTO: 4.12 MILLION/UL (ref 3.88–5.62)
RBC # BLD AUTO: 4.12 MILLION/UL (ref 3.88–5.62)
SODIUM SERPL-SCNC: 140 MMOL/L (ref 136–145)
SODIUM SERPL-SCNC: 140 MMOL/L (ref 136–145)
WBC # BLD AUTO: 11.58 THOUSAND/UL (ref 4.31–10.16)
WBC # BLD AUTO: 11.8 THOUSAND/UL (ref 4.31–10.16)

## 2021-08-14 PROCEDURE — 84145 PROCALCITONIN (PCT): CPT | Performed by: EMERGENCY MEDICINE

## 2021-08-14 PROCEDURE — 85025 COMPLETE CBC W/AUTO DIFF WBC: CPT | Performed by: PHYSICIAN ASSISTANT

## 2021-08-14 PROCEDURE — 85027 COMPLETE CBC AUTOMATED: CPT | Performed by: STUDENT IN AN ORGANIZED HEALTH CARE EDUCATION/TRAINING PROGRAM

## 2021-08-14 PROCEDURE — 80053 COMPREHEN METABOLIC PANEL: CPT | Performed by: INTERNAL MEDICINE

## 2021-08-14 PROCEDURE — 80048 BASIC METABOLIC PNL TOTAL CA: CPT | Performed by: PHYSICIAN ASSISTANT

## 2021-08-14 PROCEDURE — 99232 SBSQ HOSP IP/OBS MODERATE 35: CPT | Performed by: STUDENT IN AN ORGANIZED HEALTH CARE EDUCATION/TRAINING PROGRAM

## 2021-08-14 RX ORDER — SODIUM CHLORIDE, SODIUM LACTATE, POTASSIUM CHLORIDE, CALCIUM CHLORIDE 600; 310; 30; 20 MG/100ML; MG/100ML; MG/100ML; MG/100ML
75 INJECTION, SOLUTION INTRAVENOUS CONTINUOUS
Status: DISCONTINUED | OUTPATIENT
Start: 2021-08-14 | End: 2021-08-14

## 2021-08-14 RX ADMIN — SODIUM CHLORIDE, SODIUM LACTATE, POTASSIUM CHLORIDE, AND CALCIUM CHLORIDE 75 ML/HR: .6; .31; .03; .02 INJECTION, SOLUTION INTRAVENOUS at 12:33

## 2021-08-14 RX ADMIN — HYDROMORPHONE HYDROCHLORIDE 0.5 MG: 1 INJECTION, SOLUTION INTRAMUSCULAR; INTRAVENOUS; SUBCUTANEOUS at 00:45

## 2021-08-14 RX ADMIN — FAMOTIDINE 20 MG: 20 TABLET, FILM COATED ORAL at 17:48

## 2021-08-14 RX ADMIN — HYDROMORPHONE HYDROCHLORIDE 0.5 MG: 1 INJECTION, SOLUTION INTRAMUSCULAR; INTRAVENOUS; SUBCUTANEOUS at 04:52

## 2021-08-14 RX ADMIN — FAMOTIDINE 20 MG: 20 TABLET, FILM COATED ORAL at 08:40

## 2021-08-14 RX ADMIN — SODIUM CHLORIDE 125 ML/HR: 0.9 INJECTION, SOLUTION INTRAVENOUS at 02:35

## 2021-08-14 NOTE — PLAN OF CARE
Problem: MOBILITY - ADULT  Goal: Maintain or return to baseline ADL function  Description: INTERVENTIONS:  -  Assess patient's ability to carry out ADLs; assess patient's baseline for ADL function and identify physical deficits which impact ability to perform ADLs (bathing, care of mouth/teeth, toileting, grooming, dressing, etc )  - Assess/evaluate cause of self-care deficits   - Assess range of motion  - Assess patient's mobility; develop plan if impaired  - Assess patient's need for assistive devices and provide as appropriate  - Encourage maximum independence but intervene and supervise when necessary  - Involve family in performance of ADLs  - Assess for home care needs following discharge   - Consider OT consult to assist with ADL evaluation and planning for discharge  - Provide patient education as appropriate  Outcome: Progressing     Problem: Nutrition/Hydration-ADULT  Goal: Nutrient/Hydration intake appropriate for improving, restoring or maintaining nutritional needs  Description: Monitor and assess patient's nutrition/hydration status for malnutrition  Collaborate with interdisciplinary team and initiate plan and interventions as ordered  Monitor patient's weight and dietary intake as ordered or per policy  Utilize nutrition screening tool and intervene as necessary  Determine patient's food preferences and provide high-protein, high-caloric foods as appropriate       INTERVENTIONS:  - Monitor oral intake, urinary output, labs, and treatment plans  - Assess nutrition and hydration status and recommend course of action  - Evaluate amount of meals eaten  - Assist patient with eating if necessary   - Allow adequate time for meals  - Recommend/ encourage appropriate diets, oral nutritional supplements, and vitamin/mineral supplements  - Order, calculate, and assess calorie counts as needed  - Recommend, monitor, and adjust tube feedings and TPN/PPN based on assessed needs  - Assess need for intravenous fluids  - Provide specific nutrition/hydration education as appropriate  - Include patient/family/caregiver in decisions related to nutrition  Outcome: Progressing

## 2021-08-14 NOTE — PROGRESS NOTES
[address] [facility]  Progress Note - Lysbeth Closs 1945, 68 y o  male MRN: [id number]  Unit/Bed#: -Jasmin Encounter: [id number]  Primary Care Provider: Markham Siemens, DO   Date and time admitted to hospital: 8/12/2021  8:14 AM    * Partial SBO (small bowel obstruction) ([facility])  Assessment & Plan  · Partial small-bowel obstruction most likely 2/2 adhesions as Pt has H/o laparotomy for aortoiliac bypass surgery with recurrent partial small-bowel obstruction in past   · Presented with 1 day H/o constipation and LLQ abdominal pain  · CT AP noted partial bowel obstruction with dilated small bowel loops anterior mid abdomen with transition point  · General surgery was consulted from ED  · Pt refused NG tube given H/o migraine after placing NG tube in past     Plan:-  -the the surgery consulted  -plan to start clear liquid diet  -hold IV fluid  -antiemetics, analgesics p r n   -encourage ambulation  -NG tube decompression if Pt starts to vomit after patient's consent  -echocardiogram as per surgery for preoperative clearance    Lactic acidosis  Assessment & Plan  · POA treated with IV fluid  Dementia ([facility] [address] )  Assessment & Plan  · Chronic  · Takes Aricept at home; will continue when able to take PO  · Supportive measures  · Fall precautions    Acute kidney injury superimposed on chronic kidney disease ([facility] [address] )  Assessment & Plan  · Cr 2 1 POA  Baseline 1 5-1 7  · Currently at baseline treated with IV fluid suggesting prerenal likely 2/2 dehydration    Plan:-  -plan to start clear liquid diet  -Hold further IV fluid    Hyperlipidemia  Assessment & Plan  · Chronic  · Resume Lipitor when able to take PO    Essential hypertension  Assessment & Plan  · Chronic  · Takes Maxzide, Aldactone, Lasix and Amlodipine at home; will hold as he is now NPO  Restart once tolerating PO  · Will order IV medications  · Monitor vital signs closely          Pharmacologic: Heparin  Mechanical VTE Prophylaxis in Place: Yes    Discussions with Specialists or Other Care Team Provider:  Acute surgery    Education and Discussions with Family / Patient:  Spoke to patient's son regarding patient's condition, treatment plan  Current Length of Stay: 2 day(s)    Current Patient Status: Inpatient     Discharge Plan / Estimated Discharge Date:  Needs further stay for partial small-bowel obstruction    Code Status: Level 1 - Full Code      Subjective:   Patient was seen and examined by me at bedside  Communicated clearly  No particular overnight event reported  Hemodynamically stable and afebrile  Patient states improvement in abdominal pain however still persistent  Noted worsening abdominal distention  On room air  Has passed gas this morning  Unable to pass stool till now  Tolerating clear liquid diet well  Objective:     Vitals:   Temp (24hrs), Av 2 °F (36 8 °C), Min:98 °F (36 7 °C), Max:98 4 °F (36 9 °C)    Temp:  [98 °F (36 7 °C)-98 4 °F (36 9 °C)] 98 °F (36 7 °C)  HR:  [75-80] 75  Resp:  [19-22] 22  BP: ()/(37-58) 131/58  SpO2:  [95 %-96 %] 95 %  Body mass index is 42 7 kg/m²  Input and Output Summary (last 24 hours): Intake/Output Summary (Last 24 hours) at 2021 1138  Last data filed at 2021 0235  Gross per 24 hour   Intake 2437 5 ml   Output --   Net 2437 5 ml       Physical Exam:     Physical Exam  Vitals reviewed  Constitutional:       General: He is not in acute distress  Appearance: He is well-developed  Cardiovascular:      Rate and Rhythm: Normal rate and regular rhythm  Pulses: Normal pulses  Heart sounds: Normal heart sounds  Pulmonary:      Effort: Pulmonary effort is normal       Breath sounds: Normal breath sounds  Chest:      Chest wall: No tenderness  Abdominal:      General: There is distension  Palpations: Abdomen is soft  Tenderness: There is abdominal tenderness        Comments: Diminished bowel sounds  Midline abdominal scar from prior surgery noted  Suspected ventral hernia   Musculoskeletal:      Right lower leg: No edema  Left lower leg: No edema  Skin:     General: Skin is warm  Coloration: Skin is not pale  Neurological:      General: No focal deficit present  Mental Status: He is alert and oriented to person, place, and time  Mental status is at baseline  Psychiatric:         Mood and Affect: Mood normal          Behavior: Behavior normal          Additional Data:     Labs:    Results from last 7 days   Lab Units 08/14/21  0443 08/13/21  0530   WBC Thousand/uL 11 58* 12 28*   HEMOGLOBIN g/dL 12 5 13 1   HEMATOCRIT % 39 2 41 1   PLATELETS Thousands/uL 201 211   NEUTROS PCT %  --  78*   LYMPHS PCT %  --  11*   MONOS PCT %  --  8   EOS PCT %  --  2     Results from last 7 days   Lab Units 08/14/21  0443   POTASSIUM mmol/L 3 9   CHLORIDE mmol/L 105   CO2 mmol/L 25   BUN mg/dL 25   CREATININE mg/dL 1 71*   CALCIUM mg/dL 7 9*   ALK PHOS U/L 74   ALT U/L 84*   AST U/L 45           * I Have Reviewed All Lab Data Listed Above  * Additional Pertinent Lab Tests Reviewed: [facility] Admission Reviewed    Imaging:  XR chest portable   Final Result by Jama Jacobs MD (08/13 4506)      No acute cardiopulmonary disease  Workstation performed: [id number]         CT abdomen pelvis wo contrast   Final Result by Poppy Barajas MD (08/12 8171)   1  Dilated small bowel loops in the anterior mid abdomen with transition points adjacent to the anterior abdominal wall suggesting adhesions  Findings compatible with partial small bowel obstruction and essentially unchanged from multiple prior studies    dating back to 2/13/2018  2   Moderate colonic stool burden  3   Severe hepatic steatosis  The study was marked in [facility] for immediate notification        Workstation performed: [id number]         XR chest 1 view portable   Final Result by Jama Jacobs MD (08/12 4051)      No acute cardiopulmonary disease  Workstation performed: [id number]            Imaging Reports Reviewed Today Include:  CT AP CXR  Imaging Personally Reviewed by Myself Includes:  Same as above    Recent Cultures (last 7 days):     Results from last 7 days   Lab Units 08/12/21  1347   BLOOD CULTURE  No Growth at 24 hrs  Last 24 Hours Medication List:   Current Facility-Administered Medications   Medication Dose Route Frequency Provider Last Rate    famotidine  20 mg Oral BID Lena Starch, CRNP      Or    famotidine  20 mg Intravenous BID Lena Starch, CRNP      heparin (porcine)  5,000 Units Subcutaneous [facility] Lena Starch, CRNP      hydrALAZINE  10 mg Intravenous Q6H PRN Lena Starch, CRNP      HYDROmorphone  0 5 mg Intravenous Q4H PRN Nichole Starch, CRNP      HYDROmorphone  1 mg Intravenous Once Deanne Paz, DO      ondansetron  4 mg Intravenous Q6H PRN Nichole Starch, CRNP          Today, Patient Was Seen By: Phoenix Trinidad MD    ** Please Note: This note has been constructed using a voice recognition system   **

## 2021-08-14 NOTE — PROGRESS NOTES
Progress Note -Surgery PA  Mirian Patelarias 68 y o  male MRN: [id number]  Unit/Bed#: -01 Encounter: [id number]      Assessment   66y M w small bowel obstruction, recurrent  - refusing NG tube  - leukocytosis improving, 11 58 from 12 28  - passing flatus and no abdominal pain  Plan   Advanced to clear liquid diet  If patient tolerates may advance as tolerated  Continue with ambulation  Discontinue IV fluids  DVT prophylaxis  Slim primary  ______________________________________________________________________  Subjective:   Patient feeling well  He states he is hungry  He denies any abdominal pain  He denies any nausea or vomiting  He states he has been passing flatus overnight  Denies any fevers or chills    Objective:    Vitals:  /58   Pulse 75   Temp 98 °F (36 7 °C)   Resp 22   Ht 5' 10" (1 778 m)   Wt 135 kg (297 lb 9 9 oz)   SpO2 95%   BMI 42 70 kg/m²     I/Os:  I/O last 3 completed shifts: In: 3833 3 [I V :3833 3]  Out: -     No intake/output data recorded      Invasive Devices     Peripheral Intravenous Line            Peripheral IV 08/12/21 Right Antecubital 1 day                Medications:  Current Facility-Administered Medications   Medication Dose Route Frequency    famotidine (PEPCID) tablet 20 mg  20 mg Oral BID    Or    famotidine (PEPCID) injection 20 mg  20 mg Intravenous BID    heparin (porcine) subcutaneous injection 5,000 Units  5,000 Units Subcutaneous Q8H [address]    hydrALAZINE (APRESOLINE) injection 10 mg  10 mg Intravenous Q6H PRN    HYDROmorphone (DILAUDID) injection 0 5 mg  0 5 mg Intravenous Q4H PRN    HYDROmorphone (DILAUDID) injection 1 mg  1 mg Intravenous Once    ondansetron (ZOFRAN) injection 4 mg  4 mg Intravenous Q6H PRN    sodium chloride 0 9 % infusion  125 mL/hr Intravenous Continuous                 Lab Results and Cultures:   CBC with diff:   Lab Results   Component Value Date    WBC 11 58 (H) 08/14/2021    HGB 12 5 08/14/2021    HCT 39 2 08/14/2021    MCV 95 08/14/2021     08/14/2021    MCH 30 3 08/14/2021    MCHC 31 9 08/14/2021    RDW 13 1 08/14/2021    MPV 11 2 08/14/2021    NRBC 0 08/13/2021       BMP/CMP:  Lab Results   Component Value Date    K 3 9 08/14/2021     08/14/2021    CO2 25 08/14/2021    BUN 25 08/14/2021    CREATININE 1 71 (H) 08/14/2021    CALCIUM 7 9 (L) 08/14/2021    AST 45 08/14/2021    ALT 84 (H) 08/14/2021    ALKPHOS 74 08/14/2021    EGFR 38 08/14/2021       Lipid Panel:   No results found for: CHOL    Coags:   No results found for: PT, PTT, INR     Urinalysis:   Lab Results   Component Value Date    COLORU Yellow 08/12/2021    CLARITYU Clear 08/12/2021    SPECGRAV 1 025 08/12/2021    PHUR 6 0 08/12/2021    PHUR 6 0 11/08/2018    LEUKOCYTESUR Negative 08/12/2021    NITRITE Negative 08/12/2021    GLUCOSEU Negative 08/12/2021    KETONESU Negative 08/12/2021    BILIRUBINUR Negative 08/12/2021    BLOODU Negative 08/12/2021        Urine Culture: No results found for: URINECX   Wound Culure: No results found for: WOUNDCULT  Blood Culture:   Lab Results   Component Value Date    BLOODCX No Growth at 24 hrs  08/12/2021         Physical Exam:  General Appearance:    Alert and orientated x 3, cooperative, no distress, appears stated age   Lungs:     Clear to auscultation bilaterally, respirations unlabored, no wheezes    Heart:    Regular rate and rhythm, S1 and S2 normal, no murmur   Abdomen:    Normoactive BS, soft, non tender, mild distention, non rigid, no masses, no palpated organomegaly   Extremities:  Extremities normal, no calf tenderness, no cyanosis or edema   Pulses:   2+ and symmetric all extremities   Skin:   Skin color, texture, turgor normal, no rashes   Neurologic:   CNII-XII intact, normal strength, affect appropriate       Imaging:  CT abdomen pelvis wo contrast    Result Date: 8/12/2021  Impression: 1    Dilated small bowel loops in the anterior mid abdomen with transition points adjacent to the anterior abdominal wall suggesting adhesions  Findings compatible with partial small bowel obstruction and essentially unchanged from multiple prior studies  dating back to 2/13/2018  2   Moderate colonic stool burden  3   Severe hepatic steatosis  The study was marked in [facility] for immediate notification  Workstation performed: [id number]     XR chest portable    Result Date: 8/13/2021  Impression: No acute cardiopulmonary disease  Workstation performed: [id number]     XR chest 1 view portable    Result Date: 8/12/2021  Impression: No acute cardiopulmonary disease   Workstation performed: [id number]       VTE Pharmacologic Prophylaxis: Heparin  VTE Mechanical Prophylaxis: sequential compression device    Brian Macdonald PA-C   8/14/2021

## 2021-08-14 NOTE — ASSESSMENT & PLAN NOTE
· Cr 2 1 POA  Baseline 1 5-1 7    · Currently at baseline treated with IV fluid suggesting prerenal likely 2/2 dehydration    Plan:-  -plan to start clear liquid diet  -Hold further IV fluid

## 2021-08-14 NOTE — ASSESSMENT & PLAN NOTE
· Partial small-bowel obstruction most likely 2/2 adhesions as Pt has H/o laparotomy for aortoiliac bypass surgery with recurrent partial small-bowel obstruction in past   · Presented with 1 day H/o constipation and LLQ abdominal pain  · CT AP noted partial bowel obstruction with dilated small bowel loops anterior mid abdomen with transition point  · General surgery was consulted from ED  · Pt refused NG tube given H/o migraine after placing NG tube in past     Plan:-  -the the surgery consulted  -plan to start clear liquid diet  -hold IV fluid  -antiemetics, analgesics p r n   -encourage ambulation  -NG tube decompression if Pt starts to vomit after patient's consent  -echocardiogram as per surgery for preoperative clearance

## 2021-08-15 ENCOUNTER — APPOINTMENT (INPATIENT)
Dept: NON INVASIVE DIAGNOSTICS | Facility: HOSPITAL | Age: 76
DRG: 389 | End: 2021-08-15
Payer: MEDICARE

## 2021-08-15 VITALS
HEIGHT: 70 IN | SYSTOLIC BLOOD PRESSURE: 169 MMHG | DIASTOLIC BLOOD PRESSURE: 63 MMHG | TEMPERATURE: 98.3 F | OXYGEN SATURATION: 96 % | HEART RATE: 84 BPM | BODY MASS INDEX: 42.61 KG/M2 | WEIGHT: 297.62 LBS | RESPIRATION RATE: 19 BRPM

## 2021-08-15 LAB
ATRIAL RATE: 87 BPM
P AXIS: 67 DEGREES
PR INTERVAL: 224 MS
QRS AXIS: 71 DEGREES
QRSD INTERVAL: 148 MS
QT INTERVAL: 426 MS
QTC INTERVAL: 512 MS
T WAVE AXIS: 51 DEGREES
VENTRICULAR RATE: 87 BPM

## 2021-08-15 PROCEDURE — 99239 HOSP IP/OBS DSCHRG MGMT >30: CPT | Performed by: STUDENT IN AN ORGANIZED HEALTH CARE EDUCATION/TRAINING PROGRAM

## 2021-08-15 PROCEDURE — 93306 TTE W/DOPPLER COMPLETE: CPT

## 2021-08-15 PROCEDURE — 93306 TTE W/DOPPLER COMPLETE: CPT | Performed by: INTERNAL MEDICINE

## 2021-08-15 PROCEDURE — 99232 SBSQ HOSP IP/OBS MODERATE 35: CPT | Performed by: STUDENT IN AN ORGANIZED HEALTH CARE EDUCATION/TRAINING PROGRAM

## 2021-08-15 PROCEDURE — 93010 ELECTROCARDIOGRAM REPORT: CPT | Performed by: INTERNAL MEDICINE

## 2021-08-15 RX ADMIN — FAMOTIDINE 20 MG: 20 TABLET, FILM COATED ORAL at 10:52

## 2021-08-15 NOTE — PROGRESS NOTES
Progress Note -Surgery PA  Duane Bolaños 68 y o  male MRN: [id number]  Unit/Bed#: -01 Encounter: [id number]      Assessment   66y M w small bowel obstruction, recurrent  - leukocytosis still elevated, but stable  - passing flatus and +BM  Plan   -- Patient doing well from surgical standpoint with bowel function, and tolerating surgical soft  -- Nothing further, patient stable for discharge with resolve of SBO  -- Discharge per primary team  ______________________________________________________________________  Subjective:   Patient having BM  Tolerating diet  No return of abdominal pain or distension  No nausea or vomiting  Objective:    Vitals:  /63   Pulse 84   Temp 98 3 °F (36 8 °C)   Resp 19   Ht 5' 10" (1 778 m)   Wt 135 kg (297 lb 9 9 oz)   SpO2 96%   BMI 42 70 kg/m²     I/Os:  I/O last 3 completed shifts: In: 1775 8 [P O :780; I V :995 8]  Out: -     No intake/output data recorded      Invasive Devices     Peripheral Intravenous Line            Peripheral IV 08/12/21 Right Antecubital 3 days                Medications:  Current Facility-Administered Medications   Medication Dose Route Frequency    famotidine (PEPCID) tablet 20 mg  20 mg Oral BID    Or    famotidine (PEPCID) injection 20 mg  20 mg Intravenous BID    heparin (porcine) subcutaneous injection 5,000 Units  5,000 Units Subcutaneous Q8H [address]    hydrALAZINE (APRESOLINE) injection 10 mg  10 mg Intravenous Q6H PRN    HYDROmorphone (DILAUDID) injection 0 5 mg  0 5 mg Intravenous Q4H PRN    ondansetron (ZOFRAN) injection 4 mg  4 mg Intravenous Q6H PRN                 Lab Results and Cultures:   CBC with diff:   Lab Results   Component Value Date    WBC 11 80 (H) 08/14/2021    HGB 12 4 08/14/2021    HCT 38 8 08/14/2021    MCV 94 08/14/2021     08/14/2021    MCH 30 1 08/14/2021    MCHC 32 0 08/14/2021    RDW 13 0 08/14/2021    MPV 10 7 08/14/2021    NRBC 0 08/14/2021       BMP/CMP:  Lab Results   Component Value Date    K 4 9 08/14/2021     08/14/2021    CO2 29 08/14/2021    BUN 25 08/14/2021    CREATININE 1 70 (H) 08/14/2021    CALCIUM 8 1 (L) 08/14/2021    AST 45 08/14/2021    ALT 84 (H) 08/14/2021    ALKPHOS 74 08/14/2021    EGFR 38 08/14/2021       Lipid Panel:   No results found for: CHOL    Coags:   No results found for: PT, PTT, INR     Urinalysis:   Lab Results   Component Value Date    COLORU Yellow 08/12/2021    CLARITYU Clear 08/12/2021    SPECGRAV 1 025 08/12/2021    PHUR 6 0 08/12/2021    PHUR 6 0 11/08/2018    LEUKOCYTESUR Negative 08/12/2021    NITRITE Negative 08/12/2021    GLUCOSEU Negative 08/12/2021    KETONESU Negative 08/12/2021    BILIRUBINUR Negative 08/12/2021    BLOODU Negative 08/12/2021        Urine Culture: No results found for: URINECX   Wound Culure: No results found for: WOUNDCULT  Blood Culture:   Lab Results   Component Value Date    BLOODCX No Growth at 48 hrs  08/12/2021       Physical Exam:  General Appearance:    Alert and orientated x 3, cooperative, no distress, appears stated age   Lungs:     Clear to auscultation bilaterally, respirations unlabored, no wheezes    Heart:    Regular rate and rhythm, S1 and S2 normal, no murmur   Abdomen:    Abdomen rotund, Normoactive BS, soft, non tender, non rigid, no masses, no palpated organomegaly large ventral abdominal hernia   Extremities:  Extremities normal, no calf tenderness, no cyanosis or edema   Pulses:   2+ and symmetric all extremities   Skin:   Skin color, texture, turgor normal, no rashes   Neurologic:   CNII-XII intact, normal strength, affect appropriate       Imaging:  CT abdomen pelvis wo contrast    Result Date: 8/12/2021  Impression: 1  Dilated small bowel loops in the anterior mid abdomen with transition points adjacent to the anterior abdominal wall suggesting adhesions  Findings compatible with partial small bowel obstruction and essentially unchanged from multiple prior studies  dating back to 2/13/2018   2   Moderate colonic stool burden  3   Severe hepatic steatosis  The study was marked in [facility] for immediate notification  Workstation performed: [id number]     XR chest portable    Result Date: 8/13/2021  Impression: No acute cardiopulmonary disease  Workstation performed: [id number]     XR chest 1 view portable    Result Date: 8/12/2021  Impression: No acute cardiopulmonary disease   Workstation performed: [id number]       Brian Macdonald PA-C   8/15/2021

## 2021-08-15 NOTE — DISCHARGE SUMMARY
[address]  Discharge- Cruz Davis 1945, 68 y o  male MRN: [id number]  Unit/Bed#: -01 Encounter: [id number]  Primary Care Provider: Will Mccormick DO   Date and time admitted to hospital: 8/12/2021  8:14 AM    * Partial SBO (small bowel obstruction) (HCC)  Assessment & Plan  · Partial small-bowel obstruction most likely 2/2 adhesions as Pt has H/o laparotomy for aortoiliac bypass surgery with recurrent partial small-bowel obstruction in past   · Presented with 1 day H/o constipation and LLQ abdominal pain  · CT AP noted partial bowel obstruction with dilated small bowel loops anterior mid abdomen with transition point  · Patient was seen by surgery recommended NG tube and conservative management  · Patient adamantly declined NGT placement  · Was treated conservatively with NPO, IV hydration  · His symptoms have now resolved  · Currently he does report having a bowel movement and tolerating diet well  · Cleared by surgery for discharge  Lactic acidosis  Assessment & Plan  · Resolved  Dementia ([address] )  Assessment & Plan  · Chronic  · Takes Aricept at home; will continue when able to take PO  · Currently awake, alert, oriented at his baseline  · Hemodynamically stable for discharge with outpatient follow-up  Acute kidney injury superimposed on chronic kidney disease (HCC)  Assessment & Plan  · Cr 2 1 POA  Baseline 1 5-1 7  · Currently at baseline treated with IV fluid suggesting prerenal likely 2/2 dehydration      Hyperlipidemia  Assessment & Plan  · Chronic  · Resume Lipitor when able to take PO    Essential hypertension  Assessment & Plan  Resume home medication upon discharge  Outpatient follow-up with primary care provider      Medical Problems     Resolved Problems  Date Reviewed: 8/15/2021    None              Discharging Physician / Practitioner: Joseph Alston MD  PCP: Will Mccormick DO  Admission Date:   Admission Orders (From admission, onward) Ordered        08/12/21 1156  Inpatient Admission  Once                   Discharge Date: 08/15/21    Consultations During Hospital Stay:  · General surgery    Reason for Admission:  Partial small-bowel obstruction    Hospital Course:   Jonatan Alexis is a 68 y o  male patient with past medical history of recurrent partial small-bowel obstruction, ventral hernia, history of femoral bypass surgery, history of hypertension, CHF, COPD, CKD, PVD, hyperlipidemia, carotid stenosis, who originally presented to the hospital on 8/12/2021 due to partial small-bowel obstruction  Patient was seen by General surgery recommended conservative management  Patient was treated conservatively with NPO, IV hydration  He had adamantly refused for NG tube placement  Patient had ambulated throughout his hospitalization  Subsequently symptoms now resolved  Currently does report having a bowel movement and tolerating diet well  Patient was cleared by sinus surgery for discharge  He does report having portable home oxygen and uses p r n     Currently O2 saturation 95 % on room air  He is hemodynamically stable for discharge with outpatient follow-up with primary care provider  No other events reported  Refer to earlier notes for further clarification  Please see above list of diagnoses and related plan for additional information  Condition at Discharge: good    Discharge Day Visit / Exam:   Subjective:  Seen ambulating in his room  Reports having a bowel movement  Tolerating diet well  Denies chest pain, dyspnea, fever, chills, nausea, vomiting, any other new complaints  No other events reported        Vitals: Blood Pressure: 169/63 (08/15/21 0801)  Pulse: 84 (08/15/21 0801)  Temperature: 98 3 °F (36 8 °C) (08/15/21 0801)  Temp Source: Oral (08/12/21 1007)  Respirations: 19 (08/15/21 0801)  Height: 5' 10" (177 8 cm) (08/13/21 2102)  Weight - Scale: 135 kg (297 lb 9 9 oz) (08/13/21 2102)  SpO2: 96 % (08/15/21 0801)  Exam: Physical Exam  Constitutional:       General: He is not in acute distress  Appearance: Normal appearance  He is not ill-appearing  Comments: Morbidly obese male patient, acutely nontoxic appearing  HENT:      Head: Normocephalic and atraumatic  Cardiovascular:      Rate and Rhythm: Normal rate and regular rhythm  Pulses: Normal pulses  Heart sounds: Normal heart sounds  Pulmonary:      Effort: Pulmonary effort is normal  No respiratory distress  Breath sounds: Normal breath sounds  No stridor  Abdominal:      General: Bowel sounds are normal  There is distension  Palpations: Abdomen is soft  Tenderness: There is no abdominal tenderness  There is no guarding or rebound  Musculoskeletal:         General: Normal range of motion  Cervical back: Normal range of motion and neck supple  No rigidity  Neurological:      General: No focal deficit present  Mental Status: He is alert and oriented to person, place, and time  Mental status is at baseline  Psychiatric:         Behavior: Behavior normal          Discussion with Family: Patient reports that he has been in contact with family  Jenny Juarez Discharge instructions/Information to patient and family:   See after visit summary for information provided to patient and family  Provisions for Follow-Up Care:  See after visit summary for information related to follow-up care and any pertinent home health orders  Disposition:   Home    Planned Readmission:  None     Discharge Statement:  I spent 35 minutes discharging the patient  This time was spent on the day of discharge  I had direct contact with the patient on the day of discharge  Greater than 50% of the total time was spent examining patient, answering all patient questions, arranging and discussing plan of care with patient as well as directly providing post-discharge instructions  Additional time then spent on discharge activities      Discharge Medications:  See after visit summary for reconciled discharge medications provided to patient and/or family        **Please Note: This note may have been constructed using a voice recognition system**

## 2021-08-15 NOTE — DISCHARGE INSTRUCTIONS
Bowel Obstruction   WHAT YOU NEED TO KNOW:   A bowel obstruction is a partial or complete blockage of your intestine  Your small or large intestine may be affected  The blockage prevents food and waste from passing through normally  DISCHARGE INSTRUCTIONS:   Seek care immediately if:   · You have severe abdominal pain that does not get better  · Your heart is beating faster than normal for you  · You have a fever  Call your doctor if:   · You have nausea and are vomiting  · Your abdomen is enlarged  · You cannot pass a bowel movement or gas  · You lose weight without trying  · You have blood in your bowel movement  · You have questions or concerns about your condition or care  Follow up with your doctor as directed:  Write down your questions so you remember to ask them during your visits  © Copyright [employer] 2021 Information is for End User's use only and may not be sold, redistributed or otherwise used for commercial purposes  All illustrations and images included in CareNotes® are the copyrighted property of A D A M , Inc  or [facility] Sudeep Gayle   The above information is an  only  It is not intended as medical advice for individual conditions or treatments  Talk to your doctor, nurse or pharmacist before following any medical regimen to see if it is safe and effective for you

## 2021-08-18 LAB — BACTERIA BLD CULT: NORMAL

## 2022-01-10 NOTE — PLAN OF CARE
DISCHARGE PLANNING     Discharge to home or other facility with appropriate resources Progressing        INFECTION - ADULT     Absence or prevention of progression during hospitalization Progressing     Absence of fever/infection during neutropenic period Progressing        Knowledge Deficit     Patient/family/caregiver demonstrates understanding of disease process, treatment plan, medications, and discharge instructions Progressing        Nutrition/Hydration-ADULT     Nutrient/Hydration intake appropriate for improving, restoring or maintaining nutritional needs Progressing        PAIN - ADULT     Verbalizes/displays adequate comfort level or baseline comfort level Progressing        Potential for Falls     Patient will remain free of falls Progressing        SAFETY ADULT     Patient will remain free of falls Progressing     Maintain or return to baseline ADL function Progressing     Maintain or return mobility status to optimal level Progressing Increase Metoprolol to 1.5 tablets daily

## 2022-06-28 ENCOUNTER — HOSPITAL ENCOUNTER (INPATIENT)
Facility: HOSPITAL | Age: 77
LOS: 1 days | Discharge: HOME | DRG: 389 | End: 2022-06-30
Attending: EMERGENCY MEDICINE | Admitting: STUDENT IN AN ORGANIZED HEALTH CARE EDUCATION/TRAINING PROGRAM
Payer: MEDICARE

## 2022-06-28 ENCOUNTER — APPOINTMENT (EMERGENCY)
Dept: CT IMAGING | Facility: HOSPITAL | Age: 77
DRG: 389 | End: 2022-06-28
Payer: MEDICARE

## 2022-06-28 DIAGNOSIS — K56.600 PARTIAL SMALL BOWEL OBSTRUCTION (HCC): Primary | ICD-10-CM

## 2022-06-28 LAB
ALBUMIN SERPL BCP-MCNC: 3.6 G/DL (ref 3.5–5)
ALP SERPL-CCNC: 84 U/L (ref 46–116)
ALT SERPL W P-5'-P-CCNC: 40 U/L (ref 12–78)
ANION GAP SERPL CALCULATED.3IONS-SCNC: 10 MMOL/L (ref 4–13)
APTT PPP: 33 SECONDS (ref 23–37)
AST SERPL W P-5'-P-CCNC: 26 U/L (ref 5–45)
BASOPHILS # BLD AUTO: 0.03 THOUSANDS/ΜL (ref 0–0.1)
BASOPHILS NFR BLD AUTO: 0 % (ref 0–1)
BILIRUB DIRECT SERPL-MCNC: 0.14 MG/DL (ref 0–0.2)
BILIRUB SERPL-MCNC: 0.62 MG/DL (ref 0.2–1)
BUN SERPL-MCNC: 33 MG/DL (ref 5–25)
CALCIUM SERPL-MCNC: 9.4 MG/DL (ref 8.3–10.1)
CHLORIDE SERPL-SCNC: 97 MMOL/L (ref 100–108)
CO2 SERPL-SCNC: 30 MMOL/L (ref 21–32)
CREAT SERPL-MCNC: 1.92 MG/DL (ref 0.6–1.3)
EOSINOPHIL # BLD AUTO: 0.04 THOUSAND/ΜL (ref 0–0.61)
EOSINOPHIL NFR BLD AUTO: 0 % (ref 0–6)
ERYTHROCYTE [DISTWIDTH] IN BLOOD BY AUTOMATED COUNT: 13 % (ref 11.6–15.1)
GFR SERPL CREATININE-BSD FRML MDRD: 32 ML/MIN/1.73SQ M
GLUCOSE SERPL-MCNC: 167 MG/DL (ref 65–140)
HCT VFR BLD AUTO: 43.4 % (ref 36.5–49.3)
HGB BLD-MCNC: 14.5 G/DL (ref 12–17)
IMM GRANULOCYTES # BLD AUTO: 0.13 THOUSAND/UL (ref 0–0.2)
IMM GRANULOCYTES NFR BLD AUTO: 1 % (ref 0–2)
INR PPP: 1.02 (ref 0.84–1.19)
LACTATE SERPL-SCNC: 2 MMOL/L (ref 0.5–2)
LACTATE SERPL-SCNC: 2.7 MMOL/L (ref 0.5–2)
LACTATE SERPL-SCNC: 3.1 MMOL/L (ref 0.5–2)
LYMPHOCYTES # BLD AUTO: 0.98 THOUSANDS/ΜL (ref 0.6–4.47)
LYMPHOCYTES NFR BLD AUTO: 6 % (ref 14–44)
MCH RBC QN AUTO: 30.5 PG (ref 26.8–34.3)
MCHC RBC AUTO-ENTMCNC: 33.4 G/DL (ref 31.4–37.4)
MCV RBC AUTO: 91 FL (ref 82–98)
MONOCYTES # BLD AUTO: 0.94 THOUSAND/ΜL (ref 0.17–1.22)
MONOCYTES NFR BLD AUTO: 6 % (ref 4–12)
NEUTROPHILS # BLD AUTO: 14.79 THOUSANDS/ΜL (ref 1.85–7.62)
NEUTS SEG NFR BLD AUTO: 87 % (ref 43–75)
NRBC BLD AUTO-RTO: 0 /100 WBCS
PLATELET # BLD AUTO: 257 THOUSANDS/UL (ref 149–390)
PMV BLD AUTO: 11 FL (ref 8.9–12.7)
POTASSIUM SERPL-SCNC: 4.4 MMOL/L (ref 3.5–5.3)
PROT SERPL-MCNC: 7.9 G/DL (ref 6.4–8.2)
PROTHROMBIN TIME: 13 SECONDS (ref 11.6–14.5)
RBC # BLD AUTO: 4.76 MILLION/UL (ref 3.88–5.62)
SODIUM SERPL-SCNC: 137 MMOL/L (ref 136–145)
WBC # BLD AUTO: 16.91 THOUSAND/UL (ref 4.31–10.16)

## 2022-06-28 PROCEDURE — 80048 BASIC METABOLIC PNL TOTAL CA: CPT | Performed by: EMERGENCY MEDICINE

## 2022-06-28 PROCEDURE — 85610 PROTHROMBIN TIME: CPT | Performed by: EMERGENCY MEDICINE

## 2022-06-28 PROCEDURE — 96374 THER/PROPH/DIAG INJ IV PUSH: CPT

## 2022-06-28 PROCEDURE — 36415 COLL VENOUS BLD VENIPUNCTURE: CPT | Performed by: EMERGENCY MEDICINE

## 2022-06-28 PROCEDURE — 74176 CT ABD & PELVIS W/O CONTRAST: CPT

## 2022-06-28 PROCEDURE — 80076 HEPATIC FUNCTION PANEL: CPT | Performed by: EMERGENCY MEDICINE

## 2022-06-28 PROCEDURE — G1004 CDSM NDSC: HCPCS

## 2022-06-28 PROCEDURE — 99220 PR INITIAL OBSERVATION CARE/DAY 70 MINUTES: CPT | Performed by: INTERNAL MEDICINE

## 2022-06-28 PROCEDURE — 85025 COMPLETE CBC W/AUTO DIFF WBC: CPT | Performed by: EMERGENCY MEDICINE

## 2022-06-28 PROCEDURE — 83605 ASSAY OF LACTIC ACID: CPT | Performed by: INTERNAL MEDICINE

## 2022-06-28 PROCEDURE — 99285 EMERGENCY DEPT VISIT HI MDM: CPT

## 2022-06-28 PROCEDURE — 83605 ASSAY OF LACTIC ACID: CPT | Performed by: EMERGENCY MEDICINE

## 2022-06-28 PROCEDURE — 85730 THROMBOPLASTIN TIME PARTIAL: CPT | Performed by: EMERGENCY MEDICINE

## 2022-06-28 PROCEDURE — 99214 OFFICE O/P EST MOD 30 MIN: CPT | Performed by: SURGERY

## 2022-06-28 PROCEDURE — 99285 EMERGENCY DEPT VISIT HI MDM: CPT | Performed by: EMERGENCY MEDICINE

## 2022-06-28 PROCEDURE — 96361 HYDRATE IV INFUSION ADD-ON: CPT

## 2022-06-28 RX ORDER — MORPHINE SULFATE 10 MG/ML
6 INJECTION, SOLUTION INTRAMUSCULAR; INTRAVENOUS ONCE
Status: COMPLETED | OUTPATIENT
Start: 2022-06-28 | End: 2022-06-28

## 2022-06-28 RX ORDER — SODIUM CHLORIDE, SODIUM LACTATE, POTASSIUM CHLORIDE, CALCIUM CHLORIDE 600; 310; 30; 20 MG/100ML; MG/100ML; MG/100ML; MG/100ML
75 INJECTION, SOLUTION INTRAVENOUS CONTINUOUS
Status: DISCONTINUED | OUTPATIENT
Start: 2022-06-28 | End: 2022-06-30

## 2022-06-28 RX ORDER — ONDANSETRON 2 MG/ML
4 INJECTION INTRAMUSCULAR; INTRAVENOUS EVERY 6 HOURS PRN
Status: DISCONTINUED | OUTPATIENT
Start: 2022-06-28 | End: 2022-06-30 | Stop reason: HOSPADM

## 2022-06-28 RX ORDER — ACETAMINOPHEN 325 MG/1
650 TABLET ORAL EVERY 6 HOURS PRN
Status: DISCONTINUED | OUTPATIENT
Start: 2022-06-28 | End: 2022-06-30 | Stop reason: HOSPADM

## 2022-06-28 RX ORDER — ENOXAPARIN SODIUM 100 MG/ML
40 INJECTION SUBCUTANEOUS DAILY
Status: DISCONTINUED | OUTPATIENT
Start: 2022-06-28 | End: 2022-06-28

## 2022-06-28 RX ORDER — HEPARIN SODIUM 5000 [USP'U]/ML
5000 INJECTION, SOLUTION INTRAVENOUS; SUBCUTANEOUS EVERY 8 HOURS SCHEDULED
Status: DISCONTINUED | OUTPATIENT
Start: 2022-06-29 | End: 2022-06-30 | Stop reason: HOSPADM

## 2022-06-28 RX ORDER — BUDESONIDE AND FORMOTEROL FUMARATE DIHYDRATE 80; 4.5 UG/1; UG/1
2 AEROSOL RESPIRATORY (INHALATION) 2 TIMES DAILY
Status: DISCONTINUED | OUTPATIENT
Start: 2022-06-28 | End: 2022-06-30 | Stop reason: HOSPADM

## 2022-06-28 RX ADMIN — BUDESONIDE AND FORMOTEROL FUMARATE DIHYDRATE 2 PUFF: 80; 4.5 AEROSOL RESPIRATORY (INHALATION) at 10:32

## 2022-06-28 RX ADMIN — BUDESONIDE AND FORMOTEROL FUMARATE DIHYDRATE 2 PUFF: 80; 4.5 AEROSOL RESPIRATORY (INHALATION) at 17:15

## 2022-06-28 RX ADMIN — SODIUM CHLORIDE 1000 ML: 0.9 INJECTION, SOLUTION INTRAVENOUS at 05:41

## 2022-06-28 RX ADMIN — MORPHINE SULFATE 6 MG: 10 INJECTION INTRAVENOUS at 05:41

## 2022-06-28 RX ADMIN — SODIUM CHLORIDE, SODIUM LACTATE, POTASSIUM CHLORIDE, AND CALCIUM CHLORIDE 75 ML/HR: .6; .31; .03; .02 INJECTION, SOLUTION INTRAVENOUS at 09:26

## 2022-06-28 RX ADMIN — ENOXAPARIN SODIUM 40 MG: 40 INJECTION SUBCUTANEOUS at 09:28

## 2022-06-28 RX ADMIN — MORPHINE SULFATE 2 MG: 2 INJECTION, SOLUTION INTRAMUSCULAR; INTRAVENOUS at 09:21

## 2022-06-28 RX ADMIN — MORPHINE SULFATE 2 MG: 2 INJECTION, SOLUTION INTRAMUSCULAR; INTRAVENOUS at 13:46

## 2022-06-28 NOTE — ASSESSMENT & PLAN NOTE
Lab Results   Component Value Date    EGFR 32 06/28/2022    EGFR 38 08/14/2021    EGFR 38 08/14/2021    CREATININE 1 92 (H) 06/28/2022    CREATININE 1 70 (H) 08/14/2021    CREATININE 1 71 (H) 08/14/2021     Creatinine above baseline likely from dehydration  Will give IV fluids and monitor BMP  Hold nephrotoxic medications including diuretics that patient was taking at home

## 2022-06-28 NOTE — H&P
Felicit[address]er [address] 1945, 68 y o  male MRN: [id number]  Unit/Bed#: ED 25 Encounter: [id number]  Primary Care Provider: Nadyia Richards DO   Date and time admitted to hospital: 6/28/2022  4:51 AM    * Partial SBO (small bowel obstruction) [facility])  Assessment & Plan  Patient presents to the ED with complaints of abdominal pain, nausea and vomiting x1 day  · - CT AP wo constrast (6/28/22) shows several loops of abnormally dilated small bowel in the anterior upper to mid abdomen measuring up to 4 5 cm in diameter, fecalization of small-bowel contents is noted  Findings consistent with small bowel obstruction likely secondary to adhesions with abrupt kinking and several small bowel loops closely opposed anterior abdominal wall  Also noted is a ventral wall hernia  · Also with LLQ abdominal pain  · Patient with history of SBO in the past   · Prior CT Abdomen/Pelvis (8/12/21): 1   Dilated small bowel loops in the anterior mid abdomen with transition points adjacent to the anterior abdominal wall suggesting adhesions   Findings compatible with partial small bowel obstruction and essentially unchanged from multiple prior studies dating back to 2/13/2018  2   Moderate colonic stool burden  3   Severe hepatic steatosis  · General surgery consulted, appreciate input  ? NPO for bowel rest  ? Conservative management  ? IVF  ? Anti-emetics, Analgesics PRN  ? Serial abdominal exams  ? Encourage ambulation, OOB  · Has lactic acidosis, 3 1 on admission    Did have lactic acidosis during prior hospitalization in August 2021    Dementia [facility])  Assessment & Plan  · Start Aricept when able    Acute kidney injury superimposed on chronic kidney disease [facility])  Assessment & Plan  Lab Results   Component Value Date    EGFR 32 06/28/2022    EGFR 38 08/14/2021    EGFR 38 08/14/2021    CREATININE 1 92 (H) 06/28/2022    CREATININE 1 70 (H) 08/14/2021    CREATININE 1 71 (H) 08/14/2021     Creatinine above baseline likely from dehydration  Will give IV fluids and monitor BMP  Hold nephrotoxic medications including diuretics that patient was taking at home    Ventral hernia  Assessment & Plan  · Plan per surgery    Hyperlipidemia  Assessment & Plan  · Resume home statins when orally allowed    Essential hypertension  Assessment & Plan  · Hold all blood pressure medications while patient is NPO  · Monitor vitals per unit protocol    Blood pressure 147/63, pulse 97, temperature 98 °F (36 7 °C), resp  rate 20, SpO2 92 %  VTE Prophylaxis:  Heparin  Code Status: Level 1 - Full Code  Anticipated Length of Stay:  Patient will be admitted on an Observation basis with an anticipated length of stay of  less than 2 midnights  Justification for Hospital Stay: SBO (small bowel obstruction) [facility])      Chief Complaint:     Chief Complaint   Patient presents with    Fecal Impaction     Patient states he "is impacted"  Patient states this happens often and he needs pain meds and IV fluids to help  Last bowel movement was yesterday      History of Present Illness:    Everardo Richardson is a 68 y o  male who presents with significant abdominal pain, distention along with nausea and 1 episode of vomiting  Patient states that he has had several such episodes over the last few years  He thinks it may be his 5th episode  Patient also has some constipation and loss of appetite  Patient complains of significant abdominal pain around 7/10 in intensity with no radiation and no specific aggravating or relieving factors  Does have generalized weakness  He had no fever or chills      Review of Systems:  Negative except as above  History obtained from the patient  General ROS: positive for  - fatigue  Psychological ROS: negative  Ophthalmic ROS: negative  Respiratory ROS: no cough, shortness of breath, or wheezing  Cardiovascular ROS: no chest pain or dyspnea on exertion  Gastrointestinal ROS: positive for - abdominal pain  Genito-Urinary ROS: no dysuria, trouble voiding, or hematuria  Musculoskeletal ROS: negative  Neurological ROS: no TIA or stroke symptoms, history of memory loss  Hematological ROS- No active bleeding  Otherwise, all other 12 point review of systems normal         Past Medical and Surgical History:   Past Medical History:   Diagnosis Date    CKD (chronic kidney disease) 2018    Hyperlipidemia     Hypertension     [facility] [address]     Sleep apnea     [facility] bowel obstruction [facility] [address] [facility]     Ventral hernia      Past Surgical History:   Procedure Laterality Date    ABDOMINAL HERNIA REPAIR      ABDOMINAL SURGERY      CHOLECYSTECTOMY      open    FEMORAL BYPASS Right      Meds/Allergies: Allergies: No Known Allergies  Prior to Admission Medications   Prescriptions Last Dose Informant Patient Reported? Taking? Cholecalciferol 2000 units CAPS   Yes No   Sig: Take 1 capsule by mouth daily    Patient not taking: Reported on 2021   Omega-3 Fatty Acids (FISH OIL PO)   Yes No   Sig: Take 2 g by mouth   Patient not taking: Reported on 2021   amLODIPine (NORVASC) 5 mg tablet   Yes No   Sig: TK 1 T PO D   atorvastatin (LIPITOR) 10 mg tablet   Yes No   Sig: TK 1 T PO D   budesonide-formoterol (SYMBICORT) 80-4 5 MCG/ACT inhaler   Yes No   Sig: Inhale 2 puffs 2 (two) times a day Rinse mouth after use     donepezil (ARICEPT) 10 mg tablet   Yes No   Sig: TK 1 T PO HS   furosemide (LASIX) 40 mg tablet   Yes No   Si mg daily    omeprazole (PriLOSEC) 40 MG capsule   Yes No   Sig: Take 40 mg by mouth   Patient not taking: Reported on 2021   spironolactone (ALDACTONE) 25 mg tablet   Yes No   Sig: Take 12 5 mg by mouth 2 (two) times a day    triamterene-hydrochlorothiazide (MAXZIDE-25) 37 5-25 mg per tablet   Yes No   Sig: Take by mouth daily       Facility-Administered Medications: None     Social History:     Social History     Socioeconomic History    Marital status: /Civil [employer]     Spouse name: Not on file    Number of children: Not on file    Years of education: Not on file    Highest education level: Not on file   Occupational History    Not on file   Tobacco Use    Smoking status: Never Smoker    Smokeless tobacco: Never Used   Vaping Use    Vaping Use: Never used   Substance and Sexual Activity    Alcohol use: Never    Drug use: Never    Sexual activity: Not Currently   Other Topics Concern    Not on file   Social History Narrative    Not on file     Social Determinants of Health     Financial Resource Strain: Not on file   Food Insecurity: Not on file   Transportation Needs: No Transportation Needs    Lack of Transportation (Medical): No    Lack of Transportation (Non-Medical): No   Physical Activity: Not on file   Stress: Not on file   Social Connections: Not on file   Intimate Partner Violence: Not on file   Housing Stability: Not on file     Patient Pre-hospital Living Situation:  Lives at home  Patient Pre-hospital Level of Mobility:  Limited mobility  Patient Pre-hospital Diet Restrictions:  No restriction    Family History:  History reviewed  No pertinent family history  Physical Exam:   Vitals:   Blood Pressure: 134/60 (06/28/22 0900)  Pulse: 82 (06/28/22 0900)  Temperature: 98 °F (36 7 °C) (06/28/22 0456)  Respirations: 18 (06/28/22 0900)  SpO2: 94 % (06/28/22 0900)    General appearance: alert, appears stated age and cooperative  Constitutional- looks a little weak  HEENT - atraumatic and normocephalic  Neck- supple  Skin - no fresh rash  Extremities no fresh focal deformities  Cardiovascular- S1-S2 heard  Respiratory- bilateral air entry present, no crackles or rhonchi  Skin - no fresh rash  Abdomen - no bowel sounds present, no rebound tenderness, does have mild distention and diffuse tenderness  CNS- No fresh focal deficits  Psych- no acute psychosis     Lab Results: I have personally reviewed pertinent reports      Results from last 7 days   Lab Units 06/28/22  0523   WBC Thousand/uL 16 91*   HEMOGLOBIN g/dL 14 5   HEMATOCRIT % 43 4   PLATELETS Thousands/uL 257   NEUTROS PCT % 87*   LYMPHS PCT % 6*   MONOS PCT % 6   EOS PCT % 0     Results from last 7 days   Lab Units 06/28/22  0523   SODIUM mmol/L 137   POTASSIUM mmol/L 4 4   CHLORIDE mmol/L 97*   CO2 mmol/L 30   ANION GAP mmol/L 10   BUN mg/dL 33*   CREATININE mg/dL 1 92*   CALCIUM mg/dL 9 4   ALBUMIN g/dL 3 6   TOTAL BILIRUBIN mg/dL 0 62   ALK PHOS U/L 84   ALT U/L 40   AST U/L 26   EGFR ml/min/1 73sq m 32   GLUCOSE RANDOM mg/dL 167*     Results from last 7 days   Lab Units 06/28/22  0523   INR  1 02         Results from last 7 days   Lab Units 06/28/22  0826 06/28/22  0523   LACTIC ACID mmol/L 2 7* 3 1*                            Imaging: I have personally reviewed pertinent reports  CT abdomen pelvis wo contrast    Result Date: 6/28/2022  Impression: Small bowel obstruction with findings suggesting adhesions, as described above, similar to August 12, 2021  Please see discussion  Surgical consultation and follow-up is recommended  Diffuse hepatic steatosis  The prostate is mildly prominent  Clinical and laboratory correlation is recommended  I personally discussed this study with Malia Cook on 6/28/2022 at 6:29 AM  Workstation performed: [id number]       EKG, Pathology, and Other Studies Reviewed on Admission:   EKG  Result Date: 06/28/22  Personally reviewed strips with impression of:  No acute ST or T-wave changes    Epic Records Reviewed: Yes    MD Victoriano J[address] Internal Medicine    ** Please Note: This note has been constructed using a voice recognition system   **

## 2022-06-28 NOTE — ED PROVIDER NOTES
History  Chief Complaint   Patient presents with    Fecal Impaction     Patient states he "is impacted"  Patient states this happens often and he needs pain meds and IV fluids to help  Last bowel movement was yesterday      67 yo male with h/o multiple prior PSBOs all managed and resolved with conservative tx who presents to ED with one day of moderate progressively worsening abdominal distension  Associated nausea and vomiting  Feels identical to prior PSBO symptoms  Refuses NGT  No fever  Last bowel movement yesterday  Additional history from son at bedside as well as review of last 3 admissions in [place] for PSBO  Prior to Admission Medications   Prescriptions Last Dose Informant Patient Reported? Taking? Cholecalciferol 2000 units CAPS   Yes No   Sig: Take 1 capsule by mouth daily    Patient not taking: Reported on 2021   Omega-3 Fatty Acids (FISH OIL PO)   Yes No   Sig: Take 2 g by mouth   Patient not taking: Reported on 2021   amLODIPine (NORVASC) 5 mg tablet   Yes No   Sig: TK 1 T PO D   atorvastatin (LIPITOR) 10 mg tablet   Yes No   Sig: TK 1 T PO D   budesonide-formoterol (SYMBICORT) 80-4 5 MCG/ACT inhaler   Yes No   Sig: Inhale 2 puffs 2 (two) times a day Rinse mouth after use     donepezil (ARICEPT) 10 mg tablet   Yes No   Sig: TK 1 T PO HS   furosemide (LASIX) 40 mg tablet   Yes No   Si mg daily    omeprazole (PriLOSEC) 40 MG capsule   Yes No   Sig: Take 40 mg by mouth   Patient not taking: Reported on 2021   spironolactone (ALDACTONE) 25 mg tablet   Yes No   Sig: Take 12 5 mg by mouth 2 (two) times a day    triamterene-hydrochlorothiazide (MAXZIDE-25) 37 5-25 mg per tablet   Yes No   Sig: Take by mouth daily       Facility-Administered Medications: None       Past Medical History:   Diagnosis Date    CKD (chronic kidney disease) 2018    Hyperlipidemia     Hypertension     [facility] [address] [facility]     Sleep apnea     Small bowel obstruction ([facility] [address] [facility]  Ventral hernia        Past Surgical History:   Procedure Laterality Date    ABDOMINAL HERNIA REPAIR      ABDOMINAL SURGERY      CHOLECYSTECTOMY      open    FEMORAL BYPASS Right        History reviewed  No pertinent family history  I have reviewed and agree with the history as documented  E-Cigarette/Vaping    E-Cigarette Use Never User      E-Cigarette/Vaping Substances     Social History     Tobacco Use    Smoking status: Never Smoker    Smokeless tobacco: Never Used   Vaping Use    Vaping Use: Never used   Substance Use Topics    Alcohol use: Never    Drug use: Never       Review of Systems   Gastrointestinal: Positive for abdominal distention, abdominal pain, nausea and vomiting  All other systems reviewed and are negative  Physical Exam  Physical Exam  Vitals and nursing note reviewed  Constitutional:       General: He is not in acute distress  Appearance: Normal appearance  He is well-developed  He is not ill-appearing, toxic-appearing or diaphoretic  HENT:      Head: Normocephalic and atraumatic  Eyes:      Conjunctiva/sclera: Conjunctivae normal       Pupils: Pupils are equal, round, and reactive to light  Neck:      Vascular: No JVD  Cardiovascular:      Rate and Rhythm: Normal rate and regular rhythm  Heart sounds: Normal heart sounds  No murmur heard  No friction rub  No gallop  Pulmonary:      Effort: Pulmonary effort is normal  No respiratory distress  Breath sounds: Normal breath sounds  No stridor  No wheezing or rales  Abdominal:      General: There is distension  Palpations: Abdomen is soft  Tenderness: There is abdominal tenderness  There is no guarding or rebound  Musculoskeletal:         General: No tenderness or deformity  Normal range of motion  Cervical back: Normal range of motion and neck supple  Skin:     General: Skin is warm and dry  Capillary Refill: Capillary refill takes less than 2 seconds     Neurological: General: No focal deficit present  Mental Status: He is alert and oriented to person, place, and time  Cranial Nerves: No cranial nerve deficit  Sensory: No sensory deficit  Motor: No weakness or abnormal muscle tone        Coordination: Coordination normal          Vital Signs  ED Triage Vitals   Temperature Pulse Respirations Blood Pressure SpO2   06/28/22 0456 06/28/22 0456 06/28/22 0456 06/28/22 0456 06/28/22 0456   98 °F (36 7 °C) 95 20 (!) 183/74 94 %      Temp src Heart Rate Source Patient Position - Orthostatic VS BP Location FiO2 (%)   -- 06/28/22 0456 06/28/22 0456 06/28/22 0456 --    Monitor Lying Right arm       Pain Score       06/28/22 0541       10 - Worst Possible Pain           Vitals:    06/28/22 0456   BP: (!) 183/74   Pulse: 95   Patient Position - Orthostatic VS: Lying         Visual Acuity      ED Medications  Medications   sodium chloride 0 9 % bolus 1,000 mL (1,000 mL Intravenous New Bag 6/28/22 0541)   morphine injection 6 mg (6 mg Intravenous Given 6/28/22 0541)       Diagnostic Studies  Results Reviewed     Procedure Component Value Units Date/Time    Basic metabolic panel [[id number]]  (Abnormal) Collected: 06/28/22 0523    Lab Status: Final result Specimen: Blood from Arm, Right Updated: 06/28/22 0548     Sodium 137 mmol/L      Potassium 4 4 mmol/L      Chloride 97 mmol/L      CO2 30 mmol/L      ANION GAP 10 mmol/L      BUN 33 mg/dL      Creatinine 1 92 mg/dL      Glucose 167 mg/dL      Calcium 9 4 mg/dL      eGFR 32 ml/min/1 73sq m     Narrative:      Marie guidelines for Chronic Kidney Disease (CKD):     Stage 1 with normal or high GFR (GFR > 90 mL/min/1 73 square meters)    Stage 2 Mild CKD (GFR = 60-89 mL/min/1 73 square meters)    Stage 3A Moderate CKD (GFR = 45-59 mL/min/1 73 square meters)    Stage 3B Moderate CKD (GFR = 30-44 mL/min/1 73 square meters)    Stage 4 Severe CKD (GFR = 15-29 mL/min/1 73 square meters)    Stage 5 End Stage CKD (GFR <15 mL/min/1 73 square meters)  Note: GFR calculation is accurate only with a steady state creatinine    Hepatic function panel [[id number]]  (Normal) Collected: 06/28/22 0523    Lab Status: Final result Specimen: Blood from Arm, Right Updated: 06/28/22 0548     Total Bilirubin 0 62 mg/dL      Bilirubin, Direct 0 14 mg/dL      Alkaline Phosphatase 84 U/L      AST 26 U/L      ALT 40 U/L      Total Protein 7 9 g/dL      Albumin 3 6 g/dL     Protime-INR [[id number]]  (Normal) Collected: 06/28/22 0523    Lab Status: Final result Specimen: Blood from Arm, Right Updated: 06/28/22 0546     Protime 13 0 seconds      INR 1 02    APTT [[id number]]  (Normal) Collected: 06/28/22 0523    Lab Status: Final result Specimen: Blood from Arm, Right Updated: 06/28/22 0546     PTT 33 seconds     CBC and differential [[id number]]  (Abnormal) Collected: 06/28/22 0523    Lab Status: Final result Specimen: Blood from Arm, Right Updated: 06/28/22 0533     WBC 16 91 Thousand/uL      RBC 4 76 Million/uL      Hemoglobin 14 5 g/dL      Hematocrit 43 4 %      MCV 91 fL      MCH 30 5 pg      MCHC 33 4 g/dL      RDW 13 0 %      MPV 11 0 fL      Platelets 927 Thousands/uL      nRBC 0 /100 WBCs      Neutrophils Relative 87 %      Immat GRANS % 1 %      Lymphocytes Relative 6 %      Monocytes Relative 6 %      Eosinophils Relative 0 %      Basophils Relative 0 %      Neutrophils Absolute 14 79 Thousands/µL      Immature Grans Absolute 0 13 Thousand/uL      Lymphocytes Absolute 0 98 Thousands/µL      Monocytes Absolute 0 94 Thousand/µL      Eosinophils Absolute 0 04 Thousand/µL      Basophils Absolute 0 03 Thousands/µL     Lactic acid [[id number]] Collected: 06/28/22 0523    Lab Status:  In process Specimen: Blood from Arm, Right Updated: 06/28/22 0529                 CT abdomen pelvis wo contrast    (Results Pending)              Procedures  Procedures         ED Course  ED Course as of 06/28/22 0559   Tue Jun 28, 2022   0520 Refuses NGT  I had lengthy d/w pt and son at bedside and recommend NGT, they provide informed refusal of this treatment and demonstrate capacity  MDM    Disposition  Final diagnoses:   None     ED Disposition     None      Follow-up Information    None         Patient's Medications   Discharge Prescriptions    No medications on file       No discharge procedures on file      PDMP Review     None          ED Provider  Electronically Signed by           Francesca Andujar MD  07/01/22 3845

## 2022-06-28 NOTE — CONSULTS
Consultation - General Surgery  Tutu Saba 68 y o  male MRN: [id number]  Unit/Bed#: ED 25 Encounter: [id number]                                                  Inpatient consult to Acute Care Surgery  Consult performed by: Tello Sharp PA-C  Consult ordered by: Job Rapp MD          Assessment/Plan   89Q M with Small Bowel obstruction  - multiple prior SBO, resolved with conservative management  - presented with abdominal pain, nausea and vomiting x1 day  - CT AP wo constrast shows several loops of abnormally dilated small bowel in the anterior upper to mid abdomen measuring up to 4 5 cm in diameter, fecalization of small-bowel contents is noted  Findings consistent with small bowel obstruction likely secondary to adhesions with abrupt kinking and several small bowel loops closely opposed anterior abdominal wall  Also noted is a ventral wall hernia  Appearance is similar to prior study in August of 2021  Plan  -- no plan for acute surgical intervention at this time  Will attempt conservative management  Patient is currently refusing NG tube but nausea vomiting seems to have resolved  Will continue to monitor labs and abdominal exams with diet NPO and IV fluids  Analgesia, p r n  If patient does start to develop further worsening of symptoms including abdominal pain, nausea vomiting he will require further discussion of NG tube placement  -- serial labs and abdominal exam  -- IV fluids  -- analgesia, p r n   -- antiemetics p r n   -- encourage ambulation  -- rest of care per primary team  ______________________________________________________________________    CHIEF COMPLAINT:  I started to have pain last night      HPI: Tutu Saba is a 68y o  year old male with PMHx of hypertension, hyperlipidemia, CHF, COPD, CKD, peripheral vascular disease, carotid stenosis, history of ventral hernia following femoral bypass surgery repaired with mesh, and history of recurrent partial small-bowel obstructions  He presented early this morning with reports of abdominal pain nausea and vomiting starting last night around 10:00 p m  Reminding him of previous small-bowel obstructions  Patient states he vomited about 3-4 times and he admits to inducing vomiting  He states he had to make an feel better and he has not vomited since he has been admitted  His last bowel movement was yesterday and he denies any constipation or diarrhea  He states that he had sausage for dinner last night  His son states he has sausage for dinner almost every day  still having pain today but he denies any current nausea  He feels bloated and tired  He is refusing any attempt at NG tube placement  She states she does feel bloated and that is abdomen is distended  His last episode of small-bowel obstruction requiring admission was in August of last year  Patient has had multiple abdominal surgeries including open cholecystectomy, femoral bypass surgery, and ventral hernia repair with mesh  Patient does have a history of dementia but son is at bedside and both patient and son are able to provide history  Review of Systems   Constitutional: Negative for activity change, appetite change, chills and fever  HENT: Negative for congestion, postnasal drip, rhinorrhea and sore throat  Eyes: Negative  Respiratory: Negative for cough, shortness of breath and wheezing  Cardiovascular: Negative for chest pain and palpitations  Gastrointestinal: Positive for abdominal distention, abdominal pain, nausea and vomiting  Negative for constipation and diarrhea  Endocrine: Negative  Genitourinary: Negative for difficulty urinating and dysuria  Musculoskeletal: Negative  Skin: Negative  Allergic/Immunologic: Negative  Neurological: Negative  Hematological: Negative  Psychiatric/Behavioral: Negative  All other systems reviewed and are negative        Meds/Allergies   No Known Allergies   all current active meds have been reviewed       Historical Information   Past Medical History:   Diagnosis Date    CKD (chronic kidney disease) 11/8/2018    Hyperlipidemia     Hypertension     PVD (peripheral vascular disease) ([address] )     Sleep apnea     Small bowel obstruction (HCC)     Ventral hernia      Past Surgical History:   Procedure Laterality Date    ABDOMINAL HERNIA REPAIR      ABDOMINAL SURGERY      CHOLECYSTECTOMY      open    FEMORAL BYPASS Right      Social History   Social History     Substance and Sexual Activity   Alcohol Use Never     Social History     Substance and Sexual Activity   Drug Use Never     Social History     Tobacco Use   Smoking Status Never Smoker   Smokeless Tobacco Never Used       Family History: History reviewed  No pertinent family history        Objective   Lab Results:   Lab Results   Component Value Date    WBC 16 91 (H) 06/28/2022    HGB 14 5 06/28/2022    HCT 43 4 06/28/2022     06/28/2022     Lab Results   Component Value Date    K 4 4 06/28/2022    CL 97 (L) 06/28/2022    CO2 30 06/28/2022    BUN 33 (H) 06/28/2022    CREATININE 1 92 (H) 06/28/2022     Lab Results   Component Value Date    CALCIUM 9 4 06/28/2022    MG 2 1 08/13/2021    PHOS 3 4 08/13/2021     Lab Results   Component Value Date    AST 26 06/28/2022    ALT 40 06/28/2022    ALKPHOS 84 06/28/2022    TBILI 0 62 06/28/2022    ALB 3 6 06/28/2022     Lab Results   Component Value Date    INR 1 02 06/28/2022     Lab Results   Component Value Date    COLORU Yellow 08/12/2021    CLARITYU Clear 08/12/2021    SPECGRAV 1 025 08/12/2021    PHUR 6 0 08/12/2021    PHUR 6 0 11/08/2018    GLUCOSEU Negative 08/12/2021    KETONESU Negative 08/12/2021    BLOODU Negative 08/12/2021    NITRITE Negative 08/12/2021    LEUKOCYTESUR Negative 08/12/2021    BILIRUBINUR Negative 08/12/2021    UROBILINOGEN 0 2 08/12/2021    RBCUA None Seen 08/12/2021    WBCUA None Seen 08/12/2021    BACTERIA None Seen 08/12/2021       No intake or output data in the 24 hours ending 06/28/22 0813  Invasive Devices  Report    Peripheral Intravenous Line  Duration           Peripheral IV 06/28/22 Left Hand <1 day              Physical Exam  Vitals: /63 (BP Location: Right arm)   Pulse 97   Temp 98 °F (36 7 °C)   Resp 20   SpO2 92%   GEN: A & O x 3, cooperative   HEENT: PERRLA EOMI, sclera anicterus, oral mucosa pink  NECK: supple   LUNGS: clear throughout, no wheezes  COR: RRR   ABD:  Normoactive bowel sounds, abdomen distended with mid abdominal tenderness to palpation, to panic, no mass  EXTREM: FROM no joint deformities  Edema mild bilateral lower extremity, no pitting  SKIN:  Warm, dry, no rash, jaundice  NEURO: CN II -XII intact, no tremor, affect appropriate    Imaging Studies:   CT abdomen pelvis wo contrast    Result Date: 6/28/2022  Impression: Small bowel obstruction with findings suggesting adhesions, as described above, similar to August 12, 2021  Please see discussion  Surgical consultation and follow-up is recommended  Diffuse hepatic steatosis  The prostate is mildly prominent  Clinical and laboratory correlation is recommended    I personally discussed this study with DEN VOSS on 6/28/2022 at 6:29 AM  Workstation performed: [id number]         Ulises Monahan PA-C  6/28/2022

## 2022-06-28 NOTE — ASSESSMENT & PLAN NOTE
Patient presents to the ED with complaints of abdominal pain, nausea and vomiting x1 day  · - CT AP wo constrast (6/28/22) shows several loops of abnormally dilated small bowel in the anterior upper to mid abdomen measuring up to 4 5 cm in diameter, fecalization of small-bowel contents is noted  Findings consistent with small bowel obstruction likely secondary to adhesions with abrupt kinking and several small bowel loops closely opposed anterior abdominal wall  Also noted is a ventral wall hernia  · Also with LLQ abdominal pain  · Patient with history of SBO in the past   · Prior CT Abdomen/Pelvis (8/12/21): 1   Dilated small bowel loops in the anterior mid abdomen with transition points adjacent to the anterior abdominal wall suggesting adhesions   Findings compatible with partial small bowel obstruction and essentially unchanged from multiple prior studies dating back to 2/13/2018  2   Moderate colonic stool burden  3   Severe hepatic steatosis  · General surgery consulted, appreciate input  ? NPO for bowel rest  ? Conservative management  ? IVF  ? Anti-emetics, Analgesics PRN  ? Serial abdominal exams  ? Encourage ambulation, OOB  · Has lactic acidosis, 3 1 on admission    Did have lactic acidosis during prior hospitalization in August 2021

## 2022-06-28 NOTE — ASSESSMENT & PLAN NOTE
· Hold all blood pressure medications while patient is NPO  · Monitor vitals per unit protocol    Blood pressure 147/63, pulse 97, temperature 98 °F (36 7 °C), resp  rate 20, SpO2 92 %

## 2022-06-29 LAB
ANION GAP SERPL CALCULATED.3IONS-SCNC: 9 MMOL/L (ref 4–13)
BUN SERPL-MCNC: 30 MG/DL (ref 5–25)
CALCIUM SERPL-MCNC: 8.8 MG/DL (ref 8.3–10.1)
CHLORIDE SERPL-SCNC: 103 MMOL/L (ref 100–108)
CO2 SERPL-SCNC: 30 MMOL/L (ref 21–32)
CREAT SERPL-MCNC: 1.86 MG/DL (ref 0.6–1.3)
ERYTHROCYTE [DISTWIDTH] IN BLOOD BY AUTOMATED COUNT: 13.2 % (ref 11.6–15.1)
GFR SERPL CREATININE-BSD FRML MDRD: 34 ML/MIN/1.73SQ M
GLUCOSE P FAST SERPL-MCNC: 108 MG/DL (ref 65–99)
GLUCOSE SERPL-MCNC: 108 MG/DL (ref 65–140)
HCT VFR BLD AUTO: 41.4 % (ref 36.5–49.3)
HGB BLD-MCNC: 13.4 G/DL (ref 12–17)
MAGNESIUM SERPL-MCNC: 2.2 MG/DL (ref 1.6–2.6)
MCH RBC QN AUTO: 30.3 PG (ref 26.8–34.3)
MCHC RBC AUTO-ENTMCNC: 32.4 G/DL (ref 31.4–37.4)
MCV RBC AUTO: 94 FL (ref 82–98)
PLATELET # BLD AUTO: 260 THOUSANDS/UL (ref 149–390)
PMV BLD AUTO: 11.2 FL (ref 8.9–12.7)
POTASSIUM SERPL-SCNC: 3.7 MMOL/L (ref 3.5–5.3)
RBC # BLD AUTO: 4.42 MILLION/UL (ref 3.88–5.62)
SODIUM SERPL-SCNC: 142 MMOL/L (ref 136–145)
WBC # BLD AUTO: 12.62 THOUSAND/UL (ref 4.31–10.16)

## 2022-06-29 PROCEDURE — 80048 BASIC METABOLIC PNL TOTAL CA: CPT | Performed by: INTERNAL MEDICINE

## 2022-06-29 PROCEDURE — 99232 SBSQ HOSP IP/OBS MODERATE 35: CPT | Performed by: STUDENT IN AN ORGANIZED HEALTH CARE EDUCATION/TRAINING PROGRAM

## 2022-06-29 PROCEDURE — 97161 PT EVAL LOW COMPLEX 20 MIN: CPT

## 2022-06-29 PROCEDURE — 99226 PR SBSQ OBSERVATION CARE/DAY 35 MINUTES: CPT | Performed by: STUDENT IN AN ORGANIZED HEALTH CARE EDUCATION/TRAINING PROGRAM

## 2022-06-29 PROCEDURE — 85027 COMPLETE CBC AUTOMATED: CPT | Performed by: INTERNAL MEDICINE

## 2022-06-29 PROCEDURE — 83735 ASSAY OF MAGNESIUM: CPT | Performed by: INTERNAL MEDICINE

## 2022-06-29 RX ADMIN — HEPARIN SODIUM 5000 UNITS: 5000 INJECTION INTRAVENOUS; SUBCUTANEOUS at 13:38

## 2022-06-29 RX ADMIN — HEPARIN SODIUM 5000 UNITS: 5000 INJECTION INTRAVENOUS; SUBCUTANEOUS at 05:12

## 2022-06-29 RX ADMIN — SODIUM CHLORIDE, SODIUM LACTATE, POTASSIUM CHLORIDE, AND CALCIUM CHLORIDE 75 ML/HR: .6; .31; .03; .02 INJECTION, SOLUTION INTRAVENOUS at 18:17

## 2022-06-29 RX ADMIN — BUDESONIDE AND FORMOTEROL FUMARATE DIHYDRATE 2 PUFF: 80; 4.5 AEROSOL RESPIRATORY (INHALATION) at 17:11

## 2022-06-29 RX ADMIN — HEPARIN SODIUM 5000 UNITS: 5000 INJECTION INTRAVENOUS; SUBCUTANEOUS at 22:04

## 2022-06-29 RX ADMIN — BUDESONIDE AND FORMOTEROL FUMARATE DIHYDRATE 2 PUFF: 80; 4.5 AEROSOL RESPIRATORY (INHALATION) at 08:25

## 2022-06-29 RX ADMIN — SODIUM CHLORIDE, SODIUM LACTATE, POTASSIUM CHLORIDE, AND CALCIUM CHLORIDE 75 ML/HR: .6; .31; .03; .02 INJECTION, SOLUTION INTRAVENOUS at 05:12

## 2022-06-29 NOTE — PLAN OF CARE
Problem: MOBILITY - ADULT  Goal: Maintain or return to baseline ADL function  Description: INTERVENTIONS:  -  Assess patient's ability to carry out ADLs; assess patient's baseline for ADL function and identify physical deficits which impact ability to perform ADLs (bathing, care of mouth/teeth, toileting, grooming, dressing, etc )  - Assess/evaluate cause of self-care deficits   - Assess range of motion  - Assess patient's mobility; develop plan if impaired  - Assess patient's need for assistive devices and provide as appropriate  - Encourage maximum independence but intervene and supervise when necessary  - Involve family in performance of ADLs  - Assess for home care needs following discharge   - Consider OT consult to assist with ADL evaluation and planning for discharge  - Provide patient education as appropriate  Outcome: Progressing     Problem: Potential for Falls  Goal: Patient will remain free of falls  Description: INTERVENTIONS:  - Educate patient/family on patient safety including physical limitations  - Instruct patient to call for assistance with activity   - Consult OT/PT to assist with strengthening/mobility   - Keep Call bell within reach  - Keep bed low and locked with side rails adjusted as appropriate  - Keep care items and personal belongings within reach  - Initiate and maintain comfort rounds  - Make Fall Risk Sign visible to staff  - Offer Toileting every 2  Hours, in advance of need  - Initiate/Maintain bed alarm  - Apply yellow socks and bracelet for high fall risk patients  - Consider moving patient to room near nurses station  Outcome: Progressing

## 2022-06-29 NOTE — ASSESSMENT & PLAN NOTE
Lab Results   Component Value Date    EGFR 34 06/29/2022    EGFR 32 06/28/2022    EGFR 38 08/14/2021    CREATININE 1 86 (H) 06/29/2022    CREATININE 1 92 (H) 06/28/2022    CREATININE 1 70 (H) 08/14/2021     · Creatinine above baseline likely from dehydration while on diuretics  · Improved, monitor off diuretics

## 2022-06-29 NOTE — PROGRESS NOTES
Progress Note -Surgery PATTIE Doe 68 y o  male MRN: [id number]  Unit/Bed#: -01 Encounter: [id number]      Assessment   67y M with Small Bowel obstruction - resolving  - no further abdominal pain, nausea or vomiting, passing flatus, no BM  - leukocytosis improved 12 62 (16 91)  - Lactic acid normal 2 (from 3 1 on admission  Plan   -- advance to CLD and monitor toleration  -- serial labs and exams  -- encourage ambulation  -- IVF management per SLIM given HARVINDER on admission  -- If patient tolerates CLD and continues to have bowel function without return of SBO symptoms, plan to advance as tolerated  -- Discussed with SLIM  ______________________________________________________________________  Subjective:   Patient feeling well  States his belly is sore, but no more sharp pain like he gets with SBO  No longer feels distended  Passing flatus  Objective:    Vitals:  /52   Pulse 71   Temp 98 °F (36 7 °C)   Resp 18   Wt 135 kg (298 lb 11 6 oz)   SpO2 90%   BMI 42 86 kg/m²     I/Os:  I/O last 3 completed shifts: In: 1000 [IV Piggyback:1000]  Out: 0     No intake/output data recorded      Invasive Devices  Report    Peripheral Intravenous Line  Duration           Peripheral IV 06/28/22 Left Hand 1 day                Medications:  Current Facility-Administered Medications   Medication Dose Route Frequency    acetaminophen (TYLENOL) tablet 650 mg  650 mg Oral Q6H PRN    budesonide-formoterol (SYMBICORT) 80-4 5 MCG/ACT inhaler 2 puff  2 puff Inhalation BID    heparin (porcine) subcutaneous injection 5,000 Units  5,000 Units Subcutaneous Q8H [address]    lactated ringers infusion  75 mL/hr Intravenous Continuous    morphine injection 2 mg  2 mg Intravenous Q4H PRN    ondansetron (ZOFRAN) injection 4 mg  4 mg Intravenous Q6H PRN                 Lab Results and Cultures:   CBC with diff:   Lab Results   Component Value Date    WBC 12 62 (H) 06/29/2022    HGB 13 4 06/29/2022    HCT 41 4 06/29/2022    MCV 94 06/29/2022     06/29/2022    MCH 30 3 06/29/2022    MCHC 32 4 06/29/2022    RDW 13 2 06/29/2022    MPV 11 2 06/29/2022    NRBC 0 06/28/2022       BMP/CMP:  Lab Results   Component Value Date    K 3 7 06/29/2022     06/29/2022    CO2 30 06/29/2022    BUN 30 (H) 06/29/2022    CREATININE 1 86 (H) 06/29/2022    CALCIUM 8 8 06/29/2022    AST 26 06/28/2022    ALT 40 06/28/2022    ALKPHOS 84 06/28/2022    EGFR 34 06/29/2022       Lipid Panel:   No results found for: CHOL    Coags:   Lab Results   Component Value Date    PTT 33 06/28/2022    INR 1 02 06/28/2022        Urinalysis:   Lab Results   Component Value Date    COLORU Yellow 08/12/2021    CLARITYU Clear 08/12/2021    SPECGRAV 1 025 08/12/2021    PHUR 6 0 08/12/2021    PHUR 6 0 11/08/2018    LEUKOCYTESUR Negative 08/12/2021    NITRITE Negative 08/12/2021    GLUCOSEU Negative 08/12/2021    KETONESU Negative 08/12/2021    BILIRUBINUR Negative 08/12/2021    BLOODU Negative 08/12/2021        Urine Culture: No results found for: URINECX   Wound Culure: No results found for: WOUNDCULT  Blood Culture:   Lab Results   Component Value Date    BLOODCX No Growth After 5 Days  08/12/2021         Physical Exam:  General Appearance:    Alert and orientated x 3, cooperative, no distress, appears stated age   Lungs:     Clear to auscultation bilaterally, respirations unlabored, no wheezes    Heart:    Regular rate and rhythm, S1 and S2 normal   Abdomen:    Normoactive BS, soft, non tender, non rigid, no masses, no palpated organomegaly   Extremities:  Extremities normal, no calf tenderness, no cyanosis or edema   Pulses:   2+ and symmetric all extremities   Skin:   Skin color, texture, turgor normal, no rashes   Neurologic:   CNII-XII intact, normal strength, affect appropriate       Imaging:  CT abdomen pelvis wo contrast    Result Date: 6/28/2022  Impression: Small bowel obstruction with findings suggesting adhesions, as described above, similar to August 12, 2021  Please see discussion  Surgical consultation and follow-up is recommended  Diffuse hepatic steatosis  The prostate is mildly prominent  Clinical and laboratory correlation is recommended    I personally discussed this study with DEN VOSS on 6/28/2022 at 6:29 AM  Workstation performed: [id number]       VTE Pharmacologic Prophylaxis: Heparin  VTE Mechanical Prophylaxis: sequential compression device    Keely Mari PA-C   6/29/2022

## 2022-06-29 NOTE — PHYSICAL THERAPY NOTE
Physical Therapy Evaluation     Patient's Name: Kvng Raman    Admitting Diagnosis  Impacted, fecal ([facility] ) [K56 41]  Partial small bowel obstruction ([facility] ) [K56 600]    Problem List  Patient Active Problem List   Diagnosis    Essential hypertension    Hyperlipidemia    Ventral hernia    Partial SBO (small bowel obstruction) (HCC)    PVD (peripheral vascular disease) ([facility] )    Acute kidney injury superimposed on chronic kidney disease ([facility] )    Dementia (HCC)    Elevated random blood glucose level    Renal cyst, left    Dyslipidemia    History of CVA (cerebrovascular accident)    CKD (chronic kidney disease)    Lactic acidosis     Past Medical History  Past Medical History:   Diagnosis Date    CKD (chronic kidney disease) 11/8/2018    Hyperlipidemia     Hypertension     PVD (peripheral vascular disease) (HCC)     Sleep apnea     Small bowel obstruction (HCC)     Ventral hernia      Past Surgical History  Past Surgical History:   Procedure Laterality Date    ABDOMINAL HERNIA REPAIR      ABDOMINAL SURGERY      CHOLECYSTECTOMY      open    FEMORAL BYPASS Right       06/29/22 0954   PT Last Visit   PT Visit Date 06/29/22   Note Type   Note type Evaluation   Pain Assessment   Pain Assessment Tool 0-10   Pain Score No Pain   Restrictions/Precautions   Weight Bearing Precautions Per Order No   Braces or Orthoses Other (Comment)  (none per patient)   Other Precautions Chair Alarm; Bed Alarm;Cognitive;Multiple lines;O2;Fall Risk   Home Living   Type of Home House   Home Layout Two level;Bed/bath upstairs  (stair glide to 2nd floor)   Bathroom Shower/Tub Tub/shower unit   Bathroom Toilet Standard   Bathroom Equipment Other (Comment)  (none per patient)   [address]  (rollator)   Additional Comments Pt ambulates household distances without an AD; uses a rollator for community distances     Prior Function   Level of [place] Independent with ADLs and functional mobility   Lives With Son;Family   Receives Help From Family   ADL Assistance Independent   IADLs Needs assistance   Falls in the last 6 months 0   Vocational Retired   Comments Pt is a questionable historian; information obtained from patient; further clarification is required  General   Family/Caregiver Present No   Cognition   Overall Cognitive Status Impaired   Arousal/Participation Alert   Orientation Level Oriented X4   Memory Decreased short term memory;Decreased recall of recent events   Following Commands Follows one step commands without difficulty   Comments Pt agreeable to PT  Subjective   Subjective "I've been up and walking "   RLE Assessment   RLE Assessment WFL   LLE Assessment   LLE Assessment WFL   Light Touch   RLE Light Touch Grossly intact   LLE Light Touch Grossly intact   Bed Mobility   Supine to Sit 6  Modified independent   Additional items Increased time required;Verbal cues   Sit to Supine 6  Modified independent   Additional items Increased time required   Transfers   Sit to Stand 6  Modified independent   Additional items Increased time required   Stand to Sit 6  Modified independent   Additional items Increased time required   Ambulation/Elevation   Gait pattern WNL   Gait Assistance 6  Modified independent   Additional items Verbal cues   Assistive Device Other (Comment)  (rollator)   Distance 160 feet   Stair Management Assistance Not tested  (pt does not negotiate stairs at home)   Balance   Static Sitting Normal   Dynamic Sitting Good   Static Standing Good   Dynamic Standing Good   Ambulatory Good   Endurance Deficit   Endurance Deficit No   Activity Tolerance   Activity Tolerance Patient tolerated treatment well   Nurse Made Aware RIVKA Killian   Assessment   Prognosis Good   Problem List Decreased cognition   Assessment Pt is 68year old male seen for PT evaluation s/p admit [facility] on [facility]/28/2022 with SBO (small bowel obstruction) ([facility] )   PT consulted to assess pt's functional mobility and d/c needs  Order placed for PT eval and tx, with up and OOB as tolerated order  Comorbidities affecting pt's physical performance at time of assessment include essential hypertension, hyperlipidemia, ventral hernia, HARVINDER superimposed on CKD, and dementia  PTA, pt was independent with all functional mobility without an AD for household distances and with a rollator for community distances  Pt resides with his son and family in a two level house with no steps to enter and a flight of stairs with a stair glide to the second floor  Personal factors affecting pt at time of IE include lives in a two story house and decreased cognition  Please find objective findings from PT assessment regarding body systems outlined above with impairments and limitations including decreased cognition  The following objective measures performed on IE also reveal limitations: Barthel Index: 90/100, Modified Kiera: 1 (no significant disability) and AM-PAC 6-Clicks: 32/54  Pt's clinical presentation is currently stable seen in pt's presentation of need for ongoing medical management/monitoring  Pt is independent with transfers and functional mobility  Pt reports assist from family at home  From PT/mobility standpoint, recommendation at time of d/c would be home with family support and no PT needs  Plan to discontinue PT at this time     Barriers to Discharge None   Plan   PT Frequency Other (Comment)  (discontinue PT)   Recommendation   PT Discharge Recommendation No rehabilitation needs   AM-PAC Basic Mobility Inpatient   Turning in Bed Without Bedrails 4   Lying on Back to Sitting on Edge of Flat Bed 4   Moving Bed to Chair 4   Standing Up From Chair 4   Walk in Room 4   Climb 3-5 Stairs 3   Basic Mobility Inpatient Raw Score 23   Basic Mobility Standardized Score 50 88   Highest Level Of Mobility   JH-HLM Goal 7: Walk 25 feet or more   JH-HLM Achieved 7: Walk 25 feet or more   Modified Kiera Scale   Modified Kiera Scale 1   Barthel Index Feeding 10   Bathing 5   Grooming Score 5   Dressing Score 10   Bladder Score 10   Bowels Score 10   Toilet Use Score 10   Transfers (Bed/Chair) Score 15   Mobility (Level Surface) Score 15   Stairs Score 0   Barthel Index Score 90     PT Evaluation Time: 5306-7677    Jackie Schultz, PT, DPT

## 2022-06-29 NOTE — CASE MANAGEMENT
Case Management Assessment & Discharge Planning Note    Patient name Tutu Ra  Location [phone]/-[id number] MRN [id number]  : 1945 Date 2022       Current Admission Date: 2022  Current Admission Diagnosis:Partial SBO (small bowel obstruction) [facility])   Patient Active Problem List    Diagnosis Date Noted    Lactic acidosis 2021    CKD (chronic kidney disease) 2018    Dyslipidemia 2018    History of CVA (cerebrovascular accident) 2018    Renal cyst, left 2018    Acute kidney injury superimposed on chronic kidney disease ([facility] ) 2018    Dementia ([facility] ) 2018    Elevated random blood glucose level 2018    Essential hypertension     Hyperlipidemia     Ventral hernia     Partial SBO (small bowel obstruction) ([facility] )     PVD (peripheral vascular disease) ([facility] )       LOS (days): 0  Geometric Mean LOS (GMLOS) (days):   Days to GMLOS:     OBJECTIVE:              Current admission status: Inpatient       Preferred Pharmacy:   [facility] [address] [place], [place] [address]  Phone: [phone] Fax: [phone] [address] [place] [address]  Phone: [phone] Fax: [phone]    Primary Care Provider: Blanquita Cooper DO    Primary Insurance: MEDICARE  Secondary Insurance: JUDY    ASSESSMENT:  [address] Representative - Son   Primary Phone: [phone] (Mobile)               Advance Directives  Does patient have a [address]?: No  Was patient offered paperwork?: Yes (declined)  Does patient currently have a Health Care decision maker?: Yes, please see Health Care Proxy section  Does patient have Advance Directives?: No  Was patient offered paperwork?: Yes (declined)  Primary Contact: jessica Turner Notice Signed:  (not on workqueue)    Readmission Root Cause  30 Day Readmission: No    Patient Information  Admitted from[de-identified] Home  Mental Status: Alert  During Assessment patient was accompanied by: Not accompanied during assessment  Assessment information provided by[de-identified] Patient  Primary Caregiver: Self  Support Systems: Son  [place] of Residence: Dipika Rosales do you live in?: [address] entry access options   Select all that apply : No steps to enter home  Type of Current Residence: 2 story home  Upon entering residence, is there a bedroom on the main floor (no further steps)?: No  A bedroom is located on the following floor levels of residence (select all that apply):: 2nd Floor (a chair lift is present to reach the 2nd floor)  Upon entering residence, is there a bathroom on the main floor (no further steps)?: Yes  Number of steps to 2nd floor from main floor: One Flight  In the last 12 months, was there a time when you were not able to pay the mortgage or rent on time?: No  In the last 12 months, how many places have you lived?: 1  In the last 12 months, was there a time when you did not have a steady place to sleep or slept in a shelter (including now)?: No  Homeless/housing insecurity resource given?: No  Living Arrangements: Lives w/ Son (lives with son Erlinda Martinez)  Is patient a ?: Yes  Is patient active with [facility])?: No    Activities of Daily Living Prior to Admission  Functional Status: Independent  Completes ADLs independently?: Yes  Ambulates independently?: Yes  Does patient use assisted devices?: Yes  Assisted Devices (DME) used: Rollator  Does patient currently own DME?: Yes  What DME does the patient currently own?: Rollator  Does patient have a history of Outpatient Therapy (PT/OT)?: No  Does the patient have a history of Short-Term Rehab?: No  Does patient have a history of HHC?: No  Does patient currently have [facility]?: No         Patient Information Continued  Income Source: Pension/FCI  Does patient have prescription coverage?: Yes  Within the past 12 months, you worried that your food would run out before you got the money to buy more : Never true  Within the past 12 months, the food you bought just didn't last and you didn't have money to get more : Never true  Food insecurity resource given?: No  Does patient receive dialysis treatments?: No  Does patient have a history of substance abuse?: No  Does patient have a history of Mental Health Diagnosis?: No    PHQ 2/9 Screening   Reviewed PHQ 2/9 Depression Screening Score?: No    Means of Transportation  Means of Transport to Appts[de-identified] Drives Self  In the past 12 months, has lack of transportation kept you from medical appointments or from getting medications?: No  In the past 12 months, has lack of transportation kept you from meetings, work, or from getting things needed for daily living?: No  Was application for public transport provided?: No        DISCHARGE DETAILS:    Discharge planning discussed with[de-identified] patient  Freedom of Choice: Yes  Comments - Freedom of Choice: refusing VNA-no needs anticipated  CM contacted family/caregiver?: No- see comments (Pt will update son himself)  Were Treatment Team discharge recommendations reviewed with patient/caregiver?: Yes  Did patient/caregiver verbalize understanding of patient care needs?: Yes  Were patient/caregiver advised of the risks associated with not following Treatment Team discharge recommendations?: Yes    Contacts  Patient Contacts: Dalia Sheldon  Relationship to Patient[de-identified] [address]         Is the patient interested in [place] [address] at discharge?: No    DME Referral Provided  Referral made for DME?: No    Other Referral/Resources/Interventions Provided:  Referral Comments: Refusing VNA-does not feel it will be needed    Would you like to participate in our [address] service program?  : No - Declined    Treatment Team Recommendation: Home  Discharge Destination Plan[de-identified] Home  Transport at Discharge : Family

## 2022-06-29 NOTE — ASSESSMENT & PLAN NOTE
· Secondary to adhesions, noted on CT imaging  · Surgery following, continue medical management  · Escalate diet to clears  · Monitor

## 2022-06-29 NOTE — PROGRESS NOTES
[address] [facility]  Progress Note - Lubna Vera 1945, 68 y o  male MRN: [id number]  Unit/Bed#: -01 Encounter: [id number]  Primary Care Provider: Mikey Bryant DO   Date and time admitted to hospital: 2022  4:51 AM    * Partial SBO (small bowel obstruction) ([address] )  Assessment & Plan  · Secondary to adhesions, noted on CT imaging  · Surgery following, continue medical management  · Escalate diet to clears  · Monitor     Acute kidney injury superimposed on chronic kidney disease [facility])  Assessment & Plan  Lab Results   Component Value Date    EGFR 34 2022    EGFR 32 2022    EGFR 38 2021    CREATININE 1 86 (H) 2022    CREATININE 1 92 (H) 2022    CREATININE 1 70 (H) 2021     · Creatinine above baseline likely from dehydration while on diuretics  · Improved, monitor off diuretics     Essential hypertension  Assessment & Plan  · Controlled off medication  · Monitor          VTE Pharmacologic Prophylaxis: VTE Score: 5 High Risk (Score >/= 5) - Pharmacological DVT Prophylaxis Ordered: heparin  Sequential Compression Devices Ordered  Patient Centered Rounds: I performed bedside rounds with nursing staff today  Discussions with Specialists or Other Care Team Provider: surgery     Education and Discussions with Family / Patient: Updated  (son) via phone  Time Spent for Care: 30 minutes  More than 50% of total time spent on counseling and coordination of care as described above  Current Length of Stay: 0 day(s)  Current Patient Status: Observation   Certification Statement: The patient will continue to require additional inpatient hospital stay due to escalation of diet  Discharge Plan: Anticipate discharge in 24-48 hrs to home      Code Status: Level 1 - Full Code    Subjective:   Patient feels better today    Objective:     Vitals:   Temp (24hrs), Av 6 °F (36 4 °C), Min:97 4 °F (36 3 °C), Max:98 °F (36 7 °C)    Temp:  [97 4 °F (36 3 °C)-98 °F (36 7 °C)] 98 °F (36 7 °C)  HR:  [71-81] 71  Resp:  [14-20] 18  BP: (115-169)/(52-79) 121/52  SpO2:  [90 %-97 %] 90 %  Body mass index is 42 86 kg/m²  Input and Output Summary (last 24 hours): Intake/Output Summary (Last 24 hours) at 6/29/2022 1208  Last data filed at 6/29/2022 0800  Gross per 24 hour   Intake 0 ml   Output 0 ml   Net 0 ml       Physical Exam:   Physical Exam  Constitutional:       General: He is not in acute distress  Appearance: Normal appearance  He is not toxic-appearing  Cardiovascular:      Rate and Rhythm: Normal rate and regular rhythm  Heart sounds: Normal heart sounds  No murmur heard  Pulmonary:      Effort: Pulmonary effort is normal  No respiratory distress  Breath sounds: Normal breath sounds  No wheezing  Abdominal:      General: Abdomen is flat  There is no distension  Palpations: Abdomen is soft  Tenderness: There is no abdominal tenderness  Neurological:      General: No focal deficit present  Mental Status: He is alert  Mental status is at baseline  Motor: No weakness            Additional Data:     Labs:  Results from last 7 days   Lab Units 06/29/22  0435 06/28/22  0523   WBC Thousand/uL 12 62* 16 91*   HEMOGLOBIN g/dL 13 4 14 5   HEMATOCRIT % 41 4 43 4   PLATELETS Thousands/uL 260 257   NEUTROS PCT %  --  87*   LYMPHS PCT %  --  6*   MONOS PCT %  --  6   EOS PCT %  --  0     Results from last 7 days   Lab Units 06/29/22  0435 06/28/22  0523   SODIUM mmol/L 142 137   POTASSIUM mmol/L 3 7 4 4   CHLORIDE mmol/L 103 97*   CO2 mmol/L 30 30   BUN mg/dL 30* 33*   CREATININE mg/dL 1 86* 1 92*   ANION GAP mmol/L 9 10   CALCIUM mg/dL 8 8 9 4   ALBUMIN g/dL  --  3 6   TOTAL BILIRUBIN mg/dL  --  0 62   ALK PHOS U/L  --  84   ALT U/L  --  40   AST U/L  --  26   GLUCOSE RANDOM mg/dL 108 167*     Results from last 7 days   Lab Units 06/28/22  0523   INR  1 02             Results from last 7 days   Lab Units 06/28/22  1142 06/28/22  0826 06/28/22  0523   LACTIC ACID mmol/L 2 0 2 7* 3 1*       Lines/Drains:  Invasive Devices  Report    Peripheral Intravenous Line  Duration           Peripheral IV 06/28/22 Left Hand 1 day                      Imaging: No pertinent imaging reviewed  Recent Cultures (last 7 days):         Last 24 Hours Medication List:   Current Facility-Administered Medications   Medication Dose Route Frequency Provider Last Rate    acetaminophen  650 mg Oral Q6H PRN Juan Alberto Treviño MD      budesonide-formoterol  2 puff Inhalation BID Juan Alberto Treviño MD      heparin (porcine)  5,000 Units Subcutaneous [facility] Juan Alberto Treviño MD      lactated ringers  75 mL/hr Intravenous Continuous Juan Alberto Treviño MD 75 mL/hr (06/29/22 5915)    morphine injection  2 mg Intravenous Q4H PRN Juan Alberto Treviño MD      ondansetron  4 mg Intravenous Q6H PRN Juan Alberto Treviño MD          Today, Patient Was Seen By: Eugene Doshi MD    **Please Note: This note may have been constructed using a voice recognition system  **

## 2022-06-30 VITALS
OXYGEN SATURATION: 92 % | HEART RATE: 75 BPM | DIASTOLIC BLOOD PRESSURE: 68 MMHG | SYSTOLIC BLOOD PRESSURE: 153 MMHG | BODY MASS INDEX: 42.86 KG/M2 | WEIGHT: 298.72 LBS | RESPIRATION RATE: 17 BRPM | TEMPERATURE: 98.2 F

## 2022-06-30 PROBLEM — E66.01 MORBID OBESITY (HCC): Status: ACTIVE | Noted: 2022-06-30

## 2022-06-30 PROCEDURE — 99239 HOSP IP/OBS DSCHRG MGMT >30: CPT | Performed by: STUDENT IN AN ORGANIZED HEALTH CARE EDUCATION/TRAINING PROGRAM

## 2022-06-30 RX ADMIN — BUDESONIDE AND FORMOTEROL FUMARATE DIHYDRATE 2 PUFF: 80; 4.5 AEROSOL RESPIRATORY (INHALATION) at 10:00

## 2022-06-30 RX ADMIN — HEPARIN SODIUM 5000 UNITS: 5000 INJECTION INTRAVENOUS; SUBCUTANEOUS at 05:27

## 2022-06-30 NOTE — ASSESSMENT & PLAN NOTE
· Morbid obesity due to excess calories, POA, evidenced by BMI 42 86 treated with diet and education

## 2022-06-30 NOTE — ASSESSMENT & PLAN NOTE
· Secondary to adhesions, noted on CT imaging  · Surgery was consulted, now signed off recommending medical management  · Tolerating diet, stable for discharge

## 2022-07-02 NOTE — DISCHARGE SUMMARY
[address] [facility], [place]  Discharge- Tereso Ogden 1945, 68 y o  male MRN: [id number]  Unit/Bed#: -01 Encounter: [id number]  Primary Care Provider: Preethi Peace DO   Date and time admitted to hospital: 6/28/2022  4:51 AM    * Partial SBO (small bowel obstruction) ([facility] )  Assessment & Plan  · Secondary to adhesions, noted on CT imaging  · Surgery was consulted, now signed off recommending medical management  · Tolerating diet, stable for discharge    Morbid obesity ([facility] )  Assessment & Plan  · Morbid obesity due to excess calories, POA, evidenced by BMI 42 86 treated with diet and education    Acute kidney injury superimposed on chronic kidney disease [facility])  Assessment & Plan  Lab Results   Component Value Date    EGFR 34 06/29/2022    EGFR 32 06/28/2022    EGFR 38 08/14/2021    CREATININE 1 86 (H) 06/29/2022    CREATININE 1 92 (H) 06/28/2022    CREATININE 1 70 (H) 08/14/2021     · Creatinine above baseline likely from dehydration while on diuretics  · Improved, monitor off diuretics     Essential hypertension  Assessment & Plan  · Controlled off medication  · Monitor        Medical Problems             Resolved Problems  Date Reviewed: 6/28/2022   None               Discharging Physician / Practitioner: Kenna Asif MD  PCP: Preethi Peace DO  Admission Date:   Admission Orders (From admission, onward)     Ordered        06/29/22 1428  Inpatient Admission  Once            06/28/22 0640  Place in Observation  Once                      Discharge Date: 6/30/22    Consultations During Hospital Stay:  · IP CONSULT TO ACUTE CARE SURGERY  ·     Procedures Performed:   · imaging    Significant Findings / Test Results:   Principal Problem:    Partial SBO (small bowel obstruction) ([facility] )  Active Problems:    Essential hypertension    Hyperlipidemia    Ventral hernia    Acute kidney injury superimposed on chronic kidney disease (HCC)    Dementia ([facility] )    Morbid obesity ([facility] )  Resolved Problems:    * No resolved hospital problems  *  ·   ·     Incidental Findings:   · See above      Test Results Pending at Discharge (will require follow up): · Na      Outpatient Tests Requested:  · Follow up with PCP     Complications:  Na     Reason for Admission: SBO     Hospital Course:   Everardo Richardson is a 68 y o  male patient who originally presented to the hospital on 6/28/2022 due to SBO secondary to adhesions now resolved with conservative medical management, now stable for discharge  Condition at Discharge: good    Discharge Day Visit / Exam:   * Please refer to separate progress note for these details *    Discussion with Family: Updated  (son) via phone  Discharge instructions/Information to patient and family:   See after visit summary for information provided to patient and family  Provisions for Follow-Up Care:  See after visit summary for information related to follow-up care and any pertinent home health orders  Disposition:   Home    Planned Readmission: none      Discharge Statement:  I spent 30+ minutes discharging the patient  This time was spent on the day of discharge  I had direct contact with the patient on the day of discharge  Greater than 50% of the total time was spent examining patient, answering all patient questions, arranging and discussing plan of care with patient as well as directly providing post-discharge instructions  Additional time then spent on discharge activities  Discharge Medications:  See after visit summary for reconciled discharge medications provided to patient and/or family        **Please Note: This note may have been constructed using a voice recognition system**

## 2022-07-08 NOTE — PHYSICIAN ADVISOR
Current patient class: Inpatient  The patient is currently on Hospital Day: 3 at [address]      The patient was admitted to the hospital at  2:28 PM on 6/29/22 for the following diagnosis:  Impacted, fecal ([address] ) [K56 41]  Partial small bowel obstruction ([address] ) [K56 600]       There is documentation in the medical record of an expected length of stay of at least 2 midnights  The patient is therefore expected to satisfy the 2 midnight benchmark and given the 2 midnight presumption is appropriate for INPATIENT ADMISSION  Given this expectation of a satisfying stay, CMS instructs us that the patient is most often appropriate for inpatient admission under part A provided medical necessity is documented in the chart  After review of the relevant documentation, labs, vital signs and test results, the patient is appropriate for INPATIENT ADMISSION  Admission to the hospital as an inpatient is a complex decision making process which requires the practitioner to consider the patients presenting complaint, history and physical examination and all relevant testing  With this in mind, in this case, the patient was deemed appropriate for INPATIENT ADMISSION  After review of the documentation and testing available at the time of the admission I concur with this clinical determination of medical necessity  Rationale is as follows: The patient is a 68 yrs old Male who presented to the ED at 6/28/2022  4:51 AM with a chief complaint of Fecal Impaction (Patient states he "is impacted"  Patient states this happens often and he needs pain meds and IV fluids to help  Last bowel movement was yesterday ) Patient was found to have PSBO  He was treated with supportive care, IV morphine and IVF and seen by general surgery  His symptoms improved and he was stable for discharge hospital day 3  IP seems appropriate based on his hospital course      The patients vitals on arrival were   ED Triage Vitals Temperature Pulse Respirations Blood Pressure SpO2   06/28/22 0456 06/28/22 0456 06/28/22 0456 06/28/22 0456 06/28/22 0456   98 °F (36 7 °C) 95 20 (!) 183/74 94 %      Temp src Heart Rate Source Patient Position - Orthostatic VS BP Location FiO2 (%)   -- 06/28/22 0456 06/28/22 0456 06/28/22 0456 --    Monitor Lying Right arm       Pain Score       06/28/22 0541       10 - Worst Possible Pain           Past Medical History:   Diagnosis Date    CKD (chronic kidney disease) 11/8/2018    Hyperlipidemia     Hypertension     PVD [facility]peripheral [facility]) [facility]     Sleep apnea     Small bowel obstruction (HCC)     Ventral hernia      Past Surgical History:   Procedure Laterality Date    ABDOMINAL HERNIA REPAIR      ABDOMINAL SURGERY      CHOLECYSTECTOMY      open    FEMORAL BYPASS Right            Consults have been placed to:   IP CONSULT TO ACUTE CARE SURGERY    Vitals:    06/29/22 0719 06/29/22 1502 06/29/22 2212 06/30/22 0803   BP: 121/52 155/68 151/67 153/68   Pulse: 71 74 82 75   Resp: 18 16 19 17   Temp: 98 °F (36 7 °C) 97 5 °F (36 4 °C) 97 5 °F (36 4 °C) 98 2 °F (36 8 °C)   SpO2: 90% 92% 91% 92%   Weight:           Most recent labs:    No results for input(s): WBC, HGB, HCT, PLT, K, NA, CALCIUM, BUN, CREATININE, LIPASE, AMYLASE, INR, TROPONINI, CKTOTAL, AST, ALT, ALKPHOS, BILITOT in the last 72 hours  Scheduled Meds:  Continuous Infusions:No current facility-administered medications for this encounter  PRN Meds:      Surgical procedures (if appropriate):

## 2023-06-30 ENCOUNTER — APPOINTMENT (EMERGENCY)
Dept: CT IMAGING | Facility: HOSPITAL | Age: 78
DRG: 389 | End: 2023-06-30
Payer: MEDICARE

## 2023-06-30 ENCOUNTER — HOSPITAL ENCOUNTER (INPATIENT)
Facility: HOSPITAL | Age: 78
LOS: 5 days | Discharge: HOME | DRG: 389 | End: 2023-07-05
Attending: EMERGENCY MEDICINE | Admitting: FAMILY MEDICINE
Payer: MEDICARE

## 2023-06-30 DIAGNOSIS — K56.609 SBO (SMALL BOWEL OBSTRUCTION) (HCC): Primary | ICD-10-CM

## 2023-06-30 PROBLEM — D72.829 LEUKOCYTOSIS: Status: ACTIVE | Noted: 2023-06-30

## 2023-06-30 LAB
ALBUMIN SERPL BCP-MCNC: 4.3 G/DL (ref 3.5–5)
ALP SERPL-CCNC: 81 U/L (ref 34–104)
ALT SERPL W P-5'-P-CCNC: 26 U/L (ref 7–52)
ANION GAP SERPL CALCULATED.3IONS-SCNC: 13 MMOL/L
AST SERPL W P-5'-P-CCNC: 19 U/L (ref 13–39)
BASOPHILS # BLD AUTO: 0.06 THOUSANDS/ÂΜL (ref 0–0.1)
BASOPHILS NFR BLD AUTO: 0 % (ref 0–1)
BILIRUB SERPL-MCNC: 0.84 MG/DL (ref 0.2–1)
BUN SERPL-MCNC: 35 MG/DL (ref 5–25)
CALCIUM SERPL-MCNC: 10.3 MG/DL (ref 8.4–10.2)
CHLORIDE SERPL-SCNC: 92 MMOL/L (ref 96–108)
CO2 SERPL-SCNC: 28 MMOL/L (ref 21–32)
CREAT SERPL-MCNC: 2.02 MG/DL (ref 0.6–1.3)
EOSINOPHIL # BLD AUTO: 0.17 THOUSAND/ÂΜL (ref 0–0.61)
EOSINOPHIL NFR BLD AUTO: 1 % (ref 0–6)
ERYTHROCYTE [DISTWIDTH] IN BLOOD BY AUTOMATED COUNT: 13.2 % (ref 11.6–15.1)
FLUAV RNA RESP QL NAA+PROBE: NEGATIVE
FLUBV RNA RESP QL NAA+PROBE: NEGATIVE
GFR SERPL CREATININE-BSD FRML MDRD: 30 ML/MIN/1.73SQ M
GLUCOSE SERPL-MCNC: 188 MG/DL (ref 65–140)
HCT VFR BLD AUTO: 44.5 % (ref 36.5–49.3)
HGB BLD-MCNC: 14.8 G/DL (ref 12–17)
IMM GRANULOCYTES # BLD AUTO: 0.18 THOUSAND/UL (ref 0–0.2)
IMM GRANULOCYTES NFR BLD AUTO: 1 % (ref 0–2)
LACTATE SERPL-SCNC: 2.9 MMOL/L (ref 0.5–2)
LIPASE SERPL-CCNC: 29 U/L (ref 11–82)
LYMPHOCYTES # BLD AUTO: 1.02 THOUSANDS/ÂΜL (ref 0.6–4.47)
LYMPHOCYTES NFR BLD AUTO: 4 % (ref 14–44)
MCH RBC QN AUTO: 29.7 PG (ref 26.8–34.3)
MCHC RBC AUTO-ENTMCNC: 33.3 G/DL (ref 31.4–37.4)
MCV RBC AUTO: 89 FL (ref 82–98)
MONOCYTES # BLD AUTO: 1.66 THOUSAND/ÂΜL (ref 0.17–1.22)
MONOCYTES NFR BLD AUTO: 7 % (ref 4–12)
NEUTROPHILS # BLD AUTO: 19.98 THOUSANDS/ÂΜL (ref 1.85–7.62)
NEUTS SEG NFR BLD AUTO: 87 % (ref 43–75)
NRBC BLD AUTO-RTO: 0 /100 WBCS
PLATELET # BLD AUTO: 287 THOUSANDS/UL (ref 149–390)
PMV BLD AUTO: 11.2 FL (ref 8.9–12.7)
POTASSIUM SERPL-SCNC: 4.6 MMOL/L (ref 3.5–5.3)
PROCALCITONIN SERPL-MCNC: 0.18 NG/ML
PROT SERPL-MCNC: 8.5 G/DL (ref 6.4–8.4)
RBC # BLD AUTO: 4.99 MILLION/UL (ref 3.88–5.62)
RSV RNA RESP QL NAA+PROBE: NEGATIVE
SARS-COV-2 RNA RESP QL NAA+PROBE: NEGATIVE
SODIUM SERPL-SCNC: 133 MMOL/L (ref 135–147)
WBC # BLD AUTO: 23.07 THOUSAND/UL (ref 4.31–10.16)

## 2023-06-30 PROCEDURE — 84145 PROCALCITONIN (PCT): CPT | Performed by: INTERNAL MEDICINE

## 2023-06-30 PROCEDURE — 99284 EMERGENCY DEPT VISIT MOD MDM: CPT

## 2023-06-30 PROCEDURE — 99223 1ST HOSP IP/OBS HIGH 75: CPT

## 2023-06-30 PROCEDURE — 36415 COLL VENOUS BLD VENIPUNCTURE: CPT | Performed by: EMERGENCY MEDICINE

## 2023-06-30 PROCEDURE — 83690 ASSAY OF LIPASE: CPT | Performed by: EMERGENCY MEDICINE

## 2023-06-30 PROCEDURE — 85025 COMPLETE CBC W/AUTO DIFF WBC: CPT | Performed by: EMERGENCY MEDICINE

## 2023-06-30 PROCEDURE — 93005 ELECTROCARDIOGRAM TRACING: CPT

## 2023-06-30 PROCEDURE — 87040 BLOOD CULTURE FOR BACTERIA: CPT | Performed by: INTERNAL MEDICINE

## 2023-06-30 PROCEDURE — 83605 ASSAY OF LACTIC ACID: CPT | Performed by: INTERNAL MEDICINE

## 2023-06-30 PROCEDURE — 74176 CT ABD & PELVIS W/O CONTRAST: CPT

## 2023-06-30 PROCEDURE — 0241U HB NFCT DS VIR RESP RNA 4 TRGT: CPT | Performed by: INTERNAL MEDICINE

## 2023-06-30 PROCEDURE — 96374 THER/PROPH/DIAG INJ IV PUSH: CPT

## 2023-06-30 PROCEDURE — 80053 COMPREHEN METABOLIC PANEL: CPT | Performed by: EMERGENCY MEDICINE

## 2023-06-30 RX ORDER — FENTANYL CITRATE 50 UG/ML
75 INJECTION, SOLUTION INTRAMUSCULAR; INTRAVENOUS ONCE
Status: COMPLETED | OUTPATIENT
Start: 2023-06-30 | End: 2023-06-30

## 2023-06-30 RX ORDER — FENTANYL CITRATE 50 UG/ML
100 INJECTION, SOLUTION INTRAMUSCULAR; INTRAVENOUS ONCE
Status: DISCONTINUED | OUTPATIENT
Start: 2023-06-30 | End: 2023-06-30

## 2023-06-30 RX ORDER — ONDANSETRON 2 MG/ML
4 INJECTION INTRAMUSCULAR; INTRAVENOUS EVERY 6 HOURS PRN
Status: DISCONTINUED | OUTPATIENT
Start: 2023-06-30 | End: 2023-06-30

## 2023-06-30 RX ORDER — HEPARIN SODIUM 5000 [USP'U]/ML
5000 INJECTION, SOLUTION INTRAVENOUS; SUBCUTANEOUS EVERY 8 HOURS SCHEDULED
Status: DISCONTINUED | OUTPATIENT
Start: 2023-06-30 | End: 2023-06-30

## 2023-06-30 RX ORDER — HEPARIN SODIUM 5000 [USP'U]/ML
7500 INJECTION, SOLUTION INTRAVENOUS; SUBCUTANEOUS EVERY 8 HOURS SCHEDULED
Status: DISCONTINUED | OUTPATIENT
Start: 2023-07-01 | End: 2023-07-05 | Stop reason: HOSPADM

## 2023-06-30 RX ORDER — ACETAMINOPHEN 325 MG/1
650 TABLET ORAL EVERY 6 HOURS PRN
Status: DISCONTINUED | OUTPATIENT
Start: 2023-06-30 | End: 2023-07-05 | Stop reason: HOSPADM

## 2023-06-30 RX ORDER — SODIUM CHLORIDE 9 MG/ML
50 INJECTION, SOLUTION INTRAVENOUS CONTINUOUS
Status: DISCONTINUED | OUTPATIENT
Start: 2023-06-30 | End: 2023-07-05 | Stop reason: HOSPADM

## 2023-06-30 RX ORDER — BUDESONIDE AND FORMOTEROL FUMARATE DIHYDRATE 80; 4.5 UG/1; UG/1
2 AEROSOL RESPIRATORY (INHALATION) 2 TIMES DAILY
Status: DISCONTINUED | OUTPATIENT
Start: 2023-06-30 | End: 2023-07-05 | Stop reason: HOSPADM

## 2023-06-30 RX ORDER — HYDROMORPHONE HCL IN WATER/PF 6 MG/30 ML
0.2 PATIENT CONTROLLED ANALGESIA SYRINGE INTRAVENOUS
Status: DISCONTINUED | OUTPATIENT
Start: 2023-06-30 | End: 2023-07-02

## 2023-06-30 RX ORDER — HYDROMORPHONE HCL/PF 1 MG/ML
0.5 SYRINGE (ML) INJECTION EVERY 6 HOURS PRN
Status: DISCONTINUED | OUTPATIENT
Start: 2023-06-30 | End: 2023-07-02

## 2023-06-30 RX ADMIN — SODIUM CHLORIDE 100 ML/HR: 0.9 INJECTION, SOLUTION INTRAVENOUS at 22:26

## 2023-06-30 RX ADMIN — FENTANYL CITRATE 75 MCG: 50 INJECTION INTRAMUSCULAR; INTRAVENOUS at 22:27

## 2023-06-30 RX ADMIN — FENTANYL CITRATE 75 MCG: 50 INJECTION INTRAMUSCULAR; INTRAVENOUS at 20:28

## 2023-06-30 RX ADMIN — SODIUM CHLORIDE 100 ML/HR: 0.9 INJECTION, SOLUTION INTRAVENOUS at 23:50

## 2023-07-01 LAB
ANION GAP SERPL CALCULATED.3IONS-SCNC: 10 MMOL/L
ATRIAL RATE: 92 BPM
BACTERIA UR QL AUTO: ABNORMAL /HPF
BILIRUB UR QL STRIP: NEGATIVE
BUN SERPL-MCNC: 38 MG/DL (ref 5–25)
CALCIUM SERPL-MCNC: 9.3 MG/DL (ref 8.4–10.2)
CHLORIDE SERPL-SCNC: 96 MMOL/L (ref 96–108)
CLARITY UR: ABNORMAL
CO2 SERPL-SCNC: 30 MMOL/L (ref 21–32)
COLOR UR: YELLOW
CREAT SERPL-MCNC: 2.04 MG/DL (ref 0.6–1.3)
ERYTHROCYTE [DISTWIDTH] IN BLOOD BY AUTOMATED COUNT: 13 % (ref 11.6–15.1)
GFR SERPL CREATININE-BSD FRML MDRD: 30 ML/MIN/1.73SQ M
GLUCOSE SERPL-MCNC: 145 MG/DL (ref 65–140)
GLUCOSE UR STRIP-MCNC: NEGATIVE MG/DL
HCT VFR BLD AUTO: 43.6 % (ref 36.5–49.3)
HGB BLD-MCNC: 14.5 G/DL (ref 12–17)
HGB UR QL STRIP.AUTO: NEGATIVE
HYALINE CASTS #/AREA URNS LPF: ABNORMAL /LPF
KETONES UR STRIP-MCNC: NEGATIVE MG/DL
LACTATE SERPL-SCNC: 2 MMOL/L (ref 0.5–2)
LACTATE SERPL-SCNC: 2.3 MMOL/L (ref 0.5–2)
LACTATE SERPL-SCNC: 2.6 MMOL/L (ref 0.5–2)
LEUKOCYTE ESTERASE UR QL STRIP: NEGATIVE
MCH RBC QN AUTO: 30.2 PG (ref 26.8–34.3)
MCHC RBC AUTO-ENTMCNC: 33.3 G/DL (ref 31.4–37.4)
MCV RBC AUTO: 91 FL (ref 82–98)
NITRITE UR QL STRIP: NEGATIVE
NON-SQ EPI CELLS URNS QL MICRO: ABNORMAL /HPF
P AXIS: 77 DEGREES
PH UR STRIP.AUTO: 8.5 [PH]
PLATELET # BLD AUTO: 251 THOUSANDS/UL (ref 149–390)
PMV BLD AUTO: 11.1 FL (ref 8.9–12.7)
POTASSIUM SERPL-SCNC: 4.4 MMOL/L (ref 3.5–5.3)
PR INTERVAL: 230 MS
PROT UR STRIP-MCNC: ABNORMAL MG/DL
QRS AXIS: 56 DEGREES
QRSD INTERVAL: 144 MS
QT INTERVAL: 420 MS
QTC INTERVAL: 519 MS
RBC # BLD AUTO: 4.8 MILLION/UL (ref 3.88–5.62)
RBC #/AREA URNS AUTO: ABNORMAL /HPF
SODIUM SERPL-SCNC: 136 MMOL/L (ref 135–147)
SP GR UR STRIP.AUTO: 1.02 (ref 1–1.03)
T WAVE AXIS: 57 DEGREES
UROBILINOGEN UR STRIP-ACNC: <2 MG/DL
VENTRICULAR RATE: 92 BPM
WBC # BLD AUTO: 16.79 THOUSAND/UL (ref 4.31–10.16)
WBC #/AREA URNS AUTO: ABNORMAL /HPF

## 2023-07-01 PROCEDURE — 93010 ELECTROCARDIOGRAM REPORT: CPT | Performed by: INTERNAL MEDICINE

## 2023-07-01 PROCEDURE — 81001 URINALYSIS AUTO W/SCOPE: CPT | Performed by: INTERNAL MEDICINE

## 2023-07-01 PROCEDURE — 99222 1ST HOSP IP/OBS MODERATE 55: CPT | Performed by: SURGERY

## 2023-07-01 PROCEDURE — 83605 ASSAY OF LACTIC ACID: CPT | Performed by: INTERNAL MEDICINE

## 2023-07-01 PROCEDURE — 80048 BASIC METABOLIC PNL TOTAL CA: CPT | Performed by: INTERNAL MEDICINE

## 2023-07-01 PROCEDURE — 85027 COMPLETE CBC AUTOMATED: CPT | Performed by: INTERNAL MEDICINE

## 2023-07-01 PROCEDURE — 99232 SBSQ HOSP IP/OBS MODERATE 35: CPT | Performed by: INTERNAL MEDICINE

## 2023-07-01 RX ORDER — CEFTRIAXONE 1 G/50ML
1000 INJECTION, SOLUTION INTRAVENOUS EVERY 24 HOURS
Status: DISCONTINUED | OUTPATIENT
Start: 2023-07-01 | End: 2023-07-01

## 2023-07-01 RX ORDER — HYDROMORPHONE HCL/PF 1 MG/ML
1 SYRINGE (ML) INJECTION ONCE
Status: COMPLETED | OUTPATIENT
Start: 2023-07-01 | End: 2023-07-01

## 2023-07-01 RX ADMIN — HYDROMORPHONE HYDROCHLORIDE 0.5 MG: 1 INJECTION, SOLUTION INTRAMUSCULAR; INTRAVENOUS; SUBCUTANEOUS at 17:03

## 2023-07-01 RX ADMIN — HEPARIN SODIUM 7500 UNITS: 5000 INJECTION INTRAVENOUS; SUBCUTANEOUS at 22:59

## 2023-07-01 RX ADMIN — SODIUM CHLORIDE 500 ML: 0.9 INJECTION, SOLUTION INTRAVENOUS at 03:50

## 2023-07-01 RX ADMIN — HYDROMORPHONE HYDROCHLORIDE 0.2 MG: 0.2 INJECTION, SOLUTION INTRAMUSCULAR; INTRAVENOUS; SUBCUTANEOUS at 14:16

## 2023-07-01 RX ADMIN — HYDROMORPHONE HYDROCHLORIDE 0.5 MG: 1 INJECTION, SOLUTION INTRAMUSCULAR; INTRAVENOUS; SUBCUTANEOUS at 01:46

## 2023-07-01 RX ADMIN — Medication 1 SPRAY: at 22:49

## 2023-07-01 RX ADMIN — CEFTRIAXONE 1000 MG: 1 INJECTION, SOLUTION INTRAVENOUS at 09:15

## 2023-07-01 RX ADMIN — SODIUM CHLORIDE 100 ML/HR: 0.9 INJECTION, SOLUTION INTRAVENOUS at 17:56

## 2023-07-01 RX ADMIN — HYDROMORPHONE HYDROCHLORIDE 1 MG: 1 INJECTION, SOLUTION INTRAMUSCULAR; INTRAVENOUS; SUBCUTANEOUS at 20:24

## 2023-07-01 RX ADMIN — HYDROMORPHONE HYDROCHLORIDE 0.5 MG: 1 INJECTION, SOLUTION INTRAMUSCULAR; INTRAVENOUS; SUBCUTANEOUS at 09:15

## 2023-07-01 RX ADMIN — HYDROMORPHONE HYDROCHLORIDE 0.2 MG: 0.2 INJECTION, SOLUTION INTRAMUSCULAR; INTRAVENOUS; SUBCUTANEOUS at 06:23

## 2023-07-01 RX ADMIN — HYDROMORPHONE HYDROCHLORIDE 0.2 MG: 0.2 INJECTION, SOLUTION INTRAMUSCULAR; INTRAVENOUS; SUBCUTANEOUS at 22:44

## 2023-07-01 RX ADMIN — HEPARIN SODIUM 7500 UNITS: 5000 INJECTION INTRAVENOUS; SUBCUTANEOUS at 05:53

## 2023-07-01 RX ADMIN — HEPARIN SODIUM 7500 UNITS: 5000 INJECTION INTRAVENOUS; SUBCUTANEOUS at 14:15

## 2023-07-01 RX ADMIN — SODIUM CHLORIDE 100 ML/HR: 0.9 INJECTION, SOLUTION INTRAVENOUS at 06:22

## 2023-07-01 NOTE — ASSESSMENT & PLAN NOTE
· Patient presenting to the ED for constipation, abdominal pain, and vomiting. Denies any associated fever/chills, chest pain, or shortness of breath. Patient with multiple previous admissions for small bowel obstruction and both patient and family reports the symptoms are identical to prior episodes. · CT A/P: Findings above suspicious for small bowel obstruction likely secondary to adhesions overall very similar an appearance to the prior study dated 6/28/2022. No free intraperitoneal air.   · ED spoke with general surgery who recommend admission under SLIM  · Keep NPO  · Start on continuous IVF  · Pain control, anti-emetics  · If patient develops vomiting overnight, consider NG tube insertion  · Consult to General Surgery, recommendations are appreciated

## 2023-07-01 NOTE — H&P
[address]  H&P  Name: Manish Temple 66 y.o. male I MRN: [id number]  Unit/Bed#: ED 15 I Date of Admission: 6/30/2023   Date of Service: 6/30/2023 I Hospital Day: 0      Assessment/Plan   * SBO (small bowel obstruction) [facility])  Assessment & Plan  Patient presenting to the ED for constipation, abdominal pain, and vomiting. Denies any associated fever/chills, chest pain, or shortness of breath. Patient with multiple previous admissions for small bowel obstruction and both patient and family reports the symptoms are identical to prior episodes. · CT A/P: Findings above suspicious for small bowel obstruction likely secondary to adhesions overall very similar an appearance to the prior study dated 6/28/2022. No free intraperitoneal air.   · ED spoke with general surgery who recommend admission under SLIM  · Keep NPO  · Start on continuous IVF  · Pain control, anti-emetics  · If patient develops vomiting overnight, consider NG tube insertion  · Consult to General Surgery, recommendations are appreciated    Leukocytosis  Assessment & Plan  · WBC 23.07 on admission  · Afebrile and no obvious source of infection  · Check BC x2, procalcitonin, lactic acid, UA, C diff panel, covid/flu/rsv  · Monitor off of antibiotics at this time    CKD (chronic kidney disease)  Assessment & Plan  Lab Results   Component Value Date    EGFR 30 06/30/2023    EGFR 34 06/29/2022    EGFR 32 06/28/2022    CREATININE 2.02 (H) 06/30/2023    CREATININE 1.86 (H) 06/29/2022    CREATININE 1.92 (H) 06/28/2022     · Creatinine 2.02 (baseline 1.7-1.9)  · Avoid hypotension and nephrotoxic agents  · Monitor BMP daily    History of CVA (cerebrovascular accident)  Assessment & Plan  · Continue statin therapy    Dementia ([address])  Assessment & Plan  · Takes aricept at home  · Delirium precautions    Essential hypertension  Assessment & Plan  · BP stable on admission  · Hold oral anti-hypertensives due to SBO  · Add IV hydralazine for elevated BP  · Monitor vitals per routine       VTE Prophylaxis: Heparin  / sequential compression device   Code Status: Level 1 code  Discussion with family: Update in the AM    Anticipated Length of Stay:  Patient will be admitted on an Inpatient basis with an anticipated length of stay of  More than 2 midnights. Justification for Hospital Stay: SBO    Total Time for Visit, including Counseling / Coordination of Care: 90 minutes. Greater than 50% of this total time spent on direct patient counseling and coordination of care. Chief Complaint:   Constipation    History of Present Illness:    Roseanne Bonilla is a 66 y.o. male who has a past medical history significant for hypertension, CVA, CKD, dementia. Patient presenting to the ED for constipation, abdominal pain, and vomiting. Denies any associated fever/chills, chest pain, or shortness of breath. Patient with multiple previous admissions for small bowel obstruction and both patient and family reports the symptoms are identical to prior episodes. All patient questions answered to the best of my ability. Review of Systems:    Review of Systems   Constitutional: Negative for chills and fever. HENT: Negative for ear pain and sore throat. Eyes: Negative for pain and visual disturbance. Respiratory: Negative for cough and shortness of breath. Cardiovascular: Negative for chest pain and palpitations. Gastrointestinal: Positive for abdominal pain and constipation. Negative for vomiting. Genitourinary: Negative for dysuria and hematuria. Musculoskeletal: Negative for arthralgias and back pain. Skin: Negative for color change and rash. Neurological: Negative for seizures and syncope. All other systems reviewed and are negative.       Past Medical and Surgical History:     Past Medical History:   Diagnosis Date   • CKD (chronic kidney disease) 11/8/2018   • Hyperlipidemia    • Hypertension    • PVD (peripheral vascular disease) ([address])    • Sleep apnea    • Small bowel obstruction (HCC)    • Ventral hernia        Past Surgical History:   Procedure Laterality Date   • ABDOMINAL HERNIA REPAIR     • ABDOMINAL SURGERY     • CHOLECYSTECTOMY      open   • FEMORAL BYPASS Right        Meds/Allergies:    Prior to Admission medications    Medication Sig Start Date End Date Taking? Authorizing Provider   amLODIPine (NORVASC) 5 mg tablet TK 1 T PO D 7/11/18   Historical Provider, MD   atorvastatin (LIPITOR) 10 mg tablet TK 1 T PO D 7/11/18   Historical Provider, MD   budesonide-formoterol (SYMBICORT) 80-4.5 MCG/ACT inhaler Inhale 2 puffs 2 (two) times a day Rinse mouth after use. Historical Provider, MD   Cholecalciferol 2000 units CAPS Take 1 capsule by mouth daily   Patient not taking: Reported on 8/12/2021    Historical Provider, MD   donepezil (ARICEPT) 10 mg tablet TK 1 T PO HS 8/28/18   Historical Provider, MD   furosemide (LASIX) 40 mg tablet 80 mg daily  9/12/18   Historical Provider, MD   Omega-3 Fatty Acids (FISH OIL PO) Take 2 g by mouth  Patient not taking: Reported on 8/12/2021    Historical Provider, MD   omeprazole (PriLOSEC) 40 MG capsule Take 40 mg by mouth  Patient not taking: Reported on 8/12/2021    Historical Provider, MD   spironolactone (ALDACTONE) 25 mg tablet Take 12.5 mg by mouth 2 (two) times a day     Historical Provider, MD   triamterene-hydrochlorothiazide (MAXZIDE-25) 37.5-25 mg per tablet Take by mouth daily  9/13/18   Historical Provider, MD     I have reviewed home medications using allscripts.     Allergies: No Known Allergies    Social History:     Marital Status: /Civil Union   Occupation: NA  Patient Pre-hospital Living Situation: Home  Patient Pre-hospital Level of Mobility: Walks  Patient Pre-hospital Diet Restrictions: None  Substance Use History:   Social History     Substance and Sexual Activity   Alcohol Use Never     Social History     Tobacco Use   Smoking Status Never   Smokeless Tobacco Never     Social History     Substance and Sexual Activity   Drug Use Never       Family History:    History reviewed. No pertinent family history. Physical Exam:     Vitals:   Blood Pressure: 154/68 (06/30/23 2230)  Pulse: 88 (06/30/23 2230)  Temperature: 98.7 °F (37.1 °C) (06/30/23 1711)  Respirations: 21 (06/30/23 2230)  SpO2: 97 % (06/30/23 2230)    Physical Exam  Vitals and nursing note reviewed. Constitutional:       General: He is not in acute distress. Appearance: He is well-developed. HENT:      Head: Normocephalic and atraumatic. Nose: Nose normal.      Mouth/Throat:      Mouth: Mucous membranes are moist.      Pharynx: Oropharynx is clear. Eyes:      Conjunctiva/sclera: Conjunctivae normal.   Cardiovascular:      Rate and Rhythm: Normal rate and regular rhythm. Heart sounds: No murmur heard. Pulmonary:      Effort: Pulmonary effort is normal. No respiratory distress. Breath sounds: Normal breath sounds. Abdominal:      General: There is distension (obese abdomen). Palpations: Abdomen is soft. Tenderness: There is abdominal tenderness. There is no guarding. Musculoskeletal:         General: No swelling. Cervical back: Neck supple. Skin:     General: Skin is warm and dry. Capillary Refill: Capillary refill takes less than 2 seconds. Neurological:      Mental Status: He is alert. Mental status is at baseline. Psychiatric:         Mood and Affect: Mood normal.          Additional Data:     Lab Results: I have personally reviewed pertinent reports.       Results from last 7 days   Lab Units 06/30/23 2027   WBC Thousand/uL 23.07*   HEMOGLOBIN g/dL 14.8   HEMATOCRIT % 44.5   PLATELETS Thousands/uL 287   NEUTROS PCT % 87*   LYMPHS PCT % 4*   MONOS PCT % 7   EOS PCT % 1     Results from last 7 days   Lab Units 06/30/23 2027   SODIUM mmol/L 133*   POTASSIUM mmol/L 4.6   CHLORIDE mmol/L 92*   CO2 mmol/L 28   BUN mg/dL 35*   CREATININE mg/dL 2.02*   ANION GAP mmol/L 13   CALCIUM mg/dL 10.3* ALBUMIN g/dL 4.3   TOTAL BILIRUBIN mg/dL 0.84   ALK PHOS U/L 81   ALT U/L 26   AST U/L 19   GLUCOSE RANDOM mg/dL 188*                 Results from last 7 days   Lab Units 06/30/23  2208   LACTIC ACID mmol/L 2.9*   PROCALCITONIN ng/ml 0.18       Imaging: I have personally reviewed pertinent reports. CT abdomen pelvis wo contrast   Final Result by Mary Ocasio MD (06/30 2109)      Findings above suspicious for small bowel obstruction likely secondary to adhesions overall very similar an appearance to the prior study dated 6/28/2022. No free intraperitoneal air. Surgical consult advised. Diffuse hepatic steatosis. Above findings discussed with Dr. Elyssa Beach at 9:05 p.m. on 6/30/2023. Workstation performed: [id number]             EKG, Pathology, and Other Studies Reviewed on Admission:   · EKG: Obtain baseline    Allscripts / Epic Records Reviewed: Yes     ** Please Note: This note has been constructed using a voice recognition system.  **

## 2023-07-01 NOTE — PROGRESS NOTES
[address]  Progress Note  Name: Colette Jones  MRN: [id number]  Unit/Bed#: -32 I Date of Admission: 6/30/2023   Date of Service: 7/1/2023 I Hospital Day: 1    Assessment/Plan   * SBO (small bowel obstruction) ([address])  Assessment & Plan  · Patient presenting to the ED for constipation, abdominal pain, and vomiting. Denies any associated fever/chills, chest pain, or shortness of breath. Patient with multiple previous admissions for small bowel obstruction and both patient and family reports the symptoms are identical to prior episodes. · CT A/P: Findings above suspicious for small bowel obstruction likely secondary to adhesions overall very similar an appearance to the prior study dated 6/28/2022. No free intraperitoneal air.   · ED spoke with general surgery who recommend admission under SLIM  · Keep NPO  · Start on continuous IVF  · Pain control, anti-emetics  · If patient develops vomiting overnight, consider NG tube insertion  · Consult to General Surgery, recommendations are appreciated    Leukocytosis  Assessment & Plan  · WBC 23.07 on admission  · Afebrile and no obvious source of infection  · Check BC x2, procalcitonin, lactic acid, UA, C diff panel, covid/flu/rsv  · Chronic leukocytosis  · Monitor off antibiotics for now    CKD (chronic kidney disease)  Assessment & Plan  Lab Results   Component Value Date    EGFR 30 07/01/2023    EGFR 30 06/30/2023    EGFR 34 06/29/2022    CREATININE 2.04 (H) 07/01/2023    CREATININE 2.02 (H) 06/30/2023    CREATININE 1.86 (H) 06/29/2022     · Creatinine 2.02 (baseline 1.7-1.9)  · Creatinine at baseline    History of CVA (cerebrovascular accident)  Assessment & Plan  · C/w statin     Dementia (HCC)  Assessment & Plan  · Takes aricept at home  · Delirium precautions    Essential hypertension  Assessment & Plan  ·  blood pressure controlled         VTE Pharmacologic Prophylaxis:   Pharmacologic: Heparin  Mechanical VTE Prophylaxis in Place: Yes    Patient Centered Rounds: I have performed bedside rounds with nursing staff today. Discussions with Specialists or Other Care Team Provider: cm, nursing    Education and Discussions with Family / Patient: pt    Time Spent for Care: 30 minutes. More than 50% of total time spent on counseling and coordination of care as described above. Current Length of Stay: 1 day(s)    Current Patient Status: Inpatient   Certification Statement: The patient will continue to require additional inpatient hospital stay due to see below    Discharge Plan: Pending improvement of bowel obstruction    Code Status: Level 1 - Full Code      Subjective:   Denies fevers, chills, cough, shortness of breath    Objective:     Vitals:   Temp (24hrs), Av °F (36.7 °C), Min:97.5 °F (36.4 °C), Max:98.7 °F (37.1 °C)    Temp:  [97.5 °F (36.4 °C)-98.7 °F (37.1 °C)] 97.9 °F (36.6 °C)  HR:  [] 80  Resp:  [13-21] 18  BP: (107-163)/(50-84) 133/57  SpO2:  [93 %-97 %] 96 %  Body mass index is 43.81 kg/m². Input and Output Summary (last 24 hours): Intake/Output Summary (Last 24 hours) at 2023 1011  Last data filed at 2023 0430  Gross per 24 hour   Intake 500 ml   Output --   Net 500 ml       Physical Exam:     Physical Exam  Constitutional:       General: He is not in acute distress. Appearance: He is well-developed. He is not diaphoretic. HENT:      Head: Normocephalic and atraumatic. Nose: Nose normal.      Mouth/Throat:      Pharynx: No oropharyngeal exudate. Eyes:      General: No scleral icterus. Conjunctiva/sclera: Conjunctivae normal.   Cardiovascular:      Rate and Rhythm: Normal rate and regular rhythm. Heart sounds: Normal heart sounds. No murmur heard. No friction rub. No gallop. Pulmonary:      Effort: Pulmonary effort is normal. No respiratory distress. Breath sounds: Normal breath sounds. No wheezing or rales. Chest:      Chest wall: No tenderness.    Abdominal: General: Bowel sounds are normal. There is no distension. Palpations: Abdomen is soft. Tenderness: There is no abdominal tenderness. There is no guarding. Musculoskeletal:         General: No tenderness or deformity. Normal range of motion. Cervical back: Normal range of motion and neck supple. Skin:     General: Skin is warm and dry. Findings: No erythema. Neurological:      Mental Status: He is alert. Mental status is at baseline. ( **  Additional Data:     Labs:    Results from last 7 days   Lab Units 07/01/23  0503 06/30/23 2027   WBC Thousand/uL 16.79* 23.07*   HEMOGLOBIN g/dL 14.5 14.8   HEMATOCRIT % 43.6 44.5   PLATELETS Thousands/uL 251 287   NEUTROS PCT %  --  87*   LYMPHS PCT %  --  4*   MONOS PCT %  --  7   EOS PCT %  --  1     Results from last 7 days   Lab Units 07/01/23  0503 06/30/23 2027   SODIUM mmol/L 136 133*   POTASSIUM mmol/L 4.4 4.6   CHLORIDE mmol/L 96 92*   CO2 mmol/L 30 28   BUN mg/dL 38* 35*   CREATININE mg/dL 2.04* 2.02*   ANION GAP mmol/L 10 13   CALCIUM mg/dL 9.3 10.3*   ALBUMIN g/dL  --  4.3   TOTAL BILIRUBIN mg/dL  --  0.84   ALK PHOS U/L  --  81   ALT U/L  --  26   AST U/L  --  19   GLUCOSE RANDOM mg/dL 145* 188*                 Results from last 7 days   Lab Units 07/01/23  0906 07/01/23  0503 07/01/23  0135 06/30/23  2208   LACTIC ACID mmol/L 2.0 2.6* 2.3* 2.9*   PROCALCITONIN ng/ml  --   --   --  0.18           * I Have Reviewed All Lab Data Listed Above. * Additional Pertinent Lab Tests Reviewed:  All Labs Within Last 24 Hours Reviewed    Imaging:    Imaging Reports Reviewed Today Include: na  Imaging Personally Reviewed by Myself Includes:  na    Recent Cultures (last 7 days):           Last 24 Hours Medication List:   Current Facility-Administered Medications   Medication Dose Route Frequency Provider Last Rate   • acetaminophen  650 mg Oral Q6H PRN Alexus Ornelas PA-C     • budesonide-formoterol  2 puff Inhalation BID Alexus Ornelas PA-C     • heparin (porcine)  7,500 Units Subcutaneous Q8H [facility] Angie Way PA-C     • HYDROmorphone  0.5 mg Intravenous Q6H PRN Pedrito Zurita PA-C     • HYDROmorphone  0.2 mg Intravenous Q3H PRN Pedrito Zurita PA-C     • sodium chloride  100 mL/hr Intravenous Continuous Angie Way PA-C 100 mL/hr (07/01/23 0622)        Today, Patient Was Seen By: Luz Daniels MD    ** Please Note: Dictation voice to text software may have been used in the creation of this document.  **

## 2023-07-01 NOTE — ASSESSMENT & PLAN NOTE
Lab Results   Component Value Date    EGFR 30 06/30/2023    EGFR 34 06/29/2022    EGFR 32 06/28/2022    CREATININE 2.02 (H) 06/30/2023    CREATININE 1.86 (H) 06/29/2022    CREATININE 1.92 (H) 06/28/2022     · Creatinine 2.02 (baseline 1.7-1.9)  · Avoid hypotension and nephrotoxic agents  · Monitor BMP daily

## 2023-07-01 NOTE — PLAN OF CARE
Problem: PAIN - ADULT  Goal: Verbalizes/displays adequate comfort level or baseline comfort level  Description: Interventions:  - Encourage patient to monitor pain and request assistance  - Assess pain using appropriate pain scale  - Administer analgesics based on type and severity of pain and evaluate response  - Implement non-pharmacological measures as appropriate and evaluate response  - Consider cultural and social influences on pain and pain management  - Notify physician/advanced practitioner if interventions unsuccessful or patient reports new pain  Outcome: Progressing     Problem: INFECTION - ADULT  Goal: Absence or prevention of progression during hospitalization  Description: INTERVENTIONS:  - Assess and monitor for signs and symptoms of infection  - Monitor lab/diagnostic results  - Monitor all insertion sites, i.e. indwelling lines, tubes, and drains  - Monitor endotracheal if appropriate and nasal secretions for changes in amount and color  - [place] appropriate cooling/warming therapies per order  - Administer medications as ordered  - Instruct and encourage patient and family to use good hand hygiene technique  - Identify and instruct in appropriate isolation precautions for identified infection/condition  Outcome: Progressing  Goal: Absence of fever/infection during neutropenic period  Description: INTERVENTIONS:  - Monitor WBC    Outcome: Progressing     Problem: SAFETY ADULT  Goal: Patient will remain free of falls  Description: INTERVENTIONS:  - Educate patient/family on patient safety including physical limitations  - Instruct patient to call for assistance with activity   - Consult OT/PT to assist with strengthening/mobility   - Keep Call bell within reach  - Keep bed low and locked with side rails adjusted as appropriate  - Keep care items and personal belongings within reach  - Initiate and maintain comfort rounds  - Make Fall Risk Sign visible to staff  - Offer Toileting every *** Hours, in advance of need  - Initiate/Maintain ***alarm  - Obtain necessary fall risk management equipment: ***  - Apply yellow socks and bracelet for high fall risk patients  - Consider moving patient to room near nurses station  Outcome: Progressing  Goal: Maintain or return to baseline ADL function  Description: INTERVENTIONS:  -  Assess patient's ability to carry out ADLs; assess patient's baseline for ADL function and identify physical deficits which impact ability to perform ADLs (bathing, care of mouth/teeth, toileting, grooming, dressing, etc.)  - Assess/evaluate cause of self-care deficits   - Assess range of motion  - Assess patient's mobility; develop plan if impaired  - Assess patient's need for assistive devices and provide as appropriate  - Encourage maximum independence but intervene and supervise when necessary  - Involve family in performance of ADLs  - Assess for home care needs following discharge   - Consider OT consult to assist with ADL evaluation and planning for discharge  - Provide patient education as appropriate  Outcome: Progressing  Goal: Maintains/Returns to pre admission functional level  Description: INTERVENTIONS:  - Perform BMAT or MOVE assessment daily.   - Set and communicate daily mobility goal to care team and patient/family/caregiver. - Collaborate with rehabilitation services on mobility goals if consulted  - Perform Range of Motion *** times a day. - Reposition patient every *** hours.   - Dangle patient *** times a day  - Stand patient *** times a day  - Ambulate patient *** times a day  - Out of bed to chair *** times a day   - Out of bed for meals *** times a day  - Out of bed for toileting  - Record patient progress and toleration of activity level   Outcome: Progressing     Problem: DISCHARGE PLANNING  Goal: Discharge to home or other facility with appropriate resources  Description: INTERVENTIONS:  - Identify barriers to discharge w/patient and caregiver  - Arrange for needed discharge resources and transportation as appropriate  - Identify discharge learning needs (meds, wound care, etc.)  - Arrange for interpretive services to assist at discharge as needed  - Refer to Case Management Department for coordinating discharge planning if the patient needs post-hospital services based on physician/advanced practitioner order or complex needs related to functional status, cognitive ability, or social support system  Outcome: Progressing     Problem: Knowledge Deficit  Goal: Patient/family/caregiver demonstrates understanding of disease process, treatment plan, medications, and discharge instructions  Description: Complete learning assessment and assess knowledge base.   Interventions:  - Provide teaching at level of understanding  - Provide teaching via preferred learning methods  Outcome: Progressing     Problem: RESPIRATORY - ADULT  Goal: Achieves optimal ventilation and oxygenation  Description: INTERVENTIONS:  - Assess for changes in respiratory status  - Assess for changes in mentation and behavior  - Position to facilitate oxygenation and minimize respiratory effort  - Oxygen administered by appropriate delivery if ordered  - Initiate smoking cessation education as indicated  - Encourage broncho-pulmonary hygiene including cough, deep breathe, Incentive Spirometry  - Assess the need for suctioning and aspirate as needed  - Assess and instruct to report SOB or any respiratory difficulty  - Respiratory Therapy support as indicated  Outcome: Progressing     Problem: GASTROINTESTINAL - ADULT  Goal: Minimal or absence of nausea and/or vomiting  Description: INTERVENTIONS:  - Administer IV fluids if ordered to ensure adequate hydration  - Maintain NPO status until nausea and vomiting are resolved  - Nasogastric tube if ordered  - Administer ordered antiemetic medications as needed  - Provide nonpharmacologic comfort measures as appropriate  - Advance diet as tolerated, if ordered  - Consider nutrition services referral to assist patient with adequate nutrition and appropriate food choices  Outcome: Progressing  Goal: Maintains or returns to baseline bowel function  Description: INTERVENTIONS:  - Assess bowel function  - Encourage oral fluids to ensure adequate hydration  - Administer IV fluids if ordered to ensure adequate hydration  - Administer ordered medications as needed  - Encourage mobilization and activity  - Consider nutritional services referral to assist patient with adequate nutrition and appropriate food choices  Outcome: Progressing  Goal: Maintains adequate nutritional intake  Description: INTERVENTIONS:  - Monitor percentage of each meal consumed  - Identify factors contributing to decreased intake, treat as appropriate  - Assist with meals as needed  - Monitor I&O, weight, and lab values if indicated  - Obtain nutrition services referral as needed  Outcome: Progressing  Goal: Establish and maintain optimal ostomy function  Description: INTERVENTIONS:  - Assess bowel function  - Encourage oral fluids to ensure adequate hydration  - Administer IV fluids if ordered to ensure adequate hydration   - Administer ordered medications as needed  - Encourage mobilization and activity  - Nutrition services referral to assist patient with appropriate food choices  - Assess stoma site  - Consider wound care consult   Outcome: Progressing  Goal: Oral mucous membranes remain intact  Description: INTERVENTIONS  - Assess oral mucosa and hygiene practices  - Implement preventative oral hygiene regimen  - Implement oral medicated treatments as ordered  - Initiate Nutrition services referral as needed  Outcome: Progressing     Problem: Nutrition/Hydration-ADULT  Goal: Nutrient/Hydration intake appropriate for improving, restoring or maintaining nutritional needs  Description: Monitor and assess patient's nutrition/hydration status for malnutrition.  Collaborate with interdisciplinary team and initiate plan and interventions as ordered. Monitor patient's weight and dietary intake as ordered or per policy. Utilize nutrition screening tool and intervene as necessary. Determine patient's food preferences and provide high-protein, high-caloric foods as appropriate.      INTERVENTIONS:  - Monitor oral intake, urinary output, labs, and treatment plans  - Assess nutrition and hydration status and recommend course of action  - Evaluate amount of meals eaten  - Assist patient with eating if necessary   - Allow adequate time for meals  - Recommend/ encourage appropriate diets, oral nutritional supplements, and vitamin/mineral supplements  - Order, calculate, and assess calorie counts as needed  - Recommend, monitor, and adjust tube feedings and TPN/PPN based on assessed needs  - Assess need for intravenous fluids  - Provide specific nutrition/hydration education as appropriate  - Include patient/family/caregiver in decisions related to nutrition  Outcome: Progressing

## 2023-07-01 NOTE — ASSESSMENT & PLAN NOTE
Patient presenting to the ED for constipation, abdominal pain, and vomiting. Denies any associated fever/chills, chest pain, or shortness of breath. Patient with multiple previous admissions for small bowel obstruction and both patient and family reports the symptoms are identical to prior episodes. · CT A/P: Findings above suspicious for small bowel obstruction likely secondary to adhesions overall very similar an appearance to the prior study dated 6/28/2022. No free intraperitoneal air.   · ED spoke with general surgery who recommend admission under SLIM  · Keep NPO  · Start on continuous IVF  · Pain control, anti-emetics  · If patient develops vomiting overnight, consider NG tube insertion  · Consult to General Surgery, recommendations are appreciated

## 2023-07-01 NOTE — CASE MANAGEMENT
Case Management Assessment & Discharge Planning Note    Patient name Chente Ibarra  Location [address] [facility] [id number] MRN [id number]  : 1945 Date 2023       Current Admission Date: 2023  Current Admission Diagnosis:SBO (small bowel obstruction) [facility])   Patient Active Problem List    Diagnosis Date Noted   • Leukocytosis 2023   • Morbid obesity ([address]) 2022   • Lactic acidosis 2021   • CKD (chronic kidney disease) 2018   • Dyslipidemia 2018   • History of CVA (cerebrovascular accident) 2018   • Renal cyst, left 2018   • Acute kidney injury superimposed on chronic kidney disease ([address]) 2018   • Dementia ([address]) 2018   • Elevated random blood glucose level 2018   • Essential hypertension    • Hyperlipidemia    • Ventral hernia    • SBO (small bowel obstruction) ([address])    • PVD (peripheral vascular disease) ([address])       LOS (days): 1  Geometric Mean LOS (GMLOS) (days):   Days to GMLOS:     OBJECTIVE:    Risk of Unplanned Readmission Score: 12.37         Current admission status: Inpatient       Preferred Pharmacy:   [address] [address]  Phone: [phone] Fax: [phone] [address] [place] [address]   Phone: [phone] Fax: [phone]    Primary Care Provider: Christal Frankel, DO    Primary Insurance: MEDICARE  Secondary Insurance: [employer]    ASSESSMENT:  1 [facility], [address] Representative - Son   Primary Phone: [phone] (Mobile)            Patient Information  Admitted from[de-identified] Home  Mental Status: Alert  During Assessment patient was accompanied by: Not accompanied during assessment  Assessment information provided by[de-identified] Patient  Primary Caregiver: Self  Support Systems: Son  [place] of Residence: [address] [place] [address] do you live in?: Juan entry access options.  Select all that apply.: No steps to enter home  Type of Current Residence: 2 story home  Upon entering residence, is there a bedroom on the main floor (no further steps)?: No  A bedroom is located on the following floor levels of residence (select all that apply):: 2nd Floor  Upon entering residence, is there a bathroom on the main floor (no further steps)?: Yes  Number of steps to 2nd floor from main floor: One Flight  In the last 12 months, was there a time when you were not able to pay the mortgage or rent on time?: No  In the last 12 months, how many places have you lived?: 1  In the last 12 months, was there a time when you did not have a steady place to sleep or slept in a shelter (including now)?: No  Homeless/housing insecurity resource given?: No  Living Arrangements: Lives w/ Son  Is patient a ?: No    Activities of Daily Living Prior to Admission  Functional Status: Independent  Completes ADLs independently?: Yes  Ambulates independently?: Yes  Does patient use assisted devices?: No  Does patient currently own DME?: Yes  What DME does the patient currently own?: Stair Chair/[place], [employer]  Does patient have a history of Outpatient Therapy (PT/OT)?: No  Does the patient have a history of Short-Term Rehab?: No  Does patient have a history of HHC?: No  Does patient currently have [facility]?: No    Patient Information Continued  Income Source: Pension/nursing home  Does patient have prescription coverage?: Yes  Within the past 12 months, you worried that your food would run out before you got the money to buy more.: Never true  Within the past 12 months, the food you bought just didn't last and you didn't have money to get more.: Never true  Food insecurity resource given?: No  Does patient receive dialysis treatments?: No  Does patient have a history of substance abuse?: No  Does patient have a history of Mental Health Diagnosis?: No    PHQ 2/9 Screening   Reviewed PHQ 2/9 Depression Screening Score?: No    Means of Transportation  Means of Transport to Appts[de-identified] Family transport  In the past 12 months, has lack of transportation kept you from medical appointments or from getting medications?: No  In the past 12 months, has lack of transportation kept you from meetings, work, or from getting things needed for daily living?: No  Was application for public transport provided?: No    DISCHARGE DETAILS:    Discharge planning discussed with[de-identified] Patient  Freedom of Choice: Yes     CM contacted family/caregiver?: No- see comments (VM left for son Radha Hutchinson))  Were Treatment Team discharge recommendations reviewed with patient/caregiver?: Yes  Did patient/caregiver verbalize understanding of patient care needs?: Yes  Were patient/caregiver advised of the risks associated with not following Treatment Team discharge recommendations?: Yes    [address]         Is the patient interested in [facility] at discharge?: No    DME Referral Provided  Referral made for DME?: No    Other Referral/Resources/Interventions Provided:  Interventions: None Indicated    Would you like to participate in our [address] service program?  : No - Declined    Treatment Team Recommendation: Home  Discharge Destination Plan[de-identified] Home  Transport at Discharge : Family

## 2023-07-01 NOTE — ASSESSMENT & PLAN NOTE
Lab Results   Component Value Date    EGFR 30 07/01/2023    EGFR 30 06/30/2023    EGFR 34 06/29/2022    CREATININE 2.04 (H) 07/01/2023    CREATININE 2.02 (H) 06/30/2023    CREATININE 1.86 (H) 06/29/2022     · Creatinine 2.02 (baseline 1.7-1.9)  · Creatinine at baseline

## 2023-07-01 NOTE — CONSULTS
Consultation - General Surgery   Otto Tavarez 66 y.o. male MRN: [id number]  Unit/Bed#: -73 Encounter: [id number]    ASSESSMENT:    20-year-old male with recurrent bowel obstructions secondary to adhesions  -History of large ventral/incisional hernia repair approximately 10 years ago  -History of large vascular bypass, aortic to femoral, leading to incisional hernia  -Patient reports having approximately 7 bowel obstruction secondary to adhesions in the past, which have resolved with conservative management  -Patient presented with constipation, abdominal pain, nausea, vomiting    Plan:  -Patient is unable to tolerate NGT and is currently not experiencing any vomiting or nausea. If patient continues to have pain and develops nausea and vomiting would recommend placing NGT for decompression  -NPO and IVF  -Encourage ambulation and out of bed  -Analgesics and antiemetics as needed  -Serial abdominal exams  -Closely monitor vitals and lab results  -Continue DVT prophylaxis: Currently on heparin    _______________________________________________________________  Physician Requesting Consult: Bozena Bob MD    Additional consultants: None    Reason for Consult / Principal Problem: Small bowel obstruction    HPI: Otto Tavarez is a 66y.o. year old male with a past medical history of hypertension, CVA, CKD, dementia, who presents to the emergency department with complaints of abdominal pain, constipation, nausea, and vomiting. Patient reports history of vascular bypass that require laparotomy and eventually led to incisional hernia requiring extensive surgery for repair and large mesh placement, which was done approximately 10 years ago per patient. Patient states since that last surgery he has had 7 bowel obstructions which have been thought to be secondary to adhesions. The previous bowel obstructions have resolved with conservative management and has not required any further surgical intervention.   Patient states that he had some vomiting yesterday but it resolved once he arrived to the emergency department and has not had any since. Patient is resistant to have NGT placed as it gives him migraines and was not able to tolerate it in the past.  Patient reports feeling better today at time of consultation and states that he was able to pass a small amount of flatus. Patient denies any other symptoms such as urinary symptoms, fevers, chills, chest pain, shortness of breath, palpitations, hematemesis, melena, hematochezia. Historical Information   Past Medical History:   Diagnosis Date   • CKD (chronic kidney disease) 11/8/2018   • Hyperlipidemia    • Hypertension    • PVD (peripheral vascular disease) (HCC)    • Sleep apnea    • Small bowel obstruction (HCC)    • Ventral hernia      Past Surgical History:   Procedure Laterality Date   • ABDOMINAL HERNIA REPAIR     • ABDOMINAL SURGERY     • CHOLECYSTECTOMY      open   • FEMORAL BYPASS Right      Social History   Social History     Substance and Sexual Activity   Alcohol Use Never     Social History     Substance and Sexual Activity   Drug Use Never     Social History     Tobacco Use   Smoking Status Never   Smokeless Tobacco Never     Family History: non-contributory}    Meds/Allergies   Home meds:   Prior to Admission medications    Medication Sig Start Date End Date Taking? Authorizing Provider   amLODIPine (NORVASC) 5 mg tablet TK 1 T PO D 7/11/18   Historical Provider, MD   atorvastatin (LIPITOR) 10 mg tablet TK 1 T PO D 7/11/18   Historical Provider, MD   budesonide-formoterol (SYMBICORT) 80-4.5 MCG/ACT inhaler Inhale 2 puffs 2 (two) times a day Rinse mouth after use.     Historical Provider, MD   donepezil (ARICEPT) 10 mg tablet TK 1 T PO HS 8/28/18   Historical Provider, MD   furosemide (LASIX) 40 mg tablet 80 mg daily  9/12/18   Historical Provider, MD   spironolactone (ALDACTONE) 25 mg tablet Take 12.5 mg by mouth 2 (two) times a day     Historical Provider, MD   triamterene-hydrochlorothiazide (MAXZIDE-25) 37.5-25 mg per tablet Take by mouth daily  9/13/18   Historical Provider, MD     Scheduled Meds:  Current Facility-Administered Medications   Medication Dose Route Frequency Provider Last Rate   • acetaminophen  650 mg Oral Q6H PRN Christin Henry PA-C     • budesonide-formoterol  2 puff Inhalation BID Christin Henry PA-C     • heparin (porcine)  7,500 Units Subcutaneous Q8H [address] PA-C     • HYDROmorphone  0.5 mg Intravenous Q6H PRN Christin Henry PA-C     • HYDROmorphone  0.2 mg Intravenous Q3H PRN Christin Henry PA-C     • sodium chloride  100 mL/hr Intravenous Continuous Angie Way PA-C 100 mL/hr (07/01/23 0622)     Continuous Infusions:sodium chloride, 100 mL/hr, Last Rate: 100 mL/hr (07/01/23 0622)      PRN Meds:  •  acetaminophen  •  HYDROmorphone  •  HYDROmorphone    ALLERGIES: No Known Allergies    Review of Systems: Patient is poor historian secondary to dementia and previous CVA. Some of the history and review of systems was obtained from son who was in the room  General: negative  Cardiovascular: no chest pain or dyspnea on exertion  Respiratory: no cough, shortness of breath, or wheezing  Gastrointestinal: Nominal distention, nausea, vomiting, abdominal pain, constipation  Genitourinary: no dysuria, trouble voiding, or hematuria  Musculoskeletal: negative  Neurological: Memory difficulties secondary to CVA and dementia  Hematological and Lymphatic: negative  Dermatological : negative  Psychological: negative  Ophthalmic: negative  ENT: negative      Objective   Vitals:  Blood pressure 133/57, pulse 80, temperature 97.9 °F (36.6 °C), resp. rate 18, height 5' 10" (1.778 m), weight (!) 138 kg (305 lb 5.4 oz), SpO2 96 %. Body mass index is 43.81 kg/m². SpO2: SpO2: 96 %    I/Os:  I/O       06/29 0701  06/30 0700 06/30 0701  07/01 0700 07/01 0701  07/02 0700    P. O.  0     IV Piggyback  500     Total Intake(mL/kg)  500 (3.6)     Net  +500 Unmeasured Urine Occurrence  2 x           Invasive Lines/Tubes:  Invasive Devices     Peripheral Intravenous Line  Duration           Peripheral IV 06/30/23 Dorsal (posterior); Right Hand <1 day                Physical Exam  General appearance: alert, appears stated age and cooperative  Head: Normocephalic, without obvious abnormality, atraumatic  Lungs: diminished breath sounds  Chest wall: no tenderness  Heart[de-identified] regular rate and rhythm  Abdomen: Obese, moderate distention, mild tenderness to palpation in mid abdomen, large mid abdominal scar from prior surgery noted, hypoactive bowel sounds  Genitalia: deferred  Rectal: deferred  Extremities: extremities normal, atraumatic, no cyanosis or edema  Skin: Skin color, texture, turgor normal. No rashes or lesions  Neurologic: Grossly normal    Lab Results and Cultures:   CBC:   Results from last 7 days   Lab Units 07/01/23  0503 06/30/23 2027   WBC Thousand/uL 16.79* 23.07*   HEMOGLOBIN g/dL 14.5 14.8   HEMATOCRIT % 43.6 44.5   PLATELETS Thousands/uL 251 287     BMP/CMP:  Results from last 7 days   Lab Units 07/01/23  0503 06/30/23 2027   POTASSIUM mmol/L 4.4 4.6   CHLORIDE mmol/L 96 92*   CO2 mmol/L 30 28   BUN mg/dL 38* 35*   CREATININE mg/dL 2.04* 2.02*   CALCIUM mg/dL 9.3 10.3*     Coags:     Lipid panel:     HgbA1c: No results found for: "HGBA1C"    Urinalysis:   Lab Results   Component Value Date    COLORU Yellow 08/12/2021    CLARITYU Clear 08/12/2021    SPECGRAV 1.025 08/12/2021    PHUR 6.0 08/12/2021    PHUR 6.0 11/08/2018    LEUKOCYTESUR Negative 08/12/2021    NITRITE Negative 08/12/2021    GLUCOSEU Negative 08/12/2021    KETONESU Negative 08/12/2021    BILIRUBINUR Negative 08/12/2021    BLOODU Negative 08/12/2021   ,   Urine Culture: No results found for: "URINECX"  Wound Culure: No results found for: "WOUNDCULT"  Blood Culture:   Lab Results   Component Value Date    BLOODCX No Growth After 5 Days.  08/12/2021       Imaging Studies: I have personally reviewed pertinent reports. and I have personally reviewed pertinent films in PACS  EKG, Pathology, and Other Studies: I have personally reviewed pertinent reports.    and I have personally reviewed pertinent films in PACS  VTE Prophylaxis: Heparin     Code Status: Level 1 - Full Code  Advance Directive and Living Will:      Power of :    POLST:      Counseling / Coordination of Care  None       Africa Alfonso  7/1/2023

## 2023-07-01 NOTE — ASSESSMENT & PLAN NOTE
· WBC 23.07 on admission  · Afebrile and no obvious source of infection  · Check BC x2, procalcitonin, lactic acid, UA, C diff panel, covid/flu/rsv  · Chronic leukocytosis  · Monitor off antibiotics for now

## 2023-07-01 NOTE — ASSESSMENT & PLAN NOTE
· BP stable on admission  · Hold oral anti-hypertensives due to SBO  · Add IV hydralazine for elevated BP  · Monitor vitals per routine

## 2023-07-01 NOTE — PLAN OF CARE
Problem: PAIN - ADULT  Goal: Verbalizes/displays adequate comfort level or baseline comfort level  Description: Interventions:  - Encourage patient to monitor pain and request assistance  - Assess pain using appropriate pain scale  - Administer analgesics based on type and severity of pain and evaluate response  - Implement non-pharmacological measures as appropriate and evaluate response  - Consider cultural and social influences on pain and pain management  - Notify physician/advanced practitioner if interventions unsuccessful or patient reports new pain  Outcome: Progressing     Problem: INFECTION - ADULT  Goal: Absence or prevention of progression during hospitalization  Description: INTERVENTIONS:  - Assess and monitor for signs and symptoms of infection  - Monitor lab/diagnostic results  - Monitor all insertion sites, i.e. indwelling lines, tubes, and drains  - Monitor endotracheal if appropriate and nasal secretions for changes in amount and color  - [place] appropriate cooling/warming therapies per order  - Administer medications as ordered  - Instruct and encourage patient and family to use good hand hygiene technique  - Identify and instruct in appropriate isolation precautions for identified infection/condition  Outcome: Progressing  Goal: Absence of fever/infection during neutropenic period  Description: INTERVENTIONS:  - Monitor WBC    Outcome: Progressing     Problem: SAFETY ADULT  Goal: Patient will remain free of falls  Description: INTERVENTIONS:  - Educate patient/family on patient safety including physical limitations  - Instruct patient to call for assistance with activity   - Consult OT/PT to assist with strengthening/mobility   - Keep Call bell within reach  - Keep bed low and locked with side rails adjusted as appropriate  - Keep care items and personal belongings within reach  - Initiate and maintain comfort rounds  - Make Fall Risk Sign visible to staff  - Offer Toileting every 2 Hours, in advance of need  - Initiate/Maintain bedalarm  - Obtain necessary fall risk management equipment:   - Apply yellow socks and bracelet for high fall risk patients  - Consider moving patient to room near nurses station  Outcome: Progressing  Goal: Maintain or return to baseline ADL function  Description: INTERVENTIONS:  -  Assess patient's ability to carry out ADLs; assess patient's baseline for ADL function and identify physical deficits which impact ability to perform ADLs (bathing, care of mouth/teeth, toileting, grooming, dressing, etc.)  - Assess/evaluate cause of self-care deficits   - Assess range of motion  - Assess patient's mobility; develop plan if impaired  - Assess patient's need for assistive devices and provide as appropriate  - Encourage maximum independence but intervene and supervise when necessary  - Involve family in performance of ADLs  - Assess for home care needs following discharge   - Consider OT consult to assist with ADL evaluation and planning for discharge  - Provide patient education as appropriate  Outcome: Progressing  Goal: Maintains/Returns to pre admission functional level  Description: INTERVENTIONS:  - Perform BMAT or MOVE assessment daily.   - Set and communicate daily mobility goal to care team and patient/family/caregiver. - Collaborate with rehabilitation services on mobility goals if consulted  - Perform Range of Motion 3 times a day. - Reposition patient every 2 hours.   - Dangle patient 3 times a day  - Stand patient 3 times a day  - Ambulate patient 3 times a day  - Out of bed to chair 3 times a day   - Out of bed for meals 3 times a day  - Out of bed for toileting  - Record patient progress and toleration of activity level   Outcome: Progressing     Problem: DISCHARGE PLANNING  Goal: Discharge to home or other facility with appropriate resources  Description: INTERVENTIONS:  - Identify barriers to discharge w/patient and caregiver  - Arrange for needed discharge resources and transportation as appropriate  - Identify discharge learning needs (meds, wound care, etc.)  - Arrange for interpretive services to assist at discharge as needed  - Refer to Case Management Department for coordinating discharge planning if the patient needs post-hospital services based on physician/advanced practitioner order or complex needs related to functional status, cognitive ability, or social support system  Outcome: Progressing     Problem: Knowledge Deficit  Goal: Patient/family/caregiver demonstrates understanding of disease process, treatment plan, medications, and discharge instructions  Description: Complete learning assessment and assess knowledge base.   Interventions:  - Provide teaching at level of understanding  - Provide teaching via preferred learning methods  Outcome: Progressing     Problem: RESPIRATORY - ADULT  Goal: Achieves optimal ventilation and oxygenation  Description: INTERVENTIONS:  - Assess for changes in respiratory status  - Assess for changes in mentation and behavior  - Position to facilitate oxygenation and minimize respiratory effort  - Oxygen administered by appropriate delivery if ordered  - Initiate smoking cessation education as indicated  - Encourage broncho-pulmonary hygiene including cough, deep breathe, Incentive Spirometry  - Assess the need for suctioning and aspirate as needed  - Assess and instruct to report SOB or any respiratory difficulty  - Respiratory Therapy support as indicated  Outcome: Progressing     Problem: GASTROINTESTINAL - ADULT  Goal: Minimal or absence of nausea and/or vomiting  Description: INTERVENTIONS:  - Administer IV fluids if ordered to ensure adequate hydration  - Maintain NPO status until nausea and vomiting are resolved  - Nasogastric tube if ordered  - Administer ordered antiemetic medications as needed  - Provide nonpharmacologic comfort measures as appropriate  - Advance diet as tolerated, if ordered  - Consider nutrition services referral to assist patient with adequate nutrition and appropriate food choices  Outcome: Progressing  Goal: Maintains or returns to baseline bowel function  Description: INTERVENTIONS:  - Assess bowel function  - Encourage oral fluids to ensure adequate hydration  - Administer IV fluids if ordered to ensure adequate hydration  - Administer ordered medications as needed  - Encourage mobilization and activity  - Consider nutritional services referral to assist patient with adequate nutrition and appropriate food choices  Outcome: Progressing  Goal: Maintains adequate nutritional intake  Description: INTERVENTIONS:  - Monitor percentage of each meal consumed  - Identify factors contributing to decreased intake, treat as appropriate  - Assist with meals as needed  - Monitor I&O, weight, and lab values if indicated  - Obtain nutrition services referral as needed  Outcome: Progressing  Goal: Establish and maintain optimal ostomy function  Description: INTERVENTIONS:  - Assess bowel function  - Encourage oral fluids to ensure adequate hydration  - Administer IV fluids if ordered to ensure adequate hydration   - Administer ordered medications as needed  - Encourage mobilization and activity  - Nutrition services referral to assist patient with appropriate food choices  - Assess stoma site  - Consider wound care consult   Outcome: Progressing  Goal: Oral mucous membranes remain intact  Description: INTERVENTIONS  - Assess oral mucosa and hygiene practices  - Implement preventative oral hygiene regimen  - Implement oral medicated treatments as ordered  - Initiate Nutrition services referral as needed  Outcome: Progressing

## 2023-07-02 LAB
ANION GAP SERPL CALCULATED.3IONS-SCNC: 6 MMOL/L
BASOPHILS # BLD AUTO: 0.02 THOUSANDS/ÂΜL (ref 0–0.1)
BASOPHILS NFR BLD AUTO: 0 % (ref 0–1)
BUN SERPL-MCNC: 37 MG/DL (ref 5–25)
CALCIUM SERPL-MCNC: 8.5 MG/DL (ref 8.4–10.2)
CHLORIDE SERPL-SCNC: 100 MMOL/L (ref 96–108)
CO2 SERPL-SCNC: 30 MMOL/L (ref 21–32)
CREAT SERPL-MCNC: 1.65 MG/DL (ref 0.6–1.3)
EOSINOPHIL # BLD AUTO: 0.21 THOUSAND/ÂΜL (ref 0–0.61)
EOSINOPHIL NFR BLD AUTO: 2 % (ref 0–6)
ERYTHROCYTE [DISTWIDTH] IN BLOOD BY AUTOMATED COUNT: 13.3 % (ref 11.6–15.1)
GFR SERPL CREATININE-BSD FRML MDRD: 39 ML/MIN/1.73SQ M
GLUCOSE SERPL-MCNC: 146 MG/DL (ref 65–140)
HCT VFR BLD AUTO: 39.4 % (ref 36.5–49.3)
HGB BLD-MCNC: 12.4 G/DL (ref 12–17)
IMM GRANULOCYTES # BLD AUTO: 0.04 THOUSAND/UL (ref 0–0.2)
IMM GRANULOCYTES NFR BLD AUTO: 0 % (ref 0–2)
LYMPHOCYTES # BLD AUTO: 1.03 THOUSANDS/ÂΜL (ref 0.6–4.47)
LYMPHOCYTES NFR BLD AUTO: 10 % (ref 14–44)
MCH RBC QN AUTO: 29.7 PG (ref 26.8–34.3)
MCHC RBC AUTO-ENTMCNC: 31.5 G/DL (ref 31.4–37.4)
MCV RBC AUTO: 94 FL (ref 82–98)
MONOCYTES # BLD AUTO: 1 THOUSAND/ÂΜL (ref 0.17–1.22)
MONOCYTES NFR BLD AUTO: 10 % (ref 4–12)
NEUTROPHILS # BLD AUTO: 7.84 THOUSANDS/ÂΜL (ref 1.85–7.62)
NEUTS SEG NFR BLD AUTO: 78 % (ref 43–75)
NRBC BLD AUTO-RTO: 0 /100 WBCS
PLATELET # BLD AUTO: 208 THOUSANDS/UL (ref 149–390)
PMV BLD AUTO: 10.9 FL (ref 8.9–12.7)
POTASSIUM SERPL-SCNC: 4.1 MMOL/L (ref 3.5–5.3)
RBC # BLD AUTO: 4.18 MILLION/UL (ref 3.88–5.62)
SODIUM SERPL-SCNC: 136 MMOL/L (ref 135–147)
WBC # BLD AUTO: 10.14 THOUSAND/UL (ref 4.31–10.16)

## 2023-07-02 PROCEDURE — 99232 SBSQ HOSP IP/OBS MODERATE 35: CPT | Performed by: INTERNAL MEDICINE

## 2023-07-02 PROCEDURE — 80048 BASIC METABOLIC PNL TOTAL CA: CPT | Performed by: INTERNAL MEDICINE

## 2023-07-02 PROCEDURE — 99232 SBSQ HOSP IP/OBS MODERATE 35: CPT | Performed by: SURGERY

## 2023-07-02 PROCEDURE — 85025 COMPLETE CBC W/AUTO DIFF WBC: CPT | Performed by: INTERNAL MEDICINE

## 2023-07-02 RX ORDER — HYDROMORPHONE HCL IN WATER/PF 6 MG/30 ML
0.2 PATIENT CONTROLLED ANALGESIA SYRINGE INTRAVENOUS ONCE
Status: COMPLETED | OUTPATIENT
Start: 2023-07-02 | End: 2023-07-02

## 2023-07-02 RX ADMIN — HYDROMORPHONE HYDROCHLORIDE 0.5 MG: 1 INJECTION, SOLUTION INTRAMUSCULAR; INTRAVENOUS; SUBCUTANEOUS at 10:54

## 2023-07-02 RX ADMIN — SODIUM CHLORIDE 100 ML/HR: 0.9 INJECTION, SOLUTION INTRAVENOUS at 14:19

## 2023-07-02 RX ADMIN — HYDROMORPHONE HYDROCHLORIDE 0.2 MG: 0.2 INJECTION, SOLUTION INTRAMUSCULAR; INTRAVENOUS; SUBCUTANEOUS at 21:07

## 2023-07-02 RX ADMIN — SODIUM CHLORIDE 100 ML/HR: 0.9 INJECTION, SOLUTION INTRAVENOUS at 04:20

## 2023-07-02 RX ADMIN — HEPARIN SODIUM 7500 UNITS: 5000 INJECTION INTRAVENOUS; SUBCUTANEOUS at 21:08

## 2023-07-02 RX ADMIN — HYDROMORPHONE HYDROCHLORIDE 0.5 MG: 1 INJECTION, SOLUTION INTRAMUSCULAR; INTRAVENOUS; SUBCUTANEOUS at 04:17

## 2023-07-02 NOTE — PROGRESS NOTES
Progress Note - Mendoza Mena 66 y.o. 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  -Patient reports having approximately 7 bowel obstruction secondary to adhesions in the past, which have resolved with conservative management  -Patient presented with constipation, abdominal pain, nausea, vomiting  -Patient now reporting small amounts of flatus but still feeling very distended     Plan:  -Patient reports feeling better overall but still feeling bloated, though he states he has been passing small amounts of flatus  -We will give trial of sips of clears  -Order small bowel follow-through for tomorrow morning, to better evaluate SBO  -Encourage ambulation and out of bed  -Analgesics and antiemetics as needed  -Serial abdominal exams  -Closely monitor vitals and lab results  -Continue DVT prophylaxis: Currently on heparin    Subjective:   No acute events overnight. Patient reports feeling better overall but still feeling distended, reports small amounts of flatus, denies any nausea or vomiting. Objective:     Vitals: Blood pressure 147/59, pulse 88, temperature 98.3 °F (36.8 °C), resp. rate 18, height 5' 10" (1.778 m), weight (!) 138 kg (305 lb 5.4 oz), SpO2 92 %. ,Body mass index is 43.81 kg/m².       Intake/Output Summary (Last 24 hours) at 7/2/2023 1237  Last data filed at 7/2/2023 0357  Gross per 24 hour   Intake 0 ml   Output 0 ml   Net 0 ml       Physical Exam: /59   Pulse 88   Temp 98.3 °F (36.8 °C)   Resp 18   Ht 5' 10" (1.778 m)   Wt (!) 138 kg (305 lb 5.4 oz)   SpO2 92%   BMI 43.81 kg/m²   General appearance: alert and oriented, in no acute distress  Lungs: diminished breath sounds  Heart: regular rate and rhythm  Abdomen: Obese, moderately distended, hypoactive bowel sounds present, tenderness to palpation in the mid upper abdomen  Extremities: extremities normal, warm and well-perfused; no cyanosis, clubbing, or edema     Invasive Devices     Peripheral Intravenous Line  Duration           Peripheral IV 07/02/23 Dorsal (posterior); Left Hand <1 day                Lab, Imaging and other studies: I have personally reviewed pertinent reports.    and I have personally reviewed pertinent films in PACS  VTE Pharmacologic Prophylaxis: Heparin  VTE Mechanical Prophylaxis: sequential compression device

## 2023-07-02 NOTE — ASSESSMENT & PLAN NOTE
Lab Results   Component Value Date    EGFR 39 07/02/2023    EGFR 30 07/01/2023    EGFR 30 06/30/2023    CREATININE 1.65 (H) 07/02/2023    CREATININE 2.04 (H) 07/01/2023    CREATININE 2.02 (H) 06/30/2023     · Creatinine 2.02 (baseline 1.7-1.9)  · Creatinine at baseline

## 2023-07-02 NOTE — QUICK NOTE
Updated by nursing, patient requesting to speak with provider overnight. Reports his abdominal pain is ongoing, requests stronger pain medication, and would like to pursue NGT placement at this time. Abdominal exam appears unchanged from admission. IV dilaudid dose, NGT, and chloraseptic numbing spray ordered.

## 2023-07-02 NOTE — PLAN OF CARE
Problem: PAIN - ADULT  Goal: Verbalizes/displays adequate comfort level or baseline comfort level  Description: Interventions:  - Encourage patient to monitor pain and request assistance  - Assess pain using appropriate pain scale  - Administer analgesics based on type and severity of pain and evaluate response  - Implement non-pharmacological measures as appropriate and evaluate response  - Consider cultural and social influences on pain and pain management  - Notify physician/advanced practitioner if interventions unsuccessful or patient reports new pain  Outcome: Progressing     Problem: INFECTION - ADULT  Goal: Absence or prevention of progression during hospitalization  Description: INTERVENTIONS:  - Assess and monitor for signs and symptoms of infection  - Monitor lab/diagnostic results  - Monitor all insertion sites, i.e. indwelling lines, tubes, and drains  - Monitor endotracheal if appropriate and nasal secretions for changes in amount and color  - [place] appropriate cooling/warming therapies per order  - Administer medications as ordered  - Instruct and encourage patient and family to use good hand hygiene technique  - Identify and instruct in appropriate isolation precautions for identified infection/condition  Outcome: Progressing  Goal: Absence of fever/infection during neutropenic period  Description: INTERVENTIONS:  - Monitor WBC    Outcome: Progressing     Problem: SAFETY ADULT  Goal: Patient will remain free of falls  Description: INTERVENTIONS:  - Educate patient/family on patient safety including physical limitations  - Instruct patient to call for assistance with activity   - Consult OT/PT to assist with strengthening/mobility   - Keep Call bell within reach  - Keep bed low and locked with side rails adjusted as appropriate  - Keep care items and personal belongings within reach  - Initiate and maintain comfort rounds  - Make Fall Risk Sign visible to staff  - Offer Toileting every *** Hours, in advance of need  - Initiate/Maintain ***alarm  - Obtain necessary fall risk management equipment: ***  - Apply yellow socks and bracelet for high fall risk patients  - Consider moving patient to room near nurses station  Outcome: Progressing  Goal: Maintain or return to baseline ADL function  Description: INTERVENTIONS:  -  Assess patient's ability to carry out ADLs; assess patient's baseline for ADL function and identify physical deficits which impact ability to perform ADLs (bathing, care of mouth/teeth, toileting, grooming, dressing, etc.)  - Assess/evaluate cause of self-care deficits   - Assess range of motion  - Assess patient's mobility; develop plan if impaired  - Assess patient's need for assistive devices and provide as appropriate  - Encourage maximum independence but intervene and supervise when necessary  - Involve family in performance of ADLs  - Assess for home care needs following discharge   - Consider OT consult to assist with ADL evaluation and planning for discharge  - Provide patient education as appropriate  Outcome: Progressing  Goal: Maintains/Returns to pre admission functional level  Description: INTERVENTIONS:  - Perform BMAT or MOVE assessment daily.   - Set and communicate daily mobility goal to care team and patient/family/caregiver. - Collaborate with rehabilitation services on mobility goals if consulted  - Perform Range of Motion *** times a day. - Reposition patient every *** hours.   - Dangle patient *** times a day  - Stand patient *** times a day  - Ambulate patient *** times a day  - Out of bed to chair *** times a day   - Out of bed for meals *** times a day  - Out of bed for toileting  - Record patient progress and toleration of activity level   Outcome: Progressing     Problem: DISCHARGE PLANNING  Goal: Discharge to home or other facility with appropriate resources  Description: INTERVENTIONS:  - Identify barriers to discharge w/patient and caregiver  - Arrange for needed discharge resources and transportation as appropriate  - Identify discharge learning needs (meds, wound care, etc.)  - Arrange for interpretive services to assist at discharge as needed  - Refer to Case Management Department for coordinating discharge planning if the patient needs post-hospital services based on physician/advanced practitioner order or complex needs related to functional status, cognitive ability, or social support system  Outcome: Progressing     Problem: Knowledge Deficit  Goal: Patient/family/caregiver demonstrates understanding of disease process, treatment plan, medications, and discharge instructions  Description: Complete learning assessment and assess knowledge base.   Interventions:  - Provide teaching at level of understanding  - Provide teaching via preferred learning methods  Outcome: Progressing     Problem: RESPIRATORY - ADULT  Goal: Achieves optimal ventilation and oxygenation  Description: INTERVENTIONS:  - Assess for changes in respiratory status  - Assess for changes in mentation and behavior  - Position to facilitate oxygenation and minimize respiratory effort  - Oxygen administered by appropriate delivery if ordered  - Initiate smoking cessation education as indicated  - Encourage broncho-pulmonary hygiene including cough, deep breathe, Incentive Spirometry  - Assess the need for suctioning and aspirate as needed  - Assess and instruct to report SOB or any respiratory difficulty  - Respiratory Therapy support as indicated  Outcome: Progressing     Problem: GASTROINTESTINAL - ADULT  Goal: Minimal or absence of nausea and/or vomiting  Description: INTERVENTIONS:  - Administer IV fluids if ordered to ensure adequate hydration  - Maintain NPO status until nausea and vomiting are resolved  - Nasogastric tube if ordered  - Administer ordered antiemetic medications as needed  - Provide nonpharmacologic comfort measures as appropriate  - Advance diet as tolerated, if ordered  - Consider nutrition services referral to assist patient with adequate nutrition and appropriate food choices  Outcome: Progressing  Goal: Maintains or returns to baseline bowel function  Description: INTERVENTIONS:  - Assess bowel function  - Encourage oral fluids to ensure adequate hydration  - Administer IV fluids if ordered to ensure adequate hydration  - Administer ordered medications as needed  - Encourage mobilization and activity  - Consider nutritional services referral to assist patient with adequate nutrition and appropriate food choices  Outcome: Progressing  Goal: Maintains adequate nutritional intake  Description: INTERVENTIONS:  - Monitor percentage of each meal consumed  - Identify factors contributing to decreased intake, treat as appropriate  - Assist with meals as needed  - Monitor I&O, weight, and lab values if indicated  - Obtain nutrition services referral as needed  Outcome: Progressing  Goal: Establish and maintain optimal ostomy function  Description: INTERVENTIONS:  - Assess bowel function  - Encourage oral fluids to ensure adequate hydration  - Administer IV fluids if ordered to ensure adequate hydration   - Administer ordered medications as needed  - Encourage mobilization and activity  - Nutrition services referral to assist patient with appropriate food choices  - Assess stoma site  - Consider wound care consult   Outcome: Progressing  Goal: Oral mucous membranes remain intact  Description: INTERVENTIONS  - Assess oral mucosa and hygiene practices  - Implement preventative oral hygiene regimen  - Implement oral medicated treatments as ordered  - Initiate Nutrition services referral as needed  Outcome: Progressing     Problem: Nutrition/Hydration-ADULT  Goal: Nutrient/Hydration intake appropriate for improving, restoring or maintaining nutritional needs  Description: Monitor and assess patient's nutrition/hydration status for malnutrition.  Collaborate with interdisciplinary team and initiate plan and interventions as ordered. Monitor patient's weight and dietary intake as ordered or per policy. Utilize nutrition screening tool and intervene as necessary. Determine patient's food preferences and provide high-protein, high-caloric foods as appropriate.      INTERVENTIONS:  - Monitor oral intake, urinary output, labs, and treatment plans  - Assess nutrition and hydration status and recommend course of action  - Evaluate amount of meals eaten  - Assist patient with eating if necessary   - Allow adequate time for meals  - Recommend/ encourage appropriate diets, oral nutritional supplements, and vitamin/mineral supplements  - Order, calculate, and assess calorie counts as needed  - Recommend, monitor, and adjust tube feedings and TPN/PPN based on assessed needs  - Assess need for intravenous fluids  - Provide specific nutrition/hydration education as appropriate  - Include patient/family/caregiver in decisions related to nutrition  Outcome: Progressing

## 2023-07-02 NOTE — ASSESSMENT & PLAN NOTE
· WBC 23.07 on admission  · Afebrile and no obvious source of infection  · Reactive  · Since resolved  · Monitor off antibiotics

## 2023-07-02 NOTE — ASSESSMENT & PLAN NOTE
· Patient presenting to the ED for constipation, abdominal pain, and vomiting. Denies any associated fever/chills, chest pain, or shortness of breath. Patient with multiple previous admissions for small bowel obstruction and both patient and family reports the symptoms are identical to prior episodes. · CT A/P: Findings above suspicious for small bowel obstruction likely secondary to adhesions overall very similar an appearance to the prior study dated 6/28/2022. No free intraperitoneal air.   · ED spoke with general surgery who recommend admission under [facility]IM  · Remains npo  · C/w IVF  · Pain control, anti-emetics  · Advance diet per surgical team

## 2023-07-02 NOTE — PROGRESS NOTES
[address]  Progress Note  Name: Jazmine Morales  MRN: [id number]  Unit/Bed#: -76 I Date of Admission: 6/30/2023   Date of Service: 7/2/2023 I Hospital Day: 2    Assessment/Plan   * SBO (small bowel obstruction) ([address])  Assessment & Plan  · Patient presenting to the ED for constipation, abdominal pain, and vomiting. Denies any associated fever/chills, chest pain, or shortness of breath. Patient with multiple previous admissions for small bowel obstruction and both patient and family reports the symptoms are identical to prior episodes. · CT A/P: Findings above suspicious for small bowel obstruction likely secondary to adhesions overall very similar an appearance to the prior study dated 6/28/2022. No free intraperitoneal air. · ED spoke with general surgery who recommend admission under SLIM  · Remains npo  · C/w IVF  · Pain control, anti-emetics  · Advance diet per surgical team    Leukocytosis  Assessment & Plan  · WBC 23.07 on admission  · Afebrile and no obvious source of infection  · Reactive  · Since resolved  · Monitor off antibiotics    CKD (chronic kidney disease)  Assessment & Plan  Lab Results   Component Value Date    EGFR 39 07/02/2023    EGFR 30 07/01/2023    EGFR 30 06/30/2023    CREATININE 1.65 (H) 07/02/2023    CREATININE 2.04 (H) 07/01/2023    CREATININE 2.02 (H) 06/30/2023     · Creatinine 2.02 (baseline 1.7-1.9)  · Creatinine at baseline    History of CVA (cerebrovascular accident)  Assessment & Plan  · C/w statin     Dementia ([address])  Assessment & Plan  · Takes aricept at home  · Delirium precautions    Essential hypertension  Assessment & Plan  · Blood pressure controlled           VTE Pharmacologic Prophylaxis:   Pharmacologic: Heparin  Mechanical VTE Prophylaxis in Place: Yes    Patient Centered Rounds: I have performed bedside rounds with nursing staff today.     Discussions with Specialists or Other Care Team Provider: cm, nursing    Education and Discussions with Family / Patient: pt    Time Spent for Care: 30 minutes. More than 50% of total time spent on counseling and coordination of care as described above. Current Length of Stay: 2 day(s)    Current Patient Status: Inpatient   Certification Statement: The patient will continue to require additional inpatient hospital stay due to see below    Discharge Plan: pending improvement of SBO    Code Status: Level 1 - Full Code      Subjective:   Denies chest pain, sob, cough, fevers    Objective:     Vitals:   Temp (24hrs), Av.9 °F (36.6 °C), Min:97.3 °F (36.3 °C), Max:98.3 °F (36.8 °C)    Temp:  [97.3 °F (36.3 °C)-98.3 °F (36.8 °C)] 98.3 °F (36.8 °C)  HR:  [88-99] 88  Resp:  [18-20] 18  BP: (129-165)/(59-69) 147/59  SpO2:  [87 %-93 %] 92 %  Body mass index is 43.81 kg/m². Input and Output Summary (last 24 hours): Intake/Output Summary (Last 24 hours) at 2023 1043  Last data filed at 2023 0357  Gross per 24 hour   Intake 0 ml   Output 0 ml   Net 0 ml       Physical Exam:     Physical Exam  Constitutional:       General: He is not in acute distress. Appearance: He is well-developed. He is not diaphoretic. HENT:      Head: Normocephalic and atraumatic. Nose: Nose normal.      Mouth/Throat:      Pharynx: No oropharyngeal exudate. Eyes:      General: No scleral icterus. Conjunctiva/sclera: Conjunctivae normal.   Cardiovascular:      Rate and Rhythm: Normal rate and regular rhythm. Heart sounds: Normal heart sounds. No murmur heard. No friction rub. No gallop. Pulmonary:      Effort: Pulmonary effort is normal. No respiratory distress. Breath sounds: Normal breath sounds. No wheezing or rales. Chest:      Chest wall: No tenderness. Abdominal:      General: Bowel sounds are normal. There is no distension. Palpations: Abdomen is soft. Tenderness: There is no abdominal tenderness. There is no guarding. Musculoskeletal:         General: No tenderness or deformity. Normal range of motion. Cervical back: Normal range of motion and neck supple. Skin:     General: Skin is warm and dry. Findings: No erythema. Neurological:      Mental Status: He is alert. Mental status is at baseline. Additional Data:     Labs:    Results from last 7 days   Lab Units 07/02/23  0544   WBC Thousand/uL 10.14   HEMOGLOBIN g/dL 12.4   HEMATOCRIT % 39.4   PLATELETS Thousands/uL 208   NEUTROS PCT % 78*   LYMPHS PCT % 10*   MONOS PCT % 10   EOS PCT % 2     Results from last 7 days   Lab Units 07/02/23  0544 07/01/23  0503 06/30/23 2027   SODIUM mmol/L 136   < > 133*   POTASSIUM mmol/L 4.1   < > 4.6   CHLORIDE mmol/L 100   < > 92*   CO2 mmol/L 30   < > 28   BUN mg/dL 37*   < > 35*   CREATININE mg/dL 1.65*   < > 2.02*   ANION GAP mmol/L 6   < > 13   CALCIUM mg/dL 8.5   < > 10.3*   ALBUMIN g/dL  --   --  4.3   TOTAL BILIRUBIN mg/dL  --   --  0.84   ALK PHOS U/L  --   --  81   ALT U/L  --   --  26   AST U/L  --   --  19   GLUCOSE RANDOM mg/dL 146*   < > 188*    < > = values in this interval not displayed. Results from last 7 days   Lab Units 07/01/23  0906 07/01/23  0503 07/01/23  0135 06/30/23  2208   LACTIC ACID mmol/L 2.0 2.6* 2.3* 2.9*   PROCALCITONIN ng/ml  --   --   --  0.18           * I Have Reviewed All Lab Data Listed Above. * Additional Pertinent Lab Tests Reviewed: All Labs Within Last 24 Hours Reviewed    Imaging:    Imaging Reports Reviewed Today Include: na  Imaging Personally Reviewed by Myself Includes:  na    Recent Cultures (last 7 days):     Results from last 7 days   Lab Units 06/30/23  2235 06/30/23  2217   BLOOD CULTURE  Received in Microbiology Lab. Culture in Progress. Received in Microbiology Lab. Culture in Progress.        Last 24 Hours Medication List:   Current Facility-Administered Medications   Medication Dose Route Frequency Provider Last Rate   • acetaminophen  650 mg Oral Q6H PRN Alexus Ornelas PA-C     • budesonide-formoterol  2 puff Inhalation BID Leeanna Stain, PA-C     • heparin (porcine)  7,500 Units Subcutaneous Q8H [address] Angie Way PA-C     • HYDROmorphone  0.5 mg Intravenous Q6H PRN Leeanna Stain, PA-C     • HYDROmorphone  0.2 mg Intravenous Q3H PRN Leeanna Stain, PA-C     • phenol  1 spray Mouth/Throat Q2H PRN Avery ANGEL Baer     • sodium chloride  100 mL/hr Intravenous Continuous Angie Way PA-C 100 mL/hr (07/02/23 0420)        Today, Patient Was Seen By: Thanh Carlos MD    ** Please Note: Dictation voice to text software may have been used in the creation of this document.  **

## 2023-07-02 NOTE — NURSING NOTE
Attempts made to insert NG tube patient could not tolerate. He now refused saying "they have to knock me out that's the only way I would do it", SLIM made aware.

## 2023-07-02 NOTE — PLAN OF CARE
Problem: PAIN - ADULT  Goal: Verbalizes/displays adequate comfort level or baseline comfort level  Description: Interventions:  - Encourage patient to monitor pain and request assistance  - Assess pain using appropriate pain scale  - Administer analgesics based on type and severity of pain and evaluate response  - Implement non-pharmacological measures as appropriate and evaluate response  - Consider cultural and social influences on pain and pain management  - Notify physician/advanced practitioner if interventions unsuccessful or patient reports new pain  Outcome: Progressing     Problem: INFECTION - ADULT  Goal: Absence or prevention of progression during hospitalization  Description: INTERVENTIONS:  - Assess and monitor for signs and symptoms of infection  - Monitor lab/diagnostic results  - Monitor all insertion sites, i.e. indwelling lines, tubes, and drains  - Monitor endotracheal if appropriate and nasal secretions for changes in amount and color  - Ivel appropriate cooling/warming therapies per order  - Administer medications as ordered  - Instruct and encourage patient and family to use good hand hygiene technique  - Identify and instruct in appropriate isolation precautions for identified infection/condition  Outcome: Progressing  Goal: Absence of fever/infection during neutropenic period  Description: INTERVENTIONS:  - Monitor WBC    Outcome: Progressing     Problem: GASTROINTESTINAL - ADULT  Goal: Minimal or absence of nausea and/or vomiting  Description: INTERVENTIONS:  - Administer IV fluids if ordered to ensure adequate hydration  - Maintain NPO status until nausea and vomiting are resolved  - Nasogastric tube if ordered  - Administer ordered antiemetic medications as needed  - Provide nonpharmacologic comfort measures as appropriate  - Advance diet as tolerated, if ordered  - Consider nutrition services referral to assist patient with adequate nutrition and appropriate food choices  Outcome: Progressing  Goal: Maintains or returns to baseline bowel function  Description: INTERVENTIONS:  - Assess bowel function  - Encourage oral fluids to ensure adequate hydration  - Administer IV fluids if ordered to ensure adequate hydration  - Administer ordered medications as needed  - Encourage mobilization and activity  - Consider nutritional services referral to assist patient with adequate nutrition and appropriate food choices  Outcome: Progressing  Goal: Maintains adequate nutritional intake  Description: INTERVENTIONS:  - Monitor percentage of each meal consumed  - Identify factors contributing to decreased intake, treat as appropriate  - Assist with meals as needed  - Monitor I&O, weight, and lab values if indicated  - Obtain nutrition services referral as needed  Outcome: Progressing  Goal: Establish and maintain optimal ostomy function  Description: INTERVENTIONS:  - Assess bowel function  - Encourage oral fluids to ensure adequate hydration  - Administer IV fluids if ordered to ensure adequate hydration   - Administer ordered medications as needed  - Encourage mobilization and activity  - Nutrition services referral to assist patient with appropriate food choices  - Assess stoma site  - Consider wound care consult   Outcome: Progressing  Goal: Oral mucous membranes remain intact  Description: INTERVENTIONS  - Assess oral mucosa and hygiene practices  - Implement preventative oral hygiene regimen  - Implement oral medicated treatments as ordered  - Initiate Nutrition services referral as needed  Outcome: Progressing     Problem: Nutrition/Hydration-ADULT  Goal: Nutrient/Hydration intake appropriate for improving, restoring or maintaining nutritional needs  Description: Monitor and assess patient's nutrition/hydration status for malnutrition. Collaborate with interdisciplinary team and initiate plan and interventions as ordered. Monitor patient's weight and dietary intake as ordered or per policy.  Utilize nutrition screening tool and intervene as necessary. Determine patient's food preferences and provide high-protein, high-caloric foods as appropriate.      INTERVENTIONS:  - Monitor oral intake, urinary output, labs, and treatment plans  - Assess nutrition and hydration status and recommend course of action  - Evaluate amount of meals eaten  - Assist patient with eating if necessary   - Allow adequate time for meals  - Recommend/ encourage appropriate diets, oral nutritional supplements, and vitamin/mineral supplements  - Order, calculate, and assess calorie counts as needed  - Recommend, monitor, and adjust tube feedings and TPN/PPN based on assessed needs  - Assess need for intravenous fluids  - Provide specific nutrition/hydration education as appropriate  - Include patient/family/caregiver in decisions related to nutrition  Outcome: Progressing

## 2023-07-03 ENCOUNTER — APPOINTMENT (INPATIENT)
Dept: RADIOLOGY | Facility: HOSPITAL | Age: 78
DRG: 389 | End: 2023-07-03
Payer: MEDICARE

## 2023-07-03 LAB
ANION GAP SERPL CALCULATED.3IONS-SCNC: 7 MMOL/L
BASOPHILS # BLD AUTO: 0.01 THOUSANDS/ÂΜL (ref 0–0.1)
BASOPHILS NFR BLD AUTO: 0 % (ref 0–1)
BUN SERPL-MCNC: 29 MG/DL (ref 5–25)
CALCIUM SERPL-MCNC: 8.4 MG/DL (ref 8.4–10.2)
CHLORIDE SERPL-SCNC: 105 MMOL/L (ref 96–108)
CO2 SERPL-SCNC: 27 MMOL/L (ref 21–32)
CREAT SERPL-MCNC: 1.53 MG/DL (ref 0.6–1.3)
EOSINOPHIL # BLD AUTO: 0.22 THOUSAND/ÂΜL (ref 0–0.61)
EOSINOPHIL NFR BLD AUTO: 3 % (ref 0–6)
ERYTHROCYTE [DISTWIDTH] IN BLOOD BY AUTOMATED COUNT: 13.4 % (ref 11.6–15.1)
GFR SERPL CREATININE-BSD FRML MDRD: 42 ML/MIN/1.73SQ M
GLUCOSE SERPL-MCNC: 120 MG/DL (ref 65–140)
HCT VFR BLD AUTO: 36.3 % (ref 36.5–49.3)
HGB BLD-MCNC: 11.9 G/DL (ref 12–17)
IMM GRANULOCYTES # BLD AUTO: 0.04 THOUSAND/UL (ref 0–0.2)
IMM GRANULOCYTES NFR BLD AUTO: 1 % (ref 0–2)
LYMPHOCYTES # BLD AUTO: 1.05 THOUSANDS/ÂΜL (ref 0.6–4.47)
LYMPHOCYTES NFR BLD AUTO: 15 % (ref 14–44)
MCH RBC QN AUTO: 30.7 PG (ref 26.8–34.3)
MCHC RBC AUTO-ENTMCNC: 32.8 G/DL (ref 31.4–37.4)
MCV RBC AUTO: 94 FL (ref 82–98)
MONOCYTES # BLD AUTO: 0.82 THOUSAND/ÂΜL (ref 0.17–1.22)
MONOCYTES NFR BLD AUTO: 12 % (ref 4–12)
NEUTROPHILS # BLD AUTO: 4.66 THOUSANDS/ÂΜL (ref 1.85–7.62)
NEUTS SEG NFR BLD AUTO: 69 % (ref 43–75)
NRBC BLD AUTO-RTO: 0 /100 WBCS
PLATELET # BLD AUTO: 195 THOUSANDS/UL (ref 149–390)
PMV BLD AUTO: 10.9 FL (ref 8.9–12.7)
POTASSIUM SERPL-SCNC: 3.9 MMOL/L (ref 3.5–5.3)
RBC # BLD AUTO: 3.88 MILLION/UL (ref 3.88–5.62)
SODIUM SERPL-SCNC: 139 MMOL/L (ref 135–147)
WBC # BLD AUTO: 6.8 THOUSAND/UL (ref 4.31–10.16)

## 2023-07-03 PROCEDURE — 85025 COMPLETE CBC W/AUTO DIFF WBC: CPT | Performed by: INTERNAL MEDICINE

## 2023-07-03 PROCEDURE — 80048 BASIC METABOLIC PNL TOTAL CA: CPT | Performed by: INTERNAL MEDICINE

## 2023-07-03 PROCEDURE — 74250 X-RAY XM SM INT 1CNTRST STD: CPT

## 2023-07-03 PROCEDURE — 99232 SBSQ HOSP IP/OBS MODERATE 35: CPT | Performed by: STUDENT IN AN ORGANIZED HEALTH CARE EDUCATION/TRAINING PROGRAM

## 2023-07-03 PROCEDURE — NC001 PR NO CHARGE: Performed by: STUDENT IN AN ORGANIZED HEALTH CARE EDUCATION/TRAINING PROGRAM

## 2023-07-03 RX ORDER — HYDROMORPHONE HCL IN WATER/PF 6 MG/30 ML
0.2 PATIENT CONTROLLED ANALGESIA SYRINGE INTRAVENOUS ONCE
Status: COMPLETED | OUTPATIENT
Start: 2023-07-03 | End: 2023-07-03

## 2023-07-03 RX ORDER — HYDROMORPHONE HCL/PF 1 MG/ML
0.5 SYRINGE (ML) INJECTION ONCE
Status: COMPLETED | OUTPATIENT
Start: 2023-07-03 | End: 2023-07-03

## 2023-07-03 RX ADMIN — BUDESONIDE AND FORMOTEROL FUMARATE DIHYDRATE 2 PUFF: 80; 4.5 AEROSOL RESPIRATORY (INHALATION) at 10:12

## 2023-07-03 RX ADMIN — SODIUM CHLORIDE 100 ML/HR: 0.9 INJECTION, SOLUTION INTRAVENOUS at 01:02

## 2023-07-03 RX ADMIN — ACETAMINOPHEN 650 MG: 325 TABLET, FILM COATED ORAL at 10:09

## 2023-07-03 RX ADMIN — HEPARIN SODIUM 7500 UNITS: 5000 INJECTION INTRAVENOUS; SUBCUTANEOUS at 14:51

## 2023-07-03 RX ADMIN — HEPARIN SODIUM 7500 UNITS: 5000 INJECTION INTRAVENOUS; SUBCUTANEOUS at 05:11

## 2023-07-03 RX ADMIN — HEPARIN SODIUM 7500 UNITS: 5000 INJECTION INTRAVENOUS; SUBCUTANEOUS at 23:35

## 2023-07-03 RX ADMIN — HYDROMORPHONE HYDROCHLORIDE 0.5 MG: 1 INJECTION, SOLUTION INTRAMUSCULAR; INTRAVENOUS; SUBCUTANEOUS at 21:45

## 2023-07-03 RX ADMIN — HYDROMORPHONE HYDROCHLORIDE 0.2 MG: 0.2 INJECTION, SOLUTION INTRAMUSCULAR; INTRAVENOUS; SUBCUTANEOUS at 12:25

## 2023-07-03 RX ADMIN — DIATRIZOATE MEGLUMINE AND DIATRIZOATE SODIUM 120 ML: 660; 100 LIQUID ORAL; RECTAL at 15:50

## 2023-07-03 RX ADMIN — TRIMETHOBENZAMIDE HYDROCHLORIDE 200 MG: 100 INJECTION INTRAMUSCULAR at 23:45

## 2023-07-03 NOTE — PROGRESS NOTES
[address]  Progress Note  Name: Fam William  MRN: [id number]  Unit/Bed#: -33 I Date of Admission: 6/30/2023   Date of Service: 7/3/2023 I Hospital Day: 3    Assessment/Plan   * SBO (small bowel obstruction) [facility])  Assessment & Plan  · Patient presenting to the ED for constipation, abdominal pain, and vomiting. Denies any associated fever/chills, chest pain, or shortness of breath. Patient with multiple previous admissions for small bowel obstruction and both patient and family reports the symptoms are identical to prior episodes. · CT A/P: Findings above suspicious for small bowel obstruction likely secondary to adhesions overall very similar an appearance to the prior study dated 6/28/2022. No free intraperitoneal air. · ED spoke with general surgery who recommend admission under SLIM      · Patient does report having a very small bowel movement this morning, reports he has been passing flatus. · Remains n.p.o. for now. · Lactic acidosis present on admission which has now resolved after hydration, likely due to SBO requiring IV fluid bolus, trending of lactic acid to clear. · C/w IVF  · Pain control, anti-emetics  · FL small bowel pending form this am.   · Advance diet per surgical team  · F/u with surgery teams recommendations. Leukocytosis  Assessment & Plan  · WBC 23.07 on admission  · Afebrile and no obvious source of infection  · Reactive  · Since resolved  · Monitor off antibiotics    Morbid obesity ([address])  Assessment & Plan  Morbid obesity with a BMI of 43. Recommended dietary, lifestyle modification.       CKD (chronic kidney disease)  Assessment & Plan  Lab Results   Component Value Date    EGFR 42 07/03/2023    EGFR 39 07/02/2023    EGFR 30 07/01/2023    CREATININE 1.53 (H) 07/03/2023    CREATININE 1.65 (H) 07/02/2023    CREATININE 2.04 (H) 07/01/2023     · Creatinine 2.02 (baseline 1.7-1.9)  · Creatinine at baseline    History of CVA (cerebrovascular accident)  Assessment & Plan  · C/w statin     Dementia ([address])  Assessment & Plan  · Takes aricept at home  · Currently stable at his baseline. · Delirium precautions    Essential hypertension  Assessment & Plan  · Blood pressure controlled           VTE Pharmacologic Prophylaxis: VTE Score: 4 Moderate Risk (Score 3-4) - Pharmacological DVT Prophylaxis Ordered: heparin. Patient Centered Rounds: I performed bedside rounds with nursing staff today. Discussions with Specialists or Other Care Team Provider: Surgery team    Education and Discussions with Family / Patient: Updated  (son) at bedside. Current Length of Stay: 3 day(s)  Current Patient Status: Inpatient   Certification Statement: The patient will continue to require additional inpatient hospital stay due to SBO  Discharge Plan: Anticipate discharge in 24-48 hrs to discharge location to be determined pending rehab evaluations. Code Status: Level 1 - Full Code    Subjective:   Seen during AM rounds. Pt reported having very small bowel movement, had been passing flatus. Denies cp, n/v, fever, chills or any other new complaints. No other events reported. Objective:     Vitals:   Temp (24hrs), Av.2 °F (36.8 °C), Min:97.9 °F (36.6 °C), Max:98.4 °F (36.9 °C)    Temp:  [97.9 °F (36.6 °C)-98.4 °F (36.9 °C)] 97.9 °F (36.6 °C)  HR:  [77-89] 89  Resp:  [15-18] 15  BP: (125-173)/(46-67) 138/59  SpO2:  [90 %-95 %] 94 %  Body mass index is 43.81 kg/m². Input and Output Summary (last 24 hours): Intake/Output Summary (Last 24 hours) at 7/3/2023 1359  Last data filed at 7/3/2023 0700  Gross per 24 hour   Intake 2040 ml   Output --   Net 2040 ml       Physical Exam:   Physical Exam  Constitutional:       General: He is not in acute distress. Appearance: Normal appearance. He is obese. He is not ill-appearing, toxic-appearing or diaphoretic. Comments: Elderly, morbidly obese male patient, acutely nontoxic-appearing.    HENT: Head: Normocephalic and atraumatic. Eyes:      Pupils: Pupils are equal, round, and reactive to light. Cardiovascular:      Rate and Rhythm: Normal rate. Pulses: Normal pulses. Pulmonary:      Effort: Pulmonary effort is normal. No respiratory distress. Breath sounds: Normal breath sounds. No wheezing. Comments: Currently O2 saturation noted 93 to 94% room air, patient does report having home oxygen and uses as needed. Abdominal:      General: Bowel sounds are normal. There is distension. Palpations: Abdomen is soft. Tenderness: There is no abdominal tenderness. Musculoskeletal:      Cervical back: No rigidity. Right lower leg: Edema present. Left lower leg: Edema present. Neurological:      Mental Status: He is alert and oriented to person, place, and time. Psychiatric:         Mood and Affect: Mood normal.         Behavior: Behavior normal.          Additional Data:     Labs:  Results from last 7 days   Lab Units 07/03/23  0510   WBC Thousand/uL 6.80   HEMOGLOBIN g/dL 11.9*   HEMATOCRIT % 36.3*   PLATELETS Thousands/uL 195   NEUTROS PCT % 69   LYMPHS PCT % 15   MONOS PCT % 12   EOS PCT % 3     Results from last 7 days   Lab Units 07/03/23  0510 07/01/23  0503 06/30/23  2027   SODIUM mmol/L 139   < > 133*   POTASSIUM mmol/L 3.9   < > 4.6   CHLORIDE mmol/L 105   < > 92*   CO2 mmol/L 27   < > 28   BUN mg/dL 29*   < > 35*   CREATININE mg/dL 1.53*   < > 2.02*   ANION GAP mmol/L 7   < > 13   CALCIUM mg/dL 8.4   < > 10.3*   ALBUMIN g/dL  --   --  4.3   TOTAL BILIRUBIN mg/dL  --   --  0.84   ALK PHOS U/L  --   --  81   ALT U/L  --   --  26   AST U/L  --   --  19   GLUCOSE RANDOM mg/dL 120   < > 188*    < > = values in this interval not displayed.                  Results from last 7 days   Lab Units 07/01/23  0906 07/01/23  0503 07/01/23  0135 06/30/23  2208   LACTIC ACID mmol/L 2.0 2.6* 2.3* 2.9*   PROCALCITONIN ng/ml  --   --   --  0.18       Lines/Drains:  Invasive Devices Peripheral Intravenous Line  Duration           Peripheral IV 07/02/23 Dorsal (posterior); Left Hand 1 day    Peripheral IV 07/03/23 Distal;Right;Upper;Ventral (anterior) Arm <1 day                      Recent Cultures (last 7 days):   Results from last 7 days   Lab Units 06/30/23  2235 06/30/23  2217   BLOOD CULTURE  No Growth at 48 hrs. No Growth at 48 hrs. Last 24 Hours Medication List:   Current Facility-Administered Medications   Medication Dose Route Frequency Provider Last Rate   • acetaminophen  650 mg Oral Q6H PRN Annamarie Perrin PA-C     • budesonide-formoterol  2 puff Inhalation BID Annamarie Perrin PA-C     • heparin (porcine)  7,500 Units Subcutaneous Q8H [facility] Angie Way PA-C     • phenol  1 spray Mouth/Throat Q2H PRN Hebert Boeck Kuna, PA-C     • sodium chloride  100 mL/hr Intravenous Continuous Angie Way PA-C 100 mL/hr (07/03/23 0700)        Today, Patient Was Seen By: Belén Flores MD    **Please Note: This note may have been constructed using a voice recognition system. **

## 2023-07-03 NOTE — PROGRESS NOTES
Progress Note - General Surgery   Analilia Bah 66 y.o. male MRN: [id number]  Unit/Bed#: -02 Encounter: [id number]    Assessment/Plan  Analilia Bah is a 66 y.o. male     SBO  AVSS, WBC 6.8, Hb 11.9  Cr 1.53 from 1.63  Generalized abdominal pain following oral contrast for SBFT, scant flatus/small BM this AM, no further nausea or vomiting, abdomen rounded obese, softening, periumbilical tenderness to palpation, no guarding or rebound    Follow up on results of SBFT. Patient does report some return of bowel function this AM. If SBFT shows routine passage of contrast into colon, will advance diet as tolerated  Continue NPO/IVF at this time pending SBFT or increased bowel function. If patient does developing worsening abdominal pain or n/v, may require NGT insertion  Trend labs, monitor vitals   Pain control/antiemetics PRN  Serial abdominal exams  I/Os  Ambulation/OOB  SLIM primary     Subjective/Objective    Subjective: No acute events overnight     Objective:     Blood pressure 138/59, pulse 89, temperature 97.9 °F (36.6 °C), resp. rate 15, height 5' 10" (1.778 m), weight (!) 138 kg (305 lb 5.4 oz), SpO2 94 %. ,Body mass index is 43.81 kg/m². Intake/Output Summary (Last 24 hours) at 7/3/2023 1016  Last data filed at 7/3/2023 0700  Gross per 24 hour   Intake 2040 ml   Output --   Net 2040 ml       Invasive Devices     Peripheral Intravenous Line  Duration           Peripheral IV 07/02/23 Dorsal (posterior); Left Hand 1 day                Physical Exam: /59   Pulse 89   Temp 97.9 °F (36.6 °C)   Resp 15   Ht 5' 10" (1.778 m)   Wt (!) 138 kg (305 lb 5.4 oz)   SpO2 94%   BMI 43.81 kg/m²   General appearance: alert and oriented, in no acute distress  Lungs: clear to auscultation bilaterally  Heart: regular rate and rhythm, S1, S2 normal, no murmur, click, rub or gallop  Abdomen: rounded, obese, softening, epigastric and periumbilical tenderness to palpation, no guarding or rebound, no peritoneal signs, large laparotomy scar   Extremities: 2+ edema bilaterally    Lab, Imaging and other studies:I have personally reviewed pertinent lab results.      VTE Pharmacologic Prophylaxis: Heparin  VTE Mechanical Prophylaxis: sequential compression device    Recent Results (from the past 36 hour(s))   CBC and differential    Collection Time: 07/02/23  5:44 AM   Result Value Ref Range    WBC 10.14 4.31 - 10.16 Thousand/uL    RBC 4.18 3.88 - 5.62 Million/uL    Hemoglobin 12.4 12.0 - 17.0 g/dL    Hematocrit 39.4 36.5 - 49.3 %    MCV 94 82 - 98 fL    MCH 29.7 26.8 - 34.3 pg    MCHC 31.5 31.4 - 37.4 g/dL    RDW 13.3 11.6 - 15.1 %    MPV 10.9 8.9 - 12.7 fL    Platelets 678 640 - 207 Thousands/uL    nRBC 0 /100 WBCs    Neutrophils Relative 78 (H) 43 - 75 %    Immat GRANS % 0 0 - 2 %    Lymphocytes Relative 10 (L) 14 - 44 %    Monocytes Relative 10 4 - 12 %    Eosinophils Relative 2 0 - 6 %    Basophils Relative 0 0 - 1 %    Neutrophils Absolute 7.84 (H) 1.85 - 7.62 Thousands/µL    Immature Grans Absolute 0.04 0.00 - 0.20 Thousand/uL    Lymphocytes Absolute 1.03 0.60 - 4.47 Thousands/µL    Monocytes Absolute 1.00 0.17 - 1.22 Thousand/µL    Eosinophils Absolute 0.21 0.00 - 0.61 Thousand/µL    Basophils Absolute 0.02 0.00 - 0.10 Thousands/µL   Basic metabolic panel    Collection Time: 07/02/23  5:44 AM   Result Value Ref Range    Sodium 136 135 - 147 mmol/L    Potassium 4.1 3.5 - 5.3 mmol/L    Chloride 100 96 - 108 mmol/L    CO2 30 21 - 32 mmol/L    ANION GAP 6 mmol/L    BUN 37 (H) 5 - 25 mg/dL    Creatinine 1.65 (H) 0.60 - 1.30 mg/dL    Glucose 146 (H) 65 - 140 mg/dL    Calcium 8.5 8.4 - 10.2 mg/dL    eGFR 39 ml/min/1.73sq m   CBC and differential    Collection Time: 07/03/23  5:10 AM   Result Value Ref Range    WBC 6.80 4.31 - 10.16 Thousand/uL    RBC 3.88 3.88 - 5.62 Million/uL    Hemoglobin 11.9 (L) 12.0 - 17.0 g/dL    Hematocrit 36.3 (L) 36.5 - 49.3 %    MCV 94 82 - 98 fL    MCH 30.7 26.8 - 34.3 pg    MCHC 32.8 31.4 - 37.4 g/dL RDW 13.4 11.6 - 15.1 %    MPV 10.9 8.9 - 12.7 fL    Platelets 713 435 - 234 Thousands/uL    nRBC 0 /100 WBCs    Neutrophils Relative 69 43 - 75 %    Immat GRANS % 1 0 - 2 %    Lymphocytes Relative 15 14 - 44 %    Monocytes Relative 12 4 - 12 %    Eosinophils Relative 3 0 - 6 %    Basophils Relative 0 0 - 1 %    Neutrophils Absolute 4.66 1.85 - 7.62 Thousands/µL    Immature Grans Absolute 0.04 0.00 - 0.20 Thousand/uL    Lymphocytes Absolute 1.05 0.60 - 4.47 Thousands/µL    Monocytes Absolute 0.82 0.17 - 1.22 Thousand/µL    Eosinophils Absolute 0.22 0.00 - 0.61 Thousand/µL    Basophils Absolute 0.01 0.00 - 0.10 Thousands/µL   Basic metabolic panel    Collection Time: 07/03/23  5:10 AM   Result Value Ref Range    Sodium 139 135 - 147 mmol/L    Potassium 3.9 3.5 - 5.3 mmol/L    Chloride 105 96 - 108 mmol/L    CO2 27 21 - 32 mmol/L    ANION GAP 7 mmol/L    BUN 29 (H) 5 - 25 mg/dL    Creatinine 1.53 (H) 0.60 - 1.30 mg/dL    Glucose 120 65 - 140 mg/dL    Calcium 8.4 8.4 - 10.2 mg/dL    eGFR 42 ml/min/1.73sq m

## 2023-07-03 NOTE — ASSESSMENT & PLAN NOTE
Lab Results   Component Value Date    EGFR 42 07/03/2023    EGFR 39 07/02/2023    EGFR 30 07/01/2023    CREATININE 1.53 (H) 07/03/2023    CREATININE 1.65 (H) 07/02/2023    CREATININE 2.04 (H) 07/01/2023     · Creatinine 2.02 (baseline 1.7-1.9)  · Creatinine at baseline

## 2023-07-03 NOTE — ASSESSMENT & PLAN NOTE
· Takes aricept at home  · Currently stable at his baseline.   · Delirium precautions V-Y Flap Text: The defect edges were debeveled with a #15 scalpel blade.  Given the location of the defect, shape of the defect and the proximity to free margins a V-Y flap was deemed most appropriate.  Using a sterile surgical marker, an appropriate advancement flap was drawn incorporating the defect and placing the expected incisions within the relaxed skin tension lines where possible.    The area thus outlined was incised deep to adipose tissue with a #15 scalpel blade.  The skin margins were undermined to an appropriate distance in all directions utilizing iris scissors.

## 2023-07-03 NOTE — PLAN OF CARE
Problem: PAIN - ADULT  Goal: Verbalizes/displays adequate comfort level or baseline comfort level  Description: Interventions:  - Encourage patient to monitor pain and request assistance  - Assess pain using appropriate pain scale  - Administer analgesics based on type and severity of pain and evaluate response  - Implement non-pharmacological measures as appropriate and evaluate response  - Consider cultural and social influences on pain and pain management  - Notify physician/advanced practitioner if interventions unsuccessful or patient reports new pain  Outcome: Progressing     Problem: INFECTION - ADULT  Goal: Absence or prevention of progression during hospitalization  Description: INTERVENTIONS:  - Assess and monitor for signs and symptoms of infection  - Monitor lab/diagnostic results  - Monitor all insertion sites, i.e. indwelling lines, tubes, and drains  - Monitor endotracheal if appropriate and nasal secretions for changes in amount and color  - [place] appropriate cooling/warming therapies per order  - Administer medications as ordered  - Instruct and encourage patient and family to use good hand hygiene technique  - Identify and instruct in appropriate isolation precautions for identified infection/condition  Outcome: Progressing  Goal: Absence of fever/infection during neutropenic period  Description: INTERVENTIONS:  - Monitor WBC    Outcome: Progressing     Problem: SAFETY ADULT  Goal: Patient will remain free of falls  Description: INTERVENTIONS:  - Educate patient/family on patient safety including physical limitations  - Instruct patient to call for assistance with activity   - Consult OT/PT to assist with strengthening/mobility   - Keep Call bell within reach  - Keep bed low and locked with side rails adjusted as appropriate  - Keep care items and personal belongings within reach  - Initiate and maintain comfort rounds  - Make Fall Risk Sign visible to staff  - Offer Toileting every 2 Hours, in advance of need  - Initiate/Maintain bedalarm  - Obtain necessary fall risk management equipment:   - Apply yellow socks and bracelet for high fall risk patients  - Consider moving patient to room near nurses station  Outcome: Progressing  Goal: Maintain or return to baseline ADL function  Description: INTERVENTIONS:  -  Assess patient's ability to carry out ADLs; assess patient's baseline for ADL function and identify physical deficits which impact ability to perform ADLs (bathing, care of mouth/teeth, toileting, grooming, dressing, etc.)  - Assess/evaluate cause of self-care deficits   - Assess range of motion  - Assess patient's mobility; develop plan if impaired  - Assess patient's need for assistive devices and provide as appropriate  - Encourage maximum independence but intervene and supervise when necessary  - Involve family in performance of ADLs  - Assess for home care needs following discharge   - Consider OT consult to assist with ADL evaluation and planning for discharge  - Provide patient education as appropriate  Outcome: Progressing  Goal: Maintains/Returns to pre admission functional level  Description: INTERVENTIONS:  - Perform BMAT or MOVE assessment daily.   - Set and communicate daily mobility goal to care team and patient/family/caregiver. - Collaborate with rehabilitation services on mobility goals if consulted  - Perform Range of Motion 3 times a day. - Reposition patient every 2 hours.   - Dangle patient 3 times a day  - Stand patient 3 times a day  - Ambulate patient 3 times a day  - Out of bed to chair 3 times a day   - Out of bed for meals 3 times a day  - Out of bed for toileting  - Record patient progress and toleration of activity level   Outcome: Progressing     Problem: DISCHARGE PLANNING  Goal: Discharge to home or other facility with appropriate resources  Description: INTERVENTIONS:  - Identify barriers to discharge w/patient and caregiver  - Arrange for needed discharge resources and transportation as appropriate  - Identify discharge learning needs (meds, wound care, etc.)  - Arrange for interpretive services to assist at discharge as needed  - Refer to Case Management Department for coordinating discharge planning if the patient needs post-hospital services based on physician/advanced practitioner order or complex needs related to functional status, cognitive ability, or social support system  Outcome: Progressing     Problem: Knowledge Deficit  Goal: Patient/family/caregiver demonstrates understanding of disease process, treatment plan, medications, and discharge instructions  Description: Complete learning assessment and assess knowledge base.   Interventions:  - Provide teaching at level of understanding  - Provide teaching via preferred learning methods  Outcome: Progressing     Problem: RESPIRATORY - ADULT  Goal: Achieves optimal ventilation and oxygenation  Description: INTERVENTIONS:  - Assess for changes in respiratory status  - Assess for changes in mentation and behavior  - Position to facilitate oxygenation and minimize respiratory effort  - Oxygen administered by appropriate delivery if ordered  - Initiate smoking cessation education as indicated  - Encourage broncho-pulmonary hygiene including cough, deep breathe, Incentive Spirometry  - Assess the need for suctioning and aspirate as needed  - Assess and instruct to report SOB or any respiratory difficulty  - Respiratory Therapy support as indicated  Outcome: Progressing     Problem: GASTROINTESTINAL - ADULT  Goal: Minimal or absence of nausea and/or vomiting  Description: INTERVENTIONS:  - Administer IV fluids if ordered to ensure adequate hydration  - Maintain NPO status until nausea and vomiting are resolved  - Nasogastric tube if ordered  - Administer ordered antiemetic medications as needed  - Provide nonpharmacologic comfort measures as appropriate  - Advance diet as tolerated, if ordered  - Consider nutrition services referral to assist patient with adequate nutrition and appropriate food choices  Outcome: Progressing  Goal: Maintains or returns to baseline bowel function  Description: INTERVENTIONS:  - Assess bowel function  - Encourage oral fluids to ensure adequate hydration  - Administer IV fluids if ordered to ensure adequate hydration  - Administer ordered medications as needed  - Encourage mobilization and activity  - Consider nutritional services referral to assist patient with adequate nutrition and appropriate food choices  Outcome: Progressing  Goal: Maintains adequate nutritional intake  Description: INTERVENTIONS:  - Monitor percentage of each meal consumed  - Identify factors contributing to decreased intake, treat as appropriate  - Assist with meals as needed  - Monitor I&O, weight, and lab values if indicated  - Obtain nutrition services referral as needed  Outcome: Progressing  Goal: Establish and maintain optimal ostomy function  Description: INTERVENTIONS:  - Assess bowel function  - Encourage oral fluids to ensure adequate hydration  - Administer IV fluids if ordered to ensure adequate hydration   - Administer ordered medications as needed  - Encourage mobilization and activity  - Nutrition services referral to assist patient with appropriate food choices  - Assess stoma site  - Consider wound care consult   Outcome: Progressing  Goal: Oral mucous membranes remain intact  Description: INTERVENTIONS  - Assess oral mucosa and hygiene practices  - Implement preventative oral hygiene regimen  - Implement oral medicated treatments as ordered  - Initiate Nutrition services referral as needed  Outcome: Progressing     Problem: Nutrition/Hydration-ADULT  Goal: Nutrient/Hydration intake appropriate for improving, restoring or maintaining nutritional needs  Description: Monitor and assess patient's nutrition/hydration status for malnutrition.  Collaborate with interdisciplinary team and initiate plan and interventions as ordered. Monitor patient's weight and dietary intake as ordered or per policy. Utilize nutrition screening tool and intervene as necessary. Determine patient's food preferences and provide high-protein, high-caloric foods as appropriate.      INTERVENTIONS:  - Monitor oral intake, urinary output, labs, and treatment plans  - Assess nutrition and hydration status and recommend course of action  - Evaluate amount of meals eaten  - Assist patient with eating if necessary   - Allow adequate time for meals  - Recommend/ encourage appropriate diets, oral nutritional supplements, and vitamin/mineral supplements  - Order, calculate, and assess calorie counts as needed  - Recommend, monitor, and adjust tube feedings and TPN/PPN based on assessed needs  - Assess need for intravenous fluids  - Provide specific nutrition/hydration education as appropriate  - Include patient/family/caregiver in decisions related to nutrition  Outcome: Progressing

## 2023-07-03 NOTE — ASSESSMENT & PLAN NOTE
· Patient presenting to the ED for constipation, abdominal pain, and vomiting. Denies any associated fever/chills, chest pain, or shortness of breath. Patient with multiple previous admissions for small bowel obstruction and both patient and family reports the symptoms are identical to prior episodes. · CT A/P: Findings above suspicious for small bowel obstruction likely secondary to adhesions overall very similar an appearance to the prior study dated 6/28/2022. No free intraperitoneal air. · ED spoke with general surgery who recommend admission under SLIM      · Patient does report having a very small bowel movement this morning, reports he has been passing flatus. · Remains n.p.o. for now. · Lactic acidosis present on admission which has now resolved after hydration, likely due to SBO requiring IV fluid bolus, trending of lactic acid to clear. · C/w IVF  · Pain control, anti-emetics  · FL small bowel pending form this am.   · Advance diet per surgical team  · F/u with surgery teams recommendations.

## 2023-07-03 NOTE — PROGRESS NOTES
Progress Note - General Surgery   Rosamaria Acosta 66 y.o. male MRN: [id number]  Unit/Bed#: -02 Encounter: [id number]    Assessment/Plan  SBO Recurrent    Pt had SBFT today,  image, repeat 6 hours later. Pt complained of nausea and vomited once after oral contrast.  He states he feels better now that he vomited. He does nont feel more distended than this am.    -Recommend NGT, pt refused, discussed with him the risk of aspiration pneumonia without NGT, and unable to insert under sedation. Pt understands that he could be  putting himself at risk for aspiration pneumonia that could be deadly. He still refused.   -Encouraged patient to sit in chair, ambulate in darling, and change position from left to right side while lying in bed. Cont with zofran prn nausea. -Discussed with nursing as well. -KUB in am     Chief Complaint: I feel better now that I vomited. Objective/Exam: Blood pressure 165/70, pulse 94, temperature 97.9 °F (36.6 °C), resp. rate 15, height 5' 10" (1.778 m), weight (!) 138 kg (305 lb 5.4 oz), SpO2 90 %. Wound Culure: No results found for: "WOUNDCULT"      General Appearance:    Alert and orientated x 3, cooperative, no distress   Lungs:     Clear to auscultation bilaterally, respirations unlabored    Heart:    Regular rate and rhythm   Abdomen:     Obese, grossly distended that patient states is his baseline. Mildly tense. Pt adds, not worse than this am.   Tenderness throughout.             Extremities:   Extremities normal,  no cyanosis or edema   Pulses:   2+ and symmetric all extremities, no calf tenderness   Skin:   Skin color, texture, turgor normal, no rashes or lesions   Neurologic:   CNII-XII intact, normal strength, sensation and reflexes     Throughout, affect appropriate       ,      Intake/Output Summary (Last 24 hours) at 7/3/2023 1631  Last data filed at 7/3/2023 0700  Gross per 24 hour   Intake 2040 ml   Output --   Net 2040 ml       Invasive Devices     Peripheral Intravenous Line  Duration           Peripheral IV 07/02/23 Dorsal (posterior); Left Hand 1 day    Peripheral IV 07/03/23 Distal;Right;Upper;Ventral (anterior) Arm <1 day                                        Labs: CBC with diff: @RESUFAST(WBC,HGB,HCT,MCV,PLT,ADJUSTEDWBC,   RBC,MCH,MCHC,RDW,MPV,NRBC,TOTALCELLSCOUNTED,SEGS%,GRANS%,LYMPHS%,EOS%,BASO%,ABNEUT,ABGRANS,ABLYMPHS,ABMOMOS,ABEOS,ABBASO)@,   BMP/CMP:  Lab Results   Component Value Date    K 3.9 07/03/2023     07/03/2023    CO2 27 07/03/2023    BUN 29 (H) 07/03/2023    CREATININE 1.53 (H) 07/03/2023    CALCIUM 8.4 07/03/2023    AST 19 06/30/2023    ALT 26 06/30/2023    ALKPHOS 81 06/30/2023    EGFR 42 07/03/2023   ,   Lipid Panel: No results found for: "CHOL",   Coags:   Lab Results   Component Value Date    PTT 33 06/28/2022    INR 1.02 06/28/2022   ,     Blood Culture:   Lab Results   Component Value Date    BLOODCX No Growth at 48 hrs. 06/30/2023   ,   Urinalysis:   Lab Results   Component Value Date    COLORU Yellow 07/01/2023    CLARITYU Extra Turbid 07/01/2023    SPECGRAV 1.022 07/01/2023    PHUR 8.5 (A) 07/01/2023    PHUR 6.0 11/08/2018    LEUKOCYTESUR Negative 07/01/2023    NITRITE Negative 07/01/2023    GLUCOSEU Negative 07/01/2023    KETONESU Negative 07/01/2023    BILIRUBINUR Negative 07/01/2023    BLOODU Negative 07/01/2023   ,   Urine Culture: No results found for: "URINECX",         Imaging: CT abdomen pelvis wo contrast    Result Date: 6/30/2023  Impression: Findings above suspicious for small bowel obstruction likely secondary to adhesions overall very similar an appearance to the prior study dated 6/28/2022. No free intraperitoneal air. Surgical consult advised. Diffuse hepatic steatosis. Above findings discussed with Dr. Katina Paredes at 9:05 p.m. on 6/30/2023.  Workstation performed: [id number]         Juan Stephenson PA-C  7/3/2023

## 2023-07-04 ENCOUNTER — APPOINTMENT (INPATIENT)
Dept: RADIOLOGY | Facility: HOSPITAL | Age: 78
DRG: 389 | End: 2023-07-04
Payer: MEDICARE

## 2023-07-04 LAB
ANION GAP SERPL CALCULATED.3IONS-SCNC: 7 MMOL/L
BASOPHILS # BLD AUTO: 0.02 THOUSANDS/ÂΜL (ref 0–0.1)
BASOPHILS NFR BLD AUTO: 0 % (ref 0–1)
BUN SERPL-MCNC: 26 MG/DL (ref 5–25)
CALCIUM SERPL-MCNC: 9.5 MG/DL (ref 8.4–10.2)
CHLORIDE SERPL-SCNC: 105 MMOL/L (ref 96–108)
CO2 SERPL-SCNC: 29 MMOL/L (ref 21–32)
CREAT SERPL-MCNC: 1.59 MG/DL (ref 0.6–1.3)
EOSINOPHIL # BLD AUTO: 0.06 THOUSAND/ÂΜL (ref 0–0.61)
EOSINOPHIL NFR BLD AUTO: 1 % (ref 0–6)
ERYTHROCYTE [DISTWIDTH] IN BLOOD BY AUTOMATED COUNT: 13.2 % (ref 11.6–15.1)
GFR SERPL CREATININE-BSD FRML MDRD: 40 ML/MIN/1.73SQ M
GLUCOSE SERPL-MCNC: 135 MG/DL (ref 65–140)
HCT VFR BLD AUTO: 41.3 % (ref 36.5–49.3)
HGB BLD-MCNC: 13.5 G/DL (ref 12–17)
IMM GRANULOCYTES # BLD AUTO: 0.06 THOUSAND/UL (ref 0–0.2)
IMM GRANULOCYTES NFR BLD AUTO: 1 % (ref 0–2)
LYMPHOCYTES # BLD AUTO: 1.05 THOUSANDS/ÂΜL (ref 0.6–4.47)
LYMPHOCYTES NFR BLD AUTO: 11 % (ref 14–44)
MCH RBC QN AUTO: 30.4 PG (ref 26.8–34.3)
MCHC RBC AUTO-ENTMCNC: 32.7 G/DL (ref 31.4–37.4)
MCV RBC AUTO: 93 FL (ref 82–98)
MONOCYTES # BLD AUTO: 0.98 THOUSAND/ÂΜL (ref 0.17–1.22)
MONOCYTES NFR BLD AUTO: 10 % (ref 4–12)
NEUTROPHILS # BLD AUTO: 7.31 THOUSANDS/ÂΜL (ref 1.85–7.62)
NEUTS SEG NFR BLD AUTO: 77 % (ref 43–75)
NRBC BLD AUTO-RTO: 0 /100 WBCS
PLATELET # BLD AUTO: 263 THOUSANDS/UL (ref 149–390)
PMV BLD AUTO: 11 FL (ref 8.9–12.7)
POTASSIUM SERPL-SCNC: 4.1 MMOL/L (ref 3.5–5.3)
RBC # BLD AUTO: 4.44 MILLION/UL (ref 3.88–5.62)
SODIUM SERPL-SCNC: 141 MMOL/L (ref 135–147)
WBC # BLD AUTO: 9.48 THOUSAND/UL (ref 4.31–10.16)

## 2023-07-04 PROCEDURE — 99232 SBSQ HOSP IP/OBS MODERATE 35: CPT | Performed by: INTERNAL MEDICINE

## 2023-07-04 PROCEDURE — 74018 RADEX ABDOMEN 1 VIEW: CPT

## 2023-07-04 PROCEDURE — 80048 BASIC METABOLIC PNL TOTAL CA: CPT | Performed by: INTERNAL MEDICINE

## 2023-07-04 PROCEDURE — 99232 SBSQ HOSP IP/OBS MODERATE 35: CPT | Performed by: SURGERY

## 2023-07-04 PROCEDURE — 85025 COMPLETE CBC W/AUTO DIFF WBC: CPT | Performed by: INTERNAL MEDICINE

## 2023-07-04 RX ADMIN — SODIUM CHLORIDE 100 ML/HR: 0.9 INJECTION, SOLUTION INTRAVENOUS at 00:50

## 2023-07-04 RX ADMIN — TRIMETHOBENZAMIDE HYDROCHLORIDE 200 MG: 100 INJECTION INTRAMUSCULAR at 12:40

## 2023-07-04 RX ADMIN — HEPARIN SODIUM 7500 UNITS: 5000 INJECTION INTRAVENOUS; SUBCUTANEOUS at 14:29

## 2023-07-04 RX ADMIN — SODIUM CHLORIDE 100 ML/HR: 0.9 INJECTION, SOLUTION INTRAVENOUS at 12:31

## 2023-07-04 RX ADMIN — HEPARIN SODIUM 7500 UNITS: 5000 INJECTION INTRAVENOUS; SUBCUTANEOUS at 05:32

## 2023-07-04 NOTE — ASSESSMENT & PLAN NOTE
· Patient presenting to the ED for constipation, abdominal pain, and vomiting. Denies any associated fever/chills, chest pain, or shortness of breath. Patient with multiple previous admissions for small bowel obstruction and both patient and family reports the symptoms are identical to prior episodes. · CT A/P: Findings above suspicious for small bowel obstruction likely secondary to adhesions overall very similar an appearance to the prior study dated 6/28/2022. No free intraperitoneal air. · ED spoke with general surgery who recommend admission under SLIM  · Slowly improving  · Remains n.p.o. for now. · C/w IVF  · Pain control, anti-emetics. · Advance diet per surgical team  · F/u with surgery teams recommendations.

## 2023-07-04 NOTE — QUICK NOTE
Updated by nursing, patient vomited large amount of green bile. To administer IM tigan for nausea. Continue NPO, patient continues to refuse NGT placement at this time.

## 2023-07-04 NOTE — ASSESSMENT & PLAN NOTE
Lab Results   Component Value Date    EGFR 40 07/04/2023    EGFR 42 07/03/2023    EGFR 39 07/02/2023    CREATININE 1.59 (H) 07/04/2023    CREATININE 1.53 (H) 07/03/2023    CREATININE 1.65 (H) 07/02/2023     · Creatinine 2.02 (baseline 1.7-1.9)  · Creatinine at baseline

## 2023-07-04 NOTE — PLAN OF CARE
Problem: PAIN - ADULT  Goal: Verbalizes/displays adequate comfort level or baseline comfort level  Description: Interventions:  - Encourage patient to monitor pain and request assistance  - Assess pain using appropriate pain scale  - Administer analgesics based on type and severity of pain and evaluate response  - Implement non-pharmacological measures as appropriate and evaluate response  - Consider cultural and social influences on pain and pain management  - Notify physician/advanced practitioner if interventions unsuccessful or patient reports new pain  Outcome: Progressing     Problem: INFECTION - ADULT  Goal: Absence or prevention of progression during hospitalization  Description: INTERVENTIONS:  - Assess and monitor for signs and symptoms of infection  - Monitor lab/diagnostic results  - Monitor all insertion sites, i.e. indwelling lines, tubes, and drains  - Monitor endotracheal if appropriate and nasal secretions for changes in amount and color  - [place] appropriate cooling/warming therapies per order  - Administer medications as ordered  - Instruct and encourage patient and family to use good hand hygiene technique  - Identify and instruct in appropriate isolation precautions for identified infection/condition  Outcome: Progressing  Goal: Absence of fever/infection during neutropenic period  Description: INTERVENTIONS:  - Monitor WBC    Outcome: Progressing     Problem: SAFETY ADULT  Goal: Patient will remain free of falls  Description: INTERVENTIONS:  - Educate patient/family on patient safety including physical limitations  - Instruct patient to call for assistance with activity   - Consult OT/PT to assist with strengthening/mobility   - Keep Call bell within reach  - Keep bed low and locked with side rails adjusted as appropriate  - Keep care items and personal belongings within reach  - Initiate and maintain comfort rounds  - Make Fall Risk Sign visible to staff  - Offer Toileting every  Hours, in advance of need  - Initiate/Maintain alarm  - Obtain necessary fall risk management equipment:   - Apply yellow socks and bracelet for high fall risk patients  - Consider moving patient to room near nurses station  Outcome: Progressing  Goal: Maintain or return to baseline ADL function  Description: INTERVENTIONS:  -  Assess patient's ability to carry out ADLs; assess patient's baseline for ADL function and identify physical deficits which impact ability to perform ADLs (bathing, care of mouth/teeth, toileting, grooming, dressing, etc.)  - Assess/evaluate cause of self-care deficits   - Assess range of motion  - Assess patient's mobility; develop plan if impaired  - Assess patient's need for assistive devices and provide as appropriate  - Encourage maximum independence but intervene and supervise when necessary  - Involve family in performance of ADLs  - Assess for home care needs following discharge   - Consider OT consult to assist with ADL evaluation and planning for discharge  - Provide patient education as appropriate  Outcome: Progressing  Goal: Maintains/Returns to pre admission functional level  Description: INTERVENTIONS:  - Perform BMAT or MOVE assessment daily.   - Set and communicate daily mobility goal to care team and patient/family/caregiver. - Collaborate with rehabilitation services on mobility goals if consulted  - Perform Range of Motion  times a day. - Reposition patient every  hours.   - Dangle patient  times a day  - Stand patient  times a day  - Ambulate patient  times a day  - Out of bed to chair  times a day   - Out of bed for meal times a day  - Out of bed for toileting  - Record patient progress and toleration of activity level   Outcome: Progressing     Problem: DISCHARGE PLANNING  Goal: Discharge to home or other facility with appropriate resources  Description: INTERVENTIONS:  - Identify barriers to discharge w/patient and caregiver  - Arrange for needed discharge resources and transportation as appropriate  - Identify discharge learning needs (meds, wound care, etc.)  - Arrange for interpretive services to assist at discharge as needed  - Refer to Case Management Department for coordinating discharge planning if the patient needs post-hospital services based on physician/advanced practitioner order or complex needs related to functional status, cognitive ability, or social support system  Outcome: Progressing     Problem: Knowledge Deficit  Goal: Patient/family/caregiver demonstrates understanding of disease process, treatment plan, medications, and discharge instructions  Description: Complete learning assessment and assess knowledge base.   Interventions:  - Provide teaching at level of understanding  - Provide teaching via preferred learning methods  Outcome: Progressing     Problem: RESPIRATORY - ADULT  Goal: Achieves optimal ventilation and oxygenation  Description: INTERVENTIONS:  - Assess for changes in respiratory status  - Assess for changes in mentation and behavior  - Position to facilitate oxygenation and minimize respiratory effort  - Oxygen administered by appropriate delivery if ordered  - Initiate smoking cessation education as indicated  - Encourage broncho-pulmonary hygiene including cough, deep breathe, Incentive Spirometry  - Assess the need for suctioning and aspirate as needed  - Assess and instruct to report SOB or any respiratory difficulty  - Respiratory Therapy support as indicated  Outcome: Progressing     Problem: GASTROINTESTINAL - ADULT  Goal: Minimal or absence of nausea and/or vomiting  Description: INTERVENTIONS:  - Administer IV fluids if ordered to ensure adequate hydration  - Maintain NPO status until nausea and vomiting are resolved  - Nasogastric tube if ordered  - Administer ordered antiemetic medications as needed  - Provide nonpharmacologic comfort measures as appropriate  - Advance diet as tolerated, if ordered  - Consider nutrition services referral to assist patient with adequate nutrition and appropriate food choices  Outcome: Progressing  Goal: Maintains or returns to baseline bowel function  Description: INTERVENTIONS:  - Assess bowel function  - Encourage oral fluids to ensure adequate hydration  - Administer IV fluids if ordered to ensure adequate hydration  - Administer ordered medications as needed  - Encourage mobilization and activity  - Consider nutritional services referral to assist patient with adequate nutrition and appropriate food choices  Outcome: Progressing  Goal: Maintains adequate nutritional intake  Description: INTERVENTIONS:  - Monitor percentage of each meal consumed  - Identify factors contributing to decreased intake, treat as appropriate  - Assist with meals as needed  - Monitor I&O, weight, and lab values if indicated  - Obtain nutrition services referral as needed  Outcome: Progressing  Goal: Establish and maintain optimal ostomy function  Description: INTERVENTIONS:  - Assess bowel function  - Encourage oral fluids to ensure adequate hydration  - Administer IV fluids if ordered to ensure adequate hydration   - Administer ordered medications as needed  - Encourage mobilization and activity  - Nutrition services referral to assist patient with appropriate food choices  - Assess stoma site  - Consider wound care consult   Outcome: Progressing  Goal: Oral mucous membranes remain intact  Description: INTERVENTIONS  - Assess oral mucosa and hygiene practices  - Implement preventative oral hygiene regimen  - Implement oral medicated treatments as ordered  - Initiate Nutrition services referral as needed  Outcome: Progressing     Problem: Nutrition/Hydration-ADULT  Goal: Nutrient/Hydration intake appropriate for improving, restoring or maintaining nutritional needs  Description: Monitor and assess patient's nutrition/hydration status for malnutrition.  Collaborate with interdisciplinary team and initiate plan and interventions as ordered. Monitor patient's weight and dietary intake as ordered or per policy. Utilize nutrition screening tool and intervene as necessary. Determine patient's food preferences and provide high-protein, high-caloric foods as appropriate.      INTERVENTIONS:  - Monitor oral intake, urinary output, labs, and treatment plans  - Assess nutrition and hydration status and recommend course of action  - Evaluate amount of meals eaten  - Assist patient with eating if necessary   - Allow adequate time for meals  - Recommend/ encourage appropriate diets, oral nutritional supplements, and vitamin/mineral supplements  - Order, calculate, and assess calorie counts as needed  - Recommend, monitor, and adjust tube feedings and TPN/PPN based on assessed needs  - Assess need for intravenous fluids  - Provide specific nutrition/hydration education as appropriate  - Include patient/family/caregiver in decisions related to nutrition  Outcome: Progressing

## 2023-07-04 NOTE — ASSESSMENT & PLAN NOTE
· WBC 23.07 on admission  · Afebrile and no obvious source of infection  · Reactive  · Monitor off antibiotics

## 2023-07-04 NOTE — PROGRESS NOTES
[address]  Progress Note  Name: Norman Pate  MRN: [id number]  Unit/Bed#: -12 I Date of Admission: 6/30/2023   Date of Service: 7/4/2023 I Hospital Day: 4    Assessment/Plan   * SBO (small bowel obstruction) [facility])  Assessment & Plan  · Patient presenting to the ED for constipation, abdominal pain, and vomiting. Denies any associated fever/chills, chest pain, or shortness of breath. Patient with multiple previous admissions for small bowel obstruction and both patient and family reports the symptoms are identical to prior episodes. · CT A/P: Findings above suspicious for small bowel obstruction likely secondary to adhesions overall very similar an appearance to the prior study dated 6/28/2022. No free intraperitoneal air. · ED spoke with general surgery who recommend admission under SLIM  · Slowly improving  · Remains n.p.o. for now. · C/w IVF  · Pain control, anti-emetics. · Advance diet per surgical team  · F/u with surgery teams recommendations.      Leukocytosis  Assessment & Plan  · WBC 23.07 on admission  · Afebrile and no obvious source of infection  · Reactive  · Monitor off antibiotics    Morbid obesity (HCC)  Assessment & Plan  Diet and exercise counseling    CKD (chronic kidney disease)  Assessment & Plan  Lab Results   Component Value Date    EGFR 40 07/04/2023    EGFR 42 07/03/2023    EGFR 39 07/02/2023    CREATININE 1.59 (H) 07/04/2023    CREATININE 1.53 (H) 07/03/2023    CREATININE 1.65 (H) 07/02/2023     · Creatinine 2.02 (baseline 1.7-1.9)  · Creatinine at baseline    History of CVA (cerebrovascular accident)  Assessment & Plan  · Continue statin    Dementia (HCC)  Assessment & Plan  · Takes aricept at home  · Delirium precautions  · Currently at baseline    Essential hypertension  Assessment & Plan  · BP controlled  · Continue to hold home p.o. meds           VTE Pharmacologic Prophylaxis:   Pharmacologic: Heparin  Mechanical VTE Prophylaxis in Place: Yes    Patient Centered Rounds: I have performed bedside rounds with nursing staff today. Discussions with Specialists or Other Care Team Provider: cm, nursing    Education and Discussions with Family / Patient: pt    Time Spent for Care: 30 minutes. More than 50% of total time spent on counseling and coordination of care as described above. Current Length of Stay: 4 day(s)    Current Patient Status: Inpatient   Certification Statement: The patient will continue to require additional inpatient hospital stay due to see below    Discharge Plan: pending improvement of SBO    Code Status: Level 1 - Full Code      Subjective:   Reports nausea and vomiting. denies abd pain, fevers, chills    Objective:     Vitals:   Temp (24hrs), Av.9 °F (36.6 °C), Min:97.4 °F (36.3 °C), Max:98.3 °F (36.8 °C)    Temp:  [97.4 °F (36.3 °C)-98.3 °F (36.8 °C)] 97.4 °F (36.3 °C)  HR:  [94-98] 98  Resp:  [17] 17  BP: (143-165)/(70-71) 143/70  SpO2:  [90 %-93 %] 93 %  Body mass index is 43.81 kg/m². Input and Output Summary (last 24 hours): Intake/Output Summary (Last 24 hours) at 2023 0940  Last data filed at 2023 0601  Gross per 24 hour   Intake --   Output 1000 ml   Net -1000 ml       Physical Exam:     Physical Exam  Constitutional:       General: He is not in acute distress. Appearance: He is well-developed. He is not diaphoretic. HENT:      Head: Normocephalic and atraumatic. Nose: Nose normal.      Mouth/Throat:      Pharynx: No oropharyngeal exudate. Eyes:      General: No scleral icterus. Conjunctiva/sclera: Conjunctivae normal.   Cardiovascular:      Rate and Rhythm: Normal rate and regular rhythm. Heart sounds: Normal heart sounds. No murmur heard. No friction rub. No gallop. Pulmonary:      Effort: Pulmonary effort is normal. No respiratory distress. Breath sounds: Normal breath sounds. No wheezing or rales. Chest:      Chest wall: No tenderness.    Abdominal: General: Bowel sounds are normal. There is distension. Palpations: Abdomen is soft. Tenderness: There is no abdominal tenderness. There is no guarding. Musculoskeletal:         General: No tenderness or deformity. Normal range of motion. Cervical back: Normal range of motion and neck supple. Skin:     General: Skin is warm and dry. Findings: No erythema. Neurological:      Mental Status: He is alert. Mental status is at baseline. (  Additional Data:     Labs:    Results from last 7 days   Lab Units 07/04/23  0530   WBC Thousand/uL 9.48   HEMOGLOBIN g/dL 13.5   HEMATOCRIT % 41.3   PLATELETS Thousands/uL 263   NEUTROS PCT % 77*   LYMPHS PCT % 11*   MONOS PCT % 10   EOS PCT % 1     Results from last 7 days   Lab Units 07/04/23  0530 07/01/23  0503 06/30/23  2027   SODIUM mmol/L 141   < > 133*   POTASSIUM mmol/L 4.1   < > 4.6   CHLORIDE mmol/L 105   < > 92*   CO2 mmol/L 29   < > 28   BUN mg/dL 26*   < > 35*   CREATININE mg/dL 1.59*   < > 2.02*   ANION GAP mmol/L 7   < > 13   CALCIUM mg/dL 9.5   < > 10.3*   ALBUMIN g/dL  --   --  4.3   TOTAL BILIRUBIN mg/dL  --   --  0.84   ALK PHOS U/L  --   --  81   ALT U/L  --   --  26   AST U/L  --   --  19   GLUCOSE RANDOM mg/dL 135   < > 188*    < > = values in this interval not displayed. Results from last 7 days   Lab Units 07/01/23  0906 07/01/23  0503 07/01/23  0135 06/30/23  2208   LACTIC ACID mmol/L 2.0 2.6* 2.3* 2.9*   PROCALCITONIN ng/ml  --   --   --  0.18           * I Have Reviewed All Lab Data Listed Above. * Additional Pertinent Lab Tests Reviewed: All Labs Within Last 24 Hours Reviewed    Imaging:    Imaging Reports Reviewed Today Include: na  Imaging Personally Reviewed by Myself Includes:  na    Recent Cultures (last 7 days):     Results from last 7 days   Lab Units 06/30/23  2235 06/30/23  2217   BLOOD CULTURE  No Growth at 48 hrs. No Growth at 48 hrs.        Last 24 Hours Medication List:   Current Facility-Administered Medications   Medication Dose Route Frequency Provider Last Rate   • acetaminophen  650 mg Oral Q6H PRN Linad Healy PA-C     • budesonide-formoterol  2 puff Inhalation BID Linda Healy PA-C     • heparin (porcine)  7,500 Units Subcutaneous Q8H [facility] Angie Way PA-C     • phenol  1 spray Mouth/Throat Q2H PRN Chip ANGEL Morel     • sodium chloride  100 mL/hr Intravenous Continuous Angie Way PA-C 100 mL/hr (07/04/23 0050)   • trimethobenzamide  200 mg Intramuscular Q6H PRN Chip ANGEL Morel          Today, Patient Was Seen By: Ingris Orozco MD    ** Please Note: Dictation voice to text software may have been used in the creation of this document.  **

## 2023-07-04 NOTE — PROGRESS NOTES
Progress Note - General Surgery   Tessie Fritz 66 y.o. male MRN: [id number]  Unit/Bed#: -02 Encounter: [id number]    Assessment/Plan    Recurrent SBO    SBFT images and KUB today are relatively nondiagnostic secondary to body habitus  Emesis x 1 this am, none since. Since then multiple bowel movements. (Recommended NGT, pt refused, discussed with him the risk of aspiration pneumonia without NGT, and unable to insert under sedation. Pt understands that he could be  putting himself at risk for aspiration pneumonia that could be deadly. He still refused.)  -Encouraged patient to sit in chair, ambulate in darling, and change position. Cont with zofran prn nausea. -Discussed plan with nursing - start clear liquids since multiple BMs. Chief Complaint: No longer having abdominal pain. Emesis once at 6am. Son is concerned that the SBFT "bound him up" since he had flatus and stool yesterday but none since. Objective/Exam: Blood pressure 143/70, pulse 98, temperature (!) 97.4 °F (36.3 °C), resp. rate 17, height 5' 10" (1.778 m), weight (!) 138 kg (305 lb 5.4 oz), SpO2 93 %. Wound Culure: No results found for: "WOUNDCULT"      General Appearance:    Alert and orientated x 3, cooperative, no distress   Lungs:     Clear to auscultation bilaterally, respirations unlabored    Heart:    Regular rate and rhythm   Abdomen:     Obese, grossly distended that patient states is his baseline. Mildly tense. Pt adds, not worse than this am.   nontender throughout.             Extremities:   Extremities normal,  no cyanosis or edema   Pulses:   2+ and symmetric all extremities, no calf tenderness   Skin:   Skin color, texture, turgor normal, no rashes or lesions   Neurologic:   CNII-XII intact, normal strength, sensation and reflexes     Throughout, affect appropriate       ,      Intake/Output Summary (Last 24 hours) at 7/4/2023 1354  Last data filed at 7/4/2023 1231  Gross per 24 hour   Intake 1000 ml   Output 1000 ml Net 0 ml       Invasive Devices     Peripheral Intravenous Line  Duration           Peripheral IV 07/03/23 Dorsal (posterior); Right Forearm <1 day                                        Labs: CBC with diff: @RESUFAST(WBC,HGB,HCT,MCV,PLT,ADJUSTEDWBC,   RBC,MCH,MCHC,RDW,MPV,NRBC,TOTALCELLSCOUNTED,SEGS%,GRANS%,LYMPHS%,EOS%,BASO%,ABNEUT,ABGRANS,ABLYMPHS,ABMOMOS,ABEOS,ABBASO)@,   BMP/CMP:  Lab Results   Component Value Date    K 4.1 07/04/2023     07/04/2023    CO2 29 07/04/2023    BUN 26 (H) 07/04/2023    CREATININE 1.59 (H) 07/04/2023    CALCIUM 9.5 07/04/2023    AST 19 06/30/2023    ALT 26 06/30/2023    ALKPHOS 81 06/30/2023    EGFR 40 07/04/2023   ,   Lipid Panel: No results found for: "CHOL",   Coags:   Lab Results   Component Value Date    PTT 33 06/28/2022    INR 1.02 06/28/2022   ,     Blood Culture:   Lab Results   Component Value Date    BLOODCX No Growth at 72 hrs. 06/30/2023   ,   Urinalysis:   Lab Results   Component Value Date    COLORU Yellow 07/01/2023    CLARITYU Extra Turbid 07/01/2023    SPECGRAV 1.022 07/01/2023    PHUR 8.5 (A) 07/01/2023    PHUR 6.0 11/08/2018    LEUKOCYTESUR Negative 07/01/2023    NITRITE Negative 07/01/2023    GLUCOSEU Negative 07/01/2023    KETONESU Negative 07/01/2023    BILIRUBINUR Negative 07/01/2023    BLOODU Negative 07/01/2023   ,   Urine Culture: No results found for: "URINECX",         Imaging: CT abdomen pelvis wo contrast    Result Date: 6/30/2023  Impression: Findings above suspicious for small bowel obstruction likely secondary to adhesions overall very similar an appearance to the prior study dated 6/28/2022. No free intraperitoneal air. Surgical consult advised. Diffuse hepatic steatosis. Above findings discussed with Dr. Roxana Alexander at 9:05 p.m. on 6/30/2023.  Workstation performed: [id number]         Tyshawn Coleman MD  7/4/2023

## 2023-07-05 VITALS
TEMPERATURE: 97.2 F | RESPIRATION RATE: 19 BRPM | WEIGHT: 305.34 LBS | BODY MASS INDEX: 43.71 KG/M2 | OXYGEN SATURATION: 94 % | HEART RATE: 77 BPM | HEIGHT: 70 IN | DIASTOLIC BLOOD PRESSURE: 65 MMHG | SYSTOLIC BLOOD PRESSURE: 174 MMHG

## 2023-07-05 LAB
ANION GAP SERPL CALCULATED.3IONS-SCNC: 5 MMOL/L
BASOPHILS # BLD AUTO: 0.02 THOUSANDS/ÂΜL (ref 0–0.1)
BASOPHILS NFR BLD AUTO: 0 % (ref 0–1)
BUN SERPL-MCNC: 24 MG/DL (ref 5–25)
CALCIUM SERPL-MCNC: 8.3 MG/DL (ref 8.4–10.2)
CHLORIDE SERPL-SCNC: 107 MMOL/L (ref 96–108)
CO2 SERPL-SCNC: 28 MMOL/L (ref 21–32)
CREAT SERPL-MCNC: 1.51 MG/DL (ref 0.6–1.3)
EOSINOPHIL # BLD AUTO: 0.38 THOUSAND/ÂΜL (ref 0–0.61)
EOSINOPHIL NFR BLD AUTO: 4 % (ref 0–6)
ERYTHROCYTE [DISTWIDTH] IN BLOOD BY AUTOMATED COUNT: 13.5 % (ref 11.6–15.1)
GFR SERPL CREATININE-BSD FRML MDRD: 43 ML/MIN/1.73SQ M
GLUCOSE SERPL-MCNC: 99 MG/DL (ref 65–140)
HCT VFR BLD AUTO: 36 % (ref 36.5–49.3)
HGB BLD-MCNC: 11.7 G/DL (ref 12–17)
IMM GRANULOCYTES # BLD AUTO: 0.09 THOUSAND/UL (ref 0–0.2)
IMM GRANULOCYTES NFR BLD AUTO: 1 % (ref 0–2)
LYMPHOCYTES # BLD AUTO: 1.54 THOUSANDS/ÂΜL (ref 0.6–4.47)
LYMPHOCYTES NFR BLD AUTO: 17 % (ref 14–44)
MCH RBC QN AUTO: 30.4 PG (ref 26.8–34.3)
MCHC RBC AUTO-ENTMCNC: 32.5 G/DL (ref 31.4–37.4)
MCV RBC AUTO: 94 FL (ref 82–98)
MONOCYTES # BLD AUTO: 0.92 THOUSAND/ÂΜL (ref 0.17–1.22)
MONOCYTES NFR BLD AUTO: 10 % (ref 4–12)
NEUTROPHILS # BLD AUTO: 6.3 THOUSANDS/ÂΜL (ref 1.85–7.62)
NEUTS SEG NFR BLD AUTO: 68 % (ref 43–75)
NRBC BLD AUTO-RTO: 0 /100 WBCS
PLATELET # BLD AUTO: 219 THOUSANDS/UL (ref 149–390)
PMV BLD AUTO: 10.6 FL (ref 8.9–12.7)
POTASSIUM SERPL-SCNC: 3.8 MMOL/L (ref 3.5–5.3)
RBC # BLD AUTO: 3.85 MILLION/UL (ref 3.88–5.62)
SODIUM SERPL-SCNC: 140 MMOL/L (ref 135–147)
WBC # BLD AUTO: 9.25 THOUSAND/UL (ref 4.31–10.16)

## 2023-07-05 PROCEDURE — 99239 HOSP IP/OBS DSCHRG MGMT >30: CPT | Performed by: INTERNAL MEDICINE

## 2023-07-05 PROCEDURE — 85025 COMPLETE CBC W/AUTO DIFF WBC: CPT | Performed by: INTERNAL MEDICINE

## 2023-07-05 PROCEDURE — 99232 SBSQ HOSP IP/OBS MODERATE 35: CPT | Performed by: STUDENT IN AN ORGANIZED HEALTH CARE EDUCATION/TRAINING PROGRAM

## 2023-07-05 PROCEDURE — 80048 BASIC METABOLIC PNL TOTAL CA: CPT | Performed by: INTERNAL MEDICINE

## 2023-07-05 PROCEDURE — 99232 SBSQ HOSP IP/OBS MODERATE 35: CPT | Performed by: INTERNAL MEDICINE

## 2023-07-05 NOTE — ASSESSMENT & PLAN NOTE
Lab Results   Component Value Date    EGFR 43 07/05/2023    EGFR 40 07/04/2023    EGFR 42 07/03/2023    CREATININE 1.51 (H) 07/05/2023    CREATININE 1.59 (H) 07/04/2023    CREATININE 1.53 (H) 07/03/2023     · Creatinine 2.02 (baseline 1.7-1.9)  · Creatinine at baseline

## 2023-07-05 NOTE — PLAN OF CARE
Problem: PAIN - ADULT  Goal: Verbalizes/displays adequate comfort level or baseline comfort level  Description: Interventions:  - Encourage patient to monitor pain and request assistance  - Assess pain using appropriate pain scale  - Administer analgesics based on type and severity of pain and evaluate response  - Implement non-pharmacological measures as appropriate and evaluate response  - Consider cultural and social influences on pain and pain management  - Notify physician/advanced practitioner if interventions unsuccessful or patient reports new pain  Outcome: Progressing     Problem: INFECTION - ADULT  Goal: Absence or prevention of progression during hospitalization  Description: INTERVENTIONS:  - Assess and monitor for signs and symptoms of infection  - Monitor lab/diagnostic results  - Monitor all insertion sites, i.e. indwelling lines, tubes, and drains  - Monitor endotracheal if appropriate and nasal secretions for changes in amount and color  - [place] appropriate cooling/warming therapies per order  - Administer medications as ordered  - Instruct and encourage patient and family to use good hand hygiene technique  - Identify and instruct in appropriate isolation precautions for identified infection/condition  Outcome: Progressing  Goal: Absence of fever/infection during neutropenic period  Description: INTERVENTIONS:  - Monitor WBC    Outcome: Progressing     Problem: SAFETY ADULT  Goal: Patient will remain free of falls  Description: INTERVENTIONS:  - Educate patient/family on patient safety including physical limitations  - Instruct patient to call for assistance with activity   - Consult OT/PT to assist with strengthening/mobility   - Keep Call bell within reach  - Keep bed low and locked with side rails adjusted as appropriate  - Keep care items and personal belongings within reach  - Initiate and maintain comfort rounds  - Make Fall Risk Sign visible to staff  - Offer Toileting every 2 Hours, in advance of need  - Initiate/Maintain bed alarm  - Obtain necessary fall risk management equipment:   - Apply yellow socks and bracelet for high fall risk patients  - Consider moving patient to room near nurses station  Outcome: Progressing  Goal: Maintain or return to baseline ADL function  Description: INTERVENTIONS:  -  Assess patient's ability to carry out ADLs; assess patient's baseline for ADL function and identify physical deficits which impact ability to perform ADLs (bathing, care of mouth/teeth, toileting, grooming, dressing, etc.)  - Assess/evaluate cause of self-care deficits   - Assess range of motion  - Assess patient's mobility; develop plan if impaired  - Assess patient's need for assistive devices and provide as appropriate  - Encourage maximum independence but intervene and supervise when necessary  - Involve family in performance of ADLs  - Assess for home care needs following discharge   - Consider OT consult to assist with ADL evaluation and planning for discharge  - Provide patient education as appropriate  Outcome: Progressing  Goal: Maintains/Returns to pre admission functional level  Description: INTERVENTIONS:  - Perform BMAT or MOVE assessment daily.   - Set and communicate daily mobility goal to care team and patient/family/caregiver. - Collaborate with rehabilitation services on mobility goals if consulted  - Perform Range of Motion 2 times a day. - Reposition patient every 2 hours.   - Dangle patient 2 times a day  - Stand patient 2 times a day  - Ambulate patient 2 times a day  - Out of bed to chair 2 times a day   - Out of bed for meals 2 times a day  - Out of bed for toileting  - Record patient progress and toleration of activity level   Outcome: Progressing     Problem: DISCHARGE PLANNING  Goal: Discharge to home or other facility with appropriate resources  Description: INTERVENTIONS:  - Identify barriers to discharge w/patient and caregiver  - Arrange for needed discharge resources and transportation as appropriate  - Identify discharge learning needs (meds, wound care, etc.)  - Arrange for interpretive services to assist at discharge as needed  - Refer to Case Management Department for coordinating discharge planning if the patient needs post-hospital services based on physician/advanced practitioner order or complex needs related to functional status, cognitive ability, or social support system  Outcome: Progressing     Problem: Knowledge Deficit  Goal: Patient/family/caregiver demonstrates understanding of disease process, treatment plan, medications, and discharge instructions  Description: Complete learning assessment and assess knowledge base.   Interventions:  - Provide teaching at level of understanding  - Provide teaching via preferred learning methods  Outcome: Progressing     Problem: RESPIRATORY - ADULT  Goal: Achieves optimal ventilation and oxygenation  Description: INTERVENTIONS:  - Assess for changes in respiratory status  - Assess for changes in mentation and behavior  - Position to facilitate oxygenation and minimize respiratory effort  - Oxygen administered by appropriate delivery if ordered  - Initiate smoking cessation education as indicated  - Encourage broncho-pulmonary hygiene including cough, deep breathe, Incentive Spirometry  - Assess the need for suctioning and aspirate as needed  - Assess and instruct to report SOB or any respiratory difficulty  - Respiratory Therapy support as indicated  Outcome: Progressing     Problem: GASTROINTESTINAL - ADULT  Goal: Minimal or absence of nausea and/or vomiting  Description: INTERVENTIONS:  - Administer IV fluids if ordered to ensure adequate hydration  - Maintain NPO status until nausea and vomiting are resolved  - Nasogastric tube if ordered  - Administer ordered antiemetic medications as needed  - Provide nonpharmacologic comfort measures as appropriate  - Advance diet as tolerated, if ordered  - Consider nutrition services referral to assist patient with adequate nutrition and appropriate food choices  Outcome: Progressing  Goal: Maintains or returns to baseline bowel function  Description: INTERVENTIONS:  - Assess bowel function  - Encourage oral fluids to ensure adequate hydration  - Administer IV fluids if ordered to ensure adequate hydration  - Administer ordered medications as needed  - Encourage mobilization and activity  - Consider nutritional services referral to assist patient with adequate nutrition and appropriate food choices  Outcome: Progressing  Goal: Maintains adequate nutritional intake  Description: INTERVENTIONS:  - Monitor percentage of each meal consumed  - Identify factors contributing to decreased intake, treat as appropriate  - Assist with meals as needed  - Monitor I&O, weight, and lab values if indicated  - Obtain nutrition services referral as needed  Outcome: Progressing  Goal: Establish and maintain optimal ostomy function  Description: INTERVENTIONS:  - Assess bowel function  - Encourage oral fluids to ensure adequate hydration  - Administer IV fluids if ordered to ensure adequate hydration   - Administer ordered medications as needed  - Encourage mobilization and activity  - Nutrition services referral to assist patient with appropriate food choices  - Assess stoma site  - Consider wound care consult   Outcome: Progressing  Goal: Oral mucous membranes remain intact  Description: INTERVENTIONS  - Assess oral mucosa and hygiene practices  - Implement preventative oral hygiene regimen  - Implement oral medicated treatments as ordered  - Initiate Nutrition services referral as needed  Outcome: Progressing     Problem: Nutrition/Hydration-ADULT  Goal: Nutrient/Hydration intake appropriate for improving, restoring or maintaining nutritional needs  Description: Monitor and assess patient's nutrition/hydration status for malnutrition.  Collaborate with interdisciplinary team and initiate plan and interventions as ordered. Monitor patient's weight and dietary intake as ordered or per policy. Utilize nutrition screening tool and intervene as necessary. Determine patient's food preferences and provide high-protein, high-caloric foods as appropriate.      INTERVENTIONS:  - Monitor oral intake, urinary output, labs, and treatment plans  - Assess nutrition and hydration status and recommend course of action  - Evaluate amount of meals eaten  - Assist patient with eating if necessary   - Allow adequate time for meals  - Recommend/ encourage appropriate diets, oral nutritional supplements, and vitamin/mineral supplements  - Order, calculate, and assess calorie counts as needed  - Recommend, monitor, and adjust tube feedings and TPN/PPN based on assessed needs  - Assess need for intravenous fluids  - Provide specific nutrition/hydration education as appropriate  - Include patient/family/caregiver in decisions related to nutrition  Outcome: Progressing

## 2023-07-05 NOTE — PLAN OF CARE
Problem: PAIN - ADULT  Goal: Verbalizes/displays adequate comfort level or baseline comfort level  Description: Interventions:  - Encourage patient to monitor pain and request assistance  - Assess pain using appropriate pain scale  - Administer analgesics based on type and severity of pain and evaluate response  - Implement non-pharmacological measures as appropriate and evaluate response  - Consider cultural and social influences on pain and pain management  - Notify physician/advanced practitioner if interventions unsuccessful or patient reports new pain  7/5/2023 1401 by Joleen Mahoney RN  Outcome: Adequate for Discharge  7/5/2023 1155 by Joleen Mahoney RN  Outcome: Progressing     Problem: INFECTION - ADULT  Goal: Absence or prevention of progression during hospitalization  Description: INTERVENTIONS:  - Assess and monitor for signs and symptoms of infection  - Monitor lab/diagnostic results  - Monitor all insertion sites, i.e. indwelling lines, tubes, and drains  - Monitor endotracheal if appropriate and nasal secretions for changes in amount and color  - [place] appropriate cooling/warming therapies per order  - Administer medications as ordered  - Instruct and encourage patient and family to use good hand hygiene technique  - Identify and instruct in appropriate isolation precautions for identified infection/condition  7/5/2023 1401 by Joleen Mahoney RN  Outcome: Adequate for Discharge  7/5/2023 1155 by Joleen Mahoney RN  Outcome: Progressing  Goal: Absence of fever/infection during neutropenic period  Description: INTERVENTIONS:  - Monitor WBC    7/5/2023 1401 by Joleen Mahoney RN  Outcome: Adequate for Discharge  7/5/2023 1155 by Joleen Mahoney RN  Outcome: Progressing     Problem: SAFETY ADULT  Goal: Patient will remain free of falls  Description: INTERVENTIONS:  - Educate patient/family on patient safety including physical limitations  - Instruct patient to call for assistance with activity   - Consult OT/PT to assist with strengthening/mobility   - Keep Call bell within reach  - Keep bed low and locked with side rails adjusted as appropriate  - Keep care items and personal belongings within reach  - Initiate and maintain comfort rounds  - Make Fall Risk Sign visible to staff  - Offer Toileting every 2 Hours, in advance of need  - Initiate/Maintain bed alarm  - Obtain necessary fall risk management equipment:   - Apply yellow socks and bracelet for high fall risk patients  - Consider moving patient to room near nurses station  7/5/2023 1401 by Blake Childs RN  Outcome: Adequate for Discharge  7/5/2023 1155 by Blake Childs RN  Outcome: Progressing  Goal: Maintain or return to baseline ADL function  Description: INTERVENTIONS:  -  Assess patient's ability to carry out ADLs; assess patient's baseline for ADL function and identify physical deficits which impact ability to perform ADLs (bathing, care of mouth/teeth, toileting, grooming, dressing, etc.)  - Assess/evaluate cause of self-care deficits   - Assess range of motion  - Assess patient's mobility; develop plan if impaired  - Assess patient's need for assistive devices and provide as appropriate  - Encourage maximum independence but intervene and supervise when necessary  - Involve family in performance of ADLs  - Assess for home care needs following discharge   - Consider OT consult to assist with ADL evaluation and planning for discharge  - Provide patient education as appropriate  7/5/2023 1401 by Blake Childs RN  Outcome: Adequate for Discharge  7/5/2023 1155 by Blake Childs RN  Outcome: Progressing  Goal: Maintains/Returns to pre admission functional level  Description: INTERVENTIONS:  - Perform BMAT or MOVE assessment daily.   - Set and communicate daily mobility goal to care team and patient/family/caregiver.    - Collaborate with rehabilitation services on mobility goals if consulted  - Perform Range of Motion 2 times a day.  - Reposition patient every 2 hours. - Dangle patient 2 times a day  - Stand patient 2 times a day  - Ambulate patient 2 times a day  - Out of bed to chair 2 times a day   - Out of bed for meals 2 times a day  - Out of bed for toileting  - Record patient progress and toleration of activity level   7/5/2023 1401 by Poncho Chatman RN  Outcome: Adequate for Discharge  7/5/2023 1155 by Poncho Chatman RN  Outcome: Progressing     Problem: DISCHARGE PLANNING  Goal: Discharge to home or other facility with appropriate resources  Description: INTERVENTIONS:  - Identify barriers to discharge w/patient and caregiver  - Arrange for needed discharge resources and transportation as appropriate  - Identify discharge learning needs (meds, wound care, etc.)  - Arrange for interpretive services to assist at discharge as needed  - Refer to Case Management Department for coordinating discharge planning if the patient needs post-hospital services based on physician/advanced practitioner order or complex needs related to functional status, cognitive ability, or social support system  7/5/2023 1401 by Poncho Chatman RN  Outcome: Adequate for Discharge  7/5/2023 1155 by Poncho Chatman RN  Outcome: Progressing     Problem: Knowledge Deficit  Goal: Patient/family/caregiver demonstrates understanding of disease process, treatment plan, medications, and discharge instructions  Description: Complete learning assessment and assess knowledge base.   Interventions:  - Provide teaching at level of understanding  - Provide teaching via preferred learning methods  7/5/2023 1401 by Poncho Chatman RN  Outcome: Adequate for Discharge  7/5/2023 1155 by Poncho Chatman RN  Outcome: Progressing     Problem: RESPIRATORY - ADULT  Goal: Achieves optimal ventilation and oxygenation  Description: INTERVENTIONS:  - Assess for changes in respiratory status  - Assess for changes in mentation and behavior  - Position to facilitate oxygenation and minimize respiratory effort  - Oxygen administered by appropriate delivery if ordered  - Initiate smoking cessation education as indicated  - Encourage broncho-pulmonary hygiene including cough, deep breathe, Incentive Spirometry  - Assess the need for suctioning and aspirate as needed  - Assess and instruct to report SOB or any respiratory difficulty  - Respiratory Therapy support as indicated  7/5/2023 1401 by Yojana Kellogg RN  Outcome: Adequate for Discharge  7/5/2023 1155 by Yojana Kellogg RN  Outcome: Progressing     Problem: GASTROINTESTINAL - ADULT  Goal: Minimal or absence of nausea and/or vomiting  Description: INTERVENTIONS:  - Administer IV fluids if ordered to ensure adequate hydration  - Maintain NPO status until nausea and vomiting are resolved  - Nasogastric tube if ordered  - Administer ordered antiemetic medications as needed  - Provide nonpharmacologic comfort measures as appropriate  - Advance diet as tolerated, if ordered  - Consider nutrition services referral to assist patient with adequate nutrition and appropriate food choices  7/5/2023 1401 by Yojana Kellogg RN  Outcome: Adequate for Discharge  7/5/2023 1155 by Yojana Kellogg RN  Outcome: Progressing  Goal: Maintains or returns to baseline bowel function  Description: INTERVENTIONS:  - Assess bowel function  - Encourage oral fluids to ensure adequate hydration  - Administer IV fluids if ordered to ensure adequate hydration  - Administer ordered medications as needed  - Encourage mobilization and activity  - Consider nutritional services referral to assist patient with adequate nutrition and appropriate food choices  7/5/2023 1401 by Yojana Kellogg RN  Outcome: Adequate for Discharge  7/5/2023 1155 by Yojana Kellogg RN  Outcome: Progressing  Goal: Maintains adequate nutritional intake  Description: INTERVENTIONS:  - Monitor percentage of each meal consumed  - Identify factors contributing to decreased intake, treat as appropriate  - Assist with meals as needed  - Monitor I&O, weight, and lab values if indicated  - Obtain nutrition services referral as needed  7/5/2023 1401 by Shane Rice RN  Outcome: Adequate for Discharge  7/5/2023 1155 by Shane Rice RN  Outcome: Progressing  Goal: Establish and maintain optimal ostomy function  Description: INTERVENTIONS:  - Assess bowel function  - Encourage oral fluids to ensure adequate hydration  - Administer IV fluids if ordered to ensure adequate hydration   - Administer ordered medications as needed  - Encourage mobilization and activity  - Nutrition services referral to assist patient with appropriate food choices  - Assess stoma site  - Consider wound care consult   7/5/2023 1401 by Shane Rice RN  Outcome: Adequate for Discharge  7/5/2023 1155 by Shane Rice RN  Outcome: Progressing  Goal: Oral mucous membranes remain intact  Description: INTERVENTIONS  - Assess oral mucosa and hygiene practices  - Implement preventative oral hygiene regimen  - Implement oral medicated treatments as ordered  - Initiate Nutrition services referral as needed  7/5/2023 1401 by Shane Rice RN  Outcome: Adequate for Discharge  7/5/2023 1155 by Shane Rice RN  Outcome: Progressing     Problem: Nutrition/Hydration-ADULT  Goal: Nutrient/Hydration intake appropriate for improving, restoring or maintaining nutritional needs  Description: Monitor and assess patient's nutrition/hydration status for malnutrition. Collaborate with interdisciplinary team and initiate plan and interventions as ordered. Monitor patient's weight and dietary intake as ordered or per policy. Utilize nutrition screening tool and intervene as necessary. Determine patient's food preferences and provide high-protein, high-caloric foods as appropriate.      INTERVENTIONS:  - Monitor oral intake, urinary output, labs, and treatment plans  - Assess nutrition and hydration status and recommend course of action  - Evaluate amount of meals eaten  - Assist patient with eating if necessary   - Allow adequate time for meals  - Recommend/ encourage appropriate diets, oral nutritional supplements, and vitamin/mineral supplements  - Order, calculate, and assess calorie counts as needed  - Recommend, monitor, and adjust tube feedings and TPN/PPN based on assessed needs  - Assess need for intravenous fluids  - Provide specific nutrition/hydration education as appropriate  - Include patient/family/caregiver in decisions related to nutrition  7/5/2023 1401 by Carla Tom, RN  Outcome: Adequate for Discharge  7/5/2023 1155 by Carla Tom, RN  Outcome: Progressing

## 2023-07-05 NOTE — ASSESSMENT & PLAN NOTE
· Patient presenting to the ED for constipation, abdominal pain, and vomiting. Denies any associated fever/chills, chest pain, or shortness of breath. Patient with multiple previous admissions for small bowel obstruction and both patient and family reports the symptoms are identical to prior episodes. · CT A/P: Findings above suspicious for small bowel obstruction likely secondary to adhesions overall very similar an appearance to the prior study dated 6/28/2022. No free intraperitoneal air.   · ED spoke with general surgery who recommend admission under [facility]  · Significantly improved  · Now having bowel movements  · Advance to full diet  · Follow-up surgery recommendations

## 2023-07-05 NOTE — PLAN OF CARE
Problem: PAIN - ADULT  Goal: Verbalizes/displays adequate comfort level or baseline comfort level  Description: Interventions:  - Encourage patient to monitor pain and request assistance  - Assess pain using appropriate pain scale  - Administer analgesics based on type and severity of pain and evaluate response  - Implement non-pharmacological measures as appropriate and evaluate response  - Consider cultural and social influences on pain and pain management  - Notify physician/advanced practitioner if interventions unsuccessful or patient reports new pain  Outcome: Progressing     Problem: INFECTION - ADULT  Goal: Absence or prevention of progression during hospitalization  Description: INTERVENTIONS:  - Assess and monitor for signs and symptoms of infection  - Monitor lab/diagnostic results  - Monitor all insertion sites, i.e. indwelling lines, tubes, and drains  - Monitor endotracheal if appropriate and nasal secretions for changes in amount and color  - [place] appropriate cooling/warming therapies per order  - Administer medications as ordered  - Instruct and encourage patient and family to use good hand hygiene technique  - Identify and instruct in appropriate isolation precautions for identified infection/condition  Outcome: Progressing

## 2023-07-05 NOTE — DISCHARGE SUMMARY
[address]  Discharge- Hebert Mendoza 1945, 66 y.o. male MRN: [id number]  Unit/Bed#: -02 Encounter: [id number]  Primary Care Provider: Azul Archuleta DO   Date and time admitted to hospital: 6/30/2023  7:24 PM    Discharging Physician / Practitioner: Luz Daniels MD  PCP: Azul Archuleta DO  Admission Date:   Admission Orders (From admission, onward)     Ordered        06/30/23 2149  INPATIENT ADMISSION  Once                      Discharge Date: 07/05/23    Medical Problems     Resolved Problems  Date Reviewed: 7/5/2023   None         Consultations During Hospital Stay:  · surgery    Procedures Performed:   · none    Significant Findings / Test Results:   XR abdomen 1 view kub    Result Date: 7/4/2023  Impression: Very limited examination due to patient habitus. No gross acute abnormalities. Workstation performed: [id number]     CT abdomen pelvis wo contrast    Result Date: 6/30/2023  · Impression: Findings above suspicious for small bowel obstruction likely secondary to adhesions overall very similar an appearance to the prior study dated 6/28/2022. No free intraperitoneal air. Surgical consult advised. Diffuse hepatic steatosis. Above findings discussed with Dr. Nadya Juárez at 9:05 p.m. on 6/30/2023. Workstation performed: [id number]     Incidental Findings:   · none     Test Results Pending at Discharge (will require follow up):   · none     Outpatient Tests Requested:  · none    Complications:  none    Reason for Admission: SBO    Hospital Course:     Hebert Mendoza is a 66 y.o. male patient who originally presented to the hospital on 6/30/2023 with past medical history significant dementia initially presented small bowel obstruction managed with conservative therapy ultimately improved tolerated full diet. Will f/u pcp        Please see above list of diagnoses and related plan for additional information.      Condition at Discharge: stable     Discharge Day Visit / Exam:     * Please refer to separate progress note for these details *    Discussion with Family: pt    Discharge instructions/Information to patient and family:   See after visit summary for information provided to patient and family. Provisions for Follow-Up Care:  See after visit summary for information related to follow-up care and any pertinent home health orders. Disposition:     Home    For Discharges to [facility]F:   · Not Applicable to this Patient - Not Applicable to this Patient    Planned Readmission: none     Discharge Statement:  I spent 60 minutes discharging the patient. This time was spent on the day of discharge. I had direct contact with the patient on the day of discharge. Greater than 50% of the total time was spent examining patient, answering all patient questions, arranging and discussing plan of care with patient as well as directly providing post-discharge instructions. Additional time then spent on discharge activities. Discharge Medications:  See after visit summary for reconciled discharge medications provided to patient and family.       ** Please Note: This note has been constructed using a voice recognition system **

## 2023-07-05 NOTE — PROGRESS NOTES
[address]  Progress Note  Name: Em Holt  MRN: [id number]  Unit/Bed#: -65 I Date of Admission: 6/30/2023   Date of Service: 7/5/2023 I Hospital Day: 5    Assessment/Plan   * SBO (small bowel obstruction) [facility])  Assessment & Plan  · Patient presenting to the ED for constipation, abdominal pain, and vomiting. Denies any associated fever/chills, chest pain, or shortness of breath. Patient with multiple previous admissions for small bowel obstruction and both patient and family reports the symptoms are identical to prior episodes. · CT A/P: Findings above suspicious for small bowel obstruction likely secondary to adhesions overall very similar an appearance to the prior study dated 6/28/2022. No free intraperitoneal air.   · ED spoke with general surgery who recommend admission under SLIM  · Significantly improved  · Now having bowel movements  · Advance to full diet  · Follow-up surgery recommendations    Leukocytosis  Assessment & Plan  · WBC 23.07 on admission  · Afebrile and no obvious source of infection  · Reactive  · Monitor off antibiotics    Morbid obesity ([address])  Assessment & Plan  Potassium morning whether she discontinue diet and exercise counseling    CKD (chronic kidney disease)  Assessment & Plan  Lab Results   Component Value Date    EGFR 43 07/05/2023    EGFR 40 07/04/2023    EGFR 42 07/03/2023    CREATININE 1.51 (H) 07/05/2023    CREATININE 1.59 (H) 07/04/2023    CREATININE 1.53 (H) 07/03/2023     · Creatinine 2.02 (baseline 1.7-1.9)  · Creatinine at baseline    History of CVA (cerebrovascular accident)  Assessment & Plan  · Continue statin    Dementia ([address])  Assessment & Plan  · Takes aricept at home  · Delirium precautions  · Currently at baseline    Essential hypertension  Assessment & Plan  · BP controlled  · Continue to hold home p.o. agents           VTE Pharmacologic Prophylaxis:   Pharmacologic: Heparin  Mechanical VTE Prophylaxis in Place: Yes    Patient Centered Rounds: I have performed bedside rounds with nursing staff today. Discussions with Specialists or Other Care Team Provider: cm, nursing    Education and Discussions with Family / Patient: pt    Time Spent for Care: 30 minutes. More than 50% of total time spent on counseling and coordination of care as described above. Current Length of Stay: 5 day(s)    Current Patient Status: Inpatient   Certification Statement: The patient will continue to require additional inpatient hospital stay due to see below    Discharge Plan: Pending improvement of small bowel obstruction. Anticipate within 24 hours  Code Status: Level 1 - Full Code      Subjective:    denies fevers, chills, abdominal pain, nausea      Objective:     Vitals:   Temp (24hrs), Av.3 °F (36.3 °C), Min:97.2 °F (36.2 °C), Max:97.3 °F (36.3 °C)    Temp:  [97.2 °F (36.2 °C)-97.3 °F (36.3 °C)] 97.2 °F (36.2 °C)  HR:  [77-93] 77  Resp:  [18-19] 19  BP: (150-174)/(60-65) 174/65  SpO2:  [94 %-97 %] 94 %  Body mass index is 43.81 kg/m². Input and Output Summary (last 24 hours): Intake/Output Summary (Last 24 hours) at 2023 1059  Last data filed at 2023 0900  Gross per 24 hour   Intake 2832.5 ml   Output --   Net 2832.5 ml       Physical Exam:     Physical Exam  Constitutional:       General: He is not in acute distress. Appearance: He is well-developed. He is not diaphoretic. HENT:      Head: Normocephalic and atraumatic. Nose: Nose normal.      Mouth/Throat:      Pharynx: No oropharyngeal exudate. Eyes:      General: No scleral icterus. Conjunctiva/sclera: Conjunctivae normal.   Cardiovascular:      Rate and Rhythm: Normal rate and regular rhythm. Heart sounds: Normal heart sounds. No murmur heard. No friction rub. No gallop. Pulmonary:      Effort: Pulmonary effort is normal. No respiratory distress. Breath sounds: Normal breath sounds. No wheezing or rales.    Chest:      Chest wall: No tenderness. Abdominal:      General: Bowel sounds are normal. There is no distension. Palpations: Abdomen is soft. Tenderness: There is no abdominal tenderness. There is no guarding. Musculoskeletal:         General: No tenderness or deformity. Normal range of motion. Cervical back: Normal range of motion and neck supple. Skin:     General: Skin is warm and dry. Findings: No erythema. Neurological:      Mental Status: He is alert. Mental status is at baseline. Additional Data:     Labs:    Results from last 7 days   Lab Units 07/05/23  0447   WBC Thousand/uL 9.25   HEMOGLOBIN g/dL 11.7*   HEMATOCRIT % 36.0*   PLATELETS Thousands/uL 219   NEUTROS PCT % 68   LYMPHS PCT % 17   MONOS PCT % 10   EOS PCT % 4     Results from last 7 days   Lab Units 07/05/23  0447 07/01/23  0503 06/30/23 2027   SODIUM mmol/L 140   < > 133*   POTASSIUM mmol/L 3.8   < > 4.6   CHLORIDE mmol/L 107   < > 92*   CO2 mmol/L 28   < > 28   BUN mg/dL 24   < > 35*   CREATININE mg/dL 1.51*   < > 2.02*   ANION GAP mmol/L 5   < > 13   CALCIUM mg/dL 8.3*   < > 10.3*   ALBUMIN g/dL  --   --  4.3   TOTAL BILIRUBIN mg/dL  --   --  0.84   ALK PHOS U/L  --   --  81   ALT U/L  --   --  26   AST U/L  --   --  19   GLUCOSE RANDOM mg/dL 99   < > 188*    < > = values in this interval not displayed. Results from last 7 days   Lab Units 07/01/23  0906 07/01/23  0503 07/01/23  0135 06/30/23  2208   LACTIC ACID mmol/L 2.0 2.6* 2.3* 2.9*   PROCALCITONIN ng/ml  --   --   --  0.18           * I Have Reviewed All Lab Data Listed Above. * Additional Pertinent Lab Tests Reviewed: All Labs Within Last 24 Hours Reviewed    Imaging:    Imaging Reports Reviewed Today Include: na  Imaging Personally Reviewed by Myself Includes:  na    Recent Cultures (last 7 days):     Results from last 7 days   Lab Units 06/30/23  2235 06/30/23  2217   BLOOD CULTURE  No Growth at 72 hrs. No Growth at 72 hrs.        Last 24 Hours Medication List:   Current Facility-Administered Medications   Medication Dose Route Frequency Provider Last Rate   • acetaminophen  650 mg Oral Q6H PRN Christin Henry PA-C     • budesonide-formoterol  2 puff Inhalation BID Christin Henry PA-C     • heparin (porcine)  7,500 Units Subcutaneous Q8H [address] PA-C     • phenol  1 spray Mouth/Throat Q2H PRN Cheryle Hester PA-C     • sodium chloride  50 mL/hr Intravenous Continuous Karyna Bianchi MD Stopped (07/05/23 5718)   • trimethobenzamide  200 mg Intramuscular Q6H PRN Cheryle Hester PA-C          Today, Patient Was Seen By: Marcus Barba MD    ** Please Note: Dictation voice to text software may have been used in the creation of this document.  **

## 2023-07-05 NOTE — PROGRESS NOTES
Progress Note - General Surgery   Luli Boston 66 y.o. 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 Patient is tolerating diet without any increasing abdominal pain, nausea, vomiting. Plan:  -Continue diet as tolerated  -Patient encouraged to eat softer foods and chew food really well  -As patient's symptoms have resolved and is currently tolerating diet with bowel function patient is cleared for discharge from surgical standpoint, can be discharged at the discretion of primary team.  -Follow-up with PCP  -No need for follow-up with general surgery at this time. Subjective/Objective   Chief Complaint: "I want food"    Subjective: No acute events overnight. Patient had several bowel movements yesterday. Patient is tolerating diet without any increasing abdominal pain, nausea, vomiting. Patient is ambulating and urinating without difficulty. Patient denies any chest pain, shortness of breath, palpitations, fevers, chills. Objective:     Blood pressure (!) 174/65, pulse 77, temperature (!) 97.2 °F (36.2 °C), resp. rate 19, height 5' 10" (1.778 m), weight (!) 138 kg (305 lb 5.4 oz), SpO2 94 %. Body mass index is 43.81 kg/m². I/O       07/03 0701  07/04 0700 07/04 0701  07/05 0700 07/05 0701 07/06 0700    P. O.  240 560    I.V. (mL/kg)  1000 (7.2) 1032.5 (7.5)    Total Intake(mL/kg)  1240 (9) 1592.5 (11.5)    Emesis/NG output 1000      Total Output 1000      Net -1000 +1240 +1592.5           Unmeasured Urine Occurrence  4 x     Unmeasured Stool Occurrence  2 x           Invasive Devices     Peripheral Intravenous Line  Duration           Peripheral IV 07/03/23 Dorsal (posterior); Right Forearm 1 day                Physical Exam: BP (!) 174/65   Pulse 77   Temp (!) 97.2 °F (36.2 °C)   Resp 19   Ht 5' 10" (1.778 m)   Wt (!) 138 kg (305 lb 5.4 oz)   SpO2 94%   BMI 43.81 kg/m²   General appearance: alert  Lungs: clear to auscultation bilaterally  Heart: regular rate and rhythm  Abdomen: Obese, protuberant, bowel sounds present throughout, nontender to palpation, scars from prior surgeries noted  Extremities: extremities normal, warm and well-perfused; no cyanosis, clubbing, or edema    Labs   Recent Results (from the past 24 hour(s))   CBC and differential    Collection Time: 07/05/23  4:47 AM   Result Value Ref Range    WBC 9.25 4.31 - 10.16 Thousand/uL    RBC 3.85 (L) 3.88 - 5.62 Million/uL    Hemoglobin 11.7 (L) 12.0 - 17.0 g/dL    Hematocrit 36.0 (L) 36.5 - 49.3 %    MCV 94 82 - 98 fL    MCH 30.4 26.8 - 34.3 pg    MCHC 32.5 31.4 - 37.4 g/dL    RDW 13.5 11.6 - 15.1 %    MPV 10.6 8.9 - 12.7 fL    Platelets 317 336 - 237 Thousands/uL    nRBC 0 /100 WBCs    Neutrophils Relative 68 43 - 75 %    Immat GRANS % 1 0 - 2 %    Lymphocytes Relative 17 14 - 44 %    Monocytes Relative 10 4 - 12 %    Eosinophils Relative 4 0 - 6 %    Basophils Relative 0 0 - 1 %    Neutrophils Absolute 6.30 1.85 - 7.62 Thousands/µL    Immature Grans Absolute 0.09 0.00 - 0.20 Thousand/uL    Lymphocytes Absolute 1.54 0.60 - 4.47 Thousands/µL    Monocytes Absolute 0.92 0.17 - 1.22 Thousand/µL    Eosinophils Absolute 0.38 0.00 - 0.61 Thousand/µL    Basophils Absolute 0.02 0.00 - 0.10 Thousands/µL   Basic metabolic panel    Collection Time: 07/05/23  4:47 AM   Result Value Ref Range    Sodium 140 135 - 147 mmol/L    Potassium 3.8 3.5 - 5.3 mmol/L    Chloride 107 96 - 108 mmol/L    CO2 28 21 - 32 mmol/L    ANION GAP 5 mmol/L    BUN 24 5 - 25 mg/dL    Creatinine 1.51 (H) 0.60 - 1.30 mg/dL    Glucose 99 65 - 140 mg/dL    Calcium 8.3 (L) 8.4 - 10.2 mg/dL    eGFR 43 ml/min/1.73sq m        Imaging and other studies:  XR abdomen 1 view kub    Result Date: 7/4/2023  Impression: Very limited examination due to patient habitus. No gross acute abnormalities. Workstation performed: [id number]     CT abdomen pelvis wo contrast    Result Date: 6/30/2023  Impression: Findings above suspicious for small bowel obstruction likely secondary to adhesions overall very similar an appearance to the prior study dated 6/28/2022. No free intraperitoneal air. Surgical consult advised. Diffuse hepatic steatosis. Above findings discussed with Dr. Analilia Gandara at 9:05 p.m. on 6/30/2023.  Workstation performed: [id number]       VTE Pharmacologic Prophylaxis: Heparin  VTE Mechanical Prophylaxis: sequential compression device    Cinthya Morin PA-C  7/5/2023

## 2023-07-05 NOTE — CASE MANAGEMENT
Case Management Discharge Planning Note    Patient name Maximino Dodson  Location [address] [facility] 433/-71 MRN [id number]  : 1945 Date 2023       Current Admission Date: 2023  Current Admission Diagnosis:SBO (small bowel obstruction) [facility])   Patient Active Problem List    Diagnosis Date Noted   • Leukocytosis 2023   • Morbid obesity ([address]) 2022   • Lactic acidosis 2021   • CKD (chronic kidney disease) 2018   • Dyslipidemia 2018   • History of CVA (cerebrovascular accident) 2018   • Renal cyst, left 2018   • Acute kidney injury superimposed on chronic kidney disease ([address]) 2018   • Dementia ([address]) 2018   • Elevated random blood glucose level 2018   • Essential hypertension    • Hyperlipidemia    • Ventral hernia    • SBO (small bowel obstruction) ([address])    • PVD (peripheral vascular disease) ([address])       LOS (days): 5  Geometric Mean LOS (GMLOS) (days): 3.00  Days to GMLOS:-1.6     OBJECTIVE:  Risk of Unplanned Readmission Score: 14.3         Current admission status: Inpatient   Preferred Pharmacy:   [address] [address]  Phone: [phone] Fax: [phone]    [facility], [address] [address]  Phone: [phone] Fax: [phone]    Primary Care Provider: Sonam Urbina DO    Primary Insurance: MEDICARE  Secondary Insurance: JUDY    DISCHARGE DETAILS:    IMM Given (Date):: 23  IMM Given to[de-identified] Patient     Additional Comments: IMM reviewed with patient at bedside. Pt agreeable with discharge plan. Pt given copy of IMM. IMM placed in pt's chart.

## 2023-07-06 LAB
BACTERIA BLD CULT: NORMAL
BACTERIA BLD CULT: NORMAL

## 2023-12-21 ENCOUNTER — APPOINTMENT (OUTPATIENT)
Age: 78
End: 2023-12-21
Payer: MEDICARE

## 2023-12-21 DIAGNOSIS — M25.562 LEFT KNEE PAIN, UNSPECIFIED CHRONICITY: ICD-10-CM

## 2023-12-21 PROCEDURE — 73562 X-RAY EXAM OF KNEE 3: CPT

## 2024-01-01 ENCOUNTER — LAB REQUISITION (OUTPATIENT)
Dept: LAB | Facility: HOSPITAL | Age: 79
End: 2024-01-01
Payer: MEDICARE

## 2024-01-01 ENCOUNTER — APPOINTMENT (EMERGENCY)
Dept: VASCULAR ULTRASOUND | Facility: HOSPITAL | Age: 79
End: 2024-01-01
Payer: MEDICARE

## 2024-01-01 ENCOUNTER — APPOINTMENT (OUTPATIENT)
Age: 79
End: 2024-01-01
Payer: MEDICARE

## 2024-01-01 ENCOUNTER — HOME CARE VISIT (OUTPATIENT)
Dept: HOME HEALTH SERVICES | Facility: HOME HEALTHCARE | Age: 79
End: 2024-01-01

## 2024-01-01 ENCOUNTER — APPOINTMENT (EMERGENCY)
Dept: RADIOLOGY | Facility: HOSPITAL | Age: 79
End: 2024-01-01
Payer: MEDICARE

## 2024-01-01 ENCOUNTER — APPOINTMENT (EMERGENCY)
Dept: CT IMAGING | Facility: HOSPITAL | Age: 79
End: 2024-01-01
Payer: MEDICARE

## 2024-01-01 ENCOUNTER — HOSPITAL ENCOUNTER (INPATIENT)
Facility: HOSPITAL | Age: 79
LOS: 2 days | End: 2024-06-05
Attending: INTERNAL MEDICINE | Admitting: INTERNAL MEDICINE
Payer: MEDICARE

## 2024-01-01 ENCOUNTER — HOSPITAL ENCOUNTER (EMERGENCY)
Facility: HOSPITAL | Age: 79
End: 2024-06-03
Attending: STUDENT IN AN ORGANIZED HEALTH CARE EDUCATION/TRAINING PROGRAM
Payer: MEDICARE

## 2024-01-01 ENCOUNTER — APPOINTMENT (OUTPATIENT)
Dept: RADIOLOGY | Facility: HOSPITAL | Age: 79
End: 2024-01-01
Payer: MEDICARE

## 2024-01-01 VITALS
HEART RATE: 107 BPM | TEMPERATURE: 98.4 F | BODY MASS INDEX: 44.45 KG/M2 | DIASTOLIC BLOOD PRESSURE: 48 MMHG | WEIGHT: 292.33 LBS | RESPIRATION RATE: 28 BRPM | OXYGEN SATURATION: 97 % | SYSTOLIC BLOOD PRESSURE: 142 MMHG

## 2024-01-01 VITALS
RESPIRATION RATE: 11 BRPM | HEART RATE: 99 BPM | TEMPERATURE: 98.5 F | OXYGEN SATURATION: 96 % | DIASTOLIC BLOOD PRESSURE: 52 MMHG | SYSTOLIC BLOOD PRESSURE: 95 MMHG

## 2024-01-01 DIAGNOSIS — D64.9 ANEMIA: ICD-10-CM

## 2024-01-01 DIAGNOSIS — I73.9 PVD (PERIPHERAL VASCULAR DISEASE) (HCC): ICD-10-CM

## 2024-01-01 DIAGNOSIS — N18.32 CHRONIC KIDNEY DISEASE, STAGE 3B (HCC): ICD-10-CM

## 2024-01-01 DIAGNOSIS — N18.9 ACUTE KIDNEY INJURY SUPERIMPOSED ON CHRONIC KIDNEY DISEASE  (HCC): ICD-10-CM

## 2024-01-01 DIAGNOSIS — I99.8 ISCHEMIC LEG: ICD-10-CM

## 2024-01-01 DIAGNOSIS — Z71.89 GOALS OF CARE, COUNSELING/DISCUSSION: ICD-10-CM

## 2024-01-01 DIAGNOSIS — Z01.810 PREOP CARDIOVASCULAR EXAM: ICD-10-CM

## 2024-01-01 DIAGNOSIS — I50.33 ACUTE ON CHRONIC DIASTOLIC CHF (CONGESTIVE HEART FAILURE) (HCC): Primary | ICD-10-CM

## 2024-01-01 DIAGNOSIS — N17.9 AKI (ACUTE KIDNEY INJURY) (HCC): ICD-10-CM

## 2024-01-01 DIAGNOSIS — N17.9 AKI (ACUTE KIDNEY INJURY) (HCC): Primary | ICD-10-CM

## 2024-01-01 DIAGNOSIS — M79.604 RIGHT LEG PAIN: Primary | ICD-10-CM

## 2024-01-01 DIAGNOSIS — N17.9 ACUTE KIDNEY INJURY SUPERIMPOSED ON CHRONIC KIDNEY DISEASE  (HCC): ICD-10-CM

## 2024-01-01 DIAGNOSIS — I73.9 PERIPHERAL VASCULAR DISEASE, UNSPECIFIED (HCC): ICD-10-CM

## 2024-01-01 LAB
2HR DELTA HS TROPONIN: 1 NG/L
ABO GROUP BLD: NORMAL
ALBUMIN SERPL BCP-MCNC: 3.7 G/DL (ref 3.5–5)
ALP SERPL-CCNC: 86 U/L (ref 34–104)
ALT SERPL W P-5'-P-CCNC: 28 U/L (ref 7–52)
ANION GAP SERPL CALCULATED.3IONS-SCNC: 11 MMOL/L (ref 4–13)
ANION GAP SERPL CALCULATED.3IONS-SCNC: 12 MMOL/L (ref 4–13)
ANION GAP SERPL CALCULATED.3IONS-SCNC: 12 MMOL/L (ref 4–13)
APTT PPP: 125 SECONDS (ref 23–37)
APTT PPP: 56 SECONDS (ref 23–37)
APTT PPP: 92 SECONDS (ref 23–37)
AST SERPL W P-5'-P-CCNC: 28 U/L (ref 13–39)
ATRIAL RATE: 103 BPM
BASOPHILS # BLD AUTO: 0.03 THOUSANDS/ÂΜL (ref 0–0.1)
BASOPHILS NFR BLD AUTO: 0 % (ref 0–1)
BILIRUB SERPL-MCNC: 0.66 MG/DL (ref 0.2–1)
BLD GP AB SCN SERPL QL: NEGATIVE
BNP SERPL-MCNC: 58 PG/ML (ref 0–100)
BUN SERPL-MCNC: 57 MG/DL (ref 5–25)
BUN SERPL-MCNC: 71 MG/DL (ref 5–25)
BUN SERPL-MCNC: 77 MG/DL (ref 5–25)
CALCIUM SERPL-MCNC: 9.2 MG/DL (ref 8.4–10.2)
CALCIUM SERPL-MCNC: 9.6 MG/DL (ref 8.4–10.2)
CALCIUM SERPL-MCNC: 9.9 MG/DL (ref 8.4–10.2)
CARDIAC TROPONIN I PNL SERPL HS: 14 NG/L
CARDIAC TROPONIN I PNL SERPL HS: 15 NG/L
CHLORIDE SERPL-SCNC: 96 MMOL/L (ref 96–108)
CHLORIDE SERPL-SCNC: 97 MMOL/L (ref 96–108)
CHLORIDE SERPL-SCNC: 97 MMOL/L (ref 96–108)
CO2 SERPL-SCNC: 26 MMOL/L (ref 21–32)
CO2 SERPL-SCNC: 27 MMOL/L (ref 21–32)
CO2 SERPL-SCNC: 29 MMOL/L (ref 21–32)
CREAT SERPL-MCNC: 2.32 MG/DL (ref 0.6–1.3)
CREAT SERPL-MCNC: 2.87 MG/DL (ref 0.6–1.3)
CREAT SERPL-MCNC: 3.22 MG/DL (ref 0.6–1.3)
EOSINOPHIL # BLD AUTO: 0.45 THOUSAND/ÂΜL (ref 0–0.61)
EOSINOPHIL NFR BLD AUTO: 4 % (ref 0–6)
ERYTHROCYTE [DISTWIDTH] IN BLOOD BY AUTOMATED COUNT: 13.9 % (ref 11.6–15.1)
ERYTHROCYTE [DISTWIDTH] IN BLOOD BY AUTOMATED COUNT: 14.3 % (ref 11.6–15.1)
ERYTHROCYTE [DISTWIDTH] IN BLOOD BY AUTOMATED COUNT: 14.3 % (ref 11.6–15.1)
FLUAV RNA RESP QL NAA+PROBE: NEGATIVE
FLUBV RNA RESP QL NAA+PROBE: NEGATIVE
GFR SERPL CREATININE-BSD FRML MDRD: 17 ML/MIN/1.73SQ M
GFR SERPL CREATININE-BSD FRML MDRD: 20 ML/MIN/1.73SQ M
GFR SERPL CREATININE-BSD FRML MDRD: 25 ML/MIN/1.73SQ M
GLUCOSE P FAST SERPL-MCNC: 119 MG/DL (ref 65–99)
GLUCOSE SERPL-MCNC: 122 MG/DL (ref 65–140)
GLUCOSE SERPL-MCNC: 150 MG/DL (ref 65–140)
HCT VFR BLD AUTO: 28.2 % (ref 36.5–49.3)
HCT VFR BLD AUTO: 29 % (ref 36.5–49.3)
HCT VFR BLD AUTO: 33.2 % (ref 36.5–49.3)
HGB BLD-MCNC: 10.7 G/DL (ref 12–17)
HGB BLD-MCNC: 9.3 G/DL (ref 12–17)
HGB BLD-MCNC: 9.5 G/DL (ref 12–17)
IMM GRANULOCYTES # BLD AUTO: 0.08 THOUSAND/UL (ref 0–0.2)
IMM GRANULOCYTES NFR BLD AUTO: 1 % (ref 0–2)
INR PPP: 1.39 (ref 0.84–1.19)
INR PPP: 1.39 (ref 0.84–1.19)
LACTATE SERPL-SCNC: 1.3 MMOL/L (ref 0.5–2)
LYMPHOCYTES # BLD AUTO: 1.71 THOUSANDS/ÂΜL (ref 0.6–4.47)
LYMPHOCYTES NFR BLD AUTO: 14 % (ref 14–44)
MCH RBC QN AUTO: 30.7 PG (ref 26.8–34.3)
MCH RBC QN AUTO: 30.7 PG (ref 26.8–34.3)
MCH RBC QN AUTO: 31.1 PG (ref 26.8–34.3)
MCHC RBC AUTO-ENTMCNC: 32.2 G/DL (ref 31.4–37.4)
MCHC RBC AUTO-ENTMCNC: 32.8 G/DL (ref 31.4–37.4)
MCHC RBC AUTO-ENTMCNC: 33 G/DL (ref 31.4–37.4)
MCV RBC AUTO: 94 FL (ref 82–98)
MCV RBC AUTO: 94 FL (ref 82–98)
MCV RBC AUTO: 95 FL (ref 82–98)
MONOCYTES # BLD AUTO: 1.03 THOUSAND/ÂΜL (ref 0.17–1.22)
MONOCYTES NFR BLD AUTO: 8 % (ref 4–12)
NEUTROPHILS # BLD AUTO: 9.36 THOUSANDS/ÂΜL (ref 1.85–7.62)
NEUTS SEG NFR BLD AUTO: 73 % (ref 43–75)
NRBC BLD AUTO-RTO: 0 /100 WBCS
PLATELET # BLD AUTO: 258 THOUSANDS/UL (ref 149–390)
PLATELET # BLD AUTO: 260 THOUSANDS/UL (ref 149–390)
PLATELET # BLD AUTO: 334 THOUSANDS/UL (ref 149–390)
PMV BLD AUTO: 10 FL (ref 8.9–12.7)
PMV BLD AUTO: 10.5 FL (ref 8.9–12.7)
PMV BLD AUTO: 12.2 FL (ref 8.9–12.7)
POTASSIUM SERPL-SCNC: 5.2 MMOL/L (ref 3.5–5.3)
POTASSIUM SERPL-SCNC: 5.3 MMOL/L (ref 3.5–5.3)
POTASSIUM SERPL-SCNC: 5.3 MMOL/L (ref 3.5–5.3)
PR INTERVAL: 192 MS
PROT SERPL-MCNC: 7.2 G/DL (ref 6.4–8.4)
PROTHROMBIN TIME: 16.9 SECONDS (ref 11.6–14.5)
PROTHROMBIN TIME: 17.7 SECONDS (ref 11.6–14.5)
QRS AXIS: 55 DEGREES
QRSD INTERVAL: 144 MS
QT INTERVAL: 366 MS
QTC INTERVAL: 479 MS
RBC # BLD AUTO: 2.99 MILLION/UL (ref 3.88–5.62)
RBC # BLD AUTO: 3.09 MILLION/UL (ref 3.88–5.62)
RBC # BLD AUTO: 3.48 MILLION/UL (ref 3.88–5.62)
RH BLD: NEGATIVE
RSV RNA RESP QL NAA+PROBE: NEGATIVE
SARS-COV-2 RNA RESP QL NAA+PROBE: NEGATIVE
SODIUM SERPL-SCNC: 135 MMOL/L (ref 135–147)
SODIUM SERPL-SCNC: 135 MMOL/L (ref 135–147)
SODIUM SERPL-SCNC: 137 MMOL/L (ref 135–147)
SPECIMEN EXPIRATION DATE: NORMAL
T WAVE AXIS: 43 DEGREES
VENTRICULAR RATE: 103 BPM
WBC # BLD AUTO: 12.66 THOUSAND/UL (ref 4.31–10.16)
WBC # BLD AUTO: 14.55 THOUSAND/UL (ref 4.31–10.16)
WBC # BLD AUTO: 14.84 THOUSAND/UL (ref 4.31–10.16)

## 2024-01-01 PROCEDURE — 99223 1ST HOSP IP/OBS HIGH 75: CPT | Performed by: INTERNAL MEDICINE

## 2024-01-01 PROCEDURE — 93923 UPR/LXTR ART STDY 3+ LVLS: CPT

## 2024-01-01 PROCEDURE — 86850 RBC ANTIBODY SCREEN: CPT | Performed by: SURGERY

## 2024-01-01 PROCEDURE — 99223 1ST HOSP IP/OBS HIGH 75: CPT | Performed by: SURGERY

## 2024-01-01 PROCEDURE — 99284 EMERGENCY DEPT VISIT MOD MDM: CPT

## 2024-01-01 PROCEDURE — 70450 CT HEAD/BRAIN W/O DYE: CPT

## 2024-01-01 PROCEDURE — 76775 US EXAM ABDO BACK WALL LIM: CPT

## 2024-01-01 PROCEDURE — 96376 TX/PRO/DX INJ SAME DRUG ADON: CPT

## 2024-01-01 PROCEDURE — 93925 LOWER EXTREMITY STUDY: CPT | Performed by: SURGERY

## 2024-01-01 PROCEDURE — 86901 BLOOD TYPING SEROLOGIC RH(D): CPT | Performed by: SURGERY

## 2024-01-01 PROCEDURE — 99233 SBSQ HOSP IP/OBS HIGH 50: CPT | Performed by: NURSE PRACTITIONER

## 2024-01-01 PROCEDURE — 36415 COLL VENOUS BLD VENIPUNCTURE: CPT

## 2024-01-01 PROCEDURE — 93922 UPR/L XTREMITY ART 2 LEVELS: CPT | Performed by: SURGERY

## 2024-01-01 PROCEDURE — 84484 ASSAY OF TROPONIN QUANT: CPT | Performed by: STUDENT IN AN ORGANIZED HEALTH CARE EDUCATION/TRAINING PROGRAM

## 2024-01-01 PROCEDURE — 85025 COMPLETE CBC W/AUTO DIFF WBC: CPT | Performed by: STUDENT IN AN ORGANIZED HEALTH CARE EDUCATION/TRAINING PROGRAM

## 2024-01-01 PROCEDURE — 93925 LOWER EXTREMITY STUDY: CPT

## 2024-01-01 PROCEDURE — 80048 BASIC METABOLIC PNL TOTAL CA: CPT

## 2024-01-01 PROCEDURE — 83880 ASSAY OF NATRIURETIC PEPTIDE: CPT | Performed by: INTERNAL MEDICINE

## 2024-01-01 PROCEDURE — 85730 THROMBOPLASTIN TIME PARTIAL: CPT | Performed by: INTERNAL MEDICINE

## 2024-01-01 PROCEDURE — 85027 COMPLETE CBC AUTOMATED: CPT | Performed by: INTERNAL MEDICINE

## 2024-01-01 PROCEDURE — 85730 THROMBOPLASTIN TIME PARTIAL: CPT | Performed by: STUDENT IN AN ORGANIZED HEALTH CARE EDUCATION/TRAINING PROGRAM

## 2024-01-01 PROCEDURE — 36415 COLL VENOUS BLD VENIPUNCTURE: CPT | Performed by: STUDENT IN AN ORGANIZED HEALTH CARE EDUCATION/TRAINING PROGRAM

## 2024-01-01 PROCEDURE — 0241U HB NFCT DS VIR RESP RNA 4 TRGT: CPT | Performed by: STUDENT IN AN ORGANIZED HEALTH CARE EDUCATION/TRAINING PROGRAM

## 2024-01-01 PROCEDURE — 96366 THER/PROPH/DIAG IV INF ADDON: CPT

## 2024-01-01 PROCEDURE — 85027 COMPLETE CBC AUTOMATED: CPT

## 2024-01-01 PROCEDURE — 83605 ASSAY OF LACTIC ACID: CPT | Performed by: INTERNAL MEDICINE

## 2024-01-01 PROCEDURE — 99238 HOSP IP/OBS DSCHRG MGMT 30/<: CPT | Performed by: PHYSICIAN ASSISTANT

## 2024-01-01 PROCEDURE — 99285 EMERGENCY DEPT VISIT HI MDM: CPT | Performed by: STUDENT IN AN ORGANIZED HEALTH CARE EDUCATION/TRAINING PROGRAM

## 2024-01-01 PROCEDURE — 96374 THER/PROPH/DIAG INJ IV PUSH: CPT

## 2024-01-01 PROCEDURE — 85610 PROTHROMBIN TIME: CPT

## 2024-01-01 PROCEDURE — 93010 ELECTROCARDIOGRAM REPORT: CPT | Performed by: INTERNAL MEDICINE

## 2024-01-01 PROCEDURE — 80053 COMPREHEN METABOLIC PANEL: CPT | Performed by: STUDENT IN AN ORGANIZED HEALTH CARE EDUCATION/TRAINING PROGRAM

## 2024-01-01 PROCEDURE — 93005 ELECTROCARDIOGRAM TRACING: CPT

## 2024-01-01 PROCEDURE — 96365 THER/PROPH/DIAG IV INF INIT: CPT

## 2024-01-01 PROCEDURE — 85610 PROTHROMBIN TIME: CPT | Performed by: STUDENT IN AN ORGANIZED HEALTH CARE EDUCATION/TRAINING PROGRAM

## 2024-01-01 PROCEDURE — 99497 ADVNCD CARE PLAN 30 MIN: CPT | Performed by: NURSE PRACTITIONER

## 2024-01-01 PROCEDURE — 80048 BASIC METABOLIC PNL TOTAL CA: CPT | Performed by: INTERNAL MEDICINE

## 2024-01-01 PROCEDURE — 87040 BLOOD CULTURE FOR BACTERIA: CPT | Performed by: INTERNAL MEDICINE

## 2024-01-01 PROCEDURE — 86900 BLOOD TYPING SEROLOGIC ABO: CPT | Performed by: SURGERY

## 2024-01-01 PROCEDURE — 71046 X-RAY EXAM CHEST 2 VIEWS: CPT

## 2024-01-01 RX ORDER — HYDROMORPHONE HCL/PF 1 MG/ML
0.5 SYRINGE (ML) INJECTION ONCE
Status: COMPLETED | OUTPATIENT
Start: 2024-01-01 | End: 2024-01-01

## 2024-01-01 RX ORDER — ATORVASTATIN CALCIUM 40 MG/1
40 TABLET, FILM COATED ORAL
Status: DISCONTINUED | OUTPATIENT
Start: 2024-01-01 | End: 2024-01-01

## 2024-01-01 RX ORDER — TAMSULOSIN HYDROCHLORIDE 0.4 MG/1
0.4 CAPSULE ORAL
Status: DISCONTINUED | OUTPATIENT
Start: 2024-01-01 | End: 2024-01-01

## 2024-01-01 RX ORDER — OXYCODONE HYDROCHLORIDE 10 MG/1
10 TABLET ORAL EVERY 4 HOURS PRN
Status: DISCONTINUED | OUTPATIENT
Start: 2024-01-01 | End: 2024-01-01

## 2024-01-01 RX ORDER — HYDROMORPHONE HCL/PF 1 MG/ML
1 SYRINGE (ML) INJECTION
Status: DISCONTINUED | OUTPATIENT
Start: 2024-01-01 | End: 2024-01-01

## 2024-01-01 RX ORDER — BISACODYL 10 MG
10 SUPPOSITORY, RECTAL RECTAL DAILY PRN
Status: DISCONTINUED | OUTPATIENT
Start: 2024-01-01 | End: 2024-01-01 | Stop reason: HOSPADM

## 2024-01-01 RX ORDER — FUROSEMIDE 10 MG/ML
40 INJECTION INTRAMUSCULAR; INTRAVENOUS
Status: DISCONTINUED | OUTPATIENT
Start: 2024-01-01 | End: 2024-01-01

## 2024-01-01 RX ORDER — OXYCODONE HYDROCHLORIDE 5 MG/1
5 TABLET ORAL EVERY 4 HOURS PRN
Status: DISCONTINUED | OUTPATIENT
Start: 2024-01-01 | End: 2024-01-01

## 2024-01-01 RX ORDER — HEPARIN SODIUM 10000 [USP'U]/100ML
3-24 INJECTION, SOLUTION INTRAVENOUS
Status: DISCONTINUED | OUTPATIENT
Start: 2024-01-01 | End: 2024-01-01

## 2024-01-01 RX ORDER — HEPARIN SODIUM 1000 [USP'U]/ML
4000 INJECTION, SOLUTION INTRAVENOUS; SUBCUTANEOUS EVERY 6 HOURS PRN
Status: DISCONTINUED | OUTPATIENT
Start: 2024-01-01 | End: 2024-01-01

## 2024-01-01 RX ORDER — HEPARIN SODIUM 10000 [USP'U]/100ML
3-20 INJECTION, SOLUTION INTRAVENOUS
Status: DISCONTINUED | OUTPATIENT
Start: 2024-01-01 | End: 2024-01-01

## 2024-01-01 RX ORDER — HEPARIN SODIUM 1000 [USP'U]/ML
2000 INJECTION, SOLUTION INTRAVENOUS; SUBCUTANEOUS EVERY 6 HOURS PRN
Status: DISCONTINUED | OUTPATIENT
Start: 2024-01-01 | End: 2024-01-01

## 2024-01-01 RX ORDER — HYDROMORPHONE HCL IN WATER/PF 6 MG/30 ML
0.2 PATIENT CONTROLLED ANALGESIA SYRINGE INTRAVENOUS EVERY 4 HOURS PRN
Status: DISCONTINUED | OUTPATIENT
Start: 2024-01-01 | End: 2024-01-01

## 2024-01-01 RX ORDER — GLYCOPYRROLATE 0.2 MG/ML
0.1 INJECTION INTRAMUSCULAR; INTRAVENOUS EVERY 4 HOURS PRN
Status: DISCONTINUED | OUTPATIENT
Start: 2024-01-01 | End: 2024-01-01 | Stop reason: HOSPADM

## 2024-01-01 RX ORDER — HYDROMORPHONE HCL/PF 1 MG/ML
0.5 SYRINGE (ML) INJECTION EVERY 4 HOURS PRN
Status: DISCONTINUED | OUTPATIENT
Start: 2024-01-01 | End: 2024-01-01

## 2024-01-01 RX ORDER — HYDROMORPHONE HYDROCHLORIDE 2 MG/ML
2 INJECTION, SOLUTION INTRAMUSCULAR; INTRAVENOUS; SUBCUTANEOUS
Status: DISCONTINUED | OUTPATIENT
Start: 2024-01-01 | End: 2024-01-01 | Stop reason: HOSPADM

## 2024-01-01 RX ORDER — ALBUTEROL SULFATE 2.5 MG/3ML
2.5 SOLUTION RESPIRATORY (INHALATION) EVERY 6 HOURS PRN
Status: DISCONTINUED | OUTPATIENT
Start: 2024-01-01 | End: 2024-01-01

## 2024-01-01 RX ORDER — HALOPERIDOL 5 MG/ML
0.5 INJECTION INTRAMUSCULAR EVERY 2 HOUR PRN
Status: DISCONTINUED | OUTPATIENT
Start: 2024-01-01 | End: 2024-01-01 | Stop reason: HOSPADM

## 2024-01-01 RX ORDER — ALBUMIN, HUMAN INJ 5% 5 %
25 SOLUTION INTRAVENOUS ONCE
Status: COMPLETED | OUTPATIENT
Start: 2024-01-01 | End: 2024-01-01

## 2024-01-01 RX ORDER — ONDANSETRON 2 MG/ML
4 INJECTION INTRAMUSCULAR; INTRAVENOUS EVERY 8 HOURS PRN
Status: DISCONTINUED | OUTPATIENT
Start: 2024-01-01 | End: 2024-01-01 | Stop reason: HOSPADM

## 2024-01-01 RX ORDER — HEPARIN SODIUM 10000 [USP'U]/100ML
3-24 INJECTION, SOLUTION INTRAVENOUS
Status: DISCONTINUED | OUTPATIENT
Start: 2024-01-01 | End: 2024-01-01 | Stop reason: HOSPADM

## 2024-01-01 RX ORDER — ASPIRIN 81 MG/1
81 TABLET, CHEWABLE ORAL DAILY
Status: DISCONTINUED | OUTPATIENT
Start: 2024-01-01 | End: 2024-01-01

## 2024-01-01 RX ORDER — HYDROMORPHONE HCL/PF 1 MG/ML
1 SYRINGE (ML) INJECTION
Status: DISCONTINUED | OUTPATIENT
Start: 2024-01-01 | End: 2024-01-01 | Stop reason: HOSPADM

## 2024-01-01 RX ORDER — POLYETHYLENE GLYCOL 3350 17 G/17G
17 POWDER, FOR SOLUTION ORAL DAILY PRN
Status: DISCONTINUED | OUTPATIENT
Start: 2024-01-01 | End: 2024-01-01 | Stop reason: HOSPADM

## 2024-01-01 RX ORDER — LORAZEPAM 2 MG/ML
1 INJECTION INTRAMUSCULAR
Status: DISCONTINUED | OUTPATIENT
Start: 2024-01-01 | End: 2024-01-01 | Stop reason: HOSPADM

## 2024-01-01 RX ORDER — ACETAMINOPHEN 325 MG/1
975 TABLET ORAL EVERY 8 HOURS
Status: DISCONTINUED | OUTPATIENT
Start: 2024-01-01 | End: 2024-01-01 | Stop reason: HOSPADM

## 2024-01-01 RX ADMIN — ASPIRIN 81 MG CHEWABLE TABLET 81 MG: 81 TABLET CHEWABLE at 08:17

## 2024-01-01 RX ADMIN — HYDROMORPHONE HYDROCHLORIDE 1 MG: 1 INJECTION, SOLUTION INTRAMUSCULAR; INTRAVENOUS; SUBCUTANEOUS at 18:39

## 2024-01-01 RX ADMIN — OXYCODONE HYDROCHLORIDE 5 MG: 5 TABLET ORAL at 09:28

## 2024-01-01 RX ADMIN — HYDROMORPHONE HYDROCHLORIDE 0.5 MG: 1 INJECTION, SOLUTION INTRAMUSCULAR; INTRAVENOUS; SUBCUTANEOUS at 18:25

## 2024-01-01 RX ADMIN — ACETAMINOPHEN 975 MG: 325 TABLET, FILM COATED ORAL at 11:50

## 2024-01-01 RX ADMIN — HEPARIN SODIUM 15 UNITS/KG/HR: 10000 INJECTION, SOLUTION INTRAVENOUS at 15:34

## 2024-01-01 RX ADMIN — FUROSEMIDE 40 MG: 10 INJECTION, SOLUTION INTRAMUSCULAR; INTRAVENOUS at 22:55

## 2024-01-01 RX ADMIN — OXYCODONE HYDROCHLORIDE 10 MG: 10 TABLET ORAL at 14:11

## 2024-01-01 RX ADMIN — ACETAMINOPHEN 975 MG: 325 TABLET, FILM COATED ORAL at 05:17

## 2024-01-01 RX ADMIN — HYDROMORPHONE HYDROCHLORIDE 1 MG: 1 INJECTION, SOLUTION INTRAMUSCULAR; INTRAVENOUS; SUBCUTANEOUS at 14:59

## 2024-01-01 RX ADMIN — ACETAMINOPHEN 975 MG: 325 TABLET, FILM COATED ORAL at 22:55

## 2024-01-01 RX ADMIN — ALBUMIN (HUMAN) 25 G: 12.5 INJECTION, SOLUTION INTRAVENOUS at 00:10

## 2024-01-01 RX ADMIN — LORAZEPAM 1 MG: 2 INJECTION INTRAMUSCULAR; INTRAVENOUS at 18:42

## 2024-01-01 RX ADMIN — HYDROMORPHONE HYDROCHLORIDE 0.5 MG: 1 INJECTION, SOLUTION INTRAMUSCULAR; INTRAVENOUS; SUBCUTANEOUS at 11:50

## 2024-01-01 RX ADMIN — FUROSEMIDE 40 MG: 10 INJECTION, SOLUTION INTRAMUSCULAR; INTRAVENOUS at 05:17

## 2024-01-01 RX ADMIN — HYDROMORPHONE HYDROCHLORIDE 0.2 MG: 0.2 INJECTION, SOLUTION INTRAMUSCULAR; INTRAVENOUS; SUBCUTANEOUS at 01:39

## 2024-01-01 RX ADMIN — HEPARIN SODIUM 10 UNITS/KG/HR: 10000 INJECTION, SOLUTION INTRAVENOUS at 08:18

## 2024-01-01 RX ADMIN — HYDROMORPHONE HYDROCHLORIDE 0.5 MG: 1 INJECTION, SOLUTION INTRAMUSCULAR; INTRAVENOUS; SUBCUTANEOUS at 15:56

## 2024-01-01 RX ADMIN — HYDROMORPHONE HYDROCHLORIDE 1 MG: 1 INJECTION, SOLUTION INTRAMUSCULAR; INTRAVENOUS; SUBCUTANEOUS at 00:42

## 2024-01-01 RX ADMIN — ONDANSETRON 4 MG: 2 INJECTION INTRAMUSCULAR; INTRAVENOUS at 01:39

## 2024-01-01 RX ADMIN — LORAZEPAM 1 MG: 2 INJECTION INTRAMUSCULAR; INTRAVENOUS at 14:59

## 2024-01-01 RX ADMIN — HYDROMORPHONE HYDROCHLORIDE 1 MG: 1 INJECTION, SOLUTION INTRAMUSCULAR; INTRAVENOUS; SUBCUTANEOUS at 16:05

## 2024-01-01 RX ADMIN — OXYCODONE HYDROCHLORIDE 5 MG: 5 TABLET ORAL at 22:55

## 2024-04-08 ENCOUNTER — APPOINTMENT (EMERGENCY)
Dept: CT IMAGING | Facility: HOSPITAL | Age: 79
DRG: 388 | End: 2024-04-08
Payer: MEDICARE

## 2024-04-08 ENCOUNTER — HOSPITAL ENCOUNTER (INPATIENT)
Facility: HOSPITAL | Age: 79
LOS: 7 days | Discharge: HOME HEALTH | DRG: 388 | End: 2024-04-16
Attending: EMERGENCY MEDICINE | Admitting: STUDENT IN AN ORGANIZED HEALTH CARE EDUCATION/TRAINING PROGRAM
Payer: MEDICARE

## 2024-04-08 DIAGNOSIS — M79.604 RIGHT LEG PAIN: ICD-10-CM

## 2024-04-08 DIAGNOSIS — Z86.73 HISTORY OF CVA (CEREBROVASCULAR ACCIDENT): ICD-10-CM

## 2024-04-08 DIAGNOSIS — K56.609 SBO (SMALL BOWEL OBSTRUCTION) (HCC): ICD-10-CM

## 2024-04-08 DIAGNOSIS — N18.30 STAGE 3 CHRONIC KIDNEY DISEASE, UNSPECIFIED WHETHER STAGE 3A OR 3B CKD (HCC): Primary | ICD-10-CM

## 2024-04-08 DIAGNOSIS — J96.01 ACUTE RESPIRATORY FAILURE WITH HYPOXIA (HCC): ICD-10-CM

## 2024-04-08 DIAGNOSIS — J18.9 PNEUMONIA OF RIGHT LOWER LOBE DUE TO INFECTIOUS ORGANISM: ICD-10-CM

## 2024-04-08 DIAGNOSIS — E78.2 MIXED HYPERLIPIDEMIA: ICD-10-CM

## 2024-04-08 DIAGNOSIS — J18.9 PNEUMONIA OF LOWER LOBE DUE TO INFECTIOUS ORGANISM: ICD-10-CM

## 2024-04-08 DIAGNOSIS — I10 ESSENTIAL HYPERTENSION: ICD-10-CM

## 2024-04-08 DIAGNOSIS — I50.33 ACUTE ON CHRONIC DIASTOLIC CHF (CONGESTIVE HEART FAILURE) (HCC): ICD-10-CM

## 2024-04-08 DIAGNOSIS — I73.9 PVD (PERIPHERAL VASCULAR DISEASE) (HCC): ICD-10-CM

## 2024-04-08 LAB
ALBUMIN SERPL BCP-MCNC: 4.2 G/DL (ref 3.5–5)
ALP SERPL-CCNC: 78 U/L (ref 34–104)
ALT SERPL W P-5'-P-CCNC: 28 U/L (ref 7–52)
ANION GAP SERPL CALCULATED.3IONS-SCNC: 8 MMOL/L (ref 4–13)
AST SERPL W P-5'-P-CCNC: 19 U/L (ref 13–39)
BASOPHILS # BLD AUTO: 0.04 THOUSANDS/ÂΜL (ref 0–0.1)
BASOPHILS NFR BLD AUTO: 0 % (ref 0–1)
BILIRUB SERPL-MCNC: 0.69 MG/DL (ref 0.2–1)
BUN SERPL-MCNC: 36 MG/DL (ref 5–25)
CALCIUM SERPL-MCNC: 9.7 MG/DL (ref 8.4–10.2)
CHLORIDE SERPL-SCNC: 97 MMOL/L (ref 96–108)
CO2 SERPL-SCNC: 30 MMOL/L (ref 21–32)
CREAT SERPL-MCNC: 1.68 MG/DL (ref 0.6–1.3)
EOSINOPHIL # BLD AUTO: 0.03 THOUSAND/ÂΜL (ref 0–0.61)
EOSINOPHIL NFR BLD AUTO: 0 % (ref 0–6)
ERYTHROCYTE [DISTWIDTH] IN BLOOD BY AUTOMATED COUNT: 13.2 % (ref 11.6–15.1)
GFR SERPL CREATININE-BSD FRML MDRD: 38 ML/MIN/1.73SQ M
GLUCOSE SERPL-MCNC: 168 MG/DL (ref 65–140)
HCT VFR BLD AUTO: 44 % (ref 36.5–49.3)
HGB BLD-MCNC: 14.6 G/DL (ref 12–17)
IMM GRANULOCYTES # BLD AUTO: 0.25 THOUSAND/UL (ref 0–0.2)
IMM GRANULOCYTES NFR BLD AUTO: 1 % (ref 0–2)
LACTATE SERPL-SCNC: 4 MMOL/L (ref 0.5–2)
LIPASE SERPL-CCNC: 25 U/L (ref 11–82)
LYMPHOCYTES # BLD AUTO: 1.45 THOUSANDS/ÂΜL (ref 0.6–4.47)
LYMPHOCYTES NFR BLD AUTO: 7 % (ref 14–44)
MCH RBC QN AUTO: 30 PG (ref 26.8–34.3)
MCHC RBC AUTO-ENTMCNC: 33.2 G/DL (ref 31.4–37.4)
MCV RBC AUTO: 90 FL (ref 82–98)
MONOCYTES # BLD AUTO: 1.59 THOUSAND/ÂΜL (ref 0.17–1.22)
MONOCYTES NFR BLD AUTO: 8 % (ref 4–12)
NEUTROPHILS # BLD AUTO: 17.49 THOUSANDS/ÂΜL (ref 1.85–7.62)
NEUTS SEG NFR BLD AUTO: 84 % (ref 43–75)
NRBC BLD AUTO-RTO: 0 /100 WBCS
PLATELET # BLD AUTO: 295 THOUSANDS/UL (ref 149–390)
PMV BLD AUTO: 10.1 FL (ref 8.9–12.7)
POTASSIUM SERPL-SCNC: 4.8 MMOL/L (ref 3.5–5.3)
PROCALCITONIN SERPL-MCNC: 0.12 NG/ML
PROT SERPL-MCNC: 7.8 G/DL (ref 6.4–8.4)
RBC # BLD AUTO: 4.87 MILLION/UL (ref 3.88–5.62)
SODIUM SERPL-SCNC: 135 MMOL/L (ref 135–147)
WBC # BLD AUTO: 20.85 THOUSAND/UL (ref 4.31–10.16)

## 2024-04-08 PROCEDURE — 96361 HYDRATE IV INFUSION ADD-ON: CPT

## 2024-04-08 PROCEDURE — 99222 1ST HOSP IP/OBS MODERATE 55: CPT | Performed by: FAMILY MEDICINE

## 2024-04-08 PROCEDURE — 80053 COMPREHEN METABOLIC PANEL: CPT | Performed by: EMERGENCY MEDICINE

## 2024-04-08 PROCEDURE — 36415 COLL VENOUS BLD VENIPUNCTURE: CPT | Performed by: EMERGENCY MEDICINE

## 2024-04-08 PROCEDURE — 84145 PROCALCITONIN (PCT): CPT | Performed by: EMERGENCY MEDICINE

## 2024-04-08 PROCEDURE — 96375 TX/PRO/DX INJ NEW DRUG ADDON: CPT

## 2024-04-08 PROCEDURE — 83605 ASSAY OF LACTIC ACID: CPT | Performed by: EMERGENCY MEDICINE

## 2024-04-08 PROCEDURE — 96374 THER/PROPH/DIAG INJ IV PUSH: CPT

## 2024-04-08 PROCEDURE — 87040 BLOOD CULTURE FOR BACTERIA: CPT | Performed by: EMERGENCY MEDICINE

## 2024-04-08 PROCEDURE — 99285 EMERGENCY DEPT VISIT HI MDM: CPT | Performed by: EMERGENCY MEDICINE

## 2024-04-08 PROCEDURE — 74177 CT ABD & PELVIS W/CONTRAST: CPT

## 2024-04-08 PROCEDURE — 99284 EMERGENCY DEPT VISIT MOD MDM: CPT

## 2024-04-08 PROCEDURE — 87154 CUL TYP ID BLD PTHGN 6+ TRGT: CPT | Performed by: EMERGENCY MEDICINE

## 2024-04-08 PROCEDURE — 85025 COMPLETE CBC W/AUTO DIFF WBC: CPT | Performed by: EMERGENCY MEDICINE

## 2024-04-08 PROCEDURE — 83690 ASSAY OF LIPASE: CPT | Performed by: EMERGENCY MEDICINE

## 2024-04-08 RX ORDER — MORPHINE SULFATE 4 MG/ML
4 INJECTION, SOLUTION INTRAMUSCULAR; INTRAVENOUS ONCE
Status: COMPLETED | OUTPATIENT
Start: 2024-04-08 | End: 2024-04-08

## 2024-04-08 RX ORDER — ONDANSETRON 2 MG/ML
4 INJECTION INTRAMUSCULAR; INTRAVENOUS ONCE
Status: COMPLETED | OUTPATIENT
Start: 2024-04-08 | End: 2024-04-08

## 2024-04-08 RX ORDER — ONDANSETRON 2 MG/ML
4 INJECTION INTRAMUSCULAR; INTRAVENOUS EVERY 6 HOURS PRN
Status: DISCONTINUED | OUTPATIENT
Start: 2024-04-08 | End: 2024-04-12

## 2024-04-08 RX ORDER — HEPARIN SODIUM 5000 [USP'U]/ML
7500 INJECTION, SOLUTION INTRAVENOUS; SUBCUTANEOUS EVERY 8 HOURS SCHEDULED
Status: DISCONTINUED | OUTPATIENT
Start: 2024-04-08 | End: 2024-04-09

## 2024-04-08 RX ORDER — LIDOCAINE HYDROCHLORIDE 20 MG/ML
1 JELLY TOPICAL ONCE
Status: DISCONTINUED | OUTPATIENT
Start: 2024-04-08 | End: 2024-04-16 | Stop reason: HOSPADM

## 2024-04-08 RX ORDER — MORPHINE SULFATE 4 MG/ML
4 INJECTION, SOLUTION INTRAMUSCULAR; INTRAVENOUS EVERY 4 HOURS PRN
Status: DISCONTINUED | OUTPATIENT
Start: 2024-04-08 | End: 2024-04-12

## 2024-04-08 RX ORDER — ACETAMINOPHEN 10 MG/ML
1000 INJECTION, SOLUTION INTRAVENOUS EVERY 6 HOURS PRN
Status: DISCONTINUED | OUTPATIENT
Start: 2024-04-08 | End: 2024-04-16 | Stop reason: HOSPADM

## 2024-04-08 RX ORDER — SODIUM CHLORIDE 9 MG/ML
140 INJECTION, SOLUTION INTRAVENOUS CONTINUOUS
Status: DISCONTINUED | OUTPATIENT
Start: 2024-04-08 | End: 2024-04-11

## 2024-04-08 RX ADMIN — IOHEXOL 100 ML: 350 INJECTION, SOLUTION INTRAVENOUS at 19:23

## 2024-04-08 RX ADMIN — CEFTRIAXONE SODIUM 1000 MG: 10 INJECTION, POWDER, FOR SOLUTION INTRAVENOUS at 22:00

## 2024-04-08 RX ADMIN — HEPARIN SODIUM 7500 UNITS: 5000 INJECTION INTRAVENOUS; SUBCUTANEOUS at 22:00

## 2024-04-08 RX ADMIN — MORPHINE SULFATE 2 MG: 2 INJECTION, SOLUTION INTRAMUSCULAR; INTRAVENOUS at 22:28

## 2024-04-08 RX ADMIN — ONDANSETRON 4 MG: 2 INJECTION INTRAMUSCULAR; INTRAVENOUS at 18:20

## 2024-04-08 RX ADMIN — MORPHINE SULFATE 4 MG: 4 INJECTION INTRAVENOUS at 18:19

## 2024-04-08 RX ADMIN — SODIUM CHLORIDE 1000 ML: 0.9 INJECTION, SOLUTION INTRAVENOUS at 18:19

## 2024-04-08 RX ADMIN — SODIUM CHLORIDE 140 ML/HR: 0.9 INJECTION, SOLUTION INTRAVENOUS at 22:09

## 2024-04-08 NOTE — ED PROVIDER NOTES
History  Chief Complaint   Patient presents with    Abdominal Pain     Middle abdominal pain since Saturday. Hx of impaction      Kulwinder Hernandez is a 78 y.o.  year old male  Past Medical History:  11/8/2018: CKD (chronic kidney disease)  No date: Hyperlipidemia  No date: Hypertension  No date: PVD (peripheral vascular disease) (HCC)  No date: Sleep apnea  No date: Small bowel obstruction (HCC)  No date: Ventral hernia  Social History    Tobacco Use      Smoking status: Never      Smokeless tobacco: Never    Vaping Use      Vaping status: Never Used    Alcohol use: Never    Drug use: Never    Patient presents with:  Abdominal Pain: Middle abdominal pain since Saturday. Hx of impaction   Hernia repair in past (mid abd),   Hx of repeated bouts of SBO  Abd pain, onset today, one episode of n/v, feels bloated and belly looks 'the same' no sig. Distention  No constipation days prior, not passing gas, but had BM earlier today  No fever no chills  Uses chronic O2   History obtained directly from the PATIENT              History provided by:  Patient and relative   used: No    Abdominal Pain  Associated symptoms: nausea and vomiting    Associated symptoms: no chest pain, no chills, no cough, no dysuria, no fever, no hematuria, no shortness of breath and no sore throat        Prior to Admission Medications   Prescriptions Last Dose Informant Patient Reported? Taking?   amLODIPine (NORVASC) 5 mg tablet   Yes No   Sig: TK 1 T PO D   atorvastatin (LIPITOR) 10 mg tablet   Yes No   Sig: TK 1 T PO D   budesonide-formoterol (SYMBICORT) 80-4.5 MCG/ACT inhaler   Yes No   Sig: Inhale 2 puffs 2 (two) times a day Rinse mouth after use.   donepezil (ARICEPT) 10 mg tablet   Yes No   Sig: TK 1 T PO HS   spironolactone (ALDACTONE) 25 mg tablet   Yes No   Sig: Take 12.5 mg by mouth 2 (two) times a day    triamterene-hydrochlorothiazide (MAXZIDE-25) 37.5-25 mg per tablet   Yes No   Sig: Take by mouth daily        Facility-Administered Medications: None       Past Medical History:   Diagnosis Date    CKD (chronic kidney disease) 11/8/2018    Hyperlipidemia     Hypertension     PVD (peripheral vascular disease) (HCC)     Sleep apnea     Small bowel obstruction (HCC)     Ventral hernia        Past Surgical History:   Procedure Laterality Date    ABDOMINAL HERNIA REPAIR      ABDOMINAL SURGERY      CHOLECYSTECTOMY      open    FEMORAL BYPASS Right        History reviewed. No pertinent family history.  I have reviewed and agree with the history as documented.    E-Cigarette/Vaping    E-Cigarette Use Never User      E-Cigarette/Vaping Substances     Social History     Tobacco Use    Smoking status: Never    Smokeless tobacco: Never   Vaping Use    Vaping status: Never Used   Substance Use Topics    Alcohol use: Never    Drug use: Never       Review of Systems   Constitutional:  Negative for chills and fever.   HENT:  Negative for ear pain and sore throat.    Eyes:  Negative for pain and visual disturbance.   Respiratory:  Negative for cough and shortness of breath.    Cardiovascular:  Negative for chest pain and palpitations.   Gastrointestinal:  Positive for abdominal pain, nausea and vomiting. Negative for abdominal distention.   Genitourinary:  Negative for dysuria and hematuria.   Musculoskeletal:  Negative for arthralgias and back pain.   Skin:  Negative for color change and rash.   Neurological:  Negative for seizures and syncope.   All other systems reviewed and are negative.      Physical Exam  Physical Exam  Vitals and nursing note reviewed.   Constitutional:       General: He is not in acute distress.     Appearance: He is well-developed. He is obese.   HENT:      Head: Normocephalic and atraumatic.   Eyes:      Extraocular Movements: Extraocular movements intact.      Conjunctiva/sclera: Conjunctivae normal.   Cardiovascular:      Rate and Rhythm: Normal rate and regular rhythm.      Heart sounds: No murmur  heard.  Pulmonary:      Effort: Pulmonary effort is normal. No respiratory distress.      Breath sounds: Normal breath sounds.   Abdominal:      General: Abdomen is protuberant. Bowel sounds are decreased. There is distension. There are no signs of injury.      Palpations: Abdomen is soft.      Tenderness: There is generalized abdominal tenderness. There is no rebound. Negative signs include Mendez's sign and Rovsing's sign.      Hernia: No hernia is present.   Musculoskeletal:         General: No swelling.      Cervical back: Neck supple.   Skin:     General: Skin is warm and dry.      Capillary Refill: Capillary refill takes less than 2 seconds.   Neurological:      General: No focal deficit present.      Mental Status: He is alert and oriented to person, place, and time.   Psychiatric:         Mood and Affect: Mood normal.         Vital Signs  ED Triage Vitals   Temperature Pulse Respirations Blood Pressure SpO2   04/08/24 1536 04/08/24 1536 04/08/24 1536 04/08/24 1536 04/08/24 1536   97.8 °F (36.6 °C) 98 18 (!) 178/74 92 %      Temp Source Heart Rate Source Patient Position - Orthostatic VS BP Location FiO2 (%)   04/08/24 1536 04/08/24 1536 04/08/24 1536 04/08/24 1536 --   Temporal Monitor Sitting Left arm       Pain Score       04/08/24 1819       10 - Worst Possible Pain           Vitals:    04/08/24 1830 04/1945 04/08/24 2000 04/08/24 2030   BP: 142/67 115/53 125/58 133/61   Pulse: 96 94 93 98   Patient Position - Orthostatic VS: Sitting            Visual Acuity      ED Medications  Medications   sodium chloride 0.9 % infusion (140 mL/hr Intravenous New Bag 4/8/24 2209)   ondansetron (ZOFRAN) injection 4 mg (has no administration in time range)   heparin (porcine) subcutaneous injection 7,500 Units (7,500 Units Subcutaneous Given 4/8/24 2200)   acetaminophen (Ofirmev) injection 1,000 mg (has no administration in time range)   morphine injection 2 mg (2 mg Intravenous Given 4/8/24 2228)   morphine  injection 4 mg (has no administration in time range)   morphine injection 2 mg (0 mg Intravenous Hold 4/8/24 2233)   lidocaine (URO-JET) 2 % urethral/mucosal gel 1 Application (1 Application Urethral Not Given 4/8/24 2232)   morphine injection 4 mg (4 mg Intravenous Given 4/8/24 1819)   sodium chloride 0.9 % bolus 1,000 mL (0 mL Intravenous Stopped 4/8/24 2117)   ondansetron (ZOFRAN) injection 4 mg (4 mg Intravenous Given 4/8/24 1820)   iohexol (OMNIPAQUE) 350 MG/ML injection (MULTI-DOSE) 100 mL (100 mL Intravenous Given 4/8/24 1923)   ceftriaxone (ROCEPHIN) 1 g/50 mL in dextrose IVPB (0 mg Intravenous Stopped 4/8/24 2237)       Diagnostic Studies  Results Reviewed       Procedure Component Value Units Date/Time    Lactic acid, plasma (w/reflex if result > 2.0) [[id number]]  (Abnormal) Collected: 04/08/24 2156    Lab Status: Final result Specimen: Blood from Arm, Right Updated: 04/08/24 2229     LACTIC ACID 4.0 mmol/L     Narrative:      Result may be elevated if tourniquet was used during collection.    Lactic acid 2 Hours [[id number]]     Lab Status: No result Specimen: Blood     Procalcitonin [[id number]]  (Normal) Collected: 04/08/24 1814    Lab Status: Final result Specimen: Blood from Arm, Right Updated: 04/08/24 2214     Procalcitonin 0.12 ng/ml     Blood culture [[id number]] Collected: 04/08/24 2156    Lab Status: In process Specimen: Blood from Arm, Left Updated: 04/08/24 2202    Blood culture [[id number]] Collected: 04/08/24 2156    Lab Status: In process Specimen: Blood from Arm, Right Updated: 04/08/24 2202    Comprehensive metabolic panel [[id number]]  (Abnormal) Collected: 04/08/24 1814    Lab Status: Final result Specimen: Blood from Arm, Right Updated: 04/08/24 1842     Sodium 135 mmol/L      Potassium 4.8 mmol/L      Chloride 97 mmol/L      CO2 30 mmol/L      ANION GAP 8 mmol/L      BUN 36 mg/dL      Creatinine 1.68 mg/dL      Glucose 168 mg/dL      Calcium 9.7 mg/dL      AST 19 U/L      ALT 28 U/L       Alkaline Phosphatase 78 U/L      Total Protein 7.8 g/dL      Albumin 4.2 g/dL      Total Bilirubin 0.69 mg/dL      eGFR 38 ml/min/1.73sq m     Narrative:      [employer] guidelines for Chronic Kidney Disease (CKD):     Stage 1 with normal or high GFR (GFR > 90 mL/min/1.73 square meters)    Stage 2 Mild CKD (GFR = 60-89 mL/min/1.73 square meters)    Stage 3A Moderate CKD (GFR = 45-59 mL/min/1.73 square meters)    Stage 3B Moderate CKD (GFR = 30-44 mL/min/1.73 square meters)    Stage 4 Severe CKD (GFR = 15-29 mL/min/1.73 square meters)    Stage 5 End Stage CKD (GFR <15 mL/min/1.73 square meters)  Note: GFR calculation is accurate only with a steady state creatinine    Lipase [[id number]]  (Normal) Collected: 04/08/24 1814    Lab Status: Final result Specimen: Blood from Arm, Right Updated: 04/08/24 1842     Lipase 25 u/L     CBC and differential [[id number]]  (Abnormal) Collected: 04/08/24 1814    Lab Status: Final result Specimen: Blood from Arm, Right Updated: 04/08/24 1819     WBC 20.85 Thousand/uL      RBC 4.87 Million/uL      Hemoglobin 14.6 g/dL      Hematocrit 44.0 %      MCV 90 fL      MCH 30.0 pg      MCHC 33.2 g/dL      RDW 13.2 %      MPV 10.1 fL      Platelets 295 Thousands/uL      nRBC 0 /100 WBCs      Neutrophils Relative 84 %      Immature Grans % 1 %      Lymphocytes Relative 7 %      Monocytes Relative 8 %      Eosinophils Relative 0 %      Basophils Relative 0 %      Neutrophils Absolute 17.49 Thousands/µL      Absolute Immature Grans 0.25 Thousand/uL      Absolute Lymphocytes 1.45 Thousands/µL      Absolute Monocytes 1.59 Thousand/µL      Eosinophils Absolute 0.03 Thousand/µL      Basophils Absolute 0.04 Thousands/µL                    CT abdomen pelvis with contrast   Final Result by Johana Amor MD (04/08 2125)      1) Small bowel obstruction with transition point in the anterior mid abdomen, in same location as at least 3 prior bowel obstructions, likely related to  adhesions secondary to prior ventral hernia repair with mesh.      2) No evidence of perforation. Trace mesenteric edema adjacent to the point of transition, however no findings to suggest bowel ischemia.      3) Status post aortobifemoral bypass graft with occlusion of the right side of the graft along its length, with at least focal reconstitution at the right femoral artery anastomosis. Recommend vascular surgery consultation, which may be nonemergent if    there are no clinical signs/symptoms of right lower extremity ischemia.      4) Peripheral groundglass opacity in the right lower lobe, which may represent pneumonia in the appropriate clinical setting.      5) Additional findings as above.      I personally discussed this study with PRAVIN MONTES on 4/8/2024 at 9:25 PM.                     Workstation performed: [id number]                    Procedures  Procedures         ED Course  ED Course as of 04/08/24 2311   Mon Apr 08, 2024   1846 WBC(!): 20.85   1846 Hemoglobin: 14.6   1846 Hematocrit: 44.0   1847 BUN(!): 36   1847 Creatinine(!): 1.68   1847 Acute on chronic HARVINDER  Leukocytosis 20K                               SBIRT 20yo+      Flowsheet Row Most Recent Value   Initial Alcohol Screen: US AUDIT-C     1. How often do you have a drink containing alcohol? 0 Filed at: 04/08/2024 1857   2. How many drinks containing alcohol do you have on a typical day you are drinking?  0 Filed at: 04/08/2024 1857   3b. FEMALE Any Age, or MALE 65+: How often do you have 4 or more drinks on one occassion? 0 Filed at: 04/08/2024 1857   Audit-C Score 0 Filed at: 04/08/2024 1857   LANCE: How many times in the past year have you...    Used an illegal drug or used a prescription medication for non-medical reasons? Never Filed at: 04/08/2024 1857                      Medical Decision Making  I have considered a broad diagnosis for abdominal pain that includes: Appendicitis, diverticulitis, colitis, cholecystitis, biliary colic,  volvulus, small bowel obstruction, ileus, UTI, gastritis/PUD and other abdominal pathology.    Given the patient's physical exam and work-up today, there is low although not zero suspicion for these diagnoses.  Patient was advised and given appropriate return precautions, including continued or new fever, persistent vomiting, worsening abdominal pain, or any other concerning signs or symptoms especially if there are new.    I have ordered CBC.  This was done to assess for leukocytosis versus leukopenia as possible indicators of infection viral versus bacterial.  Also included hemoglobin and hematocrit for assess for anemia.  And assess platelet numbers.  Thrombocytosis as a marker of inflammation and of course thrombocytopenia assess result of coagulation disorder or DIC.    Metabolic panel to assess for electrolyte deficiency, assess kidney function, and blood sugar levels to assess for hypoglycemia versus hyperglycemia as possible causes of the patient's symptoms.    Liver function test -to assess for liver inflammation with a be viral, obstructive, or reactive to underlying infection/shock liver    Lipase -screen for elevated level as a sign of pancreatitis    Amount and/or Complexity of Data Reviewed  Labs: ordered. Decision-making details documented in ED Course.  Radiology: ordered.  Discussion of management or test interpretation with external provider(s): Patient has presented to the Emergency Department with exacerbation of chronic condition / pt with new illness-injury that may poses a threat to life or body function.  At least 3 different tests have been ordered on this patient AND reviewed the results.   Old laboratory data (of at least 2 tests) were reviewed from the medical records and compared to today's results  Discussion with patient and/or family members of results (normal and abnormal) and the implications for immediate and long term treatment/management.  Due to the high risk of morbidity  further diagnostic testing and treatment is necessary.    9:43 PM  I discussed the case with the [facility]. We reviewed the HPI, pertinent PMH, ED course and management.   Hospitalist agreed with plan and will admit the patient to the hospital.    Medications  ceftriaxone (ROCEPHIN) 1 g/50 mL in dextrose IVPB (has no administration in time range)  morphine injection 4 mg (4 mg Intravenous Given 4/8/24 1819)  sodium chloride 0.9 % bolus 1,000 mL (1,000 mL Intravenous New Bag 4/8/24 1819)  ondansetron (ZOFRAN) injection 4 mg (4 mg Intravenous Given 4/8/24 1820)  iohexol (OMNIPAQUE) 350 MG/ML injection (MULTI-DOSE) 100 mL (100 mL Intravenous Given 4/8/24 1923)            Risk  Prescription drug management.  Decision regarding hospitalization.             Disposition  Final diagnoses:   SBO (small bowel obstruction) (HCC)     Time reflects when diagnosis was documented in both MDM as applicable and the Disposition within this note       Time User Action Codes Description Comment    4/8/2024  9:42 PM Hussein Heller [N18.30] Stage 3 chronic kidney disease, unspecified whether stage 3a or 3b CKD (HCC)     4/8/2024  9:42 PM Hussein Heller [Z86.73] History of CVA (cerebrovascular accident)     4/8/2024  9:42 PM Hussein Heller [E78.2] Mixed hyperlipidemia     4/8/2024  9:42 PM Hussein Heller [I10] Essential hypertension     4/8/2024  9:43 PM Hussein Heller [J18.9] Pneumonia of right lower lobe due to infectious organism     4/8/2024 11:11 PM Carl Yates Add [K56.609] SBO (small bowel obstruction) (HCC)           ED Disposition       ED Disposition   Admit    Condition   Stable    Date/Time   Mon Apr 8, 2024 2142    Comment   Case was discussed with [facility] and the patient's admission status was agreed to be Admission Status: observation status to the service of Dr. You .               Follow-up Information    None         Patient's Medications   Discharge Prescriptions    No medications on file       No  discharge procedures on file.    PDMP Review         Value Time User    PDMP Reviewed  Yes 6/28/2022  8:52 AM Atul Barrow MD            ED Provider  Electronically Signed by             Carl Yates MD  04/08/24 2972

## 2024-04-09 ENCOUNTER — APPOINTMENT (OUTPATIENT)
Dept: VASCULAR ULTRASOUND | Facility: HOSPITAL | Age: 79
DRG: 388 | End: 2024-04-09
Payer: MEDICARE

## 2024-04-09 ENCOUNTER — APPOINTMENT (OUTPATIENT)
Dept: RADIOLOGY | Facility: HOSPITAL | Age: 79
DRG: 388 | End: 2024-04-09
Payer: MEDICARE

## 2024-04-09 LAB
ALBUMIN SERPL BCP-MCNC: 3.8 G/DL (ref 3.5–5)
ALP SERPL-CCNC: 78 U/L (ref 34–104)
ALT SERPL W P-5'-P-CCNC: 48 U/L (ref 7–52)
ANION GAP SERPL CALCULATED.3IONS-SCNC: 10 MMOL/L (ref 4–13)
APTT PPP: 69 SECONDS (ref 23–37)
AST SERPL W P-5'-P-CCNC: 35 U/L (ref 13–39)
BASOPHILS # BLD AUTO: 0.02 THOUSANDS/ÂΜL (ref 0–0.1)
BASOPHILS NFR BLD AUTO: 0 % (ref 0–1)
BILIRUB SERPL-MCNC: 0.75 MG/DL (ref 0.2–1)
BUN SERPL-MCNC: 41 MG/DL (ref 5–25)
CALCIUM SERPL-MCNC: 8.9 MG/DL (ref 8.4–10.2)
CHLORIDE SERPL-SCNC: 100 MMOL/L (ref 96–108)
CO2 SERPL-SCNC: 27 MMOL/L (ref 21–32)
CREAT SERPL-MCNC: 1.94 MG/DL (ref 0.6–1.3)
EOSINOPHIL # BLD AUTO: 0.07 THOUSAND/ÂΜL (ref 0–0.61)
EOSINOPHIL NFR BLD AUTO: 1 % (ref 0–6)
ERYTHROCYTE [DISTWIDTH] IN BLOOD BY AUTOMATED COUNT: 13.3 % (ref 11.6–15.1)
GFR SERPL CREATININE-BSD FRML MDRD: 32 ML/MIN/1.73SQ M
GLUCOSE SERPL-MCNC: 155 MG/DL (ref 65–140)
HCT VFR BLD AUTO: 42.2 % (ref 36.5–49.3)
HGB BLD-MCNC: 13.6 G/DL (ref 12–17)
IMM GRANULOCYTES # BLD AUTO: 0.07 THOUSAND/UL (ref 0–0.2)
IMM GRANULOCYTES NFR BLD AUTO: 1 % (ref 0–2)
LACTATE SERPL-SCNC: 2.2 MMOL/L (ref 0.5–2)
LACTATE SERPL-SCNC: 2.5 MMOL/L (ref 0.5–2)
LYMPHOCYTES # BLD AUTO: 0.92 THOUSANDS/ÂΜL (ref 0.6–4.47)
LYMPHOCYTES NFR BLD AUTO: 6 % (ref 14–44)
MAGNESIUM SERPL-MCNC: 1.6 MG/DL (ref 1.9–2.7)
MCH RBC QN AUTO: 30 PG (ref 26.8–34.3)
MCHC RBC AUTO-ENTMCNC: 32.2 G/DL (ref 31.4–37.4)
MCV RBC AUTO: 93 FL (ref 82–98)
MONOCYTES # BLD AUTO: 1.36 THOUSAND/ÂΜL (ref 0.17–1.22)
MONOCYTES NFR BLD AUTO: 9 % (ref 4–12)
NEUTROPHILS # BLD AUTO: 12.71 THOUSANDS/ÂΜL (ref 1.85–7.62)
NEUTS SEG NFR BLD AUTO: 83 % (ref 43–75)
NRBC BLD AUTO-RTO: 0 /100 WBCS
PHOSPHATE SERPL-MCNC: 4 MG/DL (ref 2.3–4.1)
PLATELET # BLD AUTO: 292 THOUSANDS/UL (ref 149–390)
PMV BLD AUTO: 10.6 FL (ref 8.9–12.7)
POTASSIUM SERPL-SCNC: 4.8 MMOL/L (ref 3.5–5.3)
PROT SERPL-MCNC: 7 G/DL (ref 6.4–8.4)
RBC # BLD AUTO: 4.53 MILLION/UL (ref 3.88–5.62)
SODIUM SERPL-SCNC: 137 MMOL/L (ref 135–147)
WBC # BLD AUTO: 15.15 THOUSAND/UL (ref 4.31–10.16)

## 2024-04-09 PROCEDURE — 85025 COMPLETE CBC W/AUTO DIFF WBC: CPT | Performed by: STUDENT IN AN ORGANIZED HEALTH CARE EDUCATION/TRAINING PROGRAM

## 2024-04-09 PROCEDURE — 83605 ASSAY OF LACTIC ACID: CPT | Performed by: FAMILY MEDICINE

## 2024-04-09 PROCEDURE — 83735 ASSAY OF MAGNESIUM: CPT | Performed by: STUDENT IN AN ORGANIZED HEALTH CARE EDUCATION/TRAINING PROGRAM

## 2024-04-09 PROCEDURE — 99233 SBSQ HOSP IP/OBS HIGH 50: CPT | Performed by: STUDENT IN AN ORGANIZED HEALTH CARE EDUCATION/TRAINING PROGRAM

## 2024-04-09 PROCEDURE — 99223 1ST HOSP IP/OBS HIGH 75: CPT | Performed by: SURGERY

## 2024-04-09 PROCEDURE — 93925 LOWER EXTREMITY STUDY: CPT

## 2024-04-09 PROCEDURE — 93923 UPR/LXTR ART STDY 3+ LVLS: CPT

## 2024-04-09 PROCEDURE — 99223 1ST HOSP IP/OBS HIGH 75: CPT

## 2024-04-09 PROCEDURE — 80053 COMPREHEN METABOLIC PANEL: CPT | Performed by: STUDENT IN AN ORGANIZED HEALTH CARE EDUCATION/TRAINING PROGRAM

## 2024-04-09 PROCEDURE — 71046 X-RAY EXAM CHEST 2 VIEWS: CPT

## 2024-04-09 PROCEDURE — 83605 ASSAY OF LACTIC ACID: CPT | Performed by: EMERGENCY MEDICINE

## 2024-04-09 PROCEDURE — 84100 ASSAY OF PHOSPHORUS: CPT | Performed by: STUDENT IN AN ORGANIZED HEALTH CARE EDUCATION/TRAINING PROGRAM

## 2024-04-09 PROCEDURE — 93922 UPR/L XTREMITY ART 2 LEVELS: CPT | Performed by: SURGERY

## 2024-04-09 PROCEDURE — 36415 COLL VENOUS BLD VENIPUNCTURE: CPT | Performed by: STUDENT IN AN ORGANIZED HEALTH CARE EDUCATION/TRAINING PROGRAM

## 2024-04-09 PROCEDURE — 85730 THROMBOPLASTIN TIME PARTIAL: CPT | Performed by: STUDENT IN AN ORGANIZED HEALTH CARE EDUCATION/TRAINING PROGRAM

## 2024-04-09 PROCEDURE — 93925 LOWER EXTREMITY STUDY: CPT | Performed by: SURGERY

## 2024-04-09 RX ORDER — MAGNESIUM SULFATE HEPTAHYDRATE 40 MG/ML
2 INJECTION, SOLUTION INTRAVENOUS ONCE
Status: COMPLETED | OUTPATIENT
Start: 2024-04-09 | End: 2024-04-09

## 2024-04-09 RX ORDER — HEPARIN SODIUM 10000 [USP'U]/100ML
3-20 INJECTION, SOLUTION INTRAVENOUS
Status: DISCONTINUED | OUTPATIENT
Start: 2024-04-09 | End: 2024-04-12

## 2024-04-09 RX ORDER — BUDESONIDE AND FORMOTEROL FUMARATE DIHYDRATE 80; 4.5 UG/1; UG/1
2 AEROSOL RESPIRATORY (INHALATION) 2 TIMES DAILY
Status: DISCONTINUED | OUTPATIENT
Start: 2024-04-09 | End: 2024-04-16 | Stop reason: HOSPADM

## 2024-04-09 RX ORDER — FUROSEMIDE 40 MG/1
40 TABLET ORAL DAILY
Status: ON HOLD | COMMUNITY
End: 2024-04-16

## 2024-04-09 RX ORDER — MELOXICAM 7.5 MG/1
7.5 TABLET ORAL DAILY
COMMUNITY

## 2024-04-09 RX ORDER — AMLODIPINE BESYLATE 5 MG/1
5 TABLET ORAL DAILY
Status: DISCONTINUED | OUTPATIENT
Start: 2024-04-09 | End: 2024-04-16 | Stop reason: HOSPADM

## 2024-04-09 RX ORDER — TELMISARTAN 40 MG/1
40 TABLET ORAL DAILY
COMMUNITY

## 2024-04-09 RX ADMIN — HEPARIN SODIUM 11.1 UNITS/KG/HR: 10000 INJECTION, SOLUTION INTRAVENOUS at 16:58

## 2024-04-09 RX ADMIN — DOXYCYCLINE 100 MG: 100 INJECTION, POWDER, LYOPHILIZED, FOR SOLUTION INTRAVENOUS at 22:50

## 2024-04-09 RX ADMIN — SODIUM CHLORIDE 140 ML/HR: 0.9 INJECTION, SOLUTION INTRAVENOUS at 16:30

## 2024-04-09 RX ADMIN — MORPHINE SULFATE 4 MG: 4 INJECTION INTRAVENOUS at 02:54

## 2024-04-09 RX ADMIN — MORPHINE SULFATE 2 MG: 2 INJECTION, SOLUTION INTRAMUSCULAR; INTRAVENOUS at 15:45

## 2024-04-09 RX ADMIN — HEPARIN SODIUM 7500 UNITS: 5000 INJECTION INTRAVENOUS; SUBCUTANEOUS at 13:00

## 2024-04-09 RX ADMIN — BUDESONIDE AND FORMOTEROL FUMARATE DIHYDRATE 2 PUFF: 80; 4.5 AEROSOL RESPIRATORY (INHALATION) at 17:10

## 2024-04-09 RX ADMIN — MORPHINE SULFATE 4 MG: 4 INJECTION INTRAVENOUS at 13:00

## 2024-04-09 RX ADMIN — ONDANSETRON 4 MG: 2 INJECTION INTRAMUSCULAR; INTRAVENOUS at 02:54

## 2024-04-09 RX ADMIN — DOXYCYCLINE 100 MG: 100 INJECTION, POWDER, LYOPHILIZED, FOR SOLUTION INTRAVENOUS at 11:54

## 2024-04-09 RX ADMIN — SODIUM CHLORIDE 140 ML/HR: 0.9 INJECTION, SOLUTION INTRAVENOUS at 23:02

## 2024-04-09 RX ADMIN — BUDESONIDE AND FORMOTEROL FUMARATE DIHYDRATE 2 PUFF: 80; 4.5 AEROSOL RESPIRATORY (INHALATION) at 10:46

## 2024-04-09 RX ADMIN — CEFTRIAXONE SODIUM 2000 MG: 2 INJECTION, POWDER, FOR SOLUTION INTRAMUSCULAR; INTRAVENOUS at 10:46

## 2024-04-09 RX ADMIN — MAGNESIUM SULFATE HEPTAHYDRATE 2 G: 40 INJECTION, SOLUTION INTRAVENOUS at 13:00

## 2024-04-09 RX ADMIN — MORPHINE SULFATE 4 MG: 4 INJECTION INTRAVENOUS at 08:13

## 2024-04-09 RX ADMIN — SODIUM CHLORIDE, SODIUM LACTATE, POTASSIUM CHLORIDE, AND CALCIUM CHLORIDE 1000 ML: .6; .31; .03; .02 INJECTION, SOLUTION INTRAVENOUS at 10:46

## 2024-04-09 RX ADMIN — MORPHINE SULFATE 4 MG: 4 INJECTION INTRAVENOUS at 19:00

## 2024-04-09 RX ADMIN — HEPARIN SODIUM 7500 UNITS: 5000 INJECTION INTRAVENOUS; SUBCUTANEOUS at 08:14

## 2024-04-09 NOTE — ASSESSMENT & PLAN NOTE
History of peripheral vascular disease.  History of aorto bifemoral bypass graft.  Son at the bedside assisting with history for the most part.  Son reports that patient has been complaining of having right lower extremity pain.    CT abdomen pelvis result Status post aortobifemoral bypass graft with occlusion of the right side of the graft along its length, with at least focal reconstitution at the right femoral artery anastomosis.    Current on encounter patient appears comfortable not in distress.  Right lower extremity noted cold on exam.  No significant tenderness noted on exam since patient received morphine.  Vascular surgery recommendation noted.  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  Had lengthy discussion with patient and son at the bedside and both are in agreement with initiation of IV heparin drip.  IV heparin drip ordered.  Above was discussed with primary/surgery team.  Follow-up with vascular surgery recommendations.

## 2024-04-09 NOTE — ASSESSMENT & PLAN NOTE
Patient's white count is 20.    Abdominal CT shows a obstruction but no signs of infection.    Blood cultures are pending.    His CT of the abdomen does show in his lower lobes opacities consistent with pneumonia.    Currently improving.  Continue IV ceftriaxone, doxycycline.  Continue to trend CBC.

## 2024-04-09 NOTE — ASSESSMENT & PLAN NOTE
As above.  Possible cause for his pneumonia.  Will check a plain film of his chest.  Empiric antibiotic therapy

## 2024-04-09 NOTE — ASSESSMENT & PLAN NOTE
Patient with elevated lactate.  In the face of infection.  Also HARVINDER.  Will give volume and recheck lactate later on tonight

## 2024-04-09 NOTE — PROGRESS NOTES
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  Currently denies nausea, vomiting.  General surgery is following the patient.    NPO on IV fluids.   Seems comfortable when I spoke to him but still adamantly refusing the NG tube.  Further care per primary/surgery team.    PVD (peripheral vascular disease) ([facility])  Assessment & Plan  History of peripheral vascular disease.  History of aorto bifemoral bypass graft.  Son at the bedside assisting with history for the most part.  Son reports that patient has been complaining of having right lower extremity pain.    CT abdomen pelvis result Status post aortobifemoral bypass graft with occlusion of the right side of the graft along its length, with at least focal reconstitution at the right femoral artery anastomosis.    Current on encounter patient appears comfortable not in distress.  Right lower extremity noted cold on exam.  No significant tenderness noted on exam since patient received morphine.  Vascular surgery recommendation noted.  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  Had lengthy discussion with patient and son at the bedside and both are in agreement with initiation of IV heparin drip.  IV heparin drip ordered.  Above was discussed with primary/surgery team.  Follow-up with vascular surgery recommendations.    Pneumonia of lower lobe due to infectious organism  Assessment & Plan  Procal wnl  WBC improving.   On iv ceftriaxone and doxy.  Continue to trend cbc and procalcitonin    Leukocytosis  Assessment & Plan  Patient's white count is 20.    Abdominal CT shows a obstruction but no signs of infection.    Blood  cultures are pending.    His CT of the abdomen does show in his lower lobes opacities consistent with pneumonia.    Currently improving.  Continue IV ceftriaxone, doxycycline.  Continue to trend CBC.    Lactic acidosis  Assessment & Plan  Presented with lactic acid 4.0.  Currently improving 2.2.  Clinically does not appear to be acutely toxic.  Continue IV hydration and monitor lactic until normal.    CKD (chronic kidney disease)  Assessment & Plan  Lab Results   Component Value Date    EGFR 32 04/09/2024    EGFR 38 04/08/2024    EGFR 43 07/05/2023    CREATININE 1.94 (H) 04/09/2024    CREATININE 1.68 (H) 04/08/2024    CREATININE 1.51 (H) 07/05/2023   Patient with a slight bump in his creatinine.  Will give him some volume because he is n.p.o.    Essential hypertension  Assessment & Plan  Patient blood pressure is excellent.  Will keep him n.p.o. for now               VTE Pharmacologic Prophylaxis:   Moderate Risk (Score 3-4) - Pharmacological DVT Prophylaxis Ordered: heparin drip.    Mobility:   MACY Achieved: 6: Walk 10 steps or more      Patient Centered Rounds: I performed bedside rounds with nursing staff today.   Discussions with Specialists or Other Care Team Provider: Surgery, Vascular     Education and Discussions with Family / Patient: Updated  (son) at bedside.    Total Time Spent on Date of Encounter in care of patient: 35 mins. This time was spent on one or more of the following: performing physical exam; counseling and coordination of care; obtaining or reviewing history; documenting in the medical record; reviewing/ordering tests, medications or procedures; communicating with other healthcare professionals and discussing with patient's family/caregivers.    Current Length of Stay: 0 day(s)  Current Patient Status: Inpatient   Certification Statement: The patient will continue to require additional inpatient hospital stay due to SBO, PVD  Discharge Plan:  per Primary/surgery team    Code  Status: Level 1 - Full Code    Subjective:   Patient appears comfortable not in distress.  Reports pain controlled after morphine.  Denies nausea, vomiting.  Son at the bedside assisting with most of history part.  Son reports patient has been having right lower extremity pain as well as abdominal pain.  Reports that he was attempting to throw up since feels better.  Currently patient appears comfortable not in distress.  Patient and son both are in agreement with initiation of IV heparin drip for right lower extremity PVD.    Objective:     Vitals:   Temp (24hrs), Av.4 °F (36.9 °C), Min:98.1 °F (36.7 °C), Max:98.5 °F (36.9 °C)    Temp:  [98.1 °F (36.7 °C)-98.5 °F (36.9 °C)] 98.5 °F (36.9 °C)  HR:  [] 97  Resp:  [13-20] 16  BP: (111-158)/(53-70) 111/56  SpO2:  [90 %-97 %] 90 %  Body mass index is 43.21 kg/m².     Input and Output Summary (last 24 hours):   No intake or output data in the 24 hours ending 24 1700    Physical Exam:   Physical Exam  Constitutional:       General: He is not in acute distress.     Appearance: Normal appearance. He is obese. He is not ill-appearing, toxic-appearing or diaphoretic.   HENT:      Head: Normocephalic and atraumatic.   Eyes:      Pupils: Pupils are equal, round, and reactive to light.   Cardiovascular:      Rate and Rhythm: Normal rate.      Pulses: Normal pulses.   Pulmonary:      Effort: Pulmonary effort is normal. No respiratory distress.      Breath sounds: Normal breath sounds. No wheezing.      Comments: O2 saturation 92-95% on 2 L nasal cannula which is chronic and stable.  Abdominal:      General: There is distension.      Tenderness: There is abdominal tenderness.      Comments: Old surgical scar noted, large hernia noted.   Musculoskeletal:      Cervical back: No rigidity.      Comments: Bilateral lower extremity noted cool to the distal aspect.  Chronic skin discoloration noted.   Neurological:      Mental Status: He is alert and oriented to person,  place, and time. Mental status is at baseline.   Psychiatric:         Mood and Affect: Mood normal.         Behavior: Behavior normal.          Additional Data:     Labs:  Results from last 7 days   Lab Units 04/09/24  0503   WBC Thousand/uL 15.15*   HEMOGLOBIN g/dL 13.6   HEMATOCRIT % 42.2   PLATELETS Thousands/uL 292   SEGS PCT % 83*   LYMPHO PCT % 6*   MONO PCT % 9   EOS PCT % 1     Results from last 7 days   Lab Units 04/09/24  0503   SODIUM mmol/L 137   POTASSIUM mmol/L 4.8   CHLORIDE mmol/L 100   CO2 mmol/L 27   BUN mg/dL 41*   CREATININE mg/dL 1.94*   ANION GAP mmol/L 10   CALCIUM mg/dL 8.9   ALBUMIN g/dL 3.8   TOTAL BILIRUBIN mg/dL 0.75   ALK PHOS U/L 78   ALT U/L 48   AST U/L 35   GLUCOSE RANDOM mg/dL 155*                 Results from last 7 days   Lab Units 04/09/24  0503 04/08/24  2156 04/08/24  1814   LACTIC ACID mmol/L 2.2* 4.0*  --    PROCALCITONIN ng/ml  --   --  0.12       Lines/Drains:  Invasive Devices       Peripheral Intravenous Line  Duration             Peripheral IV 04/09/24 Dorsal (posterior);Left Wrist <1 day                          Imaging: Reviewed radiology reports from this admission including: abdominal/pelvic CT    Recent Cultures (last 7 days):   Results from last 7 days   Lab Units 04/08/24  2156   BLOOD CULTURE  Received in Microbiology Lab. Culture in Progress.  Received in Microbiology Lab. Culture in Progress.       Last 24 Hours Medication List:   Current Facility-Administered Medications   Medication Dose Route Frequency Provider Last Rate    acetaminophen  1,000 mg Intravenous Q6H PRN Kiko Aparicio MD      amLODIPine  5 mg Oral Daily Kiko Aparicio MD      budesonide-formoterol  2 puff Inhalation BID Kiko Aparicio MD      cefTRIAXone  2,000 mg Intravenous Q24H Kiko Aparicio MD 2,000 mg (04/09/24 1046)    doxycycline  100 mg Intravenous Q12H Kiko Aparicio  mg (04/09/24 1154)    heparin (porcine)  3-20 Units/kg/hr (Order-Specific)  Intravenous Titrated Walt BERGER MD      lidocaine  1 Application Urethral Once Kiko Aparicio MD      morphine injection  2 mg Intravenous Q4H PRN Kiko Aparicio MD      morphine injection  2 mg Intravenous Q3H PRN Kiko Aparicio MD      morphine injection  4 mg Intravenous Q4H PRN Kiko Aparicio MD      ondansetron  4 mg Intravenous Q6H PRN Kiko Aparicio MD      sodium chloride  140 mL/hr Intravenous Continuous Kiko Aparicio  mL/hr (04/08/24 2209)        Today, Patient Was Seen By: Walt Collier MD    **Please Note: This note may have been constructed using a voice recognition system.**

## 2024-04-09 NOTE — ASSESSMENT & PLAN NOTE
Lab Results   Component Value Date    EGFR 38 04/08/2024    EGFR 43 07/05/2023    EGFR 40 07/04/2023    CREATININE 1.68 (H) 04/08/2024    CREATININE 1.51 (H) 07/05/2023    CREATININE 1.59 (H) 07/04/2023   Patient with a slight bump in his creatinine.  Will give him some volume because he is n.p.o.

## 2024-04-09 NOTE — ASSESSMENT & PLAN NOTE
78-year-old male patient with past medical history of recurrent SBO due to ventral hernia repaired with mesh.  Currently denies nausea, vomiting.  General surgery is following the patient.    NPO on IV fluids.   Seems comfortable when I spoke to him but still adamantly refusing the NG tube.  Further care per primary/surgery team.

## 2024-04-09 NOTE — ASSESSMENT & PLAN NOTE
Lab Results   Component Value Date    EGFR 32 04/09/2024    EGFR 38 04/08/2024    EGFR 43 07/05/2023    CREATININE 1.94 (H) 04/09/2024    CREATININE 1.68 (H) 04/08/2024    CREATININE 1.51 (H) 07/05/2023   Patient with a slight bump in his creatinine.  Will give him some volume because he is n.p.o.

## 2024-04-09 NOTE — ASSESSMENT & PLAN NOTE
Presented with lactic acid 4.0.  Currently improving 2.2.  Clinically does not appear to be acutely toxic.  Continue IV hydration and monitor lactic until normal.

## 2024-04-09 NOTE — CONSULTS
Consult Note - Vascular Surgery   [facility]: [phone]    ADDENDUM: LEAD results reviewed and demonstrate occlusion of right limb of [id number] bypass graft. Mid and distal SFA appear occluded. Unable to visualize flow in AT or PT. R DEE 0.13/-/-. Discussed with SLIM,  will initiate heparin gtt while patient is NPO. He should resume ASA 81 mg upon discharge if able to tolerate. Pain control per primary team. Vascular will continue to follow and re-evaluate tomorrow. Case d/w Dr. Clark.     Assessment:  79yo male with PMH of former smoker, TIANA, HLD, HTN, CVA x2 (ischemic and hemorrhagic), MCI/memory issues, known L carotid occlusion, R asymptomatic carotid artery stenosis, back pain, CKD, PAD s/p liksj-op-ecaptly bypass 9/27/2011 for RLE rest pain (OSH in [place]); last seen by Dr. Theodore 1/5/21. He now presents with 3 day history of generalized abdominal pain with evidence of SBO on CT A/P. There was an incidental finding of R limb graft occlusion with reconstitution at least focally at the R femoral artery anastomosis site. Vascular was consulted for evaluation.     Imaging-   CT A/P 4/9/24:  1) Small bowel obstruction with transition point in the anterior mid abdomen, in same location as at least 3 prior bowel obstructions, likely related to adhesions secondary to prior ventral hernia repair with mesh.     2) No evidence of perforation. Trace mesenteric edema adjacent to the point of transition, however no findings to suggest bowel ischemia.     3) Status post aortobifemoral bypass graft with occlusion of the right side of the graft along its length, with at least focal reconstitution at the right femoral artery anastomosis. Recommend vascular surgery consultation, which may be nonemergent if   there are no clinical signs/symptoms of right lower extremity ischemia.     4) Peripheral groundglass opacity in the right lower lobe, which may represent pneumonia in the appropriate clinical  setting.    Labs-  Lactic acid 2/2 (4)  WBC 15.15 (20)  sCr 1.94/ GFR 32.     Plan:  -PAD s/p aortobifemoral graft '11 now with evidence of R limb occlusion along its length with reconstitution at distal anastomosis. Unknown chronicity.   -Reports 3 week history of RLE pain, mainly with walking and positional. No ischemic rest pain or ulcerations.   -LEAD 12/30/2020 showed bilateral graft limb stenosis and CFA 50-75% stenosis. B SFA occluded with poor collaterals. No recent imaging.   -Updated LEAD ordered.   -No acute vascular intervention indicated. No evidence of ALI.   -Recommend starting aspirin 81 mg if not contraindicated for OMT for PAD.   -Continue statin.   -HARVINDER on CKD. Creatinine 1.94 today. Continue to trend.   -Rest of care as per general surgery, SLIM.   -Further recommendations forthcoming pending LEAD results and surgeon discussion.   -Will d/w Dr. Clark.   ______________________________________________________________________    Consulting Service: General Surgery    Chief Complaint: RLE pain x3 weeks     HPI: Kulwinder Hernandez is a 78 y.o. male with PMH of former smoker, TIANA, HLD, HTN, CVA x2 15 years ago (ischemic and hemorrhagic), MCI/memory issues, known L carotid occlusion, R mild asymptomatic carotid artery stenosis, back pain, CKD, PAD s/p [id number] bypass 9/27/2011 for RLE rest pain (OSH); last seen by Dr. Theodore 1/5/21. He now presents with 3 day history of abdominal pain with evidence of SBO on CT A/P.     History obtained from his Son Hussein who is his POA and present at bedside. Patient was given pain medications and is drowsy on exam.     Hussein states that patients has a hx of SBO; 1-2 per year. Hx of ventral hernia repair with adhesions. He states that his abdominal pain started 3 days ago and was intense, 10/10 pain.     In addition. he reports severe RLE pain from the groin to the ankle. Pain has been going on for approximately 3 weeks. He reports pain mainly with walking; as  soon as he starts moving. He ambulates shorter distances but is able to carry out his ADLs. He denies pain at rest but states the pain is not constant but when it comes on it is very painful. He denies any pain at night that wakes him up. He states his pain is positional.    He has a history of chronic back pain. Back pain is intermittent but has not had issues for a while. He has been seeing a chiropractor a few times per week to get adjustments.     He gets chronic swelling in the legs and is on water pills for this. He elevates his legs in a recliner. He has chronic dryness and thickening of the skin in the lower legs. He recently has gotten more discoloration in the right lower leg, ankles and feet that appears purple.     He last saw [facility] in 2021. He was asymptomatic at that time.     He is on Atorvastatin 10 mg. He is not on any anti-platelets.    Hx of diabetes type 2. He is on metformin. Most recent A1c was 7.5%.     He has L knee pain but has been adjusted by the chiropractor. He denies any other leg pain in the LLE.     He has been having numbness/tingling in bilateral feet R>L.     He is independent and is able to carry out his ADLs.     Review of Systems:  General: negative  Cardiovascular: no chest pain or dyspnea on exertion  Respiratory: no cough, shortness of breath, or wheezing  Gastrointestinal: Abdominal pain  Genitourinary ROS: no dysuria, trouble voiding, or hematuria  Musculoskeletal ROS: negative  Neurological ROS: no TIA or stroke symptoms  Hematological and Lymphatic ROS: negative  Dermatological ROS: negative  Psychological ROS: negative  Ophthalmic ROS: negative  ENT ROS: negative    Past Medical History:  Past Medical History:   Diagnosis Date    CKD (chronic kidney disease) 11/8/2018    Hyperlipidemia     Hypertension     PVD (peripheral vascular disease) ([facility])     Sleep apnea     Small bowel obstruction ([facility])     Ventral hernia        Past Surgical History:  Past Surgical  History:   Procedure Laterality Date    ABDOMINAL HERNIA REPAIR      ABDOMINAL SURGERY      CHOLECYSTECTOMY      open    FEMORAL BYPASS Right        Social History:  Social History     Substance and Sexual Activity   Alcohol Use Never     Social History     Substance and Sexual Activity   Drug Use Never     Social History     Tobacco Use   Smoking Status Never   Smokeless Tobacco Never       Family History:  History reviewed. No pertinent family history.    Allergies:  No Known Allergies    Medications:  Current Facility-Administered Medications   Medication Dose Route Frequency    acetaminophen (Ofirmev) injection 1,000 mg  1,000 mg Intravenous Q6H PRN    amLODIPine (NORVASC) tablet 5 mg  5 mg Oral Daily    budesonide-formoterol (SYMBICORT) 80-4.5 MCG/ACT inhaler 2 puff  2 puff Inhalation BID    ceftriaxone (ROCEPHIN) 2 g/50 mL in dextrose IVPB  2,000 mg Intravenous Q24H    doxycycline (VIBRAMYCIN) 100 mg in sodium chloride 0.9 % 100 mL IVPB  100 mg Intravenous Q12H    heparin (porcine) subcutaneous injection 7,500 Units  7,500 Units Subcutaneous Q8H SPARKLE    lactated ringers bolus 1,000 mL  1,000 mL Intravenous Once    lidocaine (URO-JET) 2 % urethral/mucosal gel 1 Application  1 Application Urethral Once    magnesium sulfate 2 g/50 mL IVPB (premix) 2 g  2 g Intravenous Once    morphine injection 2 mg  2 mg Intravenous Q4H PRN    morphine injection 2 mg  2 mg Intravenous Q3H PRN    morphine injection 4 mg  4 mg Intravenous Q4H PRN    ondansetron (ZOFRAN) injection 4 mg  4 mg Intravenous Q6H PRN    sodium chloride 0.9 % infusion  140 mL/hr Intravenous Continuous       Vitals:  Vitals:    04/09/24 0643   BP: 150/68   Pulse: 80   Resp: 19   Temp: 98.1 °F (36.7 °C)   SpO2: 97%       I/Os:  No intake/output data recorded.  No intake/output data recorded.    Lab Results and Cultures:   CBC with diff:   Lab Results   Component Value Date    WBC 15.15 (H) 04/09/2024    HGB 13.6 04/09/2024    HCT 42.2 04/09/2024    MCV 93  "04/09/2024     04/09/2024    RBC 4.53 04/09/2024    MCH 30.0 04/09/2024    MCHC 32.2 04/09/2024    RDW 13.3 04/09/2024    MPV 10.6 04/09/2024    NRBC 0 04/09/2024   ,   BMP/CMP:  Lab Results   Component Value Date    K 4.8 04/09/2024     04/09/2024    CO2 27 04/09/2024    BUN 41 (H) 04/09/2024    CREATININE 1.94 (H) 04/09/2024    CALCIUM 8.9 04/09/2024    AST 35 04/09/2024    ALT 48 04/09/2024    ALKPHOS 78 04/09/2024    EGFR 32 04/09/2024   ,   Lipid Panel: No results found for: \"CHOL\",   Coags:   Lab Results   Component Value Date    PTT 33 06/28/2022    INR 1.02 06/28/2022   ,     Blood Culture:   Lab Results   Component Value Date    BLOODCX Received in Microbiology Lab. Culture in Progress. 04/08/2024    BLOODCX Received in Microbiology Lab. Culture in Progress. 04/08/2024   ,   Urinalysis:   Lab Results   Component Value Date    COLORU Yellow 07/01/2023    CLARITYU Extra Turbid 07/01/2023    SPECGRAV 1.022 07/01/2023    PHUR 8.5 (A) 07/01/2023    PHUR 6.0 11/08/2018    LEUKOCYTESUR Negative 07/01/2023    NITRITE Negative 07/01/2023    GLUCOSEU Negative 07/01/2023    KETONESU Negative 07/01/2023    BILIRUBINUR Negative 07/01/2023    BLOODU Negative 07/01/2023   ,   Urine Culture: No results found for: \"URINECX\",   Wound Culure: No results found for: \"WOUNDCULT\"    Imaging:  CT abdomen pelvis with contrast   Final Result by Johana Amor MD (04/08 2125)      1) Small bowel obstruction with transition point in the anterior mid abdomen, in same location as at least 3 prior bowel obstructions, likely related to adhesions secondary to prior ventral hernia repair with mesh.      2) No evidence of perforation. Trace mesenteric edema adjacent to the point of transition, however no findings to suggest bowel ischemia.      3) Status post aortobifemoral bypass graft with occlusion of the right side of the graft along its length, with at least focal reconstitution at the right femoral artery anastomosis. " Recommend vascular surgery consultation, which may be nonemergent if    there are no clinical signs/symptoms of right lower extremity ischemia.      4) Peripheral groundglass opacity in the right lower lobe, which may represent pneumonia in the appropriate clinical setting.      5) Additional findings as above.      I personally discussed this study with PRAVIN MONTES on 4/8/2024 at 9:25 PM.                     Workstation performed: [id number]         XR chest pa & lateral    (Results Pending)   FL small bowel    (Results Pending)         Physical Exam:    General appearance: Alert; appears drowsy on exam. Answers questions appropriately.   Head: Normocephalic, without obvious abnormality, atraumatic  Neck: supple, symmetrical, trachea midline and B carotid bruits  Lungs: clear to auscultation bilaterally  Heart: regular rate and rhythm, S1, S2 normal, no murmur, click, rub or gallop  Abdomen:  Soft. Tender to palpation. Decreased bowel sounds.   Genitalia: deferred  Rectal: deferred  Extremities:  BLE are warm to cool from thigh to feet R>L. B lower legs are dry with venous stasis changes and lipodermatosclerosis. Purple discoloration noted at the level of the toes and anterior shin.   Neurologic: Grossly normal    Pulse exam:  Radial: Right: 2+ Left:: 2+  Femoral: Unable to palpate due to large pannus.   DP: Right: non-palpable Left: non-palpable  PT: Right: doppler signal Left: doppler signal    Poonam Martinez PA-C  4/9/2024

## 2024-04-09 NOTE — ASSESSMENT & PLAN NOTE
Patient's white count is 20.  Abdominal CT shows a obstruction but no signs of infection.  Blood cultures are pending.  His CT of the abdomen does show in his lower lobes opacities consistent with pneumonia.  Will check a plain film of his chest.

## 2024-04-09 NOTE — H&P
H&P Exam - General Surgery   Kulwinder Heranndez 78 y.o. male MRN: [id number]  Unit/Bed#: ED 19 Encounter: [id number]    Assessment/Plan     Kulwinder Hernandez is a 78 y.o. male with small bowel obstruction.    AVSS, WBC 15 from 21, magnesium 1.6, lactate 2.2 from 4, remainder of labs within normal limits    CTAP: 1) Small bowel obstruction with transition point in the anterior mid abdomen, in same location as at least 3 prior bowel obstructions, likely related to adhesions secondary to prior ventral hernia repair with mesh.     2) No evidence of perforation. Trace mesenteric edema adjacent to the point of transition, however no findings to suggest bowel ischemia.    Plan:  Recommended NG tube at this time due to significant abdominal distention and emesis however patient is refusing.  We discussed strict n.p.o. for bowel rest and if further emesis occurs will require NG tube placement.  Small bowel follow-through ordered this a.m. to assess SBO  N.p.o.  IVF  Monitor abdominal exam, labs, vitals  Consult placed to vascular surgery given CT findings of femoral graft occlusion as well as patient complaint of right leg pain.  Vascular PA contacted.  PRN pain medication and anti-emetics  Encourage ambulation TID  DVT ppx: heparin  Continue home medications as prescribed   General surgery is primary.    History of Present Illness     HPI:  Kulwinder Hernandez is a 78 y.o. male with a past medical history of CKD, hyperlipidemia, hypertension, peripheral vascular disease, sleep apnea, ventral hernia repair with mesh who presents with abdominal pain, nausea, vomiting.  Patient reports the abdominal pain began yesterday morning and felt similar to his previous many episodes of bowel obstructions.  Patient has an extensive surgical history of the abdomen including gastric bypass, ventral hernia repair with mesh, open cholecystectomy.  Patient reports he has had about 10 bowel obstructions since his ventral hernia repair.  He reports he had 1 episode  of emesis yesterday afternoon which was self-induced.  He currently reports nausea but his abdominal pain has improved with IV pain medication.  Denies passing flatus at this time.  Reports last bowel movement was yesterday morning.  No other complaints at this time. The patient denies CP, SOB, palpitations, headache, fever, chills, unintentional weight loss, night sweats, constipation, diarrhea.    Review of Systems   Constitutional:  Negative for chills and fever.   HENT:  Negative for ear pain and sore throat.    Eyes:  Negative for pain and visual disturbance.   Respiratory:  Negative for cough and shortness of breath.    Cardiovascular:  Negative for chest pain and palpitations.   Gastrointestinal:  Positive for abdominal pain (mid abdomen), nausea and vomiting. Negative for constipation and diarrhea.   Genitourinary:  Negative for dysuria and hematuria.   Musculoskeletal:  Negative for arthralgias and back pain.   Skin:  Negative for color change and rash.   Neurological:  Negative for seizures and syncope.   All other systems reviewed and are negative.      Historical Information   Past Medical History:   Diagnosis Date    CKD (chronic kidney disease) 11/8/2018    Hyperlipidemia     Hypertension     PVD (peripheral vascular disease) (HCC)     Sleep apnea     Small bowel obstruction (HCC)     Ventral hernia      Past Surgical History:   Procedure Laterality Date    ABDOMINAL HERNIA REPAIR      ABDOMINAL SURGERY      CHOLECYSTECTOMY      open    FEMORAL BYPASS Right      Social History   Social History     Substance and Sexual Activity   Alcohol Use Never     Social History     Substance and Sexual Activity   Drug Use Never     Social History     Tobacco Use   Smoking Status Never   Smokeless Tobacco Never     Family History: non-contributory    Meds/Allergies   all medications and allergies reviewed  No Known Allergies    Objective   First Vitals:   Blood Pressure: (!) 178/74 (04/08/24 1536)  Pulse: 98  (04/08/24 1536)  Temperature: 97.8 °F (36.6 °C) (04/08/24 1536)  Temp Source: Temporal (04/08/24 1536)  Respirations: 18 (04/08/24 1536)  SpO2: 92 % (04/08/24 1536)    Current Vitals:   Blood Pressure: 150/68 (04/09/24 0643)  Pulse: 80 (04/09/24 0643)  Temperature: 98.1 °F (36.7 °C) (04/09/24 0643)  Temp Source: Temporal (04/08/24 1536)  Respirations: 19 (04/09/24 0643)  SpO2: 97 % (04/09/24 0643)    No intake or output data in the 24 hours ending 04/09/24 0802    Invasive Devices       Peripheral Intravenous Line  Duration             Peripheral IV 04/09/24 Dorsal (posterior);Left Wrist <1 day                    Physical Exam  Vitals reviewed.   Constitutional:       General: He is not in acute distress.     Appearance: Normal appearance. He is obese. He is not ill-appearing or toxic-appearing.   HENT:      Head: Normocephalic and atraumatic.      Right Ear: External ear normal.      Left Ear: External ear normal.      Nose: Nose normal.      Mouth/Throat:      Mouth: Mucous membranes are moist.   Eyes:      General: No scleral icterus.        Right eye: No discharge.         Left eye: No discharge.      Conjunctiva/sclera: Conjunctivae normal.   Cardiovascular:      Rate and Rhythm: Normal rate and regular rhythm.   Pulmonary:      Effort: Pulmonary effort is normal. No respiratory distress.   Abdominal:      General: There is distension.      Palpations: Abdomen is soft.      Tenderness: There is abdominal tenderness (midline supraumbilcal TTP, remainder of abdomen is NT and soft but significantly distended). There is no guarding.      Hernia: A hernia (large ventral hernia palpated) is present.   Musculoskeletal:         General: Normal range of motion.      Cervical back: Normal range of motion.   Skin:     General: Skin is warm.      Coloration: Skin is not jaundiced.   Neurological:      General: No focal deficit present.      Mental Status: He is alert and oriented to person, place, and time.   Psychiatric:          Mood and Affect: Mood normal.         Behavior: Behavior normal.         Thought Content: Thought content normal.         Judgment: Judgment normal.         Lab Results: I have personally reviewed pertinent lab results.    Recent Results (from the past 36 hour(s))   CBC and differential    Collection Time: 04/08/24  6:14 PM   Result Value Ref Range    WBC 20.85 (H) 4.31 - 10.16 Thousand/uL    RBC 4.87 3.88 - 5.62 Million/uL    Hemoglobin 14.6 12.0 - 17.0 g/dL    Hematocrit 44.0 36.5 - 49.3 %    MCV 90 82 - 98 fL    MCH 30.0 26.8 - 34.3 pg    MCHC 33.2 31.4 - 37.4 g/dL    RDW 13.2 11.6 - 15.1 %    MPV 10.1 8.9 - 12.7 fL    Platelets 295 149 - 390 Thousands/uL    nRBC 0 /100 WBCs    Neutrophils Relative 84 (H) 43 - 75 %    Immature Grans % 1 0 - 2 %    Lymphocytes Relative 7 (L) 14 - 44 %    Monocytes Relative 8 4 - 12 %    Eosinophils Relative 0 0 - 6 %    Basophils Relative 0 0 - 1 %    Neutrophils Absolute 17.49 (H) 1.85 - 7.62 Thousands/µL    Absolute Immature Grans 0.25 (H) 0.00 - 0.20 Thousand/uL    Absolute Lymphocytes 1.45 0.60 - 4.47 Thousands/µL    Absolute Monocytes 1.59 (H) 0.17 - 1.22 Thousand/µL    Eosinophils Absolute 0.03 0.00 - 0.61 Thousand/µL    Basophils Absolute 0.04 0.00 - 0.10 Thousands/µL   Comprehensive metabolic panel    Collection Time: 04/08/24  6:14 PM   Result Value Ref Range    Sodium 135 135 - 147 mmol/L    Potassium 4.8 3.5 - 5.3 mmol/L    Chloride 97 96 - 108 mmol/L    CO2 30 21 - 32 mmol/L    ANION GAP 8 4 - 13 mmol/L    BUN 36 (H) 5 - 25 mg/dL    Creatinine 1.68 (H) 0.60 - 1.30 mg/dL    Glucose 168 (H) 65 - 140 mg/dL    Calcium 9.7 8.4 - 10.2 mg/dL    AST 19 13 - 39 U/L    ALT 28 7 - 52 U/L    Alkaline Phosphatase 78 34 - 104 U/L    Total Protein 7.8 6.4 - 8.4 g/dL    Albumin 4.2 3.5 - 5.0 g/dL    Total Bilirubin 0.69 0.20 - 1.00 mg/dL    eGFR 38 ml/min/1.73sq m   Lipase    Collection Time: 04/08/24  6:14 PM   Result Value Ref Range    Lipase 25 11 - 82 u/L   Procalcitonin     Collection Time: 04/08/24  6:14 PM   Result Value Ref Range    Procalcitonin 0.12 <=0.25 ng/ml   Lactic acid, plasma (w/reflex if result > 2.0)    Collection Time: 04/08/24  9:56 PM   Result Value Ref Range    LACTIC ACID 4.0 (HH) 0.5 - 2.0 mmol/L   Lactic acid 2 Hours    Collection Time: 04/09/24  5:03 AM   Result Value Ref Range    LACTIC ACID 2.2 (HH) 0.5 - 2.0 mmol/L   Comprehensive metabolic panel    Collection Time: 04/09/24  5:03 AM   Result Value Ref Range    Sodium 137 135 - 147 mmol/L    Potassium 4.8 3.5 - 5.3 mmol/L    Chloride 100 96 - 108 mmol/L    CO2 27 21 - 32 mmol/L    ANION GAP 10 4 - 13 mmol/L    BUN 41 (H) 5 - 25 mg/dL    Creatinine 1.94 (H) 0.60 - 1.30 mg/dL    Glucose 155 (H) 65 - 140 mg/dL    Calcium 8.9 8.4 - 10.2 mg/dL    AST 35 13 - 39 U/L    ALT 48 7 - 52 U/L    Alkaline Phosphatase 78 34 - 104 U/L    Total Protein 7.0 6.4 - 8.4 g/dL    Albumin 3.8 3.5 - 5.0 g/dL    Total Bilirubin 0.75 0.20 - 1.00 mg/dL    eGFR 32 ml/min/1.73sq m   Magnesium    Collection Time: 04/09/24  5:03 AM   Result Value Ref Range    Magnesium 1.6 (L) 1.9 - 2.7 mg/dL   Phosphorus    Collection Time: 04/09/24  5:03 AM   Result Value Ref Range    Phosphorus 4.0 2.3 - 4.1 mg/dL   CBC and differential    Collection Time: 04/09/24  5:03 AM   Result Value Ref Range    WBC 15.15 (H) 4.31 - 10.16 Thousand/uL    RBC 4.53 3.88 - 5.62 Million/uL    Hemoglobin 13.6 12.0 - 17.0 g/dL    Hematocrit 42.2 36.5 - 49.3 %    MCV 93 82 - 98 fL    MCH 30.0 26.8 - 34.3 pg    MCHC 32.2 31.4 - 37.4 g/dL    RDW 13.3 11.6 - 15.1 %    MPV 10.6 8.9 - 12.7 fL    Platelets 292 149 - 390 Thousands/uL    nRBC 0 /100 WBCs    Neutrophils Relative 83 (H) 43 - 75 %    Immature Grans % 1 0 - 2 %    Lymphocytes Relative 6 (L) 14 - 44 %    Monocytes Relative 9 4 - 12 %    Eosinophils Relative 1 0 - 6 %    Basophils Relative 0 0 - 1 %    Neutrophils Absolute 12.71 (H) 1.85 - 7.62 Thousands/µL    Absolute Immature Grans 0.07 0.00 - 0.20 Thousand/uL     Absolute Lymphocytes 0.92 0.60 - 4.47 Thousands/µL    Absolute Monocytes 1.36 (H) 0.17 - 1.22 Thousand/µL    Eosinophils Absolute 0.07 0.00 - 0.61 Thousand/µL    Basophils Absolute 0.02 0.00 - 0.10 Thousands/µL     Imaging: I have personally reviewed pertinent reports.    CT abdomen pelvis with contrast    Result Date: 4/8/2024  Impression: 1) Small bowel obstruction with transition point in the anterior mid abdomen, in same location as at least 3 prior bowel obstructions, likely related to adhesions secondary to prior ventral hernia repair with mesh. 2) No evidence of perforation. Trace mesenteric edema adjacent to the point of transition, however no findings to suggest bowel ischemia. 3) Status post aortobifemoral bypass graft with occlusion of the right side of the graft along its length, with at least focal reconstitution at the right femoral artery anastomosis. Recommend vascular surgery consultation, which may be nonemergent if there are no clinical signs/symptoms of right lower extremity ischemia. 4) Peripheral groundglass opacity in the right lower lobe, which may represent pneumonia in the appropriate clinical setting. 5) Additional findings as above. I personally discussed this study with PRAVIN MONTES on 4/8/2024 at 9:25 PM. Workstation performed: [id number]        EKG, Pathology, and Other Studies: I have personally reviewed pertinent reports.

## 2024-04-09 NOTE — ED NOTES
Pt refused NG tube insertion. Pt educated on the benefits. Physician notified.     Verónica Braxton, RIVKA  04/08/24 5127       Verónica Braxton, RIVKA  04/08/24 1845

## 2024-04-09 NOTE — CONSULTS
[facility]  Progress Note  Name: Kulwinder Hernandez I  MRN: [id number]  Unit/Bed#: ED 29 I Date of Admission: 4/8/2024   Date of Service: 4/8/2024 I Hospital Day: 0    Assessment/Plan   Pneumonia of lower lobe due to infectious organism  Assessment & Plan  As above.  Possible cause for his pneumonia.  Will check a plain film of his chest.  Empiric antibiotic therapy    Leukocytosis  Assessment & Plan  Patient's white count is 20.  Abdominal CT shows a obstruction but no signs of infection.  Blood cultures are pending.  His CT of the abdomen does show in his lower lobes opacities consistent with pneumonia.  Will check a plain film of his chest.    CKD (chronic kidney disease)  Assessment & Plan  Lab Results   Component Value Date    EGFR 38 04/08/2024    EGFR 43 07/05/2023    EGFR 40 07/04/2023    CREATININE 1.68 (H) 04/08/2024    CREATININE 1.51 (H) 07/05/2023    CREATININE 1.59 (H) 07/04/2023   Patient with a slight bump in his creatinine.  Will give him some volume because he is n.p.o.    Essential hypertension  Assessment & Plan  Patient blood pressure is excellent.  Will keep him n.p.o. for now    * SBO (small bowel obstruction) ([facility])  Assessment & Plan  Patient with small bowel obstruction.  Refusing NG tube.  General surgery is following the patient.  Seems comfortable when I spoke to him but still adamantly refusing the NG tube.             VTE Pharmacologic Prophylaxis:   Moderate Risk (Score 3-4) - Pharmacological DVT Prophylaxis Contraindicated. Sequential Compression Devices Ordered.  Code Status: Level 1 - Full Code   Discussion with family:     Anticipated Length of Stay:     Total Time Spent on Date of Encounter in care of patient: 30 mins. This time was spent on one or more of the following: performing physical exam; counseling and coordination of care; obtaining or reviewing history; documenting in the medical record; reviewing/ordering tests, medications or procedures; communicating  with other healthcare professionals and discussing with patient's family/caregivers.    Chief Complaint: Abdominal distention    History of Present Illness:  Kulwinder Hernandez is a 78 y.o. male with a PMH of chronic kidney disease, who presents with abdominal distention.  The patient presented to the ER with abdominal distention not necessarily pain per the patient no vomiting, diarrhea no fever.  Breathing comfortably.  Had a CT scan of his abdomen which did show small bowel obstruction also noted have a white count of 20, a mild HARVINDER over his baseline of 1.64 and an elevated lactate of 4.  Patient was hemodynamically stable with a good blood pressure.  Was comfortable and mentating well.  General surgery wanted NG tube placed but the patient refused.  Additionally a CT of the abdomen did show lower lobe infiltrate or groundglass opacity and was given a dose of Rocephin in the emergency room.  The patient again does not have respiratory symptoms..    Review of Systems:  Review of Systems   Constitutional:  Negative for activity change, appetite change, chills, fatigue and fever.   HENT:  Negative for congestion, drooling, hearing loss, mouth sores, sinus pain and sore throat.    Eyes:  Negative for discharge.   Respiratory:  Negative for cough, choking, chest tightness, shortness of breath and wheezing.    Cardiovascular:  Negative for chest pain and leg swelling.   Gastrointestinal:  Positive for abdominal distention and abdominal pain. Negative for blood in stool, constipation, diarrhea and nausea.   Endocrine: Negative for cold intolerance.   Genitourinary:  Negative for flank pain.   Musculoskeletal:  Negative for back pain.   Neurological:  Negative for dizziness, light-headedness and headaches.   Hematological:  Negative for adenopathy.   Psychiatric/Behavioral:  Negative for agitation and behavioral problems.        Past Medical and Surgical History:   Past Medical History:   Diagnosis Date    CKD (chronic kidney  disease) 11/8/2018    Hyperlipidemia     Hypertension     PVD (peripheral vascular disease) (HCC)     Sleep apnea     Small bowel obstruction (HCC)     Ventral hernia        Past Surgical History:   Procedure Laterality Date    ABDOMINAL HERNIA REPAIR      ABDOMINAL SURGERY      CHOLECYSTECTOMY      open    FEMORAL BYPASS Right        Meds/Allergies:  Prior to Admission medications    Medication Sig Start Date End Date Taking? Authorizing Provider   amLODIPine (NORVASC) 5 mg tablet TK 1 T PO D 7/11/18   Historical Provider, MD   atorvastatin (LIPITOR) 10 mg tablet TK 1 T PO D 7/11/18   Historical Provider, MD   budesonide-formoterol (SYMBICORT) 80-4.5 MCG/ACT inhaler Inhale 2 puffs 2 (two) times a day Rinse mouth after use.    Historical Provider, MD   donepezil (ARICEPT) 10 mg tablet TK 1 T PO HS 8/28/18   Historical Provider, MD   spironolactone (ALDACTONE) 25 mg tablet Take 12.5 mg by mouth 2 (two) times a day     Historical Provider, MD   triamterene-hydrochlorothiazide (MAXZIDE-25) 37.5-25 mg per tablet Take by mouth daily  9/13/18   Historical Provider, MD     I have reviewed home medications with patient personally.    Allergies: No Known Allergies    Social History:  Marital Status:    Occupation: retired  Patient Pre-hospital Living Situation:   Patient Pre-hospital Level of Mobility:   Patient Pre-hospital Diet Restrictions:   Substance Use History:   Social History     Substance and Sexual Activity   Alcohol Use Never     Social History     Tobacco Use   Smoking Status Never   Smokeless Tobacco Never     Social History     Substance and Sexual Activity   Drug Use Never       Family History:      Physical Exam:     Vitals:   Blood Pressure: 158/70 (04/08/24 2315)  Pulse: 94 (04/08/24 2315)  Temperature: 97.8 °F (36.6 °C) (04/08/24 1536)  Temp Source: Temporal (04/08/24 1536)  Respirations: 13 (04/08/24 2315)  SpO2: 93 % (04/08/24 2315)    Physical Exam  Constitutional:       General: He is not in  acute distress.     Appearance: Normal appearance. He is not ill-appearing.   HENT:      Head: Normocephalic and atraumatic.      Right Ear: Tympanic membrane normal.      Left Ear: Tympanic membrane normal.      Nose: Nose normal.      Mouth/Throat:      Mouth: Mucous membranes are dry.   Eyes:      Pupils: Pupils are equal, round, and reactive to light.   Cardiovascular:      Rate and Rhythm: Normal rate and regular rhythm.   Pulmonary:      Effort: Pulmonary effort is normal. No respiratory distress.      Breath sounds: Normal breath sounds. No stridor.   Abdominal:      General: There is distension.      Palpations: There is no mass.      Tenderness: There is no abdominal tenderness.   Musculoskeletal:         General: No swelling or tenderness. Normal range of motion.      Cervical back: Normal range of motion. No rigidity or tenderness.      Right lower leg: No edema.      Left lower leg: No edema.   Skin:     General: Skin is warm and dry.      Capillary Refill: Capillary refill takes less than 2 seconds.      Coloration: Skin is not jaundiced.   Neurological:      General: No focal deficit present.      Mental Status: He is alert and oriented to person, place, and time.   Psychiatric:         Mood and Affect: Mood normal.          Additional Data:     Lab Results:  Results from last 7 days   Lab Units 04/08/24  1814   WBC Thousand/uL 20.85*   HEMOGLOBIN g/dL 14.6   HEMATOCRIT % 44.0   PLATELETS Thousands/uL 295   NEUTROS PCT % 84*   LYMPHS PCT % 7*   MONOS PCT % 8   EOS PCT % 0     Results from last 7 days   Lab Units 04/08/24  1814   SODIUM mmol/L 135   POTASSIUM mmol/L 4.8   CHLORIDE mmol/L 97   CO2 mmol/L 30   BUN mg/dL 36*   CREATININE mg/dL 1.68*   ANION GAP mmol/L 8   CALCIUM mg/dL 9.7   ALBUMIN g/dL 4.2   TOTAL BILIRUBIN mg/dL 0.69   ALK PHOS U/L 78   ALT U/L 28   AST U/L 19   GLUCOSE RANDOM mg/dL 168*                 Results from last 7 days   Lab Units 04/08/24  2156 04/08/24  1814   LACTIC ACID  mmol/L 4.0*  --    PROCALCITONIN ng/ml  --  0.12       Lines/Drains:  Invasive Devices       Peripheral Intravenous Line  Duration             Peripheral IV 04/08/24 Proximal;Right;Ventral (anterior) Forearm <1 day                        Imaging: Reviewed radiology reports from this admission including: chest CT scan  CT abdomen pelvis with contrast   Final Result by Johana Amor MD (04/08 2125)      1) Small bowel obstruction with transition point in the anterior mid abdomen, in same location as at least 3 prior bowel obstructions, likely related to adhesions secondary to prior ventral hernia repair with mesh.      2) No evidence of perforation. Trace mesenteric edema adjacent to the point of transition, however no findings to suggest bowel ischemia.      3) Status post aortobifemoral bypass graft with occlusion of the right side of the graft along its length, with at least focal reconstitution at the right femoral artery anastomosis. Recommend vascular surgery consultation, which may be nonemergent if    there are no clinical signs/symptoms of right lower extremity ischemia.      4) Peripheral groundglass opacity in the right lower lobe, which may represent pneumonia in the appropriate clinical setting.      5) Additional findings as above.      I personally discussed this study with PRAVIN MONTES on 4/8/2024 at 9:25 PM.                     Workstation performed: [id number]             EKG and Other Studies Reviewed on Admission:   EKG: NSR. HR  .    ** Please Note: This note has been constructed using a voice recognition system. **

## 2024-04-09 NOTE — ASSESSMENT & PLAN NOTE
Patient with small bowel obstruction.  Refusing NG tube.  General surgery is following the patient.  Seems comfortable when I spoke to him but still adamantly refusing the NG tube.

## 2024-04-09 NOTE — PLAN OF CARE
Problem: PAIN - ADULT  Goal: Verbalizes/displays adequate comfort level or baseline comfort level  Description: Interventions:  - Encourage patient to monitor pain and request assistance  - Assess pain using appropriate pain scale  - Administer analgesics based on type and severity of pain and evaluate response  - Implement non-pharmacological measures as appropriate and evaluate response  - Consider cultural and social influences on pain and pain management  - Notify physician/advanced practitioner if interventions unsuccessful or patient reports new pain  Outcome: Progressing     Problem: RESPIRATORY - ADULT  Goal: Achieves optimal ventilation and oxygenation  Description: INTERVENTIONS:  - Assess for changes in respiratory status  - Assess for changes in mentation and behavior  - Position to facilitate oxygenation and minimize respiratory effort  - Oxygen administered by appropriate delivery if ordered  - Initiate smoking cessation education as indicated  - Encourage broncho-pulmonary hygiene including cough, deep breathe, Incentive Spirometry  - Assess the need for suctioning and aspirate as needed  - Assess and instruct to report SOB or any respiratory difficulty  - Respiratory Therapy support as indicated  Outcome: Progressing     Problem: GASTROINTESTINAL - ADULT  Goal: Minimal or absence of nausea and/or vomiting  Description: INTERVENTIONS:  - Administer IV fluids if ordered to ensure adequate hydration  - Maintain NPO status until nausea and vomiting are resolved  - Nasogastric tube if ordered  - Administer ordered antiemetic medications as needed  - Provide nonpharmacologic comfort measures as appropriate  - Advance diet as tolerated, if ordered  - Consider nutrition services referral to assist patient with adequate nutrition and appropriate food choices  Outcome: Progressing  Goal: Maintains or returns to baseline bowel function  Description: INTERVENTIONS:  - Assess bowel function  - Encourage oral  fluids to ensure adequate hydration  - Administer IV fluids if ordered to ensure adequate hydration  - Administer ordered medications as needed  - Encourage mobilization and activity  - Consider nutritional services referral to assist patient with adequate nutrition and appropriate food choices  Outcome: Progressing

## 2024-04-10 ENCOUNTER — APPOINTMENT (INPATIENT)
Dept: CT IMAGING | Facility: HOSPITAL | Age: 79
DRG: 388 | End: 2024-04-10
Payer: MEDICARE

## 2024-04-10 ENCOUNTER — APPOINTMENT (INPATIENT)
Dept: RADIOLOGY | Facility: HOSPITAL | Age: 79
DRG: 388 | End: 2024-04-10
Payer: MEDICARE

## 2024-04-10 LAB
ANION GAP SERPL CALCULATED.3IONS-SCNC: 5 MMOL/L (ref 4–13)
APTT PPP: 47 SECONDS (ref 23–37)
APTT PPP: 49 SECONDS (ref 23–37)
APTT PPP: 58 SECONDS (ref 23–37)
BASOPHILS # BLD AUTO: 0.02 THOUSANDS/ÂΜL (ref 0–0.1)
BASOPHILS NFR BLD AUTO: 0 % (ref 0–1)
BUN SERPL-MCNC: 55 MG/DL (ref 5–25)
CALCIUM SERPL-MCNC: 8 MG/DL (ref 8.4–10.2)
CHLORIDE SERPL-SCNC: 106 MMOL/L (ref 96–108)
CO2 SERPL-SCNC: 28 MMOL/L (ref 21–32)
CREAT SERPL-MCNC: 2.96 MG/DL (ref 0.6–1.3)
DME PARACHUTE DELIVERY DATE REQUESTED: NORMAL
DME PARACHUTE ITEM DESCRIPTION: NORMAL
DME PARACHUTE ORDER STATUS: NORMAL
DME PARACHUTE SUPPLIER NAME: NORMAL
DME PARACHUTE SUPPLIER PHONE: NORMAL
EOSINOPHIL # BLD AUTO: 0.2 THOUSAND/ÂΜL (ref 0–0.61)
EOSINOPHIL NFR BLD AUTO: 2 % (ref 0–6)
ERYTHROCYTE [DISTWIDTH] IN BLOOD BY AUTOMATED COUNT: 13.9 % (ref 11.6–15.1)
GFR SERPL CREATININE-BSD FRML MDRD: 19 ML/MIN/1.73SQ M
GLUCOSE SERPL-MCNC: 106 MG/DL (ref 65–140)
GLUCOSE SERPL-MCNC: 125 MG/DL (ref 65–140)
HCT VFR BLD AUTO: 35 % (ref 36.5–49.3)
HGB BLD-MCNC: 11.1 G/DL (ref 12–17)
IMM GRANULOCYTES # BLD AUTO: 0.08 THOUSAND/UL (ref 0–0.2)
IMM GRANULOCYTES NFR BLD AUTO: 1 % (ref 0–2)
LACTATE SERPL-SCNC: 0.6 MMOL/L (ref 0.5–2)
LACTATE SERPL-SCNC: 2.3 MMOL/L (ref 0.5–2)
LYMPHOCYTES # BLD AUTO: 1.84 THOUSANDS/ÂΜL (ref 0.6–4.47)
LYMPHOCYTES NFR BLD AUTO: 19 % (ref 14–44)
MCH RBC QN AUTO: 30.2 PG (ref 26.8–34.3)
MCHC RBC AUTO-ENTMCNC: 31.7 G/DL (ref 31.4–37.4)
MCV RBC AUTO: 95 FL (ref 82–98)
MONOCYTES # BLD AUTO: 0.99 THOUSAND/ÂΜL (ref 0.17–1.22)
MONOCYTES NFR BLD AUTO: 10 % (ref 4–12)
NEUTROPHILS # BLD AUTO: 6.44 THOUSANDS/ÂΜL (ref 1.85–7.62)
NEUTS SEG NFR BLD AUTO: 68 % (ref 43–75)
NRBC BLD AUTO-RTO: 0 /100 WBCS
PLATELET # BLD AUTO: 188 THOUSANDS/UL (ref 149–390)
PMV BLD AUTO: 10.4 FL (ref 8.9–12.7)
POTASSIUM SERPL-SCNC: 4.8 MMOL/L (ref 3.5–5.3)
RBC # BLD AUTO: 3.67 MILLION/UL (ref 3.88–5.62)
SODIUM SERPL-SCNC: 139 MMOL/L (ref 135–147)
WBC # BLD AUTO: 9.57 THOUSAND/UL (ref 4.31–10.16)

## 2024-04-10 PROCEDURE — 99232 SBSQ HOSP IP/OBS MODERATE 35: CPT | Performed by: STUDENT IN AN ORGANIZED HEALTH CARE EDUCATION/TRAINING PROGRAM

## 2024-04-10 PROCEDURE — 82948 REAGENT STRIP/BLOOD GLUCOSE: CPT

## 2024-04-10 PROCEDURE — 85025 COMPLETE CBC W/AUTO DIFF WBC: CPT

## 2024-04-10 PROCEDURE — 85730 THROMBOPLASTIN TIME PARTIAL: CPT | Performed by: STUDENT IN AN ORGANIZED HEALTH CARE EDUCATION/TRAINING PROGRAM

## 2024-04-10 PROCEDURE — 99024 POSTOP FOLLOW-UP VISIT: CPT | Performed by: SURGERY

## 2024-04-10 PROCEDURE — 70450 CT HEAD/BRAIN W/O DYE: CPT

## 2024-04-10 PROCEDURE — 80048 BASIC METABOLIC PNL TOTAL CA: CPT

## 2024-04-10 PROCEDURE — 83605 ASSAY OF LACTIC ACID: CPT | Performed by: STUDENT IN AN ORGANIZED HEALTH CARE EDUCATION/TRAINING PROGRAM

## 2024-04-10 PROCEDURE — 99233 SBSQ HOSP IP/OBS HIGH 50: CPT | Performed by: STUDENT IN AN ORGANIZED HEALTH CARE EDUCATION/TRAINING PROGRAM

## 2024-04-10 PROCEDURE — 74250 X-RAY XM SM INT 1CNTRST STD: CPT

## 2024-04-10 RX ADMIN — SODIUM CHLORIDE 140 ML/HR: 0.9 INJECTION, SOLUTION INTRAVENOUS at 15:10

## 2024-04-10 RX ADMIN — DOXYCYCLINE 100 MG: 100 INJECTION, POWDER, LYOPHILIZED, FOR SOLUTION INTRAVENOUS at 11:53

## 2024-04-10 RX ADMIN — CEFTRIAXONE SODIUM 2000 MG: 2 INJECTION, POWDER, FOR SOLUTION INTRAMUSCULAR; INTRAVENOUS at 10:58

## 2024-04-10 RX ADMIN — AMLODIPINE BESYLATE 5 MG: 5 TABLET ORAL at 09:18

## 2024-04-10 RX ADMIN — MORPHINE SULFATE 2 MG: 2 INJECTION, SOLUTION INTRAMUSCULAR; INTRAVENOUS at 19:27

## 2024-04-10 RX ADMIN — DIATRIZOATE MEGLUMINE AND DIATRIZOATE SODIUM 60 ML: 660; 100 LIQUID ORAL; RECTAL at 14:20

## 2024-04-10 RX ADMIN — SODIUM CHLORIDE 140 ML/HR: 0.9 INJECTION, SOLUTION INTRAVENOUS at 05:56

## 2024-04-10 RX ADMIN — MORPHINE SULFATE 4 MG: 4 INJECTION INTRAVENOUS at 01:49

## 2024-04-10 RX ADMIN — DOXYCYCLINE 100 MG: 100 INJECTION, POWDER, LYOPHILIZED, FOR SOLUTION INTRAVENOUS at 23:02

## 2024-04-10 RX ADMIN — BUDESONIDE AND FORMOTEROL FUMARATE DIHYDRATE 2 PUFF: 80; 4.5 AEROSOL RESPIRATORY (INHALATION) at 09:18

## 2024-04-10 RX ADMIN — SODIUM CHLORIDE 140 ML/HR: 0.9 INJECTION, SOLUTION INTRAVENOUS at 20:50

## 2024-04-10 RX ADMIN — BUDESONIDE AND FORMOTEROL FUMARATE DIHYDRATE 2 PUFF: 80; 4.5 AEROSOL RESPIRATORY (INHALATION) at 18:22

## 2024-04-10 RX ADMIN — HEPARIN SODIUM 15.1 UNITS/KG/HR: 10000 INJECTION, SOLUTION INTRAVENOUS at 15:49

## 2024-04-10 RX ADMIN — MORPHINE SULFATE 4 MG: 4 INJECTION INTRAVENOUS at 09:10

## 2024-04-10 NOTE — PLAN OF CARE
Problem: Potential for Falls  Goal: Patient will remain free of falls  Description: INTERVENTIONS:  - Educate patient/family on patient safety including physical limitations  - Instruct patient to call for assistance with activity   - Consult OT/PT to assist with strengthening/mobility   - Keep Call bell within reach  - Keep bed low and locked with side rails adjusted as appropriate  - Keep care items and personal belongings within reach  - Initiate and maintain comfort rounds  - Make Fall Risk Sign visible to staff  - Offer Toileting every 2 Hours, in advance of need  - Initiate/Maintain alarm  - Obtain necessary fall risk management equipment  - Apply yellow socks and bracelet for high fall risk patients  - Consider moving patient to room near nurses station  Outcome: Progressing     Problem: Prexisting or High Potential for Compromised Skin Integrity  Goal: Skin integrity is maintained or improved  Description: INTERVENTIONS:  - Identify patients at risk for skin breakdown  - Assess and monitor skin integrity  - Assess and monitor nutrition and hydration status  - Monitor labs   - Assess for incontinence   - Turn and reposition patient  - Assist with mobility/ambulation  - Relieve pressure over bony prominences  - Avoid friction and shearing  - Provide appropriate hygiene as needed including keeping skin clean and dry  - Evaluate need for skin moisturizer/barrier cream  - Collaborate with interdisciplinary team   - Patient/family teaching  - Consider wound care consult   Outcome: Progressing     Problem: SAFETY ADULT  Goal: Patient will remain free of falls  Description: INTERVENTIONS:  - Educate patient/family on patient safety including physical limitations  - Instruct patient to call for assistance with activity   - Consult OT/PT to assist with strengthening/mobility   - Keep Call bell within reach  - Keep bed low and locked with side rails adjusted as appropriate  - Keep care items and personal belongings  within reach  - Initiate and maintain comfort rounds  - Make Fall Risk Sign visible to staff  - Offer Toileting every 2 Hours, in advance of need  - Initiate/Maintain alarm  - Obtain necessary fall risk management equipment  - Apply yellow socks and bracelet for high fall risk patients  - Consider moving patient to room near nurses station  Outcome: Progressing     Problem: GASTROINTESTINAL - ADULT  Goal: Minimal or absence of nausea and/or vomiting  Description: INTERVENTIONS:  - Administer IV fluids if ordered to ensure adequate hydration  - Maintain NPO status until nausea and vomiting are resolved  - Nasogastric tube if ordered  - Administer ordered antiemetic medications as needed  - Provide nonpharmacologic comfort measures as appropriate  - Advance diet as tolerated, if ordered  - Consider nutrition services referral to assist patient with adequate nutrition and appropriate food choices  Outcome: Progressing  Goal: Maintains or returns to baseline bowel function  Description: INTERVENTIONS:  - Assess bowel function  - Encourage oral fluids to ensure adequate hydration  - Administer IV fluids if ordered to ensure adequate hydration  - Administer ordered medications as needed  - Encourage mobilization and activity  - Consider nutritional services referral to assist patient with adequate nutrition and appropriate food choices  Outcome: Progressing

## 2024-04-10 NOTE — PLAN OF CARE
Problem: Potential for Falls  Goal: Patient will remain free of falls  Description: INTERVENTIONS:  - Educate patient/family on patient safety including physical limitations  - Instruct patient to call for assistance with activity   - Consult OT/PT to assist with strengthening/mobility   - Keep Call bell within reach  - Keep bed low and locked with side rails adjusted as appropriate  - Keep care items and personal belongings within reach  - Initiate and maintain comfort rounds  - Make Fall Risk Sign visible to staff  - Offer Toileting every 2 Hours, in advance of need  - Initiate/Maintain bed/chair alarm  - Apply yellow socks and bracelet for high fall risk patients  - Consider moving patient to room near nurses station  Outcome: Progressing     Problem: PAIN - ADULT  Goal: Verbalizes/displays adequate comfort level or baseline comfort level  Description: Interventions:  - Encourage patient to monitor pain and request assistance  - Assess pain using appropriate pain scale  - Administer analgesics based on type and severity of pain and evaluate response  - Implement non-pharmacological measures as appropriate and evaluate response  - Consider cultural and social influences on pain and pain management  - Notify physician/advanced practitioner if interventions unsuccessful or patient reports new pain  Outcome: Progressing

## 2024-04-10 NOTE — NURSING NOTE
Pt refusing to listen to staff regarding safety measures. Pt states he is fine and keeps telling staff to leave room when transferring to the commode. Pt is very impulsive and on heparin drip. Educated pt on the importance of following safety precautions so he does not fall or rip out any of his IVs and cause bleeding. Pt ignoring staff when talked to and refusing to listen. No evidence of learning at this time.

## 2024-04-10 NOTE — PROGRESS NOTES
"Progress Note - General Surgery   Kulwinder Hernandez 78 y.o. male MRN: [id number]  Unit/Bed#: -01 Encounter: [id number]    Assessment:  Kulwinder Hernandez is a 78 y.o. male with recurrent small bowel obstruction.    AVSS, WBC 9.5 from 15, lactate normalized 0.6 from 2.3, remainder of labs are within normal limits    Plan:  SBO improving as patient reports passing flatus, distention and pain improved. Will keep NPO until bowel function improves and in case of vascular intervention.   IVF  Vascular consult appreciated - spoke with Vascular PA about my concern for decline in RLE function on my exam this AM. Okay from general surgery standpoint for transfer if vascular intervention is needed.   Monitor abdominal exam, labs, vitals  PRN pain medication and anti-emetics  Encourage ambulation  DVT ppx: heparin  Incentive spirometry 10 times/hour while awake  Continue home medications as prescribed   General surgery is primary    Subjective/Objective    Subjective: No acute events overnight.     Objective:     Blood pressure 116/61, pulse 104, temperature 98.8 °F (37.1 °C), resp. rate 16, height 5' 8\" (1.727 m), weight (!) 137 kg (301 lb 2.4 oz), SpO2 96%.,Body mass index is 45.79 kg/m².      Intake/Output Summary (Last 24 hours) at 4/10/2024 0841  Last data filed at 4/10/2024 0556  Gross per 24 hour   Intake 3966 ml   Output 1361 ml   Net 2605 ml       Invasive Devices       Peripheral Intravenous Line  Duration             Peripheral IV 04/09/24 Dorsal (posterior);Left Wrist 1 day    Peripheral IV 04/09/24 Dorsal (posterior);Left Hand <1 day                    Physical Exam:   GEN: NAD  HEENT: NCAT, MMM  CV: RRR, no m/r/g  Lung: Normal effort, CTA B/L, no w/r/r  Ab: Soft, distended, mildly TTP in the mid abdomen.   Extrem: Limited motor function of the RLE likely due to pain, motor and sensation of the LLE intact. No cyanosis or edema. Venous stasis b/l R>L  Neuro: A+Ox3 , RLE with 3/5 strength, 5/5 LLE strength    Lab, Imaging " and other studies:I have personally reviewed pertinent lab results.     VTE Pharmacologic Prophylaxis: Heparin  VTE Mechanical Prophylaxis: sequential compression device    Recent Results (from the past 36 hour(s))   Blood culture    Collection Time: 04/08/24  9:56 PM    Specimen: Arm, Left; Blood   Result Value Ref Range    Blood Culture No Growth at 24 hrs.    Blood culture    Collection Time: 04/08/24  9:56 PM    Specimen: Arm, Right; Blood   Result Value Ref Range    Gram Stain Result Gram positive cocci in clusters (A)    Lactic acid, plasma (w/reflex if result > 2.0)    Collection Time: 04/08/24  9:56 PM   Result Value Ref Range    LACTIC ACID 4.0 (HH) 0.5 - 2.0 mmol/L   Blood Culture Identification Panel    Collection Time: 04/08/24  9:56 PM    Specimen: Arm, Right; Blood   Result Value Ref Range    Staphylococcus Detected (A) Not Detected   Lactic acid 2 Hours    Collection Time: 04/09/24  5:03 AM   Result Value Ref Range    LACTIC ACID 2.2 (HH) 0.5 - 2.0 mmol/L   Comprehensive metabolic panel    Collection Time: 04/09/24  5:03 AM   Result Value Ref Range    Sodium 137 135 - 147 mmol/L    Potassium 4.8 3.5 - 5.3 mmol/L    Chloride 100 96 - 108 mmol/L    CO2 27 21 - 32 mmol/L    ANION GAP 10 4 - 13 mmol/L    BUN 41 (H) 5 - 25 mg/dL    Creatinine 1.94 (H) 0.60 - 1.30 mg/dL    Glucose 155 (H) 65 - 140 mg/dL    Calcium 8.9 8.4 - 10.2 mg/dL    AST 35 13 - 39 U/L    ALT 48 7 - 52 U/L    Alkaline Phosphatase 78 34 - 104 U/L    Total Protein 7.0 6.4 - 8.4 g/dL    Albumin 3.8 3.5 - 5.0 g/dL    Total Bilirubin 0.75 0.20 - 1.00 mg/dL    eGFR 32 ml/min/1.73sq m   Magnesium    Collection Time: 04/09/24  5:03 AM   Result Value Ref Range    Magnesium 1.6 (L) 1.9 - 2.7 mg/dL   Phosphorus    Collection Time: 04/09/24  5:03 AM   Result Value Ref Range    Phosphorus 4.0 2.3 - 4.1 mg/dL   CBC and differential    Collection Time: 04/09/24  5:03 AM   Result Value Ref Range    WBC 15.15 (H) 4.31 - 10.16 Thousand/uL    RBC 4.53  3.88 - 5.62 Million/uL    Hemoglobin 13.6 12.0 - 17.0 g/dL    Hematocrit 42.2 36.5 - 49.3 %    MCV 93 82 - 98 fL    MCH 30.0 26.8 - 34.3 pg    MCHC 32.2 31.4 - 37.4 g/dL    RDW 13.3 11.6 - 15.1 %    MPV 10.6 8.9 - 12.7 fL    Platelets 292 149 - 390 Thousands/uL    nRBC 0 /100 WBCs    Segmented % 83 (H) 43 - 75 %    Immature Grans % 1 0 - 2 %    Lymphocytes % 6 (L) 14 - 44 %    Monocytes % 9 4 - 12 %    Eosinophils Relative 1 0 - 6 %    Basophils Relative 0 0 - 1 %    Absolute Neutrophils 12.71 (H) 1.85 - 7.62 Thousands/µL    Absolute Immature Grans 0.07 0.00 - 0.20 Thousand/uL    Absolute Lymphocytes 0.92 0.60 - 4.47 Thousands/µL    Absolute Monocytes 1.36 (H) 0.17 - 1.22 Thousand/µL    Eosinophils Absolute 0.07 0.00 - 0.61 Thousand/µL    Basophils Absolute 0.02 0.00 - 0.10 Thousands/µL   Lactic acid, plasma (w/reflex if result > 2.0)    Collection Time: 04/09/24  7:00 PM   Result Value Ref Range    LACTIC ACID 2.5 (HH) 0.5 - 2.0 mmol/L   Lactic acid 2 Hours    Collection Time: 04/09/24 10:50 PM   Result Value Ref Range    LACTIC ACID 2.3 (HH) 0.5 - 2.0 mmol/L   APTT    Collection Time: 04/09/24 10:50 PM   Result Value Ref Range    PTT 69 (H) 23 - 37 seconds   CBC and differential    Collection Time: 04/10/24  4:59 AM   Result Value Ref Range    WBC 9.57 4.31 - 10.16 Thousand/uL    RBC 3.67 (L) 3.88 - 5.62 Million/uL    Hemoglobin 11.1 (L) 12.0 - 17.0 g/dL    Hematocrit 35.0 (L) 36.5 - 49.3 %    MCV 95 82 - 98 fL    MCH 30.2 26.8 - 34.3 pg    MCHC 31.7 31.4 - 37.4 g/dL    RDW 13.9 11.6 - 15.1 %    MPV 10.4 8.9 - 12.7 fL    Platelets 188 149 - 390 Thousands/uL    nRBC 0 /100 WBCs    Segmented % 68 43 - 75 %    Immature Grans % 1 0 - 2 %    Lymphocytes % 19 14 - 44 %    Monocytes % 10 4 - 12 %    Eosinophils Relative 2 0 - 6 %    Basophils Relative 0 0 - 1 %    Absolute Neutrophils 6.44 1.85 - 7.62 Thousands/µL    Absolute Immature Grans 0.08 0.00 - 0.20 Thousand/uL    Absolute Lymphocytes 1.84 0.60 - 4.47  Thousands/µL    Absolute Monocytes 0.99 0.17 - 1.22 Thousand/µL    Eosinophils Absolute 0.20 0.00 - 0.61 Thousand/µL    Basophils Absolute 0.02 0.00 - 0.10 Thousands/µL   Basic metabolic panel    Collection Time: 04/10/24  4:59 AM   Result Value Ref Range    Sodium 139 135 - 147 mmol/L    Potassium 4.8 3.5 - 5.3 mmol/L    Chloride 106 96 - 108 mmol/L    CO2 28 21 - 32 mmol/L    ANION GAP 5 4 - 13 mmol/L    BUN 55 (H) 5 - 25 mg/dL    Creatinine 2.96 (H) 0.60 - 1.30 mg/dL    Glucose 106 65 - 140 mg/dL    Calcium 8.0 (L) 8.4 - 10.2 mg/dL    eGFR 19 ml/min/1.73sq m   APTT    Collection Time: 04/10/24  4:59 AM   Result Value Ref Range    PTT 58 (H) 23 - 37 seconds   Lactic acid, plasma (w/reflex if result > 2.0)    Collection Time: 04/10/24  4:59 AM   Result Value Ref Range    LACTIC ACID 0.6 0.5 - 2.0 mmol/L   Fingerstick Glucose (POCT)    Collection Time: 04/10/24  8:07 AM   Result Value Ref Range    POC Glucose 125 65 - 140 mg/dl

## 2024-04-10 NOTE — PROGRESS NOTES
[facility]  Progress Note  Name: Kulwinder Hernandez I  MRN: [id number]  Unit/Bed#: -01 I Date of Admission: 4/8/2024   Date of Service: 4/10/2024 I Hospital Day: 1    Assessment/Plan   * SBO (small bowel obstruction) ([facility])  Assessment & Plan  78-year-old male patient with past medical history of recurrent SBO due to ventral hernia repaired with mesh.  Currently denies nausea, vomiting.    Currently afebrile, hemodynamically stable.  Patient at bedside reports that he was passing flatus however no bowel movement.  Also reports abdominal pain has been improving however he had just received morphine prior to my encounter.  NPO on IV fluids.   Small bowel follow-through study pending.  Further care per primary/surgery team.    PVD (peripheral vascular disease) ([facility])  Assessment & Plan  History of peripheral vascular disease.  History of aorto bifemoral bypass graft.  Son at the bedside assisting with history for the most part.  Son reports that patient has been complaining of having right lower extremity pain.    CT abdomen pelvis result Status post aortobifemoral bypass graft with occlusion of the right side of the graft along its length, with at least focal reconstitution at the right femoral artery anastomosis  LEAD 4/9/2024 demonstrates occluded right limb graft with occlusion of the mid and distal SFA.    -No urgent intervention recommended at this time due to no evidence of ALI, current SBO, pneumonia, and worsening HARVINDER. Once current medical issues resolve, we can discuss proceeding with vascular intervention in attempt to recanalize occluded R limb graft. .    Current on encounter patient appears comfortable not in distress.  Right lower extremity noted warm to cold distally.  No significant tenderness noted on exam.  No urgent intervention recommended repeat respiratory recommendation, once current medical issue resolves vascular surgery will have discussion with patient and family to  proceed with vascular intervention and attempt to recanalize occluded right limb graft likely on outpatient basis.  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  Currently on IV heparin drip.  Following up with CT head since noted with some generalized weakness and mumbling per family at this morning.  Vascular surgery recommendation patient.    Pneumonia of lower lobe due to infectious organism  Assessment & Plan  Procal wnl  WBC improving.   On iv ceftriaxone and doxy.  Continue to trend cbc and procalcitonin    Leukocytosis  Assessment & Plan  Patient's white count is 20.    Abdominal CT shows a obstruction but no signs of infection.  1/2 blood culture growing coag negative staph likely contaminant.  Leukocytosis resolved.  His CT of the abdomen does show in his lower lobes opacities consistent with pneumonia.    Currently improving.  Continue IV ceftriaxone, doxycycline.  Continue to trend CBC.    Lactic acidosis  Assessment & Plan  Lactic acidosis resolved.  Clinically does not appear to be acutely toxic.  Continue IV hydration and monitor lactic until normal.    CKD (chronic kidney disease)  Assessment & Plan  Lab Results   Component Value Date    EGFR 19 04/10/2024    EGFR 32 04/09/2024    EGFR 38 04/08/2024    CREATININE 2.96 (H) 04/10/2024    CREATININE 1.94 (H) 04/09/2024    CREATININE 1.68 (H) 04/08/2024     Baseline creatinine around 1.6-1.7 range.  Currently slightly uptrending 2.96.  Patient has been urinating well per son at the bedside.  Follow-up with bladder scan.  Continue IV hydration and monitor renal function.  Avoid nephrotoxic agent.    Essential hypertension  Assessment & Plan  Patient blood pressure is excellent.  Will keep him n.p.o. for now               VTE Pharmacologic Prophylaxis:   Moderate Risk (Score 3-4) - Pharmacological DVT Prophylaxis Ordered: heparin drip.    Mobility:   Basic Mobility Inpatient Raw Score: 19  JH-HLM Goal: 6: Walk 10 steps or more  JH-HLM  Achieved: 4: Move to chair/commode      Patient Centered Rounds: I performed bedside rounds with nursing staff today.   Discussions with Specialists or Other Care Team Provider: Surgery and Vascular     Education and Discussions with Family / Patient: Updated  (son) at bedside.    Total Time Spent on Date of Encounter in care of patient: 35 mins. This time was spent on one or more of the following: performing physical exam; counseling and coordination of care; obtaining or reviewing history; documenting in the medical record; reviewing/ordering tests, medications or procedures; communicating with other healthcare professionals and discussing with patient's family/caregivers.    Current Length of Stay: 1 day(s)  Current Patient Status: Inpatient   Certification Statement: The patient will continue to require additional inpatient hospital stay due to SBO, peripheral vascular disease currently on IV heparin drip.  Discharge Plan: Anticipate discharge in 48-72 hrs to discharge location to be determined pending rehab evaluations.    Code Status: Level 1 - Full Code    Subjective:   Seen during a.m. rounds.  Patient had received morphine right prior to my encounter.  He is drowsy.  Wakes up with verbal stimuli however son at bedside providing history.  Patient has been urinating well as per son also reports abdominal pain seems to be improving and he is passing flatus however son is concerned about having mumbling speech as well as generalized weakness and deconditioning.  Denies any focal neurological complaints however since patient has remote history of hemorrhagic stroke and currently on IV heparin drip we will follow-up with CT head.  Patient and son both are in agreement with above plan.    Objective:     Vitals:   Temp (24hrs), Av.6 °F (37 °C), Min:98.5 °F (36.9 °C), Max:98.8 °F (37.1 °C)    Temp:  [98.5 °F (36.9 °C)-98.8 °F (37.1 °C)] 98.8 °F (37.1 °C)  HR:  [] 104  Resp:  [16] 16  BP:  (111-116)/(56-61) 116/61  SpO2:  [90 %-96 %] 96 %  Body mass index is 45.79 kg/m².     Input and Output Summary (last 24 hours):     Intake/Output Summary (Last 24 hours) at 4/10/2024 1247  Last data filed at 4/10/2024 0909  Gross per 24 hour   Intake 3966 ml   Output 1661 ml   Net 2305 ml       Physical Exam:   Physical Exam  Constitutional:       General: He is not in acute distress.     Appearance: Normal appearance. He is obese. He is not ill-appearing, toxic-appearing or diaphoretic.   HENT:      Head: Normocephalic and atraumatic.   Eyes:      Pupils: Pupils are equal, round, and reactive to light.   Cardiovascular:      Rate and Rhythm: Normal rate.      Pulses: Normal pulses.   Pulmonary:      Effort: Pulmonary effort is normal. No respiratory distress.      Breath sounds: Normal breath sounds. No wheezing.      Comments: O2 saturation 92-95% on 2 L nasal cannula which is chronic and stable.  Abdominal:      General: There is distension.      Tenderness: There is abdominal tenderness.      Comments: Old surgical scar noted, large hernia noted.   Musculoskeletal:      Cervical back: No rigidity.      Comments: Bilateral lower extremity noted cool to the distal aspect.  Chronic skin discoloration noted.   Neurological:      Mental Status: He is alert. Mental status is at baseline.      Comments: Drowsy.  Wakes up with verbal stimuli.          Additional Data:     Labs:  Results from last 7 days   Lab Units 04/10/24  0459   WBC Thousand/uL 9.57   HEMOGLOBIN g/dL 11.1*   HEMATOCRIT % 35.0*   PLATELETS Thousands/uL 188   SEGS PCT % 68   LYMPHO PCT % 19   MONO PCT % 10   EOS PCT % 2     Results from last 7 days   Lab Units 04/10/24  0459 04/09/24  0503   SODIUM mmol/L 139 137   POTASSIUM mmol/L 4.8 4.8   CHLORIDE mmol/L 106 100   CO2 mmol/L 28 27   BUN mg/dL 55* 41*   CREATININE mg/dL 2.96* 1.94*   ANION GAP mmol/L 5 10   CALCIUM mg/dL 8.0* 8.9   ALBUMIN g/dL  --  3.8   TOTAL BILIRUBIN mg/dL  --  0.75   ALK PHOS  U/L  --  78   ALT U/L  --  48   AST U/L  --  35   GLUCOSE RANDOM mg/dL 106 155*         Results from last 7 days   Lab Units 04/10/24  0807   POC GLUCOSE mg/dl 125         Results from last 7 days   Lab Units 04/10/24  0459 04/09/24  2250 04/09/24  1900 04/09/24  0503 04/08/24  2156 04/08/24  1814   LACTIC ACID mmol/L 0.6 2.3* 2.5* 2.2*   < >  --    PROCALCITONIN ng/ml  --   --   --   --   --  0.12    < > = values in this interval not displayed.       Lines/Drains:  Invasive Devices       Peripheral Intravenous Line  Duration             Peripheral IV 04/09/24 Dorsal (posterior);Left Wrist 1 day    Peripheral IV 04/09/24 Dorsal (posterior);Left Hand <1 day                        Recent Cultures (last 7 days):   Results from last 7 days   Lab Units 04/08/24  2156   BLOOD CULTURE  No Growth at 24 hrs.   GRAM STAIN RESULT  Gram positive cocci in clusters*       Last 24 Hours Medication List:   Current Facility-Administered Medications   Medication Dose Route Frequency Provider Last Rate    acetaminophen  1,000 mg Intravenous Q6H PRN Kiko Aparicio MD      amLODIPine  5 mg Oral Daily Kiko Aparicio MD      budesonide-formoterol  2 puff Inhalation BID Kiko Aparicio MD      cefTRIAXone  2,000 mg Intravenous Q24H Kiko Aparicio MD 2,000 mg (04/10/24 1058)    doxycycline  100 mg Intravenous Q12H Kiko Aparicio  mg (04/10/24 1153)    heparin (porcine)  3-20 Units/kg/hr (Order-Specific) Intravenous Titrated Walt BERGER MD 13.1 Units/kg/hr (04/10/24 7725)    lidocaine  1 Application Urethral Once Kiko Aparicio MD      morphine injection  2 mg Intravenous Q4H PRN Kiko Aparicio MD      morphine injection  2 mg Intravenous Q3H PRN Kiko Aparicio MD      morphine injection  4 mg Intravenous Q4H PRN Kiko Aparicio MD      ondansetron  4 mg Intravenous Q6H PRN Kiko Aparicio MD      pneumococcal 20-annmarie conj vacc  0.5 mL Intramuscular Prior to discharge Walt  Amira BERGER MD      sodium chloride  140 mL/hr Intravenous Continuous Kiko Aparicio  mL/hr (04/10/24 7745)        Today, Patient Was Seen By: Walt Collier MD    **Please Note: This note may have been constructed using a voice recognition system.**

## 2024-04-10 NOTE — ASSESSMENT & PLAN NOTE
Lab Results   Component Value Date    EGFR 19 04/10/2024    EGFR 32 04/09/2024    EGFR 38 04/08/2024    CREATININE 2.96 (H) 04/10/2024    CREATININE 1.94 (H) 04/09/2024    CREATININE 1.68 (H) 04/08/2024     Baseline creatinine around 1.6-1.7 range.  Currently slightly uptrending 2.96.  Patient has been urinating well per son at the bedside.  Follow-up with bladder scan.  Continue IV hydration and monitor renal function.  Avoid nephrotoxic agent.

## 2024-04-10 NOTE — ASSESSMENT & PLAN NOTE
79yo male with PMH of former smoker, TIANA, HLD, HTN, CVA x2 (ischemic and hemorrhagic), MCI/memory issues, known L carotid occlusion, R asymptomatic carotid artery stenosis, back pain, CKD, PAD s/p [id number] bypass 9/27/2011 for RLE rest pain (OSH in [place]); last seen by Dr. Theodore 1/5/21. He now presents with 3 day history of generalized abdominal pain with evidence of SBO on CT A/P. There was an incidental finding of R limb graft occlusion with reconstitution at least focally at the R femoral artery anastomosis site. Vascular was consulted for evaluation.      Imaging-   LEAD 4/9/24: There is occlusion of the right limb of the Aorta-bi-iliac bypass. There is evidence of occlusion of the right mid and distal superficial femoral artery. Unable to visualize flow in the PTA and MOOSE. R DEE 0.13/-/-. LLE shows occlusion of the proximal and mid superficial femoral artery. 50-75% stenosis noted in the common femoral and profunda femoral arteries. Monophasic hyperemic flow noted in the PTA and MOOSE. L DEE 0.39/53/20.    CT A/P 4/9/24:  1) Small bowel obstruction with transition point in the anterior mid abdomen, in same location as at least 3 prior bowel obstructions, likely related to adhesions secondary to prior ventral hernia repair with mesh.     2) No evidence of perforation. Trace mesenteric edema adjacent to the point of transition, however no findings to suggest bowel ischemia.     3) Status post aortobifemoral bypass graft with occlusion of the right side of the graft along its length, with at least focal reconstitution at the right femoral artery anastomosis. Recommend vascular surgery consultation, which may be nonemergent if   there are no clinical signs/symptoms of right lower extremity ischemia.     4) Peripheral groundglass opacity in the right lower lobe, which may represent pneumonia in the appropriate clinical setting.     Labs-  Lactic acid 0.6 (2.3)  WBC 9.57  sCr 2.96/GFR 19 (1.94/ 32).    Hgb 11.1     Plan:  -PAD s/p aortobifemoral graft '11 now with evidence of R limb occlusion along its length with reconstitution at distal anastomosis. Unknown chronicity.   -Reports 3 week history of RLE pain, mainly with walking and positional. No tissue loss.  Nonpalpable pedal pulses.  -LEAD 12/30/2020 showed bilateral graft limb stenosis and CFA 50-75% stenosis. B SFA occluded with poor collaterals. No recent imaging.   -LEAD 4/9/24 demonstrates occluded right limb graft with occlusion of the mid and distal SFA. R DEE 0.13/-/-.   -No urgent intervention recommended due to SBO, pneumonia, HARVINDER. Once current medical issues resolve, we can discuss proceeding with vascular intervention in attempt to recanalize occluded R limb graft. Patient is high risk surgical candidate due to hx of prior aorto-bi-fem bypass, hx of multiple SBO, large pannus, and CKD, COPD, morbid obesity.  -Continue heparin gtt.   -Recommend starting aspirin 81 mg upon discharge given current NPO status.   -Recommend statin.   -HARVINDER on CKD. Creatinine 2.96 today from 1.94. Continue to trend. SLIM aware.  -Rest of care as per general surgery, SLIM.   -Will d/w Dr. Campos

## 2024-04-10 NOTE — ASSESSMENT & PLAN NOTE
Lactic acidosis resolved.  Clinically does not appear to be acutely toxic.  Continue IV hydration and monitor lactic until normal.

## 2024-04-10 NOTE — ASSESSMENT & PLAN NOTE
Patient's white count is 20.    Abdominal CT shows a obstruction but no signs of infection.  1/2 blood culture growing coag negative staph likely contaminant.  Leukocytosis resolved.  His CT of the abdomen does show in his lower lobes opacities consistent with pneumonia.    Currently improving.  Continue IV ceftriaxone, doxycycline.  Continue to trend CBC.

## 2024-04-10 NOTE — ASSESSMENT & PLAN NOTE
78-year-old male patient with past medical history of recurrent SBO due to ventral hernia repaired with mesh.  Currently denies nausea, vomiting.    Currently afebrile, hemodynamically stable.  Patient at bedside reports that he was passing flatus however no bowel movement.  Also reports abdominal pain has been improving however he had just received morphine prior to my encounter.  NPO on IV fluids.   Small bowel follow-through study pending.  Further care per primary/surgery team.

## 2024-04-10 NOTE — ASSESSMENT & PLAN NOTE
History of peripheral vascular disease.  History of aorto bifemoral bypass graft.  Son at the bedside assisting with history for the most part.  Son reports that patient has been complaining of having right lower extremity pain.    CT abdomen pelvis result Status post aortobifemoral bypass graft with occlusion of the right side of the graft along its length, with at least focal reconstitution at the right femoral artery anastomosis  LEAD 4/9/2024 demonstrates occluded right limb graft with occlusion of the mid and distal SFA.    -No urgent intervention recommended at this time due to no evidence of ALI, current SBO, pneumonia, and worsening HARVINDER. Once current medical issues resolve, we can discuss proceeding with vascular intervention in attempt to recanalize occluded R limb graft. .    Current on encounter patient appears comfortable not in distress.  Right lower extremity noted warm to cold distally.  No significant tenderness noted on exam.  No urgent intervention recommended repeat respiratory recommendation, once current medical issue resolves vascular surgery will have discussion with patient and family to proceed with vascular intervention and attempt to recanalize occluded right limb graft likely on outpatient basis.  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  Currently on IV heparin drip.  Following up with CT head since noted with some generalized weakness and mumbling per family at this morning.  Vascular surgery recommendation patient.

## 2024-04-10 NOTE — PROGRESS NOTES
[facility]  Progress Note  Name: Kulwinder Hernandez I  MRN: [id number]  Unit/Bed#: -01 I Date of Admission: 4/8/2024   Date of Service: 4/10/2024 I Hospital Day: 1    Assessment/Plan   PVD (peripheral vascular disease) ([facility])  Assessment & Plan  77yo male with PMH of former smoker, TIANA, HLD, HTN, CVA x2 (ischemic and hemorrhagic), MCI/memory issues, known L carotid occlusion, R asymptomatic carotid artery stenosis, back pain, CKD, PAD s/p imgtc-xv-fqtojth bypass 9/27/2011 for RLE rest pain (OSH in [place]); last seen by Dr. Theodore 1/5/21. He now presents with 3 day history of generalized abdominal pain with evidence of SBO on CT A/P. There was an incidental finding of R limb graft occlusion with reconstitution at least focally at the R femoral artery anastomosis site. Vascular was consulted for evaluation.      Imaging-   LEAD 4/9/24: There is occlusion of the right limb of the Aorta-bi-iliac bypass. There is evidence of occlusion of the right mid and distal superficial femoral artery. Unable to visualize flow in the PTA and MOOSE. R DEE 0.13/-/-. LLE shows occlusion of the proximal and mid superficial femoral artery. 50-75% stenosis noted in the common femoral and profunda femoral arteries. Monophasic hyperemic flow noted in the PTA and MOOSE. L DEE 0.39/53/20.    CT A/P 4/9/24:  1) Small bowel obstruction with transition point in the anterior mid abdomen, in same location as at least 3 prior bowel obstructions, likely related to adhesions secondary to prior ventral hernia repair with mesh.     2) No evidence of perforation. Trace mesenteric edema adjacent to the point of transition, however no findings to suggest bowel ischemia.     3) Status post aortobifemoral bypass graft with occlusion of the right side of the graft along its length, with at least focal reconstitution at the right femoral artery anastomosis. Recommend vascular surgery consultation, which may be nonemergent if   there are  no clinical signs/symptoms of right lower extremity ischemia.     4) Peripheral groundglass opacity in the right lower lobe, which may represent pneumonia in the appropriate clinical setting.     Labs-  Lactic acid 0.6 (2.3)  WBC 9.57  sCr 2.96/GFR 19 (1.94/ 32).   Hgb 11.1     Plan:  -PAD s/p aortobifemoral graft '11 now with evidence of R limb occlusion along its length with reconstitution at distal anastomosis. Unknown chronicity.   -Reports 3 week history of RLE pain, mainly with walking and positional. No tissue loss.  Nonpalpable pedal pulses.  -LEAD 12/30/2020 showed bilateral graft limb stenosis and CFA 50-75% stenosis. B SFA occluded with poor collaterals. No recent imaging.   -LEAD 4/9/2024 demonstrates occluded right limb graft with occlusion of the mid and distal SFA. R DEE 0.13/-/-.   -No urgent intervention recommended at this time due to no evidence of ALI, current SBO, pneumonia, and worsening HARVINDER. Once current medical issues resolve, we can discuss proceeding with vascular intervention in attempt to recanalize occluded R limb graft. Patient is high risk surgical candidate due to hx of prior aorto-bi-fem bypass, hx of multiple SBO, large pannus, and CKD, COPD, morbid obesity.  -Monitor neurological and pulse exam. Please notify vascular with any new motor or sensory deficits.  -Continue heparin gtt.   -Recommend starting aspirin 81 mg upon discharge given current NPO status.   -Recommend statin.   -HARVINDER on CKD. Creatinine 2.96 today from 1.94. Continue to trend. SLIM aware.  -Rest of care as per general surgery, SLIM.   -D/w SLIM.   -Will d/w Dr. Campos          Subjective: Patient examined at bedside with son Hussein present. Patient reports pain in the RLE. He states his leg pain is a 2/10 when laying completely flat and 9/10 when his head is elevated. He does not need to dangle his leg off the side of the bed. He is able to move and feel his feet and toes without issue.     Vitals:  /61    "Pulse 104   Temp 98.8 °F (37.1 °C)   Resp 16   Ht 5' 8\" (1.727 m)   Wt (!) 137 kg (301 lb 2.4 oz)   SpO2 96%   BMI 45.79 kg/m²     I/Os:  I/O last 3 completed shifts:  In: 3966 [I.V.:3966]  Out: 1361 [Urine:1361]  I/O this shift:  In: 0   Out: 300 [Urine:300]          Lab Results and Cultures:   CBC with diff:   Lab Results   Component Value Date    WBC 9.57 04/10/2024    HGB 11.1 (L) 04/10/2024    HCT 35.0 (L) 04/10/2024    MCV 95 04/10/2024     04/10/2024    RBC 3.67 (L) 04/10/2024    MCH 30.2 04/10/2024    MCHC 31.7 04/10/2024    RDW 13.9 04/10/2024    MPV 10.4 04/10/2024    NRBC 0 04/10/2024   ,   BMP/CMP:  Lab Results   Component Value Date    SODIUM 139 04/10/2024    K 4.8 04/10/2024     04/10/2024    CO2 28 04/10/2024    BUN 55 (H) 04/10/2024    CREATININE 2.96 (H) 04/10/2024    CALCIUM 8.0 (L) 04/10/2024    AST 35 04/09/2024    ALT 48 04/09/2024    ALKPHOS 78 04/09/2024    EGFR 19 04/10/2024   ,   Lipid Panel: No results found for: \"CHOL\",   Coags:   Lab Results   Component Value Date    PTT 58 (H) 04/10/2024    INR 1.02 06/28/2022   ,     Blood Culture:   Lab Results   Component Value Date    BLOODCX No Growth at 24 hrs. 04/08/2024   ,   Urinalysis:   Lab Results   Component Value Date    COLORU Yellow 07/01/2023    CLARITYU Extra Turbid 07/01/2023    SPECGRAV 1.022 07/01/2023    PHUR 8.5 (A) 07/01/2023    PHUR 6.0 11/08/2018    LEUKOCYTESUR Negative 07/01/2023    NITRITE Negative 07/01/2023    GLUCOSEU Negative 07/01/2023    KETONESU Negative 07/01/2023    BILIRUBINUR Negative 07/01/2023    BLOODU Negative 07/01/2023   ,   Urine Culture: No results found for: \"URINECX\",   Wound Culure: No results found for: \"WOUNDCULT\"    Medications:  Current Facility-Administered Medications   Medication Dose Route Frequency    acetaminophen (Ofirmev) injection 1,000 mg  1,000 mg Intravenous Q6H PRN    amLODIPine (NORVASC) tablet 5 mg  5 mg Oral Daily    budesonide-formoterol (SYMBICORT) 80-4.5 MCG/ACT " inhaler 2 puff  2 puff Inhalation BID    ceftriaxone (ROCEPHIN) 2 g/50 mL in dextrose IVPB  2,000 mg Intravenous Q24H    doxycycline (VIBRAMYCIN) 100 mg in sodium chloride 0.9 % 100 mL IVPB  100 mg Intravenous Q12H    heparin (porcine) 25,000 units in 0.45% NaCl 250 mL infusion (premix)  3-20 Units/kg/hr (Order-Specific) Intravenous Titrated    lidocaine (URO-JET) 2 % urethral/mucosal gel 1 Application  1 Application Urethral Once    morphine injection 2 mg  2 mg Intravenous Q4H PRN    morphine injection 2 mg  2 mg Intravenous Q3H PRN    morphine injection 4 mg  4 mg Intravenous Q4H PRN    ondansetron (ZOFRAN) injection 4 mg  4 mg Intravenous Q6H PRN    pneumococcal 20-annmarie conj vacc (PREVNAR 20) IM Injection 0.5 mL  0.5 mL Intramuscular Prior to discharge    sodium chloride 0.9 % infusion  140 mL/hr Intravenous Continuous       Imaging:  VAS ARTERIAL DUPLEX- LOWER LIMB BILATERAL   Final Result by Gloria Cruz DO (04/09 2122)      XR chest pa & lateral   Final Result by Destinee Ortega MD (04/09 0937)   Bibasilar densities seen which may be due to atelectasis/pneumonia   No congestion seen   Hypoinflated lungs   Follow-up suggested in 8 weeks to demonstrate resolution         Workstation performed: [id number]         CT abdomen pelvis with contrast   Final Result by Johana Amor MD (04/08 2125)      1) Small bowel obstruction with transition point in the anterior mid abdomen, in same location as at least 3 prior bowel obstructions, likely related to adhesions secondary to prior ventral hernia repair with mesh.      2) No evidence of perforation. Trace mesenteric edema adjacent to the point of transition, however no findings to suggest bowel ischemia.      3) Status post aortobifemoral bypass graft with occlusion of the right side of the graft along its length, with at least focal reconstitution at the right femoral artery anastomosis. Recommend vascular surgery consultation, which may be nonemergent if     there are no clinical signs/symptoms of right lower extremity ischemia.      4) Peripheral groundglass opacity in the right lower lobe, which may represent pneumonia in the appropriate clinical setting.      5) Additional findings as above.      I personally discussed this study with PRAVIN MONTES on 4/8/2024 at 9:25 PM.                     Workstation performed: [id number]         FL small bowel    (Results Pending)         Physical Exam:    General: Alert and oriented. Answers questions appropriately when asked. Laying flat in bed in NAD  CV: RRR  Respiratory: Normal effort. On 2L O2 NC however on patients forehead on exam and patient refused to wear properly.   Abdominal: Protuberant. Soft. NT. ND  Extremities: BLE warm to cool from thigh to foot R>L. No cyanosis or edema. Chronic venous stasis changes noted to B anterior shins. Motor and sensation intact bilaterally.   Neurologic: Grossly normal. Tongue midline, no facial droop, 5/5 UE and LLE strength. Difficult to assess RLE strength due to pain.     Pulse exam:  Femoral: Right: Unable to palpate; large pannus Left: Unable to palpate; large pannus  Popliteal: Right: doppler signal Left: nonpalpable  DP: Right: nonpalpable; signals absent Left: nonpalpable; signals absent  PT: Right: Faint doppler signal Left: doppler signal    Poonam Martinez PA-C  4/10/2024

## 2024-04-10 NOTE — CASE MANAGEMENT
Case Management Assessment & Discharge Planning Note    Patient name Kulwinder Hernanedz  Location /-01 MRN [id number]  : 1945 Date 4/10/2024       Current Admission Date: 2024  Current Admission Diagnosis:SBO (small bowel obstruction) (HCC)   Patient Active Problem List    Diagnosis Date Noted    Pneumonia of lower lobe due to infectious organism 2024    Leukocytosis 2023    Morbid obesity (HCC) 2022    Lactic acidosis 2021    CKD (chronic kidney disease) 2018    Dyslipidemia 2018    History of CVA (cerebrovascular accident) 2018    Renal cyst, left 2018    Acute kidney injury superimposed on chronic kidney disease  (HCC) 2018    Dementia (HCC) 2018    Elevated random blood glucose level 2018    Essential hypertension     Hyperlipidemia     Ventral hernia     SBO (small bowel obstruction) (HCC)     PVD (peripheral vascular disease) ([facility])       LOS (days): 1  Geometric Mean LOS (GMLOS) (days): 4.6  Days to GMLOS:3.6     OBJECTIVE:    Risk of Unplanned Readmission Score: 11.69         Current admission status: Inpatient       Preferred Pharmacy:   [id number] [place] - [address]  [address] [address]  Phone: [phone] Fax: [phone]    Primary Care Provider: Piyush Elizondo DO    Primary Insurance: MEDICARE  Secondary Insurance: [employer]    ASSESSMENT:  Active Health Care Proxies       Hussein Hernandez [employer] [facility] Care Representative - Son   Primary Phone: [phone] (Mobile)                           Readmission Root Cause  30 Day Readmission: No    Patient Information  Admitted from:: Home  Mental Status: Alert  During Assessment patient was accompanied by: Son  Assessment information provided by:: Son, Patient  Primary Caregiver: Self  Support Systems: Self, Son  County [place] Residence: [place]  What city do you live in[place]  Home entry access options. Select all that apply.:  Other access (Comment) (pt has a chair lift)  Type of Current Residence: 2 story home  Upon entering residence, is there a bedroom on the main floor (no further steps)?: Yes  Upon entering residence, is there a bathroom on the main floor (no further steps)?: Yes  Living Arrangements: Lives w/ Son  Is patient a ?: Yes  Is patient active with [facility] ([facility])?: No    Activities of Daily Living Prior to Admission  Functional Status: Independent  Completes ADLs independently?: Yes  Ambulates independently?: Yes  Does patient use assisted devices?: Yes  Assisted Devices (DME) used: Walker, Home Oxygen concentrator, Stair Chair/[place]  O2 Rate(s): 2L  Does patient currently own DME?: Yes  What DME does the patient currently own?: Walker, Stair Chair/[place]  Does patient have a history of Outpatient Therapy (PT/OT)?: No  Does the patient have a history of Short-Term Rehab?: Yes ([facility])  Does patient have a history of HHC?: Yes (doesnt remember name)         Patient Information Continued  Income Source: SSI/SSD  Does patient have prescription coverage?: Yes  Does patient receive dialysis treatments?: No  Does patient have a history of substance abuse?: No  Does patient have a history of Mental Health Diagnosis?: No    PHQ 2/9 Screening   Reviewed PHQ 2/9 Depression Screening Score?: No    Means of Transportation  Means of Transport to Appts:: Drives Self      Social Determinants of Health (SDOH)      Flowsheet Row Most Recent Value   Housing Stability    In the last 12 months, was there a time when you were not able to pay the mortgage or rent on time? N   In the last 12 months, how many places have you lived? 1   In the last 12 months, was there a time when you did not have a steady place to sleep or slept in a shelter (including now)? N   Transportation Needs    In the past 12 months, has lack of transportation kept you from medical appointments or from getting medications? no   In the past 12 months, has lack of  transportation kept you from meetings, work, or from getting things needed for daily living? No   Food Insecurity    Within the past 12 months, you worried that your food would run out before you got the money to buy more. Never true   Within the past 12 months, the food you bought just didn't last and you didn't have money to get more. Never true   Utilities    In the past 12 months has the electric, gas, oil, or water company threatened to shut off services in your home? No            DISCHARGE DETAILS:    Discharge planning discussed with:: Patient and son at bedside  Freedom of Choice: Yes  Comments - Freedom of Choice: CM discussed freedom of choice as it pertains to discharge planning. Patient declined rehab and hhc. Son asked for cm to send a referral for wheelchair and hhc and stated he will talk to patient to see if patient changes his mind.  CM contacted family/caregiver?: Yes  Were Treatment Team discharge recommendations reviewed with patient/caregiver?: Yes  Did patient/caregiver verbalize understanding of patient care needs?: Yes  Were patient/caregiver advised of the risks associated with not following Treatment Team discharge recommendations?: Yes    Contacts  Patient Contacts: Hussein  Relationship to Patient:: Family  Contact Method: Phone  Phone Number: [phone]    Requested Home Health Care         Is the patient interested in HHC at discharge?: Yes  Home Health Discipline requested:: Nursing, Occupational Therapy, Physical Therapy  Home Health Follow-Up Provider:: PCP  Home Health Services Needed:: Post-Op Care and Assessment, Wound/Ostomy Care, Evaluate Functional Status and Safety, Strengthening/Theraputic Exercises to Improve Function, Oxygen Via Nasal Cannula  Homebound Criteria Met:: Requires the Assistance of Another Person for Safe Ambulation or to Leave the Home, Uses an Assist Device (i.e. cane, walker, etc)  Supporting Clincal Findings:: Limited Endurance, Fatigues Easliy in Short  Distances    DME Referral Provided  Referral made for DME?: Yes  DME referral completed for the following items:: Wheelchair  DME Supplier Name:: [employer]    Other Referral/Resources/Interventions Provided:  Interventions: HHC  Referral Comments: HHC and DME referral sent for walker    Would you like to participate in our [facility] service program?  : No - Declined    Treatment Team Recommendation: Home with Home Health Care  Discharge Destination Plan:: Home with Home Health Care  Transport at Discharge : Family

## 2024-04-11 ENCOUNTER — APPOINTMENT (INPATIENT)
Dept: RADIOLOGY | Facility: HOSPITAL | Age: 79
DRG: 388 | End: 2024-04-11
Payer: MEDICARE

## 2024-04-11 PROBLEM — I50.33 ACUTE ON CHRONIC DIASTOLIC CHF (CONGESTIVE HEART FAILURE) (HCC): Status: ACTIVE | Noted: 2024-04-11

## 2024-04-11 LAB
2HR DELTA HS TROPONIN: 10 NG/L
4HR DELTA HS TROPONIN: 9 NG/L
ANION GAP SERPL CALCULATED.3IONS-SCNC: 8 MMOL/L (ref 4–13)
APTT PPP: 34 SECONDS (ref 23–37)
APTT PPP: 59 SECONDS (ref 23–37)
APTT PPP: 64 SECONDS (ref 23–37)
BASOPHILS # BLD AUTO: 0.03 THOUSANDS/ÂΜL (ref 0–0.1)
BASOPHILS NFR BLD AUTO: 0 % (ref 0–1)
BNP SERPL-MCNC: 96 PG/ML (ref 0–100)
BUN SERPL-MCNC: 49 MG/DL (ref 5–25)
CALCIUM SERPL-MCNC: 7.7 MG/DL (ref 8.4–10.2)
CARDIAC TROPONIN I PNL SERPL HS: 114 NG/L
CARDIAC TROPONIN I PNL SERPL HS: 123 NG/L
CARDIAC TROPONIN I PNL SERPL HS: 124 NG/L
CHLORIDE SERPL-SCNC: 106 MMOL/L (ref 96–108)
CO2 SERPL-SCNC: 24 MMOL/L (ref 21–32)
CREAT SERPL-MCNC: 2.33 MG/DL (ref 0.6–1.3)
EOSINOPHIL # BLD AUTO: 0.11 THOUSAND/ÂΜL (ref 0–0.61)
EOSINOPHIL NFR BLD AUTO: 1 % (ref 0–6)
ERYTHROCYTE [DISTWIDTH] IN BLOOD BY AUTOMATED COUNT: 13.8 % (ref 11.6–15.1)
GFR SERPL CREATININE-BSD FRML MDRD: 25 ML/MIN/1.73SQ M
GLUCOSE SERPL-MCNC: 129 MG/DL (ref 65–140)
HCT VFR BLD AUTO: 35 % (ref 36.5–49.3)
HGB BLD-MCNC: 11.1 G/DL (ref 12–17)
IMM GRANULOCYTES # BLD AUTO: 0.25 THOUSAND/UL (ref 0–0.2)
IMM GRANULOCYTES NFR BLD AUTO: 2 % (ref 0–2)
LYMPHOCYTES # BLD AUTO: 1.87 THOUSANDS/ÂΜL (ref 0.6–4.47)
LYMPHOCYTES NFR BLD AUTO: 15 % (ref 14–44)
MCH RBC QN AUTO: 30.2 PG (ref 26.8–34.3)
MCHC RBC AUTO-ENTMCNC: 31.7 G/DL (ref 31.4–37.4)
MCV RBC AUTO: 95 FL (ref 82–98)
MONOCYTES # BLD AUTO: 1.26 THOUSAND/ÂΜL (ref 0.17–1.22)
MONOCYTES NFR BLD AUTO: 10 % (ref 4–12)
NEUTROPHILS # BLD AUTO: 8.79 THOUSANDS/ÂΜL (ref 1.85–7.62)
NEUTS SEG NFR BLD AUTO: 72 % (ref 43–75)
NRBC BLD AUTO-RTO: 0 /100 WBCS
PLATELET # BLD AUTO: 213 THOUSANDS/UL (ref 149–390)
PMV BLD AUTO: 10.4 FL (ref 8.9–12.7)
POTASSIUM SERPL-SCNC: 4.5 MMOL/L (ref 3.5–5.3)
PROCALCITONIN SERPL-MCNC: 0.43 NG/ML
RBC # BLD AUTO: 3.68 MILLION/UL (ref 3.88–5.62)
SODIUM SERPL-SCNC: 138 MMOL/L (ref 135–147)
WBC # BLD AUTO: 12.31 THOUSAND/UL (ref 4.31–10.16)

## 2024-04-11 PROCEDURE — 99232 SBSQ HOSP IP/OBS MODERATE 35: CPT | Performed by: PHYSICIAN ASSISTANT

## 2024-04-11 PROCEDURE — 83880 ASSAY OF NATRIURETIC PEPTIDE: CPT | Performed by: STUDENT IN AN ORGANIZED HEALTH CARE EDUCATION/TRAINING PROGRAM

## 2024-04-11 PROCEDURE — 80048 BASIC METABOLIC PNL TOTAL CA: CPT

## 2024-04-11 PROCEDURE — 85730 THROMBOPLASTIN TIME PARTIAL: CPT | Performed by: STUDENT IN AN ORGANIZED HEALTH CARE EDUCATION/TRAINING PROGRAM

## 2024-04-11 PROCEDURE — 84484 ASSAY OF TROPONIN QUANT: CPT | Performed by: STUDENT IN AN ORGANIZED HEALTH CARE EDUCATION/TRAINING PROGRAM

## 2024-04-11 PROCEDURE — 85025 COMPLETE CBC W/AUTO DIFF WBC: CPT

## 2024-04-11 PROCEDURE — 71045 X-RAY EXAM CHEST 1 VIEW: CPT

## 2024-04-11 PROCEDURE — 93005 ELECTROCARDIOGRAM TRACING: CPT

## 2024-04-11 PROCEDURE — 84145 PROCALCITONIN (PCT): CPT | Performed by: STUDENT IN AN ORGANIZED HEALTH CARE EDUCATION/TRAINING PROGRAM

## 2024-04-11 PROCEDURE — 99233 SBSQ HOSP IP/OBS HIGH 50: CPT | Performed by: STUDENT IN AN ORGANIZED HEALTH CARE EDUCATION/TRAINING PROGRAM

## 2024-04-11 RX ORDER — FLUTICASONE PROPIONATE 50 MCG
1 SPRAY, SUSPENSION (ML) NASAL DAILY
Status: DISCONTINUED | OUTPATIENT
Start: 2024-04-11 | End: 2024-04-16 | Stop reason: HOSPADM

## 2024-04-11 RX ORDER — FUROSEMIDE 10 MG/ML
40 INJECTION INTRAMUSCULAR; INTRAVENOUS ONCE
Status: COMPLETED | OUTPATIENT
Start: 2024-04-11 | End: 2024-04-11

## 2024-04-11 RX ADMIN — HEPARIN SODIUM 23.1 UNITS/KG/HR: 10000 INJECTION, SOLUTION INTRAVENOUS at 23:13

## 2024-04-11 RX ADMIN — CEFTRIAXONE SODIUM 2000 MG: 2 INJECTION, POWDER, FOR SOLUTION INTRAMUSCULAR; INTRAVENOUS at 08:27

## 2024-04-11 RX ADMIN — BUDESONIDE AND FORMOTEROL FUMARATE DIHYDRATE 2 PUFF: 80; 4.5 AEROSOL RESPIRATORY (INHALATION) at 08:32

## 2024-04-11 RX ADMIN — FUROSEMIDE 40 MG: 10 INJECTION, SOLUTION INTRAMUSCULAR; INTRAVENOUS at 13:55

## 2024-04-11 RX ADMIN — AMLODIPINE BESYLATE 5 MG: 5 TABLET ORAL at 08:15

## 2024-04-11 RX ADMIN — DOXYCYCLINE 100 MG: 100 INJECTION, POWDER, LYOPHILIZED, FOR SOLUTION INTRAVENOUS at 21:04

## 2024-04-11 RX ADMIN — HEPARIN SODIUM 17.1 UNITS/KG/HR: 10000 INJECTION, SOLUTION INTRAVENOUS at 10:47

## 2024-04-11 RX ADMIN — SODIUM CHLORIDE 140 ML/HR: 0.9 INJECTION, SOLUTION INTRAVENOUS at 06:05

## 2024-04-11 RX ADMIN — FLUTICASONE PROPIONATE 1 SPRAY: 50 SPRAY, METERED NASAL at 13:55

## 2024-04-11 RX ADMIN — BUDESONIDE AND FORMOTEROL FUMARATE DIHYDRATE 2 PUFF: 80; 4.5 AEROSOL RESPIRATORY (INHALATION) at 18:51

## 2024-04-11 RX ADMIN — DOXYCYCLINE 100 MG: 100 INJECTION, POWDER, LYOPHILIZED, FOR SOLUTION INTRAVENOUS at 10:07

## 2024-04-11 NOTE — ASSESSMENT & PLAN NOTE
Lab Results   Component Value Date    EGFR 25 04/11/2024    EGFR 19 04/10/2024    EGFR 32 04/09/2024    CREATININE 2.33 (H) 04/11/2024    CREATININE 2.96 (H) 04/10/2024    CREATININE 1.94 (H) 04/09/2024     Baseline creatinine around 1.6-1.7 range.  Currently creatinine 2.32 slightly better than yesterday.  Patient has been urinating well per son at the bedside.  Received 1 dose of IV Lasix 40 mg today.  Monitor renal function.

## 2024-04-11 NOTE — ASSESSMENT & PLAN NOTE
Lactic acidosis resolved.  Clinically does not appear to be acutely toxic.  Discontinue IV fluids.

## 2024-04-11 NOTE — NURSING NOTE
DI score 55.64. Per son, patient walked to bathroom with his assistance and oxygen decreased to 70's while wearing 4L via NC. This nurse noted patient's respirations to be labored and shallow upon entering room after patient had already returned to bed. SpO2 91% on 4L at rest, pulse 98, respirations 22 breaths/minute. Chest expansion symmetrical, crackles auscultated. SLIM evaluated patient at bedside. This nurse instructed patient to use bedside commode or urinal for toileting to avoid hypoxic episodes. Patient and son demonstrated understanding.

## 2024-04-11 NOTE — PROGRESS NOTES
[facility]  Progress Note  Name: Kulwinder Hernandez I  MRN: [id number]  Unit/Bed#: -01 I Date of Admission: 4/8/2024   Date of Service: 4/11/2024 I Hospital Day: 2    Assessment/Plan   * SBO (small bowel obstruction) ([facility])  Assessment & Plan  78-year-old male patient with past medical history of recurrent SBO due to ventral hernia repaired with mesh.  Currently denies nausea, vomiting.    Currently afebrile, hemodynamically stable.  Patient has been having bowel movements.  Currently on surgical soft diet per surgical team.  Denies nausea, vomiting, abdominal pain.  Small bowel follow-through study report noted negative for bowel obstruction.  Discontinue IV fluids.  Resume home medication.  Further care per primary/surgery team.    PVD (peripheral vascular disease) ([facility])  Assessment & Plan  History of peripheral vascular disease.  History of aorto bifemoral bypass graft.  Son at the bedside assisting with history for the most part.  Son reports that patient has been complaining of having right lower extremity pain.    CT abdomen pelvis result Status post aortobifemoral bypass graft with occlusion of the right side of the graft along its length, with at least focal reconstitution at the right femoral artery anastomosis  LEAD 4/9/2024 demonstrates occluded right limb graft with occlusion of the mid and distal SFA.  Vascular surgery recommended no urgent surgical intervention.    Current on encounter patient appears comfortable not in distress.  No significant pain at rest without signs of tissue loss.  No urgent intervention recommended repeat respiratory recommendation, once current medical issue resolves vascular surgery will have discussion with patient and family to proceed with vascular intervention and attempt to recanalize occluded right limb graft likely on outpatient basis.  Currently on IV heparin drip however respiratory recommended to start on aspirin and DOAC once cleared from SBO  perspective as well as renal perspective.  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  Currently on IV heparin drip.  Vascular surgery recommendation appreciated.    Acute on chronic diastolic CHF (congestive heart failure) (HCC)  Assessment & Plan  Wt Readings from Last 3 Encounters:   04/09/24 (!) 137 kg (301 lb 2.4 oz)   06/30/23 (!) 138 kg (305 lb 5.4 oz)   06/28/22 135 kg (298 lb 11.6 oz)       Patient has history of chronic diastolic CHF.  home regimen of Lasix, spironolactone, telmisartan were held due to SBO and patient being NPO.  Patient developed shortness of breath, hypoxia today likely from holding home regimen of diuretics and giving IV fluids due to HARVINDER and SBO.  He appears to be in +2 L balance.    Noted having episodes of dyspnea, hypoxia.  Currently O2 saturation 92 to 93% on 3-4L nc  Give 1 dose of IV Lasix 40 mg.  Follow-up with chest x-ray, trops.  Follow-up with BNP.  Continue with low-salt, fluid restrict diet.  Son reports that patient supposed to be on home oxygen as well as CPAP however not using it.  Continue to wean down oxygen.  Resume home regimen.        Pneumonia of lower lobe due to infectious organism  Assessment & Plan  Procal wnl  WBC improving.   On iv ceftriaxone and doxy day #3  Continue to trend cbc and procalcitonin    Leukocytosis  Assessment & Plan    Abdominal CT shows a obstruction but no signs of infection.  1/2 blood culture growing coag negative staph likely contaminant.  Procalcitonin mandated to 0.43.   CT of the abdomen does show in his lower lobes opacities consistent with pneumonia.    Leukocytosis improving.  Currently improving.  Continue IV ceftriaxone, doxycycline day #3.  Continue to trend CBC.    Lactic acidosis  Assessment & Plan  Lactic acidosis resolved.  Clinically does not appear to be acutely toxic.  Discontinue IV fluids.    CKD (chronic kidney disease)  Assessment & Plan  Lab Results   Component Value Date    EGFR 25 04/11/2024     EGFR 19 04/10/2024    EGFR 32 04/09/2024    CREATININE 2.33 (H) 04/11/2024    CREATININE 2.96 (H) 04/10/2024    CREATININE 1.94 (H) 04/09/2024     Baseline creatinine around 1.6-1.7 range.  Currently creatinine 2.32 slightly better than yesterday.  Patient has been urinating well per son at the bedside.  Received 1 dose of IV Lasix 40 mg today.  Monitor renal function.    Essential hypertension  Assessment & Plan  Patient blood pressure is excellent.   Resume home medications.               VTE Pharmacologic Prophylaxis:   Moderate Risk (Score 3-4) - Pharmacological DVT Prophylaxis Ordered: heparin drip.    Mobility:   Basic Mobility Inpatient Raw Score: 19  -HLM Goal: 6: Walk 10 steps or more  -HLM Achieved: 6: Walk 10 steps or more      Patient Centered Rounds: I performed bedside rounds with nursing staff today.   Discussions with Specialists or Other Care Team Provider: Vascular surgery, General surgery    Education and Discussions with Family / Patient: Updated  (son) at bedside.    Total Time Spent on Date of Encounter in care of patient: 35 mins. This time was spent on one or more of the following: performing physical exam; counseling and coordination of care; obtaining or reviewing history; documenting in the medical record; reviewing/ordering tests, medications or procedures; communicating with other healthcare professionals and discussing with patient's family/caregivers.    Current Length of Stay: 2 day(s)  Current Patient Status: Inpatient   Certification Statement: The patient will continue to require additional inpatient hospital stay due to currently having shortness of breath, hypoxia, currently on IV heparin drip due to peripheral vascular disease, aorto bifemoral bypass with occluded right limb  Discharge Plan: Anticipate discharge in 48-72 hrs to discharge location to be determined pending rehab evaluations.    Code Status: Level 1 - Full Code    Subjective:   Seen during a.m.  rounds as well as again reevaluated during the afternoon.  Earlier during a.m. rounds patient reported having bowel movement and denies any further abdominal pain.  Subsequently in the afternoon patient had episode of hypoxia, shortness of breath when patient walked to the bathroom to urinate.  which was followed up with ordering troponin.  IV Lasix, chest x-ray, BNP, troponin.  Patient reports feeling better after resting back in the bed.  Complain of having chest pain, nausea, diaphoresis, dyspnea, any other new complaints.  No other events reported.  Son present at the bedside at time of the above discussion and workup.    Objective:     Vitals:   Temp (24hrs), Av.2 °F (36.8 °C), Min:97.6 °F (36.4 °C), Max:98.7 °F (37.1 °C)    Temp:  [97.6 °F (36.4 °C)-98.7 °F (37.1 °C)] 97.6 °F (36.4 °C)  HR:  [76-99] 86  Resp:  [17-24] 24  BP: ()/(50-76) 98/76  SpO2:  [91 %-95 %] 92 %  Body mass index is 45.79 kg/m².     Input and Output Summary (last 24 hours):     Intake/Output Summary (Last 24 hours) at 2024 1725  Last data filed at 2024 1437  Gross per 24 hour   Intake 960 ml   Output 750 ml   Net 210 ml       Physical Exam:   Physical Exam  Constitutional:       General: He is not in acute distress.     Appearance: Normal appearance. He is obese. He is not ill-appearing, toxic-appearing or diaphoretic.   HENT:      Head: Normocephalic and atraumatic.   Eyes:      Pupils: Pupils are equal, round, and reactive to light.   Cardiovascular:      Rate and Rhythm: Normal rate.      Pulses: Normal pulses.   Pulmonary:      Effort: Pulmonary effort is normal. No respiratory distress.      Breath sounds: Rales present. No wheezing.      Comments: O2 saturation 92-95% on 3-4 L   Abdominal:      General: There is distension.      Tenderness: There is no abdominal tenderness.      Comments: Old surgical scar noted, large hernia noted.   Musculoskeletal:      Cervical back: No rigidity.      Right lower leg: Edema  present.      Left lower leg: Edema present.      Comments: Bilateral lower extremity noted cool to the distal aspect.  Chronic skin discoloration noted.   Neurological:      Mental Status: He is alert. Mental status is at baseline.      Comments: Drowsy.  Wakes up with verbal stimuli.          Additional Data:     Labs:  Results from last 7 days   Lab Units 04/11/24  0450   WBC Thousand/uL 12.31*   HEMOGLOBIN g/dL 11.1*   HEMATOCRIT % 35.0*   PLATELETS Thousands/uL 213   SEGS PCT % 72   LYMPHO PCT % 15   MONO PCT % 10   EOS PCT % 1     Results from last 7 days   Lab Units 04/11/24  0450 04/10/24  0459 04/09/24  0503   SODIUM mmol/L 138   < > 137   POTASSIUM mmol/L 4.5   < > 4.8   CHLORIDE mmol/L 106   < > 100   CO2 mmol/L 24   < > 27   BUN mg/dL 49*   < > 41*   CREATININE mg/dL 2.33*   < > 1.94*   ANION GAP mmol/L 8   < > 10   CALCIUM mg/dL 7.7*   < > 8.9   ALBUMIN g/dL  --   --  3.8   TOTAL BILIRUBIN mg/dL  --   --  0.75   ALK PHOS U/L  --   --  78   ALT U/L  --   --  48   AST U/L  --   --  35   GLUCOSE RANDOM mg/dL 129   < > 155*    < > = values in this interval not displayed.         Results from last 7 days   Lab Units 04/10/24  0807   POC GLUCOSE mg/dl 125         Results from last 7 days   Lab Units 04/11/24  0450 04/10/24  0459 04/09/24  2250 04/09/24  1900 04/09/24  0503 04/08/24  2156 04/08/24  1814   LACTIC ACID mmol/L  --  0.6 2.3* 2.5* 2.2*   < >  --    PROCALCITONIN ng/ml 0.43*  --   --   --   --   --  0.12    < > = values in this interval not displayed.       Lines/Drains:  Invasive Devices       Peripheral Intravenous Line  Duration             Peripheral IV 04/09/24 Dorsal (posterior);Left Hand 1 day    Peripheral IV 04/11/24 Dorsal (posterior);Right Forearm <1 day                          Imaging: Reviewed radiology reports from this admission including: chest xray    Recent Cultures (last 7 days):   Results from last 7 days   Lab Units 04/08/24  6434   BLOOD CULTURE  Staphylococcus coagulase  negative*  No Growth at 48 hrs.   GRAM STAIN RESULT  Gram positive cocci in clusters*       Last 24 Hours Medication List:   Current Facility-Administered Medications   Medication Dose Route Frequency Provider Last Rate    acetaminophen  1,000 mg Intravenous Q6H PRN Kiko Aparicio MD      amLODIPine  5 mg Oral Daily Kiko Aparicio MD      budesonide-formoterol  2 puff Inhalation BID Kiko Aparicio MD      cefTRIAXone  2,000 mg Intravenous Q24H Kiko Aparicio MD 2,000 mg (04/11/24 0827)    doxycycline  100 mg Intravenous Q12H Kiko Aparicio  mg (04/11/24 1007)    fluticasone  1 spray Each Nare Daily Walt BERGER MD      heparin (porcine)  3-20 Units/kg/hr (Order-Specific) Intravenous Titrated Walt BERGER MD 19.1 Units/kg/hr (04/11/24 1133)    lidocaine  1 Application Urethral Once Kiko Aparicio MD      morphine injection  2 mg Intravenous Q4H PRN Kiko Aparicio MD      morphine injection  2 mg Intravenous Q3H PRN Kiko Aparicio MD      morphine injection  4 mg Intravenous Q4H PRN Kiko Aparicio MD      ondansetron  4 mg Intravenous Q6H PRN Kiko Aparicio MD      pneumococcal 20-annmarie conj vacc  0.5 mL Intramuscular Prior to discharge Walt BERGER MD          Today, Patient Was Seen By: Walt Collier MD    **Please Note: This note may have been constructed using a voice recognition system.**

## 2024-04-11 NOTE — ASSESSMENT & PLAN NOTE
Abdominal CT shows a obstruction but no signs of infection.  1/2 blood culture growing coag negative staph likely contaminant.  Procalcitonin mandated to 0.43.   CT of the abdomen does show in his lower lobes opacities consistent with pneumonia.    Leukocytosis improving.  Currently improving.  Continue IV ceftriaxone, doxycycline day #3.  Continue to trend CBC.

## 2024-04-11 NOTE — ASSESSMENT & PLAN NOTE
Procal wnl  WBC improving.   On iv ceftriaxone and doxy day #3  Continue to trend cbc and procalcitonin

## 2024-04-11 NOTE — NURSING NOTE
Provider Amira at bedside. Patient tachypneic post transfer to [facility]. Upon patient waking up, son noted that patient looked pale and was short of breath. Son helped patient transfer to [facility] then informed nurse of patient status. Breathing noted to be labored and shallow, respirations 24 breaths/minute. Coarse crackles auscultated along with expiratory wheezing in anterior left chest. SpO2 high 80's-low 90' while on 4L via NC. Patient currently in bed resting, call bell within reach.

## 2024-04-11 NOTE — ASSESSMENT & PLAN NOTE
Wt Readings from Last 3 Encounters:   04/09/24 (!) 137 kg (301 lb 2.4 oz)   06/30/23 (!) 138 kg (305 lb 5.4 oz)   06/28/22 135 kg (298 lb 11.6 oz)       Patient has history of chronic diastolic CHF.  home regimen of Lasix, spironolactone, telmisartan were held due to SBO and patient being NPO.  Patient developed shortness of breath, hypoxia today likely from holding home regimen of diuretics and giving IV fluids due to HARVINDER and SBO.  He appears to be in +2 L balance.    Noted having episodes of dyspnea, hypoxia.  Currently O2 saturation 92 to 93% on 3-4L nc  Give 1 dose of IV Lasix 40 mg.  Follow-up with chest x-ray, trops.  Follow-up with BNP.  Continue with low-salt, fluid restrict diet.  Son reports that patient supposed to be on home oxygen as well as CPAP however not using it.  Continue to wean down oxygen.  Resume home regimen.

## 2024-04-11 NOTE — PROGRESS NOTES
"Progress Note -Surgery PATTIE Miramontes David 78 y.o. male MRN: [id number]  Unit/Bed#: -Jasmin Encounter: [id number]      Assessment    78 y.o. male with recurrent small bowel obstruction - resolving  SBFT 4/10/24 showed contrast in the colon at 2 hours, showing normal transit and resolution of obstruction  - Afebrile, O2 90% on 4LNC, WBC 12.31 from 9.57, Hb 11.1  + flatus/BM, abdomen obese, soft and nontender  Lungs with decreased inspiratory effort, shallow at base    HARVINDER - Cr 2.33, improving from 2.96    PVD - h/o aortobifemoral bypass with occluded R limb  - seen by Vascular, recommending short interval follow up for discussion of revascularization of RLE  - on heparin gtt, recommend ASA 81mg on discharge and DOAC  - RLE pain improved with increase strength  Plan   Advance diet as tolerated  Discontinue IVF  Transition to oral anticoagulation per Vascular/SLIM. Discussed with Vascular and recommending DOAC once HARVINDER resolved and ASA 81mg. Continue heparin gtt for now  Monitor vitals and O2, wean as tolerated, patient does use at home but having higher O2 needs  PRN pain medication and anti-emetics  Encourage ambulation and OOB  DVT ppx: heparin  Incentive spirometry 10 times/hour while awake  Continue home medications per medicine, OK per oral medications  Anticipate discharge in the next 24h once medically cleared/optimized  ______________________________________________________________________  Subjective:   +flatus and BM, no abdominal pain, feeling hungry. Tolerating CLD, no n/v. RLE pain improved and ambulating better today. Does feel SOB at rest and increased with OOB.    Objective:    Vitals:  /58   Pulse 89   Temp 98.2 °F (36.8 °C)   Resp 17   Ht 5' 8\" (1.727 m)   Wt (!) 137 kg (301 lb 2.4 oz)   SpO2 95%   BMI 45.79 kg/m²     I/Os:  I/O last 3 completed shifts:  In: 4446 [P.O.:480; I.V.:3966]  Out: 2211 [Urine:2211]    No intake/output data recorded.    Invasive Devices       Peripheral Intravenous " Line  Duration             Peripheral IV 04/09/24 Dorsal (posterior);Left Hand 1 day    Peripheral IV 04/11/24 Dorsal (posterior);Right Forearm <1 day                    Medications:  Current Facility-Administered Medications   Medication Dose Route Frequency    acetaminophen (Ofirmev) injection 1,000 mg  1,000 mg Intravenous Q6H PRN    amLODIPine (NORVASC) tablet 5 mg  5 mg Oral Daily    budesonide-formoterol (SYMBICORT) 80-4.5 MCG/ACT inhaler 2 puff  2 puff Inhalation BID    ceftriaxone (ROCEPHIN) 2 g/50 mL in dextrose IVPB  2,000 mg Intravenous Q24H    doxycycline (VIBRAMYCIN) 100 mg in sodium chloride 0.9 % 100 mL IVPB  100 mg Intravenous Q12H    heparin (porcine) 25,000 units in 0.45% NaCl 250 mL infusion (premix)  3-20 Units/kg/hr (Order-Specific) Intravenous Titrated    lidocaine (URO-JET) 2 % urethral/mucosal gel 1 Application  1 Application Urethral Once    morphine injection 2 mg  2 mg Intravenous Q4H PRN    morphine injection 2 mg  2 mg Intravenous Q3H PRN    morphine injection 4 mg  4 mg Intravenous Q4H PRN    ondansetron (ZOFRAN) injection 4 mg  4 mg Intravenous Q6H PRN    pneumococcal 20-annmarie conj vacc (PREVNAR 20) IM Injection 0.5 mL  0.5 mL Intramuscular Prior to discharge    sodium chloride 0.9 % infusion  140 mL/hr Intravenous Continuous                 Lab Results and Cultures:   CBC with diff:   Lab Results   Component Value Date    WBC 12.31 (H) 04/11/2024    HGB 11.1 (L) 04/11/2024    HCT 35.0 (L) 04/11/2024    MCV 95 04/11/2024     04/11/2024    RBC 3.68 (L) 04/11/2024    MCH 30.2 04/11/2024    MCHC 31.7 04/11/2024    RDW 13.8 04/11/2024    MPV 10.4 04/11/2024    NRBC 0 04/11/2024       BMP/CMP:  Lab Results   Component Value Date    K 4.5 04/11/2024     04/11/2024    CO2 24 04/11/2024    BUN 49 (H) 04/11/2024    CREATININE 2.33 (H) 04/11/2024    CALCIUM 7.7 (L) 04/11/2024    AST 35 04/09/2024    ALT 48 04/09/2024    ALKPHOS 78 04/09/2024    EGFR 25 04/11/2024       Lipid Panel:  "  No results found for: \"CHOL\"    Coags:   Lab Results   Component Value Date    PTT 64 (H) 04/11/2024    INR 1.02 06/28/2022        Blood Culture:   Lab Results   Component Value Date    BLOODCX No Growth at 48 hrs. 04/08/2024         Physical Exam:  General Appearance:    Alert and orientated x 3, cooperative, no distress, appears stated age   Lungs:     Clear to auscultation bilaterally, shallow at base, decreased inspiratory effort    Heart:    Regular rate and rhythm, S1 and S2 normal,   Abdomen:    Normoactive BS, soft, non tender, non rigid, no masses, no palpated organomegaly   Extremities:  BLE with edema R>L, LLE + pitting, no erythema, RLE erythematous below knee into foot, with +2 pitting, no tenderness, motor function and strength intact, RLE strength 5/5, LLE strength 5/5   Pulses:   2+ and symmetric all extremities   Skin:   Skin color, texture, turgor normal, no rashes   Neurologic:   CNII-XII intact, normal strength, affect appropriate       Imaging:  FL small bowel    Result Date: 4/10/2024  Impression: Passage of contrast into colon at 2 hours excluding obstruction. Mild dilatation small bowel loops seen Workstation performed: [id number]     CT head wo contrast    Result Date: 4/10/2024  Impression: No acute intracranial abnormality. Workstation performed: [id number]     XR chest pa & lateral    Result Date: 4/9/2024  Impression: Bibasilar densities seen which may be due to atelectasis/pneumonia No congestion seen Hypoinflated lungs Follow-up suggested in 8 weeks to demonstrate resolution Workstation performed: [id number]     CT abdomen pelvis with contrast    Result Date: 4/8/2024  Impression: 1) Small bowel obstruction with transition point in the anterior mid abdomen, in same location as at least 3 prior bowel obstructions, likely related to adhesions secondary to prior ventral hernia repair with mesh. 2) No evidence of perforation. Trace mesenteric edema adjacent to the point of " transition, however no findings to suggest bowel ischemia. 3) Status post aortobifemoral bypass graft with occlusion of the right side of the graft along its length, with at least focal reconstitution at the right femoral artery anastomosis. Recommend vascular surgery consultation, which may be nonemergent if there are no clinical signs/symptoms of right lower extremity ischemia. 4) Peripheral groundglass opacity in the right lower lobe, which may represent pneumonia in the appropriate clinical setting. 5) Additional findings as above. I personally discussed this study with PRAVIN MONTES on 4/8/2024 at 9:25 PM. Workstation performed: [id number]       VTE Pharmacologic Prophylaxis: Heparin gtt  VTE Mechanical Prophylaxis: sequential compression device    Elzbieta Lucas PA-C   4/11/2024

## 2024-04-11 NOTE — PLAN OF CARE
Problem: Potential for Falls  Goal: Patient will remain free of falls  Description: INTERVENTIONS:  - Educate patient/family on patient safety including physical limitations  - Instruct patient to call for assistance with activity   - Consult OT/PT to assist with strengthening/mobility   - Keep Call bell within reach  - Keep bed low and locked with side rails adjusted as appropriate  - Keep care items and personal belongings within reach  - Initiate and maintain comfort rounds  - Make Fall Risk Sign visible to staff  - Apply yellow socks and bracelet for high fall risk patients  - Consider moving patient to room near nurses station  Outcome: Progressing     Problem: Prexisting or High Potential for Compromised Skin Integrity  Goal: Skin integrity is maintained or improved  Description: INTERVENTIONS:  - Identify patients at risk for skin breakdown  - Assess and monitor skin integrity  - Assess and monitor nutrition and hydration status  - Monitor labs   - Assess for incontinence   - Turn and reposition patient  - Assist with mobility/ambulation  - Relieve pressure over bony prominences  - Avoid friction and shearing  - Provide appropriate hygiene as needed including keeping skin clean and dry  - Evaluate need for skin moisturizer/barrier cream  - Collaborate with interdisciplinary team   - Patient/family teaching  - Consider wound care consult   Outcome: Progressing     Problem: PAIN - ADULT  Goal: Verbalizes/displays adequate comfort level or baseline comfort level  Description: Interventions:  - Encourage patient to monitor pain and request assistance  - Assess pain using appropriate pain scale  - Administer analgesics based on type and severity of pain and evaluate response  - Implement non-pharmacological measures as appropriate and evaluate response  - Consider cultural and social influences on pain and pain management  - Notify physician/advanced practitioner if interventions unsuccessful or patient reports  new pain  Outcome: Progressing     Problem: INFECTION - ADULT  Goal: Absence or prevention of progression during hospitalization  Description: INTERVENTIONS:  - Assess and monitor for signs and symptoms of infection  - Monitor lab/diagnostic results  - Monitor all insertion sites, i.e. indwelling lines, tubes, and drains  - Monitor endotracheal if appropriate and nasal secretions for changes in amount and color  - Merrill appropriate cooling/warming therapies per order  - Administer medications as ordered  - Instruct and encourage patient and family to use good hand hygiene technique  - Identify and instruct in appropriate isolation precautions for identified infection/condition  Outcome: Progressing     Problem: SAFETY ADULT  Goal: Patient will remain free of falls  Description: INTERVENTIONS:  - Educate patient/family on patient safety including physical limitations  - Instruct patient to call for assistance with activity   - Consult OT/PT to assist with strengthening/mobility   - Keep Call bell within reach  - Keep bed low and locked with side rails adjusted as appropriate  - Keep care items and personal belongings within reach  - Initiate and maintain comfort rounds  - Apply yellow socks and bracelet for high fall risk patients  - Consider moving patient to room near nurses station  Outcome: Progressing  Goal: Maintain or return to baseline ADL function  Description: INTERVENTIONS:  -  Assess patient's ability to carry out ADLs; assess patient's baseline for ADL function and identify physical deficits which impact ability to perform ADLs (bathing, care of mouth/teeth, toileting, grooming, dressing, etc.)  - Assess/evaluate cause of self-care deficits   - Assess range of motion  - Assess patient's mobility; develop plan if impaired  - Assess patient's need for assistive devices and provide as appropriate  - Encourage maximum independence but intervene and supervise when necessary  - Involve family in  performance of ADLs  - Assess for home care needs following discharge   - Consider OT consult to assist with ADL evaluation and planning for discharge  - Provide patient education as appropriate  Outcome: Progressing  Goal: Maintains/Returns to pre admission functional level  Description: INTERVENTIONS:  - Perform AM-PAC 6 Click Basic Mobility/ Daily Activity assessment daily.  - Set and communicate daily mobility goal to care team and patient/family/caregiver.   - Out of bed for toileting  - Record patient progress and toleration of activity level   Outcome: Progressing     Problem: DISCHARGE PLANNING  Goal: Discharge to home or other facility with appropriate resources  Description: INTERVENTIONS:  - Identify barriers to discharge w/patient and caregiver  - Arrange for needed discharge resources and transportation as appropriate  - Identify discharge learning needs (meds, wound care, etc.)  - Arrange for interpretive services to assist at discharge as needed  - Refer to Case Management Department for coordinating discharge planning if the patient needs post-hospital services based on physician/advanced practitioner order or complex needs related to functional status, cognitive ability, or social support system  Outcome: Progressing     Problem: Knowledge Deficit  Goal: Patient/family/caregiver demonstrates understanding of disease process, treatment plan, medications, and discharge instructions  Description: Complete learning assessment and assess knowledge base.  Interventions:  - Provide teaching at level of understanding  - Provide teaching via preferred learning methods  Outcome: Progressing     Problem: RESPIRATORY - ADULT  Goal: Achieves optimal ventilation and oxygenation  Description: INTERVENTIONS:  - Assess for changes in respiratory status  - Assess for changes in mentation and behavior  - Position to facilitate oxygenation and minimize respiratory effort  - Oxygen administered by appropriate delivery  if ordered  - Initiate smoking cessation education as indicated  - Encourage broncho-pulmonary hygiene including cough, deep breathe, Incentive Spirometry  - Assess the need for suctioning and aspirate as needed  - Assess and instruct to report SOB or any respiratory difficulty  - Respiratory Therapy support as indicated  Outcome: Progressing     Problem: GASTROINTESTINAL - ADULT  Goal: Minimal or absence of nausea and/or vomiting  Description: INTERVENTIONS:  - Administer IV fluids if ordered to ensure adequate hydration  - Maintain NPO status until nausea and vomiting are resolved  - Nasogastric tube if ordered  - Administer ordered antiemetic medications as needed  - Provide nonpharmacologic comfort measures as appropriate  - Advance diet as tolerated, if ordered  - Consider nutrition services referral to assist patient with adequate nutrition and appropriate food choices  Outcome: Progressing  Goal: Maintains or returns to baseline bowel function  Description: INTERVENTIONS:  - Assess bowel function  - Encourage oral fluids to ensure adequate hydration  - Administer IV fluids if ordered to ensure adequate hydration  - Administer ordered medications as needed  - Encourage mobilization and activity  - Consider nutritional services referral to assist patient with adequate nutrition and appropriate food choices  Outcome: Progressing     Problem: Nutrition/Hydration-ADULT  Goal: Nutrient/Hydration intake appropriate for improving, restoring or maintaining nutritional needs  Description: Monitor and assess patient's nutrition/hydration status for malnutrition. Collaborate with interdisciplinary team and initiate plan and interventions as ordered.  Monitor patient's weight and dietary intake as ordered or per policy. Utilize nutrition screening tool and intervene as necessary. Determine patient's food preferences and provide high-protein, high-caloric foods as appropriate.     INTERVENTIONS:  - Monitor oral intake,  urinary output, labs, and treatment plans  - Assess nutrition and hydration status and recommend course of action  - Evaluate amount of meals eaten  - Assist patient with eating if necessary   - Allow adequate time for meals  - Recommend/ encourage appropriate diets, oral nutritional supplements, and vitamin/mineral supplements  - Order, calculate, and assess calorie counts as needed  - Recommend, monitor, and adjust tube feedings and TPN/PPN based on assessed needs  - Assess need for intravenous fluids  - Provide specific nutrition/hydration education as appropriate  - Include patient/family/caregiver in decisions related to nutrition  Outcome: Progressing

## 2024-04-11 NOTE — ASSESSMENT & PLAN NOTE
78-year-old male patient with past medical history of recurrent SBO due to ventral hernia repaired with mesh.  Currently denies nausea, vomiting.    Currently afebrile, hemodynamically stable.  Patient has been having bowel movements.  Currently on surgical soft diet per surgical team.  Denies nausea, vomiting, abdominal pain.  Small bowel follow-through study report noted negative for bowel obstruction.  Discontinue IV fluids.  Resume home medication.  Further care per primary/surgery team.

## 2024-04-11 NOTE — PLAN OF CARE
Problem: RESPIRATORY - ADULT  Goal: Achieves optimal ventilation and oxygenation  Description: INTERVENTIONS:  - Assess for changes in respiratory status  - Assess for changes in mentation and behavior  - Position to facilitate oxygenation and minimize respiratory effort  - Oxygen administered by appropriate delivery if ordered  - Initiate smoking cessation education as indicated  - Encourage broncho-pulmonary hygiene including cough, deep breathe, Incentive Spirometry  - Assess the need for suctioning and aspirate as needed  - Assess and instruct to report SOB or any respiratory difficulty  - Respiratory Therapy support as indicated  Outcome: Progressing     Problem: GASTROINTESTINAL - ADULT  Goal: Minimal or absence of nausea and/or vomiting  Description: INTERVENTIONS:  - Administer IV fluids if ordered to ensure adequate hydration  - Maintain NPO status until nausea and vomiting are resolved  - Nasogastric tube if ordered  - Administer ordered antiemetic medications as needed  - Provide nonpharmacologic comfort measures as appropriate  - Advance diet as tolerated, if ordered  - Consider nutrition services referral to assist patient with adequate nutrition and appropriate food choices  Outcome: Progressing  Goal: Maintains or returns to baseline bowel function  Description: INTERVENTIONS:  - Assess bowel function  - Encourage oral fluids to ensure adequate hydration  - Administer IV fluids if ordered to ensure adequate hydration  - Administer ordered medications as needed  - Encourage mobilization and activity  - Consider nutritional services referral to assist patient with adequate nutrition and appropriate food choices  Outcome: Progressing     Problem: HEMATOLOGIC - ADULT  Goal: Maintains hematologic stability  Description: INTERVENTIONS  - Assess for signs and symptoms of bleeding or hemorrhage  - Monitor labs  - Administer supportive blood products/factors as ordered and appropriate  Outcome: Progressing

## 2024-04-11 NOTE — ASSESSMENT & PLAN NOTE
History of peripheral vascular disease.  History of aorto bifemoral bypass graft.  Son at the bedside assisting with history for the most part.  Son reports that patient has been complaining of having right lower extremity pain.    CT abdomen pelvis result Status post aortobifemoral bypass graft with occlusion of the right side of the graft along its length, with at least focal reconstitution at the right femoral artery anastomosis  LEAD 4/9/2024 demonstrates occluded right limb graft with occlusion of the mid and distal SFA.  Vascular surgery recommended no urgent surgical intervention.    Current on encounter patient appears comfortable not in distress.  No significant pain at rest without signs of tissue loss.  No urgent intervention recommended repeat respiratory recommendation, once current medical issue resolves vascular surgery will have discussion with patient and family to proceed with vascular intervention and attempt to recanalize occluded right limb graft likely on outpatient basis.  Currently on IV heparin drip however respiratory recommended to start on aspirin and DOAC once cleared from SBO perspective as well as renal perspective.  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  Currently on IV heparin drip.  Vascular surgery recommendation appreciated.

## 2024-04-12 ENCOUNTER — APPOINTMENT (INPATIENT)
Dept: NON INVASIVE DIAGNOSTICS | Facility: HOSPITAL | Age: 79
DRG: 388 | End: 2024-04-12
Payer: MEDICARE

## 2024-04-12 LAB
2HR DELTA HS TROPONIN: -3 NG/L
4HR DELTA HS TROPONIN: 9 NG/L
ANION GAP SERPL CALCULATED.3IONS-SCNC: 5 MMOL/L (ref 4–13)
AORTIC ROOT: 3.6 CM
APICAL FOUR CHAMBER EJECTION FRACTION: 67 %
APTT PPP: 78 SECONDS (ref 23–37)
APTT PPP: 89 SECONDS (ref 23–37)
ASCENDING AORTA: 3.3 CM
ATRIAL RATE: 96 BPM
ATRIAL RATE: 96 BPM
AV LVOT MEAN GRADIENT: 5 MMHG
AV LVOT PEAK GRADIENT: 8 MMHG
BACTERIA BLD CULT: ABNORMAL
BASOPHILS # BLD AUTO: 0.05 THOUSANDS/ÂΜL (ref 0–0.1)
BASOPHILS NFR BLD AUTO: 0 % (ref 0–1)
BSA FOR ECHO PROCEDURE: 2.43 M2
BUN SERPL-MCNC: 43 MG/DL (ref 5–25)
CALCIUM SERPL-MCNC: 8.4 MG/DL (ref 8.4–10.2)
CARDIAC TROPONIN I PNL SERPL HS: 74 NG/L
CARDIAC TROPONIN I PNL SERPL HS: 77 NG/L
CARDIAC TROPONIN I PNL SERPL HS: 86 NG/L
CHLORIDE SERPL-SCNC: 104 MMOL/L (ref 96–108)
CO2 SERPL-SCNC: 28 MMOL/L (ref 21–32)
CREAT SERPL-MCNC: 1.88 MG/DL (ref 0.6–1.3)
DOP CALC LVOT PEAK VEL VTI: 28.61 CM
DOP CALC LVOT PEAK VEL: 1.46 M/S
DOP CALC MV VTI: 43.41 CM
E WAVE DECELERATION TIME: 306 MS
E/A RATIO: 0.93
EOSINOPHIL # BLD AUTO: 0.21 THOUSAND/ÂΜL (ref 0–0.61)
EOSINOPHIL NFR BLD AUTO: 1 % (ref 0–6)
ERYTHROCYTE [DISTWIDTH] IN BLOOD BY AUTOMATED COUNT: 13.6 % (ref 11.6–15.1)
FRACTIONAL SHORTENING: 28 (ref 28–44)
GFR SERPL CREATININE-BSD FRML MDRD: 33 ML/MIN/1.73SQ M
GLUCOSE SERPL-MCNC: 142 MG/DL (ref 65–140)
GRAM STN SPEC: ABNORMAL
HCT VFR BLD AUTO: 36.9 % (ref 36.5–49.3)
HGB BLD-MCNC: 11.6 G/DL (ref 12–17)
IMM GRANULOCYTES # BLD AUTO: 0.36 THOUSAND/UL (ref 0–0.2)
IMM GRANULOCYTES NFR BLD AUTO: 2 % (ref 0–2)
INTERVENTRICULAR SEPTUM IN DIASTOLE (PARASTERNAL SHORT AXIS VIEW): 1.5 CM
INTERVENTRICULAR SEPTUM: 1.5 CM (ref 0.6–1.1)
LAAS-AP2: 25.4 CM2
LAAS-AP4: 20.4 CM2
LEFT ATRIUM AREA SYSTOLE SINGLE PLANE A4C: 19.3 CM2
LEFT ATRIUM SIZE: 3.9 CM
LEFT ATRIUM VOLUME (MOD BIPLANE): 70 ML
LEFT ATRIUM VOLUME INDEX (MOD BIPLANE): 28.8 ML/M2
LEFT INTERNAL DIMENSION IN SYSTOLE: 3.3 CM (ref 2.1–4)
LEFT VENTRICULAR INTERNAL DIMENSION IN DIASTOLE: 4.6 CM (ref 3.5–6)
LEFT VENTRICULAR POSTERIOR WALL IN END DIASTOLE: 1.3 CM
LEFT VENTRICULAR STROKE VOLUME: 54 ML
LVSV (TEICH): 54 ML
LYMPHOCYTES # BLD AUTO: 1.69 THOUSANDS/ÂΜL (ref 0.6–4.47)
LYMPHOCYTES NFR BLD AUTO: 10 % (ref 14–44)
MCH RBC QN AUTO: 29.7 PG (ref 26.8–34.3)
MCHC RBC AUTO-ENTMCNC: 31.4 G/DL (ref 31.4–37.4)
MCV RBC AUTO: 95 FL (ref 82–98)
MONOCYTES # BLD AUTO: 1.76 THOUSAND/ÂΜL (ref 0.17–1.22)
MONOCYTES NFR BLD AUTO: 11 % (ref 4–12)
MV E'TISSUE VEL-SEP: 12 CM/S
MV MEAN GRADIENT: 7 MMHG
MV PEAK A VEL: 1.55 M/S
MV PEAK E VEL: 144 CM/S
MV PEAK GRADIENT: 15 MMHG
MV STENOSIS PRESSURE HALF TIME: 89 MS
MV VALVE AREA P 1/2 METHOD: 2.47
NEUTROPHILS # BLD AUTO: 12.52 THOUSANDS/ÂΜL (ref 1.85–7.62)
NEUTS SEG NFR BLD AUTO: 76 % (ref 43–75)
NRBC BLD AUTO-RTO: 0 /100 WBCS
PLATELET # BLD AUTO: 240 THOUSANDS/UL (ref 149–390)
PMV BLD AUTO: 11.4 FL (ref 8.9–12.7)
POTASSIUM SERPL-SCNC: 4.1 MMOL/L (ref 3.5–5.3)
PR INTERVAL: 128 MS
PR INTERVAL: 160 MS
PROCALCITONIN SERPL-MCNC: 0.32 NG/ML
QRS AXIS: 50 DEGREES
QRS AXIS: 51 DEGREES
QRSD INTERVAL: 138 MS
QRSD INTERVAL: 148 MS
QT INTERVAL: 376 MS
QT INTERVAL: 388 MS
QTC INTERVAL: 475 MS
QTC INTERVAL: 490 MS
RBC # BLD AUTO: 3.9 MILLION/UL (ref 3.88–5.62)
RIGHT ATRIAL 2D VOLUME: 35 ML
RIGHT ATRIUM AREA SYSTOLE A4C: 13.3 CM2
RIGHT VENTRICLE ID DIMENSION: 4 CM
S AUREUS+CONS DNA BLD POS NAA+NON-PROBE: DETECTED
SL CV LEFT ATRIUM LENGTH A2C: 6.2 CM
SL CV LV EF: 55
SL CV PED ECHO LEFT VENTRICLE DIASTOLIC VOLUME (MOD BIPLANE) 2D: 99 ML
SL CV PED ECHO LEFT VENTRICLE SYSTOLIC VOLUME (MOD BIPLANE) 2D: 44 ML
SODIUM SERPL-SCNC: 137 MMOL/L (ref 135–147)
T WAVE AXIS: 30 DEGREES
T WAVE AXIS: 32 DEGREES
TRICUSPID ANNULAR PLANE SYSTOLIC EXCURSION: 2.3 CM
VENTRICULAR RATE: 96 BPM
VENTRICULAR RATE: 96 BPM
WBC # BLD AUTO: 16.59 THOUSAND/UL (ref 4.31–10.16)

## 2024-04-12 PROCEDURE — 93010 ELECTROCARDIOGRAM REPORT: CPT | Performed by: INTERNAL MEDICINE

## 2024-04-12 PROCEDURE — 87040 BLOOD CULTURE FOR BACTERIA: CPT | Performed by: STUDENT IN AN ORGANIZED HEALTH CARE EDUCATION/TRAINING PROGRAM

## 2024-04-12 PROCEDURE — 99232 SBSQ HOSP IP/OBS MODERATE 35: CPT | Performed by: CLINICAL NURSE SPECIALIST

## 2024-04-12 PROCEDURE — 80048 BASIC METABOLIC PNL TOTAL CA: CPT | Performed by: PHYSICIAN ASSISTANT

## 2024-04-12 PROCEDURE — 99233 SBSQ HOSP IP/OBS HIGH 50: CPT | Performed by: STUDENT IN AN ORGANIZED HEALTH CARE EDUCATION/TRAINING PROGRAM

## 2024-04-12 PROCEDURE — 85730 THROMBOPLASTIN TIME PARTIAL: CPT | Performed by: STUDENT IN AN ORGANIZED HEALTH CARE EDUCATION/TRAINING PROGRAM

## 2024-04-12 PROCEDURE — 85025 COMPLETE CBC W/AUTO DIFF WBC: CPT | Performed by: PHYSICIAN ASSISTANT

## 2024-04-12 PROCEDURE — 84145 PROCALCITONIN (PCT): CPT | Performed by: STUDENT IN AN ORGANIZED HEALTH CARE EDUCATION/TRAINING PROGRAM

## 2024-04-12 PROCEDURE — 84484 ASSAY OF TROPONIN QUANT: CPT | Performed by: STUDENT IN AN ORGANIZED HEALTH CARE EDUCATION/TRAINING PROGRAM

## 2024-04-12 PROCEDURE — 93306 TTE W/DOPPLER COMPLETE: CPT | Performed by: INTERNAL MEDICINE

## 2024-04-12 PROCEDURE — 93306 TTE W/DOPPLER COMPLETE: CPT

## 2024-04-12 PROCEDURE — 93005 ELECTROCARDIOGRAM TRACING: CPT

## 2024-04-12 RX ORDER — HYDROMORPHONE HCL/PF 1 MG/ML
0.5 SYRINGE (ML) INJECTION EVERY 4 HOURS PRN
Status: DISCONTINUED | OUTPATIENT
Start: 2024-04-12 | End: 2024-04-16 | Stop reason: HOSPADM

## 2024-04-12 RX ORDER — FUROSEMIDE 10 MG/ML
40 INJECTION INTRAMUSCULAR; INTRAVENOUS
Status: DISCONTINUED | OUTPATIENT
Start: 2024-04-12 | End: 2024-04-14

## 2024-04-12 RX ORDER — ASPIRIN 81 MG/1
81 TABLET, CHEWABLE ORAL DAILY
Status: DISCONTINUED | OUTPATIENT
Start: 2024-04-12 | End: 2024-04-16 | Stop reason: HOSPADM

## 2024-04-12 RX ORDER — OXYCODONE HYDROCHLORIDE 5 MG/1
5 TABLET ORAL EVERY 6 HOURS PRN
Status: DISCONTINUED | OUTPATIENT
Start: 2024-04-12 | End: 2024-04-16 | Stop reason: HOSPADM

## 2024-04-12 RX ADMIN — ASPIRIN 81 MG: 81 TABLET, CHEWABLE ORAL at 16:03

## 2024-04-12 RX ADMIN — FLUTICASONE PROPIONATE 1 SPRAY: 50 SPRAY, METERED NASAL at 09:06

## 2024-04-12 RX ADMIN — DOXYCYCLINE 100 MG: 100 INJECTION, POWDER, LYOPHILIZED, FOR SOLUTION INTRAVENOUS at 21:44

## 2024-04-12 RX ADMIN — HEPARIN SODIUM 23.11 UNITS/KG/HR: 10000 INJECTION, SOLUTION INTRAVENOUS at 11:48

## 2024-04-12 RX ADMIN — CEFTRIAXONE SODIUM 2000 MG: 2 INJECTION, POWDER, FOR SOLUTION INTRAMUSCULAR; INTRAVENOUS at 08:52

## 2024-04-12 RX ADMIN — FUROSEMIDE 40 MG: 10 INJECTION, SOLUTION INTRAMUSCULAR; INTRAVENOUS at 17:30

## 2024-04-12 RX ADMIN — BUDESONIDE AND FORMOTEROL FUMARATE DIHYDRATE 2 PUFF: 80; 4.5 AEROSOL RESPIRATORY (INHALATION) at 09:06

## 2024-04-12 RX ADMIN — APIXABAN 5 MG: 5 TABLET, FILM COATED ORAL at 17:30

## 2024-04-12 RX ADMIN — FUROSEMIDE 40 MG: 10 INJECTION, SOLUTION INTRAMUSCULAR; INTRAVENOUS at 11:49

## 2024-04-12 RX ADMIN — DOXYCYCLINE 100 MG: 100 INJECTION, POWDER, LYOPHILIZED, FOR SOLUTION INTRAVENOUS at 09:37

## 2024-04-12 RX ADMIN — AMLODIPINE BESYLATE 5 MG: 5 TABLET ORAL at 08:58

## 2024-04-12 RX ADMIN — BUDESONIDE AND FORMOTEROL FUMARATE DIHYDRATE 2 PUFF: 80; 4.5 AEROSOL RESPIRATORY (INHALATION) at 17:33

## 2024-04-12 RX ADMIN — ONDANSETRON 4 MG: 2 INJECTION INTRAMUSCULAR; INTRAVENOUS at 09:40

## 2024-04-12 NOTE — ASSESSMENT & PLAN NOTE
Abdominal CT shows a obstruction but no signs of infection.  1/2 blood culture growing coag negative staph likely contaminant.  Procalcitonin mandated to 0.43.  Multifactorial could be in the setting of pneumonia/heart failure  Continue with IV antibiotic for 2 more days for total of 5 days

## 2024-04-12 NOTE — ASSESSMENT & PLAN NOTE
Lab Results   Component Value Date    EGFR 33 04/12/2024    EGFR 25 04/11/2024    EGFR 19 04/10/2024    CREATININE 1.88 (H) 04/12/2024    CREATININE 2.33 (H) 04/11/2024    CREATININE 2.96 (H) 04/10/2024     Patient has a wide range of baseline creatinine which is like 1.6-1.9 current is 1.8  Will start IV Lasix 40 mg 3 times daily, I's and O's, daily weight as patient is fluid overloaded

## 2024-04-12 NOTE — PROGRESS NOTES
"Progress Note - General Surgery   Kulwinder Hernandez 78 y.o. male MRN: [id number]  Unit/Bed#: -01 Encounter: [id number]      Assessment:   78-year-old male with small bowel obstruction  -P.o. has resolved and patient continues to tolerate diet without any increasing abdominal pain, nausea, vomiting.  Patient continues to have bowel movements and flatus.  -Patient denies any abdominal pain    PVD  -Patient is currently on IV heparin drip however recommendations are to start patient on aspirin and DOAC once cleared renal function and SBO have improved    Acute on chronic diastolic CHF  -Patient noted having episodes of dyspnea and hypoxia  -Patient currently on 5 L of oxygen with an oxygen saturation of 90    Pneumonia lower lobe  -Bibasilar densities seen which may be due to atelectasis/pneumonia noted on chest x-ray on 4/9/2024  -Currently on doxycycline and ceftriaxone    Plan:  -Patient's SBO has completely resolved.  Continue diet as tolerated.  Continue to monitor bowel function  -Continue IV antibiotics for pneumonia and leukocytosis as per medical team  -Continue management of heparin drip and transition to DOAC when appropriate as per medical team  -Continue medical management of CHF as per medical team  -Continue management of HARVINDER as per medical team     Subjective/Objective     Subjective: No acute events overnight.  Patient reports tolerating diet without any increasing abdominal pain, nausea, vomiting.  Patient continues to have flatus and bowel movements.  Patient experiencing some shortness of breath.    Objective:     Blood pressure 160/50, pulse 101, temperature 98.5 °F (36.9 °C), resp. rate 20, height 5' 8\" (1.727 m), weight (!) 137 kg (301 lb 2.4 oz), SpO2 90%.  Body mass index is 45.79 kg/m².    I/O         04/10 0701 04/11 0700 04/11 0701 04/12 0700 04/12 0701 04/13 0700    P.O. 480 840 120    I.V. (mL/kg)       Total Intake(mL/kg) 480 (3.5) 840 (6.1) 120 (0.9)    Urine (mL/kg/hr) 850 (0.3) 1300 " "(0.4)     Total Output 850 1300     Net -370 -460 +120           Unmeasured Urine Occurrence 4 x 1 x             Invasive Devices       Peripheral Intravenous Line  Duration             Peripheral IV 04/09/24 Dorsal (posterior);Left Hand 2 days    Peripheral IV 04/11/24 Dorsal (posterior);Right Forearm 1 day              Drain  Duration             External Urinary Catheter Medium <1 day                    Physical Exam: BP (!) 138/46 (BP Location: Left arm)   Pulse 97   Temp 98.5 °F (36.9 °C)   Resp 20   Ht 5' 8\" (1.727 m)   Wt (!) 137 kg (301 lb 2.4 oz)   SpO2 (!) 89% Comment: pt sleeping  BMI 45.79 kg/m²   General appearance: alert and oriented, in no acute distress  Lungs: diminished breath sounds  Heart: S1, S2 normal and tachycardiac  Abdomen:  Obese, soft, nontender to palpation, active bowel sounds present  Extremities: extremities normal, warm and well-perfused; no cyanosis, clubbing, or edema    Labs   Recent Results (from the past 24 hour(s))   APTT    Collection Time: 04/11/24 11:06 AM   Result Value Ref Range    PTT 59 (H) 23 - 37 seconds   APTT    Collection Time: 04/11/24  6:21 PM   Result Value Ref Range    PTT 34 23 - 37 seconds   HS Troponin 0hr (reflex protocol)    Collection Time: 04/11/24  6:21 PM   Result Value Ref Range    hs TnI 0hr 114 (H) \"Refer to ACS Flowchart\"- see link ng/L   B-Type Natriuretic Peptide(BNP)    Collection Time: 04/11/24  6:21 PM   Result Value Ref Range    BNP 96 0 - 100 pg/mL   HS Troponin I 2hr    Collection Time: 04/11/24  8:30 PM   Result Value Ref Range    hs TnI 2hr 124 (H) \"Refer to ACS Flowchart\"- see link ng/L    Delta 2hr hsTnI 10 <20 ng/L   HS Troponin I 4hr    Collection Time: 04/11/24 10:40 PM   Result Value Ref Range    hs TnI 4hr 123 (H) \"Refer to ACS Flowchart\"- see link ng/L    Delta 4hr hsTnI 9 <20 ng/L   APTT    Collection Time: 04/12/24  1:04 AM   Result Value Ref Range    PTT 89 (H) 23 - 37 seconds   CBC and differential    Collection Time: " 04/12/24  6:28 AM   Result Value Ref Range    WBC 16.59 (H) 4.31 - 10.16 Thousand/uL    RBC 3.90 3.88 - 5.62 Million/uL    Hemoglobin 11.6 (L) 12.0 - 17.0 g/dL    Hematocrit 36.9 36.5 - 49.3 %    MCV 95 82 - 98 fL    MCH 29.7 26.8 - 34.3 pg    MCHC 31.4 31.4 - 37.4 g/dL    RDW 13.6 11.6 - 15.1 %    MPV 11.4 8.9 - 12.7 fL    Platelets 240 149 - 390 Thousands/uL    nRBC 0 /100 WBCs    Segmented % 76 (H) 43 - 75 %    Immature Grans % 2 0 - 2 %    Lymphocytes % 10 (L) 14 - 44 %    Monocytes % 11 4 - 12 %    Eosinophils Relative 1 0 - 6 %    Basophils Relative 0 0 - 1 %    Absolute Neutrophils 12.52 (H) 1.85 - 7.62 Thousands/µL    Absolute Immature Grans 0.36 (H) 0.00 - 0.20 Thousand/uL    Absolute Lymphocytes 1.69 0.60 - 4.47 Thousands/µL    Absolute Monocytes 1.76 (H) 0.17 - 1.22 Thousand/µL    Eosinophils Absolute 0.21 0.00 - 0.61 Thousand/µL    Basophils Absolute 0.05 0.00 - 0.10 Thousands/µL   Basic metabolic panel    Collection Time: 04/12/24  6:28 AM   Result Value Ref Range    Sodium 137 135 - 147 mmol/L    Potassium 4.1 3.5 - 5.3 mmol/L    Chloride 104 96 - 108 mmol/L    CO2 28 21 - 32 mmol/L    ANION GAP 5 4 - 13 mmol/L    BUN 43 (H) 5 - 25 mg/dL    Creatinine 1.88 (H) 0.60 - 1.30 mg/dL    Glucose 142 (H) 65 - 140 mg/dL    Calcium 8.4 8.4 - 10.2 mg/dL    eGFR 33 ml/min/1.73sq m   Procalcitonin    Collection Time: 04/12/24  6:28 AM   Result Value Ref Range    Procalcitonin 0.32 (H) <=0.25 ng/ml   APTT    Collection Time: 04/12/24  7:22 AM   Result Value Ref Range    PTT 78 (H) 23 - 37 seconds        Imaging and other studies:  XR chest portable    Result Date: 4/12/2024  Impression: Hazy groundglass density in the both lungs may be due to hypoinflation, congestion or aspiration, worse from the previous study Workstation performed: [id number]     FL small bowel    Result Date: 4/10/2024  Impression: Passage of contrast into colon at 2 hours excluding obstruction. Mild dilatation small bowel loops seen  Workstation performed: [id number]     CT head wo contrast    Result Date: 4/10/2024  Impression: No acute intracranial abnormality. Workstation performed: [id number]     XR chest pa & lateral    Result Date: 4/9/2024  Impression: Bibasilar densities seen which may be due to atelectasis/pneumonia No congestion seen Hypoinflated lungs Follow-up suggested in 8 weeks to demonstrate resolution Workstation performed: [id number]     CT abdomen pelvis with contrast    Result Date: 4/8/2024  Impression: 1) Small bowel obstruction with transition point in the anterior mid abdomen, in same location as at least 3 prior bowel obstructions, likely related to adhesions secondary to prior ventral hernia repair with mesh. 2) No evidence of perforation. Trace mesenteric edema adjacent to the point of transition, however no findings to suggest bowel ischemia. 3) Status post aortobifemoral bypass graft with occlusion of the right side of the graft along its length, with at least focal reconstitution at the right femoral artery anastomosis. Recommend vascular surgery consultation, which may be nonemergent if there are no clinical signs/symptoms of right lower extremity ischemia. 4) Peripheral groundglass opacity in the right lower lobe, which may represent pneumonia in the appropriate clinical setting. 5) Additional findings as above. I personally discussed this study with PRAVIN MONTES on 4/8/2024 at 9:25 PM. Workstation performed: [id number]       VTE Pharmacologic Prophylaxis: Heparin  VTE Mechanical Prophylaxis: sequential compression device    Stef Acosta PA-C  4/12/2024

## 2024-04-12 NOTE — ASSESSMENT & PLAN NOTE
Wt Readings from Last 3 Encounters:   04/12/24 (!) 137 kg (301 lb)   06/30/23 (!) 138 kg (305 lb 5.4 oz)   06/28/22 135 kg (298 lb 11.6 oz)       Patient has history of chronic diastolic CHF.  home regimen of Lasix, spironolactone, telmisartan were held due to SBO and patient being NPO.  Patient developed shortness of breath, hypoxia yesterday likely from holding home regimen of diuretics and giving IV fluids due to HARVINDER and SBO.  Today's update and plan:  Patient is requiring 6 L of oxygen and saturation is 87 to 88%.  On exam patient has bilateral crackles on the posterolateral bases  IV Lasix 40 mg 3 times daily, I's and O's, daily weight  Repeat troponin showed negative delta.  Procalcitonin has trended down.  Will repeat echocardiogram to look for interval change

## 2024-04-12 NOTE — ASSESSMENT & PLAN NOTE
History of peripheral vascular disease.  History of aorto bifemoral bypass graft.  Son at the bedside assisting with history for the most part.  Son reports that patient has been complaining of having right lower extremity pain.    CT abdomen pelvis result Status post aortobifemoral bypass graft with occlusion of the right side of the graft along its length, with at least focal reconstitution at the right femoral artery anastomosis  LEAD 4/9/2024 demonstrates occluded right limb graft with occlusion of the mid and distal SFA.  Vascular surgery recommended no urgent surgical intervention.    Current on encounter patient appears comfortable not in distress.  No significant pain at rest without signs of tissue loss.  No urgent intervention recommended repeat respiratory recommendation, once current medical issue resolves vascular surgery will have discussion with patient and family to proceed with vascular intervention and attempt to recanalize occluded right limb graft likely on outpatient basis.  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  Vascular surgery recommendation appreciated.  Today's update and plan:  Resumed aspirin with Eliquis as per the vascular surgery and general surgery recommendation for the occluded peripheral artery disease.  Appreciate vascular surgery no urgent intervention needed

## 2024-04-12 NOTE — PROGRESS NOTES
[facility]  Progress Note  Name: Kulwinder Hernandez I  MRN: [id number]  Unit/Bed#: -01 I Date of Admission: 4/8/2024   Date of Service: 4/12/2024 I Hospital Day: 3    Assessment/Plan   Acute on chronic diastolic CHF (congestive heart failure) ([facility])  Assessment & Plan  Wt Readings from Last 3 Encounters:   04/12/24 (!) 137 kg (301 lb)   06/30/23 (!) 138 kg (305 lb 5.4 oz)   06/28/22 135 kg (298 lb 11.6 oz)       Patient has history of chronic diastolic CHF.  home regimen of Lasix, spironolactone, telmisartan were held due to SBO and patient being NPO.  Patient developed shortness of breath, hypoxia yesterday likely from holding home regimen of diuretics and giving IV fluids due to HARVINDER and SBO.  Today's update and plan:  Patient is requiring 6 L of oxygen and saturation is 87 to 88%.  On exam patient has bilateral crackles on the posterolateral bases  IV Lasix 40 mg 3 times daily, I's and O's, daily weight  Repeat troponin showed negative delta.  Procalcitonin has trended down.  Will repeat echocardiogram to look for interval change        PVD (peripheral vascular disease) ([facility])  Assessment & Plan  History of peripheral vascular disease.  History of aorto bifemoral bypass graft.  Son at the bedside assisting with history for the most part.  Son reports that patient has been complaining of having right lower extremity pain.    CT abdomen pelvis result Status post aortobifemoral bypass graft with occlusion of the right side of the graft along its length, with at least focal reconstitution at the right femoral artery anastomosis  LEAD 4/9/2024 demonstrates occluded right limb graft with occlusion of the mid and distal SFA.  Vascular surgery recommended no urgent surgical intervention.    Current on encounter patient appears comfortable not in distress.  No significant pain at rest without signs of tissue loss.  No urgent intervention recommended repeat respiratory recommendation, once current  medical issue resolves vascular surgery will have discussion with patient and family to proceed with vascular intervention and attempt to recanalize occluded right limb graft likely on outpatient basis.  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  Vascular surgery recommendation appreciated.  Today's update and plan:  Resumed aspirin with Eliquis as per the vascular surgery and general surgery recommendation for the occluded peripheral artery disease.  Appreciate vascular surgery no urgent intervention needed    Pneumonia of lower lobe due to infectious organism  Assessment & Plan  Procal wnl  WBC improving.   On iv ceftriaxone and doxy day #4      CKD (chronic kidney disease)  Assessment & Plan  Lab Results   Component Value Date    EGFR 33 04/12/2024    EGFR 25 04/11/2024    EGFR 19 04/10/2024    CREATININE 1.88 (H) 04/12/2024    CREATININE 2.33 (H) 04/11/2024    CREATININE 2.96 (H) 04/10/2024     Patient has a wide range of baseline creatinine which is like 1.6-1.9 current is 1.8  Will start IV Lasix 40 mg 3 times daily, I's and O's, daily weight as patient is fluid overloaded    * SBO (small bowel obstruction) (H[facility])  Assessment & Plan  78-year-old male patient with past medical history of recurrent SBO due to ventral hernia repaired with mesh.  Currently denies nausea, vomiting.    Currently afebrile, hemodynamically stable.  Patient has been having bowel movements.  Currently on surgical soft diet per surgical team.  Denies nausea, vomiting, abdominal pain.  Small bowel follow-through study report noted negative for bowel obstruction.  Today's update and plan:  Resolved    Leukocytosis  Assessment & Plan    Abdominal CT shows a obstruction but no signs of infection.  1/2 blood culture growing coag negative staph likely contaminant.  Procalcitonin mandated to 0.43.  Multifactorial could be in the setting of pneumonia/heart failure  Continue with IV antibiotic for 2 more days for total of 5  days    Lactic acidosis  Assessment & Plan  Lactic acidosis resolved.      Essential hypertension  Assessment & Plan  Patient blood pressure stable  Resume home medications.             VTE Pharmacologic Prophylaxis: VTE Score: 3 Moderate Risk (Score 3-4) - Pharmacological DVT Prophylaxis Ordered: apixaban (Eliquis).    Mobility:   Basic Mobility Inpatient Raw Score: 17  JH-HLM Goal: 5: Stand one or more mins  JH-HLM Achieved: 6: Walk 10 steps or more  JH-HLM Goal NOT achieved. Continue with multidisciplinary rounding and encourage appropriate mobility to improve upon JH-HLM goals.    Patient Centered Rounds: I performed bedside rounds with nursing staff today.   Discussions with Specialists or Other Care Team Provider: Care manager, nursing staff    Education and Discussions with Family / Patient: Updated  (son) at bedside.        Current Length of Stay: 3 day(s)  Current Patient Status: Inpatient   Certification Statement: The patient will continue to require additional inpatient hospital stay due to acute on chronic heart failure and acute hypoxemic respiratory failure  Discharge Plan: Anticipate discharge in 48-72 hrs to not sure    Code Status: Level 1 - Full Code    Subjective:   Patient is complaining of shortness of breath but no chest pain, orthopnea and PND.  Objective:     Vitals:   Temp (24hrs), Av.4 °F (36.9 °C), Min:98.2 °F (36.8 °C), Max:98.5 °F (36.9 °C)    Temp:  [98.2 °F (36.8 °C)-98.5 °F (36.9 °C)] 98.5 °F (36.9 °C)  HR:  [] 101  Resp:  [18-22] 18  BP: (138-160)/(46-67) 138/46  SpO2:  [87 %-92 %] 91 %  Body mass index is 45.77 kg/m².     Input and Output Summary (last 24 hours):     Intake/Output Summary (Last 24 hours) at 2024 1555  Last data filed at 2024 1400  Gross per 24 hour   Intake 600 ml   Output 2150 ml   Net -1550 ml       Physical Exam:   Constitutional: Mild cardio respiratory  distress  HEENT: Pallor or icterus  CVS: S1 plus S2  Respiratory:  Bilateral end inspiratory crackles on the basis JVD positive when bilateral pedal edema  Gastroenterology: Soft nontender without any palpable mass  Skin: No bruises or ecchymosis  Neurology: No focal logical deficit      Additional Data:     Labs:  Results from last 7 days   Lab Units 04/12/24  0628   WBC Thousand/uL 16.59*   HEMOGLOBIN g/dL 11.6*   HEMATOCRIT % 36.9   PLATELETS Thousands/uL 240   SEGS PCT % 76*   LYMPHO PCT % 10*   MONO PCT % 11   EOS PCT % 1     Results from last 7 days   Lab Units 04/12/24  0628 04/10/24  0459 04/09/24  0503   SODIUM mmol/L 137   < > 137   POTASSIUM mmol/L 4.1   < > 4.8   CHLORIDE mmol/L 104   < > 100   CO2 mmol/L 28   < > 27   BUN mg/dL 43*   < > 41*   CREATININE mg/dL 1.88*   < > 1.94*   ANION GAP mmol/L 5   < > 10   CALCIUM mg/dL 8.4   < > 8.9   ALBUMIN g/dL  --   --  3.8   TOTAL BILIRUBIN mg/dL  --   --  0.75   ALK PHOS U/L  --   --  78   ALT U/L  --   --  48   AST U/L  --   --  35   GLUCOSE RANDOM mg/dL 142*   < > 155*    < > = values in this interval not displayed.         Results from last 7 days   Lab Units 04/10/24  0807   POC GLUCOSE mg/dl 125         Results from last 7 days   Lab Units 04/12/24  0628 04/11/24  0450 04/10/24  0459 04/09/24  2250 04/09/24  1900 04/09/24  0503 04/08/24  2156 04/08/24  1814   LACTIC ACID mmol/L  --   --  0.6 2.3* 2.5* 2.2*   < >  --    PROCALCITONIN ng/ml 0.32* 0.43*  --   --   --   --   --  0.12    < > = values in this interval not displayed.       Lines/Drains:  Invasive Devices       Peripheral Intravenous Line  Duration             Peripheral IV 04/09/24 Dorsal (posterior);Left Hand 2 days    Peripheral IV 04/11/24 Dorsal (posterior);Right Forearm 1 day              Drain  Duration             External Urinary Catheter Medium <1 day                          Imaging: Personally reviewed the following imaging: chest xray signs of fluid overload    Recent Cultures (last 7 days):   Results from last 7 days   Lab Units 04/08/24  6514    BLOOD CULTURE  No Growth at 72 hrs.  Staphylococcus coagulase negative*   GRAM STAIN RESULT  Gram positive cocci in clusters*       Last 24 Hours Medication List:   Current Facility-Administered Medications   Medication Dose Route Frequency Provider Last Rate    acetaminophen  1,000 mg Intravenous Q6H PRN Kiko Aparicio MD      amLODIPine  5 mg Oral Daily Kiko Aparicio MD      apixaban  5 mg Oral BID Deepak Riggs MD      aspirin  81 mg Oral Daily Deepak Riggs MD      budesonide-formoterol  2 puff Inhalation BID Kiko Aparicio MD      [START ON 4/13/2024] cefTRIAXone  2,000 mg Intravenous Q24H Deepak Riggs MD      doxycycline  100 mg Intravenous Q12H Deepak Riggs MD      fluticasone  1 spray Each Nare Daily Walt BERGER MD      furosemide  40 mg Intravenous TID (diuretic) Deepak Riggs MD      HYDROmorphone  0.5 mg Intravenous Q4H PRN Deepak Riggs MD      lidocaine  1 Application Urethral Once Kiko Aparicio MD      oxyCODONE  5 mg Oral Q6H PRN Deepak Riggs MD      pneumococcal 20-annmarie conj vacc  0.5 mL Intramuscular Prior to discharge Walt BERGER MD      trimethobenzamide  100 mg Intramuscular Q8H PRN Deepak Riggs MD          Today, Patient Was Seen By: Deepak Riggs MD    **Please Note: This note may have been constructed using a voice recognition system.**

## 2024-04-12 NOTE — PLAN OF CARE
Problem: RESPIRATORY - ADULT  Goal: Achieves optimal ventilation and oxygenation  Description: INTERVENTIONS:  - Assess for changes in respiratory status  - Assess for changes in mentation and behavior  - Position to facilitate oxygenation and minimize respiratory effort  - Oxygen administered by appropriate delivery if ordered  - Initiate smoking cessation education as indicated  - Encourage broncho-pulmonary hygiene including cough, deep breathe, Incentive Spirometry  - Assess the need for suctioning and aspirate as needed  - Assess and instruct to report SOB or any respiratory difficulty  - Respiratory Therapy support as indicated  Outcome: Progressing     Problem: GASTROINTESTINAL - ADULT  Goal: Minimal or absence of nausea and/or vomiting  Description: INTERVENTIONS:  - Administer IV fluids if ordered to ensure adequate hydration  - Maintain NPO status until nausea and vomiting are resolved  - Nasogastric tube if ordered  - Administer ordered antiemetic medications as needed  - Provide nonpharmacologic comfort measures as appropriate  - Advance diet as tolerated, if ordered  - Consider nutrition services referral to assist patient with adequate nutrition and appropriate food choices  Outcome: Progressing     Problem: HEMATOLOGIC - ADULT  Goal: Maintains hematologic stability  Description: INTERVENTIONS  - Assess for signs and symptoms of bleeding or hemorrhage  - Monitor labs  - Administer supportive blood products/factors as ordered and appropriate  Outcome: Progressing     Problem: METABOLIC, FLUID AND ELECTROLYTES - ADULT  Goal: Fluid balance maintained  Description: INTERVENTIONS:  - Monitor labs   - Monitor I/O and WT  - Instruct patient on fluid and nutrition as appropriate  - Assess for signs & symptoms of volume excess or deficit  Outcome: Progressing

## 2024-04-12 NOTE — NURSING NOTE
This nurse expressed concern to physician about patient's respiratory status and change in oxygen needs. SLIM at bedside.

## 2024-04-12 NOTE — ASSESSMENT & PLAN NOTE
78-year-old male patient with past medical history of recurrent SBO due to ventral hernia repaired with mesh.  Currently denies nausea, vomiting.    Currently afebrile, hemodynamically stable.  Patient has been having bowel movements.  Currently on surgical soft diet per surgical team.  Denies nausea, vomiting, abdominal pain.  Small bowel follow-through study report noted negative for bowel obstruction.  Today's update and plan:  Resolved

## 2024-04-12 NOTE — CASE MANAGEMENT
Case Management Discharge Planning Note    Patient name Kulwinder Hernandez  Location /-01 MRN [id number]  : 1945 Date 2024       Current Admission Date: 2024  Current Admission Diagnosis:SBO (small bowel obstruction) ([facility])   Patient Active Problem List    Diagnosis Date Noted    Acute on chronic diastolic CHF (congestive heart failure) (HCC) 2024    Pneumonia of lower lobe due to infectious organism 2024    Leukocytosis 2023    Morbid obesity (HCC) 2022    Lactic acidosis 2021    CKD (chronic kidney disease) 2018    Dyslipidemia 2018    History of CVA (cerebrovascular accident) 2018    Renal cyst, left 2018    Acute kidney injury superimposed on chronic kidney disease  (HCC) 2018    Dementia ([facility]) 2018    Elevated random blood glucose level 2018    Essential hypertension     Hyperlipidemia     Ventral hernia     SBO (small bowel obstruction) ([facility])     PVD (peripheral vascular disease) ([facility])       LOS (days): 3  Geometric Mean LOS (GMLOS) (days): 4.6  Days to GMLOS:1.5     OBJECTIVE:  Risk of Unplanned Readmission Score: 11.28         Current admission status: Inpatient   Preferred Pharmacy:   [id number] - [place], [place] - [address]  [address] [place] [address]  Phone: [phone] Fax: [phone]    Primary Care Provider: Piyush Elizondo DO    Primary Insurance: MEDICARE  Secondary Insurance: [employer]    DISCHARGE DETAILS:                                          Other Referral/Resources/Interventions Provided:  Referral Comments: Per CM handoff, chalino commode ordered for pt per son's request.  Per [facility], chalino commode ordered and approved;  unclear if item has been delivered to pt.  Per nurse Sallie, pt's waist measurement is 38 inches.  Attempted to order chalino wh/ch, but no d/c date has been established;  will wait closer to d/c.  No order for walker found in [facility], though prior  note reports it was ordered.  As [facility] will not cover both wh/ch and RW, will need to reach out to pt/son to discuss.  Pt now on med service, no longer on surg service.  O2 via NC up to 6L;  remains on IV abxs, IV lasix, IV zofran.  IV hep gtt d/c'd.  Pt w AM-PAC of 19;  no PT/OT ordered, no recommendations made for post d/c care, DME.         Treatment Team Recommendation: Other (TBD)  Discharge Destination Plan:: Other (TBD)  Transport at Discharge : Family

## 2024-04-13 ENCOUNTER — APPOINTMENT (INPATIENT)
Dept: CT IMAGING | Facility: HOSPITAL | Age: 79
DRG: 388 | End: 2024-04-13
Payer: MEDICARE

## 2024-04-13 PROBLEM — R91.1 LUNG NODULE: Status: ACTIVE | Noted: 2024-04-13

## 2024-04-13 PROBLEM — J96.01 ACUTE RESPIRATORY FAILURE WITH HYPOXIA (HCC): Status: ACTIVE | Noted: 2024-04-13

## 2024-04-13 LAB
ANION GAP SERPL CALCULATED.3IONS-SCNC: 5 MMOL/L (ref 4–13)
APTT PPP: 44 SECONDS (ref 23–37)
BUN SERPL-MCNC: 38 MG/DL (ref 5–25)
CALCIUM SERPL-MCNC: 8.5 MG/DL (ref 8.4–10.2)
CHLORIDE SERPL-SCNC: 102 MMOL/L (ref 96–108)
CO2 SERPL-SCNC: 30 MMOL/L (ref 21–32)
CREAT SERPL-MCNC: 1.88 MG/DL (ref 0.6–1.3)
ERYTHROCYTE [DISTWIDTH] IN BLOOD BY AUTOMATED COUNT: 13.6 % (ref 11.6–15.1)
GFR SERPL CREATININE-BSD FRML MDRD: 33 ML/MIN/1.73SQ M
GLUCOSE SERPL-MCNC: 214 MG/DL (ref 65–140)
HCT VFR BLD AUTO: 34.2 % (ref 36.5–49.3)
HGB BLD-MCNC: 11.1 G/DL (ref 12–17)
MAGNESIUM SERPL-MCNC: 1.7 MG/DL (ref 1.9–2.7)
MCH RBC QN AUTO: 30.8 PG (ref 26.8–34.3)
MCHC RBC AUTO-ENTMCNC: 32.5 G/DL (ref 31.4–37.4)
MCV RBC AUTO: 95 FL (ref 82–98)
PLATELET # BLD AUTO: 235 THOUSANDS/UL (ref 149–390)
PMV BLD AUTO: 9.9 FL (ref 8.9–12.7)
POTASSIUM SERPL-SCNC: 4.1 MMOL/L (ref 3.5–5.3)
RBC # BLD AUTO: 3.6 MILLION/UL (ref 3.88–5.62)
SODIUM SERPL-SCNC: 137 MMOL/L (ref 135–147)
WBC # BLD AUTO: 14.76 THOUSAND/UL (ref 4.31–10.16)

## 2024-04-13 PROCEDURE — 83735 ASSAY OF MAGNESIUM: CPT | Performed by: STUDENT IN AN ORGANIZED HEALTH CARE EDUCATION/TRAINING PROGRAM

## 2024-04-13 PROCEDURE — 99233 SBSQ HOSP IP/OBS HIGH 50: CPT | Performed by: STUDENT IN AN ORGANIZED HEALTH CARE EDUCATION/TRAINING PROGRAM

## 2024-04-13 PROCEDURE — 80048 BASIC METABOLIC PNL TOTAL CA: CPT | Performed by: STUDENT IN AN ORGANIZED HEALTH CARE EDUCATION/TRAINING PROGRAM

## 2024-04-13 PROCEDURE — 71250 CT THORAX DX C-: CPT

## 2024-04-13 PROCEDURE — 85027 COMPLETE CBC AUTOMATED: CPT | Performed by: STUDENT IN AN ORGANIZED HEALTH CARE EDUCATION/TRAINING PROGRAM

## 2024-04-13 PROCEDURE — 85730 THROMBOPLASTIN TIME PARTIAL: CPT | Performed by: STUDENT IN AN ORGANIZED HEALTH CARE EDUCATION/TRAINING PROGRAM

## 2024-04-13 RX ADMIN — BUDESONIDE AND FORMOTEROL FUMARATE DIHYDRATE 2 PUFF: 80; 4.5 AEROSOL RESPIRATORY (INHALATION) at 08:10

## 2024-04-13 RX ADMIN — FLUTICASONE PROPIONATE 1 SPRAY: 50 SPRAY, METERED NASAL at 08:08

## 2024-04-13 RX ADMIN — FUROSEMIDE 40 MG: 10 INJECTION, SOLUTION INTRAMUSCULAR; INTRAVENOUS at 19:50

## 2024-04-13 RX ADMIN — ASPIRIN 81 MG: 81 TABLET, CHEWABLE ORAL at 08:06

## 2024-04-13 RX ADMIN — FUROSEMIDE 40 MG: 10 INJECTION, SOLUTION INTRAMUSCULAR; INTRAVENOUS at 14:09

## 2024-04-13 RX ADMIN — DOXYCYCLINE 100 MG: 100 INJECTION, POWDER, LYOPHILIZED, FOR SOLUTION INTRAVENOUS at 08:48

## 2024-04-13 RX ADMIN — AMLODIPINE BESYLATE 5 MG: 5 TABLET ORAL at 08:06

## 2024-04-13 RX ADMIN — CEFTRIAXONE SODIUM 2000 MG: 10 INJECTION, POWDER, FOR SOLUTION INTRAVENOUS at 10:28

## 2024-04-13 RX ADMIN — FUROSEMIDE 40 MG: 10 INJECTION, SOLUTION INTRAMUSCULAR; INTRAVENOUS at 06:07

## 2024-04-13 RX ADMIN — APIXABAN 5 MG: 5 TABLET, FILM COATED ORAL at 19:50

## 2024-04-13 RX ADMIN — BUDESONIDE AND FORMOTEROL FUMARATE DIHYDRATE 2 PUFF: 80; 4.5 AEROSOL RESPIRATORY (INHALATION) at 19:50

## 2024-04-13 RX ADMIN — APIXABAN 5 MG: 5 TABLET, FILM COATED ORAL at 08:06

## 2024-04-13 NOTE — ASSESSMENT & PLAN NOTE
78-year-old male patient with past medical history of recurrent SBO due to ventral hernia repaired with mesh.  Resolved

## 2024-04-13 NOTE — ASSESSMENT & PLAN NOTE
Abdominal CT shows a obstruction but no signs of infection.  1/2 blood culture growing coag negative staph likely contaminant.  Procalcitonin trended down  Multifactorial could be in the setting of pneumonia/heart failure

## 2024-04-13 NOTE — ASSESSMENT & PLAN NOTE
Etiology multifactorial acute on chronic heart failure with preserved ejection fraction/pneumonia  Keep SpO2 90 to 92%, incentive spirometry  IV Lasix 40 mg 3 times daily, I's and O's, daily weight  IV antibiotic for total of 5 to 7 days.

## 2024-04-13 NOTE — ASSESSMENT & PLAN NOTE
History of peripheral vascular disease.  History of aorto bifemoral bypass graft.  Son at the bedside assisting with history for the most part.  Son reports that patient has been complaining of having right lower extremity pain.    CT abdomen pelvis result Status post aortobifemoral bypass graft with occlusion of the right side of the graft along its length, with at least focal reconstitution at the right femoral artery anastomosis  LEAD 4/9/2024 demonstrates occluded right limb graft with occlusion of the mid and distal SFA.  Vascular surgery recommended no urgent surgical intervention.    Current on encounter patient appears comfortable not in distress.  No significant pain at rest without signs of tissue loss.  No urgent intervention recommended repeat respiratory recommendation, once current medical issue resolves vascular surgery will have discussion with patient and family to proceed with vascular intervention and attempt to recanalize occluded right limb graft likely on outpatient basis.  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  Vascular surgery recommendation appreciated.  Today's update and plan:   aspirin with Eliquis as per the vascular surgery and general surgery recommendation for the occluded peripheral artery disease.  Appreciate vascular surgery no urgent intervention needed   [Stable] : are stable [URI] : upper respiratory tract infection [(# ___since the last visit)] : [unfilled] visits to the emergency room since the last visit [(# ___ since the last visit)] : hospitalized [unfilled] times since the last visit [( # ___ since the last visit)] : intubated [unfilled] times since the last visit [None] : The patient is currently asymptomatic [0 x/month] : 0 x/month [Minor Limitation] : minor limitation [< or = 2 days/wk] : < than or = 2 days/week [> or = 20] : > than or = 20 [0 - 1/year] : 0 - 1/year [FreeTextEntry1] : Moderate Persistent Asthma, Rhinitis  4/2024 visit. Last seen 10/2023.   Interval Hx: Doing well. Recent ER visits/hospitalizations: none  Last oral steroid course: none  Cough, SOB, or wheeze/ nighttime awakening with cough/wheeze: none  Daily meds: Flovent 44 2 puffs QD, Cetirizine 5mg PRN, Albuterol PRN Last used rescue: 1 month ago  Allergic rhinitis symptoms:- sutffy nose the last two weeks    Flu vaccine:yes   10/2023 visit. Last seen 7/2022 Interval history- Doing well. Was able to come off Flovent during the summer and restarted 2 puffs once daily by September. Uses some zyrtec when he has runny nose and sneezing  ER/hospitalizations since last visit- none  oral steroids since last visit - none  cough/wheeze, SOB, night time awakening with cough/wheeze - denies  allergy symptoms during the change in season  last used rescue - not in the last 2-3 months   Meds:Flovent 44 2 puffs once daily. albuterol PRN, Zyrtec or flonase as needed.  flu vaccine- will get with pediatrician.   7/8/2022 visit. Last visit 8/2021. *Interval- He had the flu on 5/16/22.. Mother reports that he had a lingering cough for about a month. He did rescue albuterol until cough completely resolved. PMD gave him a course of Amoxicillin for the cough and a Zpack afterwards to help with the cough. *ER/Hospital since last visit- none *Oral Steroid since the last visit- umberto. *Cough/wheeze/SOB/nighttime awakening with cough or wheeze- currently he is not experiencing any of these symptoms. *Allergy symptoms- denied. *Last used rescue- middle of June. *Meds- Qvar 80 2 puffs daily, Albuterol prn,  *Covid 19 exposure- none known. He received 2 doses of the Covid 19 vaccine. Jan 18, 2022. 12/28/22 was the 1st dose.  8/16/21 visit. Last visit 10/2020. *Interval- No URI's or asthma exacerbations since his last visit. *ER/Hospital since last visit- none. *Oral Steroid since the last visit- none. *Cough/wheeze/SOB/nighttime awakening with cough or wheeze- Currently he is not experiencing any of these symptoms. *Allergy symptoms- denied. *Last used rescue- "well before his last visit with us". *Meds- Qvar 40- off it for the summer. Will restart it in September - 2 puffs once a day. Albuterol prn. *Covid 19 exposure- none known. He had a negative Covid 19 test, PCR done at [facility]. Mother to bring in results to put in chart for him to do a PFT today.    10/2020 visit. Last seen 8/2019 *Interval history- He had a URI in February 2020 and he went to [facility]. He required rescue Albuterol. Last week he was sent home from school with c/o headache and nasal congestion. Had to have a Covid test (done 10/15/20) before being allowed back in school. Symptoms lasted 2 days and were though to be due to allergies. No meds given by Mother. *ER/hospitalizations since last visit- none. *oral steroids since last visit - none. *cough/wheeze, SOB, night time awakening with cough/wheeze- Currently denies any of these symptoms. *allergy symptoms - nasal congestion and headache last week. *last used rescue -  *Meds: QVAR 40 2 puffs once daily Albuterol prn. Cetrizine/Flonase PRN Mother states he has not taken any meds since March when school shut down.  *COVID -19 exposure- none known. Tested last week on 10/15/20 to do PFT today.  8/2019 visit. Has been doing well. No hospitazations, oral steroids or hospitalizations. He has not been using his Albuterol. Tried to stop QVAR intially in the spring but he had a mild cough. Now off QVAR for the last 3-4 weeks. No SOB. NO allergy symptoms. No recent antibiotics.   2/28/2019 visit: He has been doing very well since last visit. No hospitalizations, no ER visits, no oral steroids. He has not needed to use his albuterol. No cough or wheezing. He is taking his QVAR 2 puffs bid daily, flonase PRN. No SOB with activity, no nocturnal cough, no snoring. No recent abx   Dec 2018: 7yo M with asthma here for follow up. He was trialed off Qvar at his last appointment. He was doing well until he had an asthma exacerbation on 9/28/18 treated at Urgent Care requiring Decadron and Albuterol. His seasonal allergies are managed with Flonase PRN. No nocturnal cough, slight cough with activity. Gets tired when he runs a lot.   [More Frequent Use Needed Recently] : Patient reports no recent increase in frequency of [de-identified] : as above [de-identified] : will be off Qvar for the summer. [Shortness of Breath] : no shortness of breath [Cough] : no cough [Wheezing] : no wheezing [de-identified] : Urgent care visit

## 2024-04-13 NOTE — PROGRESS NOTES
[facility]  Progress Note  Name: Kulwinder Hernandez I  MRN: [id number]  Unit/Bed#: -01 I Date of Admission: 4/8/2024   Date of Service: 4/13/2024 I Hospital Day: 4    Assessment/Plan   Acute respiratory failure with hypoxia (HCC)  Assessment & Plan  Etiology multifactorial acute on chronic heart failure with preserved ejection fraction/pneumonia  Keep SpO2 90 to 92%, incentive spirometry  IV Lasix 40 mg 3 times daily, I's and O's, daily weight  IV antibiotic for total of 5 to 7 days.    Acute on chronic diastolic CHF (congestive heart failure) ([facility])  Assessment & Plan  Wt Readings from Last 3 Encounters:   04/12/24 (!) 137 kg (301 lb)   06/30/23 (!) 138 kg (305 lb 5.4 oz)   06/28/22 135 kg (298 lb 11.6 oz)       Patient has history of chronic diastolic CHF.  home regimen of Lasix, spironolactone, telmisartan were held due to SBO and patient being NPO.  Patient developed shortness of breath, hypoxia yesterday likely from holding home regimen of diuretics and giving IV fluids due to HARVINDER and SBO.  Today's update and plan:  IV Lasix 40 mg 3 times daily  Repeat troponin showed negative delta.  Procalcitonin has trended down.  Repeat echo did not show any significant interval change.        PVD (peripheral vascular disease) ([facility])  Assessment & Plan  History of peripheral vascular disease.  History of aorto bifemoral bypass graft.  Son at the bedside assisting with history for the most part.  Son reports that patient has been complaining of having right lower extremity pain.    CT abdomen pelvis result Status post aortobifemoral bypass graft with occlusion of the right side of the graft along its length, with at least focal reconstitution at the right femoral artery anastomosis  LEAD 4/9/2024 demonstrates occluded right limb graft with occlusion of the mid and distal SFA.  Vascular surgery recommended no urgent surgical intervention.    Current on encounter patient appears comfortable not in  distress.  No significant pain at rest without signs of tissue loss.  No urgent intervention recommended repeat respiratory recommendation, once current medical issue resolves vascular surgery will have discussion with patient and family to proceed with vascular intervention and attempt to recanalize occluded right limb graft likely on outpatient basis.  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  Vascular surgery recommendation appreciated.  Today's update and plan:   aspirin with Eliquis as per the vascular surgery and general surgery recommendation for the occluded peripheral artery disease.  Appreciate vascular surgery no urgent intervention needed    Lung nodule  Assessment & Plan  Outpatient follow-up with CT scan and PET/CT in 3 months    Pneumonia of lower lobe due to infectious organism  Assessment & Plan  Procal wnl  WBC improving.   On iv ceftriaxone and doxy day #5      CKD (chronic kidney disease)  Assessment & Plan  Lab Results   Component Value Date    EGFR 33 04/13/2024    EGFR 33 04/12/2024    EGFR 25 04/11/2024    CREATININE 1.88 (H) 04/13/2024    CREATININE 1.88 (H) 04/12/2024    CREATININE 2.33 (H) 04/11/2024     Patient has a wide range of baseline creatinine which is like 1.6-1.9 current is 1.8   IV Lasix 40 mg 3 times daily, I's and O's, daily weight as patient is fluid overloaded    * SBO (small bowel obstruction) (HCC)  Assessment & Plan  78-year-old male patient with past medical history of recurrent SBO due to ventral hernia repaired with mesh.  Resolved    Leukocytosis  Assessment & Plan  Abdominal CT shows a obstruction but no signs of infection.  1/2 blood culture growing coag negative staph likely contaminant.  Procalcitonin trended down  Multifactorial could be in the setting of pneumonia/heart failure      Lactic acidosis  Assessment & Plan  Lactic acidosis resolved.      Essential hypertension  Assessment & Plan  Patient blood pressure stable  Resume home  medications.               VTE Pharmacologic Prophylaxis: VTE Score: 3 Moderate Risk (Score 3-4) - Pharmacological DVT Prophylaxis Ordered: apixaban (Eliquis).    Mobility:   Basic Mobility Inpatient Raw Score: 17  JH-HLM Goal: 5: Stand one or more mins  JH-HLM Achieved: 7: Walk 25 feet or more  JH-HLM Goal NOT achieved. Continue with multidisciplinary rounding and encourage appropriate mobility to improve upon JH-HLM goals.    Patient Centered Rounds: I performed bedside rounds with nursing staff today.   Discussions with Specialists or Other Care Team Provider: Care manager and nursing    Education and Discussions with Family / Patient: Updated  (son) at bedside.      Current Length of Stay: 4 day(s)  Current Patient Status: Inpatient   Certification Statement: The patient will continue to require additional inpatient hospital stay due to acute hypoxemic respiratory failure secondary to acute on chronic diastolic heart failure  Discharge Plan: Anticipate discharge in 48-72 hrs to yet to be decided    Code Status: Level 1 - Full Code    Subjective:   Breathing better than yesterday.  No active complaint of chest pain, nausea, vomiting and diarrhea    Objective:     Vitals:   Temp (24hrs), Av °F (37.2 °C), Min:98.7 °F (37.1 °C), Max:99.2 °F (37.3 °C)    Temp:  [98.7 °F (37.1 °C)-99.2 °F (37.3 °C)] 98.7 °F (37.1 °C)  HR:  [] 100  Resp:  [18] 18  BP: (136-140)/(46-66) 140/66  SpO2:  [90 %-91 %] 90 %  Body mass index is 45.77 kg/m².     Input and Output Summary (last 24 hours):     Intake/Output Summary (Last 24 hours) at 2024 1334  Last data filed at 2024 2144  Gross per 24 hour   Intake 360 ml   Output 600 ml   Net -240 ml       Physical Exam:   Constitutional: No acute distress  HEENT: No pallor or icterus  CVS: S1 plus S2 decreased breath sounds on the bases and bilateral crackles.  2+ pedal edema  Respiratory: Normal vascular breathe without crackles and wheeze  Gastroenterology:  Soft nontender without any palpable mass  Skin: No bruises or ecchymosis  Neurology: No focal logical deficit      Additional Data:     Labs:  Results from last 7 days   Lab Units 04/13/24  1002 04/12/24  0628   WBC Thousand/uL 14.76* 16.59*   HEMOGLOBIN g/dL 11.1* 11.6*   HEMATOCRIT % 34.2* 36.9   PLATELETS Thousands/uL 235 240   SEGS PCT %  --  76*   LYMPHO PCT %  --  10*   MONO PCT %  --  11   EOS PCT %  --  1     Results from last 7 days   Lab Units 04/13/24  1002 04/10/24  0459 04/09/24  0503   SODIUM mmol/L 137   < > 137   POTASSIUM mmol/L 4.1   < > 4.8   CHLORIDE mmol/L 102   < > 100   CO2 mmol/L 30   < > 27   BUN mg/dL 38*   < > 41*   CREATININE mg/dL 1.88*   < > 1.94*   ANION GAP mmol/L 5   < > 10   CALCIUM mg/dL 8.5   < > 8.9   ALBUMIN g/dL  --   --  3.8   TOTAL BILIRUBIN mg/dL  --   --  0.75   ALK PHOS U/L  --   --  78   ALT U/L  --   --  48   AST U/L  --   --  35   GLUCOSE RANDOM mg/dL 214*   < > 155*    < > = values in this interval not displayed.         Results from last 7 days   Lab Units 04/10/24  0807   POC GLUCOSE mg/dl 125         Results from last 7 days   Lab Units 04/12/24  0628 04/11/24  0450 04/10/24  0459 04/09/24  2250 04/09/24  1900 04/09/24  0503 04/08/24  2156 04/08/24  1814   LACTIC ACID mmol/L  --   --  0.6 2.3* 2.5* 2.2*   < >  --    PROCALCITONIN ng/ml 0.32* 0.43*  --   --   --   --   --  0.12    < > = values in this interval not displayed.       Lines/Drains:  Invasive Devices       Peripheral Intravenous Line  Duration             Peripheral IV 04/09/24 Dorsal (posterior);Left Hand 3 days    Peripheral IV 04/11/24 Dorsal (posterior);Right Forearm 2 days              Drain  Duration             External Urinary Catheter Medium 1 day                          Imaging: Reviewed radiology reports from this admission including: chest CT scan showed congestive heart failure with lung nodule    Recent Cultures (last 7 days):   Results from last 7 days   Lab Units 04/12/24  1203  04/08/24 2156   BLOOD CULTURE  Received in Microbiology Lab. Culture in Progress.  Received in Microbiology Lab. Culture in Progress. No Growth After 4 Days.  Staphylococcus coagulase negative*   GRAM STAIN RESULT   --  Gram positive cocci in clusters*       Last 24 Hours Medication List:   Current Facility-Administered Medications   Medication Dose Route Frequency Provider Last Rate    acetaminophen  1,000 mg Intravenous Q6H PRN Kiko Aparicio MD      amLODIPine  5 mg Oral Daily Kiko Aparicio MD      apixaban  5 mg Oral BID Deepak Riggs MD      aspirin  81 mg Oral Daily Deepak Riggs MD      budesonide-formoterol  2 puff Inhalation BID Kiko Aparicio MD      [START ON 4/14/2024] cefTRIAXone  2,000 mg Intravenous Q24H Deepak Riggs MD      fluticasone  1 spray Each Nare Daily Walt BERGER MD      furosemide  40 mg Intravenous TID (diuretic) Deepak Riggs MD      HYDROmorphone  0.5 mg Intravenous Q4H PRN Deepak Riggs MD      lidocaine  1 Application Urethral Once Kiko Aparicio MD      oxyCODONE  5 mg Oral Q6H PRN Deepak Riggs MD      pneumococcal 20-annmarie conj vacc  0.5 mL Intramuscular Prior to discharge Walt BERGER MD      trimethobenzamide  100 mg Intramuscular Q8H PRN Deepak Riggs MD          Today, Patient Was Seen By: Deepak Riggs MD    **Please Note: This note may have been constructed using a voice recognition system.**

## 2024-04-13 NOTE — PLAN OF CARE
Problem: Potential for Falls  Goal: Patient will remain free of falls  Description: INTERVENTIONS:  - Educate patient/family on patient safety including physical limitations  - Instruct patient to call for assistance with activity   - Consult OT/PT to assist with strengthening/mobility   - Keep Call bell within reach  - Keep bed low and locked with side rails adjusted as appropriate  - Keep care items and personal belongings within reach  - Initiate and maintain comfort rounds  - Make Fall Risk Sign visible to staff  - Obtain necessary fall risk management   - Apply yellow socks and bracelet for high fall risk patients  - Consider moving patient to room near nurses station  Outcome: Progressing     Problem: Prexisting or High Potential for Compromised Skin Integrity  Goal: Skin integrity is maintained or improved  Description: INTERVENTIONS:  - Identify patients at risk for skin breakdown  - Assess and monitor skin integrity  - Assess and monitor nutrition and hydration status  - Monitor labs   - Assess for incontinence   - Turn and reposition patient  - Assist with mobility/ambulation  - Relieve pressure over bony prominences  - Avoid friction and shearing  - Provide appropriate hygiene as needed including keeping skin clean and dry  - Evaluate need for skin moisturizer/barrier cream  - Collaborate with interdisciplinary team   - Patient/family teaching  - Consider wound care consult   Outcome: Progressing     Problem: PAIN - ADULT  Goal: Verbalizes/displays adequate comfort level or baseline comfort level  Description: Interventions:  - Encourage patient to monitor pain and request assistance  - Assess pain using appropriate pain scale  - Administer analgesics based on type and severity of pain and evaluate response  - Implement non-pharmacological measures as appropriate and evaluate response  - Consider cultural and social influences on pain and pain management  - Notify physician/advanced practitioner if  interventions unsuccessful or patient reports new pain  Outcome: Progressing     Problem: INFECTION - ADULT  Goal: Absence or prevention of progression during hospitalization  Description: INTERVENTIONS:  - Assess and monitor for signs and symptoms of infection  - Monitor lab/diagnostic results  - Monitor all insertion sites, i.e. indwelling lines, tubes, and drains  - Monitor endotracheal if appropriate and nasal secretions for changes in amount and color  - [place] appropriate cooling/warming therapies per order  - Administer medications as ordered  - Instruct and encourage patient and family to use good hand hygiene technique  - Identify and instruct in appropriate isolation precautions for identified infection/condition  Outcome: Progressing     Problem: SAFETY ADULT  Goal: Patient will remain free of falls  Description: INTERVENTIONS:  - Educate patient/family on patient safety including physical limitations  - Instruct patient to call for assistance with activity   - Consult OT/PT to assist with strengthening/mobility   - Keep Call bell within reach  - Keep bed low and locked with side rails adjusted as appropriate  - Keep care items and personal belongings within reach  - Initiate and maintain comfort rounds  - Make Fall Risk Sign visible to staff  - Apply yellow socks and bracelet for high fall risk patients  - Consider moving patient to room near nurses station  Outcome: Progressing  Goal: Maintain or return to baseline ADL function  Description: INTERVENTIONS:  -  Assess patient's ability to carry out ADLs; assess patient's baseline for ADL function and identify physical deficits which impact ability to perform ADLs (bathing, care of mouth/teeth, toileting, grooming, dressing, etc.)  - Assess/evaluate cause of self-care deficits   - Assess range of motion  - Assess patient's mobility; develop plan if impaired  - Assess patient's need for assistive devices and provide as appropriate  - Encourage maximum  independence but intervene and supervise when necessary  - Involve family in performance of ADLs  - Assess for home care needs following discharge   - Consider OT consult to assist with ADL evaluation and planning for discharge  - Provide patient education as appropriate  Outcome: Progressing  Goal: Maintains/Returns to pre admission functional level  Description: INTERVENTIONS:  - Perform AM-PAC 6 Click Basic Mobility/ Daily Activity assessment daily.  - Set and communicate daily mobility goal to care team and patient/family/caregiver.   - Collaborate with rehabilitation services on mobility goals if consulted  - Out of bed for toileting  - Record patient progress and toleration of activity level   Outcome: Progressing     Problem: DISCHARGE PLANNING  Goal: Discharge to home or other facility with appropriate resources  Description: INTERVENTIONS:  - Identify barriers to discharge w/patient and caregiver  - Arrange for needed discharge resources and transportation as appropriate  - Identify discharge learning needs (meds, wound care, etc.)  - Arrange for interpretive services to assist at discharge as needed  - Refer to Case Management Department for coordinating discharge planning if the patient needs post-hospital services based on physician/advanced practitioner order or complex needs related to functional status, cognitive ability, or social support system  Outcome: Progressing     Problem: Knowledge Deficit  Goal: Patient/family/caregiver demonstrates understanding of disease process, treatment plan, medications, and discharge instructions  Description: Complete learning assessment and assess knowledge base.  Interventions:  - Provide teaching at level of understanding  - Provide teaching via preferred learning methods  Outcome: Progressing     Problem: RESPIRATORY - ADULT  Goal: Achieves optimal ventilation and oxygenation  Description: INTERVENTIONS:  - Assess for changes in respiratory status  - Assess for  changes in mentation and behavior  - Position to facilitate oxygenation and minimize respiratory effort  - Oxygen administered by appropriate delivery if ordered  - Initiate smoking cessation education as indicated  - Encourage broncho-pulmonary hygiene including cough, deep breathe, Incentive Spirometry  - Assess the need for suctioning and aspirate as needed  - Assess and instruct to report SOB or any respiratory difficulty  - Respiratory Therapy support as indicated  Outcome: Progressing     Problem: GASTROINTESTINAL - ADULT  Goal: Minimal or absence of nausea and/or vomiting  Description: INTERVENTIONS:  - Administer IV fluids if ordered to ensure adequate hydration  - Maintain NPO status until nausea and vomiting are resolved  - Nasogastric tube if ordered  - Administer ordered antiemetic medications as needed  - Provide nonpharmacologic comfort measures as appropriate  - Advance diet as tolerated, if ordered  - Consider nutrition services referral to assist patient with adequate nutrition and appropriate food choices  Outcome: Progressing  Goal: Maintains or returns to baseline bowel function  Description: INTERVENTIONS:  - Assess bowel function  - Encourage oral fluids to ensure adequate hydration  - Administer IV fluids if ordered to ensure adequate hydration  - Administer ordered medications as needed  - Encourage mobilization and activity  - Consider nutritional services referral to assist patient with adequate nutrition and appropriate food choices  Outcome: Progressing     Problem: Nutrition/Hydration-ADULT  Goal: Nutrient/Hydration intake appropriate for improving, restoring or maintaining nutritional needs  Description: Monitor and assess patient's nutrition/hydration status for malnutrition. Collaborate with interdisciplinary team and initiate plan and interventions as ordered.  Monitor patient's weight and dietary intake as ordered or per policy. Utilize nutrition screening tool and intervene as  necessary. Determine patient's food preferences and provide high-protein, high-caloric foods as appropriate.     INTERVENTIONS:  - Monitor oral intake, urinary output, labs, and treatment plans  - Assess nutrition and hydration status and recommend course of action  - Evaluate amount of meals eaten  - Assist patient with eating if necessary   - Allow adequate time for meals  - Recommend/ encourage appropriate diets, oral nutritional supplements, and vitamin/mineral supplements  - Order, calculate, and assess calorie counts as needed  - Recommend, monitor, and adjust tube feedings and TPN/PPN based on assessed needs  - Assess need for intravenous fluids  - Provide specific nutrition/hydration education as appropriate  - Include patient/family/caregiver in decisions related to nutrition  Outcome: Progressing     Problem: HEMATOLOGIC - ADULT  Goal: Maintains hematologic stability  Description: INTERVENTIONS  - Assess for signs and symptoms of bleeding or hemorrhage  - Monitor labs  - Administer supportive blood products/factors as ordered and appropriate  Outcome: Progressing     Problem: METABOLIC, FLUID AND ELECTROLYTES - ADULT  Goal: Fluid balance maintained  Description: INTERVENTIONS:  - Monitor labs   - Monitor I/O and WT  - Instruct patient on fluid and nutrition as appropriate  - Assess for signs & symptoms of volume excess or deficit  Outcome: Progressing

## 2024-04-13 NOTE — ASSESSMENT & PLAN NOTE
Lab Results   Component Value Date    EGFR 33 04/13/2024    EGFR 33 04/12/2024    EGFR 25 04/11/2024    CREATININE 1.88 (H) 04/13/2024    CREATININE 1.88 (H) 04/12/2024    CREATININE 2.33 (H) 04/11/2024     Patient has a wide range of baseline creatinine which is like 1.6-1.9 current is 1.8   IV Lasix 40 mg 3 times daily, I's and O's, daily weight as patient is fluid overloaded

## 2024-04-13 NOTE — PLAN OF CARE
Problem: Potential for Falls  Goal: Patient will remain free of falls  Description: INTERVENTIONS:  - Educate patient/family on patient safety including physical limitations  - Instruct patient to call for assistance with activity   - Consult OT/PT to assist with strengthening/mobility   - Keep Call bell within reach  - Keep bed low and locked with side rails adjusted as appropriate  - Keep care items and personal belongings within reach  - Initiate and maintain comfort rounds  - Make Fall Risk Sign visible to staff  - Apply yellow socks and bracelet for high fall risk patients  - Consider moving patient to room near nurses station  Outcome: Progressing     Problem: SAFETY ADULT  Goal: Patient will remain free of falls  Description: INTERVENTIONS:  - Educate patient/family on patient safety including physical limitations  - Instruct patient to call for assistance with activity   - Consult OT/PT to assist with strengthening/mobility   - Keep Call bell within reach  - Keep bed low and locked with side rails adjusted as appropriate  - Keep care items and personal belongings within reach  - Initiate and maintain comfort rounds  - Make Fall Risk Sign visible to staff  - Initiate/Maintain bed alarm  - Obtain necessary fall risk management equipment: Walker   - Apply yellow socks and bracelet for high fall risk patients  - Consider moving patient to room near nurses station  Outcome: Progressing     Problem: DISCHARGE PLANNING  Goal: Discharge to home or other facility with appropriate resources  Description: INTERVENTIONS:  - Identify barriers to discharge w/patient and caregiver  - Arrange for needed discharge resources and transportation as appropriate  - Identify discharge learning needs (meds, wound care, etc.)  - Arrange for interpretive services to assist at discharge as needed  - Refer to Case Management Department for coordinating discharge planning if the patient needs post-hospital services based on  physician/advanced practitioner order or complex needs related to functional status, cognitive ability, or social support system  Outcome: Progressing

## 2024-04-14 LAB
ANION GAP SERPL CALCULATED.3IONS-SCNC: 7 MMOL/L (ref 4–13)
BACTERIA BLD CULT: NORMAL
BUN SERPL-MCNC: 34 MG/DL (ref 5–25)
CALCIUM SERPL-MCNC: 8.7 MG/DL (ref 8.4–10.2)
CHLORIDE SERPL-SCNC: 101 MMOL/L (ref 96–108)
CO2 SERPL-SCNC: 31 MMOL/L (ref 21–32)
CREAT SERPL-MCNC: 1.71 MG/DL (ref 0.6–1.3)
ERYTHROCYTE [DISTWIDTH] IN BLOOD BY AUTOMATED COUNT: 13.5 % (ref 11.6–15.1)
GFR SERPL CREATININE-BSD FRML MDRD: 37 ML/MIN/1.73SQ M
GLUCOSE SERPL-MCNC: 111 MG/DL (ref 65–140)
HCT VFR BLD AUTO: 35.3 % (ref 36.5–49.3)
HGB BLD-MCNC: 11.5 G/DL (ref 12–17)
MAGNESIUM SERPL-MCNC: 1.8 MG/DL (ref 1.9–2.7)
MCH RBC QN AUTO: 31.2 PG (ref 26.8–34.3)
MCHC RBC AUTO-ENTMCNC: 32.6 G/DL (ref 31.4–37.4)
MCV RBC AUTO: 96 FL (ref 82–98)
PLATELET # BLD AUTO: 272 THOUSANDS/UL (ref 149–390)
PMV BLD AUTO: 11.6 FL (ref 8.9–12.7)
POTASSIUM SERPL-SCNC: 4.1 MMOL/L (ref 3.5–5.3)
RBC # BLD AUTO: 3.69 MILLION/UL (ref 3.88–5.62)
SODIUM SERPL-SCNC: 139 MMOL/L (ref 135–147)
WBC # BLD AUTO: 14.3 THOUSAND/UL (ref 4.31–10.16)

## 2024-04-14 PROCEDURE — 99232 SBSQ HOSP IP/OBS MODERATE 35: CPT | Performed by: STUDENT IN AN ORGANIZED HEALTH CARE EDUCATION/TRAINING PROGRAM

## 2024-04-14 PROCEDURE — 85027 COMPLETE CBC AUTOMATED: CPT | Performed by: STUDENT IN AN ORGANIZED HEALTH CARE EDUCATION/TRAINING PROGRAM

## 2024-04-14 PROCEDURE — 83735 ASSAY OF MAGNESIUM: CPT | Performed by: STUDENT IN AN ORGANIZED HEALTH CARE EDUCATION/TRAINING PROGRAM

## 2024-04-14 PROCEDURE — 80048 BASIC METABOLIC PNL TOTAL CA: CPT | Performed by: STUDENT IN AN ORGANIZED HEALTH CARE EDUCATION/TRAINING PROGRAM

## 2024-04-14 PROCEDURE — 94760 N-INVAS EAR/PLS OXIMETRY 1: CPT

## 2024-04-14 RX ORDER — SPIRONOLACTONE 25 MG/1
25 TABLET ORAL DAILY
Status: DISCONTINUED | OUTPATIENT
Start: 2024-04-14 | End: 2024-04-16 | Stop reason: HOSPADM

## 2024-04-14 RX ORDER — FUROSEMIDE 10 MG/ML
60 INJECTION INTRAMUSCULAR; INTRAVENOUS
Status: DISCONTINUED | OUTPATIENT
Start: 2024-04-14 | End: 2024-04-16

## 2024-04-14 RX ORDER — MAGNESIUM SULFATE 1 G/100ML
1 INJECTION INTRAVENOUS ONCE
Status: COMPLETED | OUTPATIENT
Start: 2024-04-14 | End: 2024-04-14

## 2024-04-14 RX ADMIN — APIXABAN 5 MG: 5 TABLET, FILM COATED ORAL at 17:44

## 2024-04-14 RX ADMIN — FUROSEMIDE 40 MG: 10 INJECTION, SOLUTION INTRAMUSCULAR; INTRAVENOUS at 06:07

## 2024-04-14 RX ADMIN — CEFTRIAXONE SODIUM 2000 MG: 10 INJECTION, POWDER, FOR SOLUTION INTRAVENOUS at 09:20

## 2024-04-14 RX ADMIN — FUROSEMIDE 60 MG: 10 INJECTION, SOLUTION INTRAMUSCULAR; INTRAVENOUS at 17:26

## 2024-04-14 RX ADMIN — FLUTICASONE PROPIONATE 1 SPRAY: 50 SPRAY, METERED NASAL at 09:17

## 2024-04-14 RX ADMIN — MAGNESIUM SULFATE HEPTAHYDRATE 1 G: 1 INJECTION, SOLUTION INTRAVENOUS at 10:53

## 2024-04-14 RX ADMIN — ASPIRIN 81 MG: 81 TABLET, CHEWABLE ORAL at 09:11

## 2024-04-14 RX ADMIN — AMLODIPINE BESYLATE 5 MG: 5 TABLET ORAL at 09:11

## 2024-04-14 RX ADMIN — SPIRONOLACTONE 25 MG: 25 TABLET ORAL at 09:11

## 2024-04-14 RX ADMIN — APIXABAN 5 MG: 5 TABLET, FILM COATED ORAL at 09:11

## 2024-04-14 RX ADMIN — BUDESONIDE AND FORMOTEROL FUMARATE DIHYDRATE 2 PUFF: 80; 4.5 AEROSOL RESPIRATORY (INHALATION) at 17:46

## 2024-04-14 RX ADMIN — FUROSEMIDE 60 MG: 10 INJECTION, SOLUTION INTRAMUSCULAR; INTRAVENOUS at 11:43

## 2024-04-14 RX ADMIN — BUDESONIDE AND FORMOTEROL FUMARATE DIHYDRATE 2 PUFF: 80; 4.5 AEROSOL RESPIRATORY (INHALATION) at 09:14

## 2024-04-14 NOTE — ASSESSMENT & PLAN NOTE
Etiology multifactorial acute on chronic heart failure with preserved ejection fraction/pneumonia  Patient saturations dropped when he lies flat in the bed dropped down to 86% on 6 L of oxygen and trended up to 99 to 2% when he wakes up or sit upright.  Patient is probably having severe obstructive sleep apnea/obesity hypoventilation syndrome  Patient has been encouraged to start using CPAP.  He is agreeable to use CPAP.  CPAP during naps and at night  Consulted IR for bilateral thoracentesis as patient has more pleural effusion on the right side which could also contribute in the worsening of his hypoxemia  High flow also ordered if patient started requiring more oxygen to keep SpO2 92 and above  Consulted pulmonary  Encourage out of bed to chair, incentive spirometry  Continue IV ceftriaxone for total of 7 days

## 2024-04-14 NOTE — ASSESSMENT & PLAN NOTE
History of peripheral vascular disease.  History of aorto bifemoral bypass graft.  Son at the bedside assisting with history for the most part.  Son reports that patient has been complaining of having right lower extremity pain.    CT abdomen pelvis result Status post aortobifemoral bypass graft with occlusion of the right side of the graft along its length, with at least focal reconstitution at the right femoral artery anastomosis  LEAD 4/9/2024 demonstrates occluded right limb graft with occlusion of the mid and distal SFA.  Vascular surgery recommended no urgent surgical intervention.    Current on encounter patient appears comfortable not in distress.  No significant pain at rest without signs of tissue loss.  No urgent intervention recommended repeat respiratory recommendation, once current medical issue resolves vascular surgery will have discussion with patient and family to proceed with vascular intervention and attempt to recanalize occluded right limb graft likely on outpatient basis.  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  Vascular surgery recommendation appreciated.  Today's update and plan:   aspirin with Eliquis as per the vascular surgery and general surgery recommendation for the occluded peripheral artery disease.  Appreciate vascular surgery no urgent intervention needed

## 2024-04-14 NOTE — ASSESSMENT & PLAN NOTE
Lab Results   Component Value Date    EGFR 37 04/14/2024    EGFR 33 04/13/2024    EGFR 33 04/12/2024    CREATININE 1.71 (H) 04/14/2024    CREATININE 1.88 (H) 04/13/2024    CREATININE 1.88 (H) 04/12/2024     Patient has a wide range of baseline creatinine which is like 1.6-1.9 current is 1.8   IV Lasix 60 mg 3 times daily, I's and O's, daily weight as patient is fluid overloaded

## 2024-04-14 NOTE — PLAN OF CARE
Problem: Potential for Falls  Goal: Patient will remain free of falls  Description: INTERVENTIONS:  - Educate patient/family on patient safety including physical limitations  - Instruct patient to call for assistance with activity   - Consult OT/PT to assist with strengthening/mobility   - Keep Call bell within reach  - Keep bed low and locked with side rails adjusted as appropriate  - Keep care items and personal belongings within reach  - Initiate and maintain comfort rounds  - Apply yellow socks and bracelet for high fall risk patients  - Consider moving patient to room near nurses station  Outcome: Progressing     Problem: Prexisting or High Potential for Compromised Skin Integrity  Goal: Skin integrity is maintained or improved  Description: INTERVENTIONS:  - Identify patients at risk for skin breakdown  - Assess and monitor skin integrity  - Assess and monitor nutrition and hydration status  - Monitor labs   - Assess for incontinence   - Turn and reposition patient  - Assist with mobility/ambulation  - Relieve pressure over bony prominences  - Avoid friction and shearing  - Provide appropriate hygiene as needed including keeping skin clean and dry  - Evaluate need for skin moisturizer/barrier cream  - Collaborate with interdisciplinary team   - Patient/family teaching  - Consider wound care consult   Outcome: Progressing     Problem: PAIN - ADULT  Goal: Verbalizes/displays adequate comfort level or baseline comfort level  Description: Interventions:  - Encourage patient to monitor pain and request assistance  - Assess pain using appropriate pain scale  - Administer analgesics based on type and severity of pain and evaluate response  - Implement non-pharmacological measures as appropriate and evaluate response  - Consider cultural and social influences on pain and pain management  - Notify physician/advanced practitioner if interventions unsuccessful or patient reports new pain  Outcome: Progressing      Problem: INFECTION - ADULT  Goal: Absence or prevention of progression during hospitalization  Description: INTERVENTIONS:  - Assess and monitor for signs and symptoms of infection  - Monitor lab/diagnostic results  - Monitor all insertion sites, i.e. indwelling lines, tubes, and drains  - Monitor endotracheal if appropriate and nasal secretions for changes in amount and color  - [place] appropriate cooling/warming therapies per order  - Administer medications as ordered  - Instruct and encourage patient and family to use good hand hygiene technique  - Identify and instruct in appropriate isolation precautions for identified infection/condition  Outcome: Progressing     Problem: SAFETY ADULT  Goal: Patient will remain free of falls  Description: INTERVENTIONS:  - Educate patient/family on patient safety including physical limitations  - Instruct patient to call for assistance with activity   - Consult OT/PT to assist with strengthening/mobility   - Keep Call bell within reach  - Keep bed low and locked with side rails adjusted as appropriate  - Keep care items and personal belongings within reach  - Initiate and maintain comfort rounds  - Make Fall Risk Sign visible to staff  - Apply yellow socks and bracelet for high fall risk patients  - Consider moving patient to room near nurses station  Outcome: Progressing  Goal: Maintain or return to baseline ADL function  Description: INTERVENTIONS:  -  Assess patient's ability to carry out ADLs; assess patient's baseline for ADL function and identify physical deficits which impact ability to perform ADLs (bathing, care of mouth/teeth, toileting, grooming, dressing, etc.)  - Assess/evaluate cause of self-care deficits   - Assess range of motion  - Assess patient's mobility; develop plan if impaired  - Assess patient's need for assistive devices and provide as appropriate  - Encourage maximum independence but intervene and supervise when necessary  - Involve family in  performance of ADLs  - Assess for home care needs following discharge   - Consider OT consult to assist with ADL evaluation and planning for discharge  - Provide patient education as appropriate  Outcome: Progressing  Goal: Maintains/Returns to pre admission functional level  Description: INTERVENTIONS:  - Perform AM-PAC 6 Click Basic Mobility/ Daily Activity assessment daily.  - Set and communicate daily mobility goal to care team and patient/family/caregiver.   - Collaborate with rehabilitation services on mobility goals if consulted  - Out of bed for toileting  - Record patient progress and toleration of activity level   Outcome: Progressing     Problem: DISCHARGE PLANNING  Goal: Discharge to home or other facility with appropriate resources  Description: INTERVENTIONS:  - Identify barriers to discharge w/patient and caregiver  - Arrange for needed discharge resources and transportation as appropriate  - Identify discharge learning needs (meds, wound care, etc.)  - Arrange for interpretive services to assist at discharge as needed  - Refer to Case Management Department for coordinating discharge planning if the patient needs post-hospital services based on physician/advanced practitioner order or complex needs related to functional status, cognitive ability, or social support system  Outcome: Progressing     Problem: Knowledge Deficit  Goal: Patient/family/caregiver demonstrates understanding of disease process, treatment plan, medications, and discharge instructions  Description: Complete learning assessment and assess knowledge base.  Interventions:  - Provide teaching at level of understanding  - Provide teaching via preferred learning methods  Outcome: Progressing     Problem: RESPIRATORY - ADULT  Goal: Achieves optimal ventilation and oxygenation  Description: INTERVENTIONS:  - Assess for changes in respiratory status  - Assess for changes in mentation and behavior  - Position to facilitate oxygenation and  minimize respiratory effort  - Oxygen administered by appropriate delivery if ordered  - Initiate smoking cessation education as indicated  - Encourage broncho-pulmonary hygiene including cough, deep breathe, Incentive Spirometry  - Assess the need for suctioning and aspirate as needed  - Assess and instruct to report SOB or any respiratory difficulty  - Respiratory Therapy support as indicated  Outcome: Progressing     Problem: GASTROINTESTINAL - ADULT  Goal: Minimal or absence of nausea and/or vomiting  Description: INTERVENTIONS:  - Administer IV fluids if ordered to ensure adequate hydration  - Maintain NPO status until nausea and vomiting are resolved  - Nasogastric tube if ordered  - Administer ordered antiemetic medications as needed  - Provide nonpharmacologic comfort measures as appropriate  - Advance diet as tolerated, if ordered  - Consider nutrition services referral to assist patient with adequate nutrition and appropriate food choices  Outcome: Progressing  Goal: Maintains or returns to baseline bowel function  Description: INTERVENTIONS:  - Assess bowel function  - Encourage oral fluids to ensure adequate hydration  - Administer IV fluids if ordered to ensure adequate hydration  - Administer ordered medications as needed  - Encourage mobilization and activity  - Consider nutritional services referral to assist patient with adequate nutrition and appropriate food choices  Outcome: Progressing     Problem: Nutrition/Hydration-ADULT  Goal: Nutrient/Hydration intake appropriate for improving, restoring or maintaining nutritional needs  Description: Monitor and assess patient's nutrition/hydration status for malnutrition. Collaborate with interdisciplinary team and initiate plan and interventions as ordered.  Monitor patient's weight and dietary intake as ordered or per policy. Utilize nutrition screening tool and intervene as necessary. Determine patient's food preferences and provide high-protein,  high-caloric foods as appropriate.     INTERVENTIONS:  - Monitor oral intake, urinary output, labs, and treatment plans  - Assess nutrition and hydration status and recommend course of action  - Evaluate amount of meals eaten  - Assist patient with eating if necessary   - Allow adequate time for meals  - Recommend/ encourage appropriate diets, oral nutritional supplements, and vitamin/mineral supplements  - Order, calculate, and assess calorie counts as needed  - Recommend, monitor, and adjust tube feedings and TPN/PPN based on assessed needs  - Assess need for intravenous fluids  - Provide specific nutrition/hydration education as appropriate  - Include patient/family/caregiver in decisions related to nutrition  Outcome: Progressing     Problem: HEMATOLOGIC - ADULT  Goal: Maintains hematologic stability  Description: INTERVENTIONS  - Assess for signs and symptoms of bleeding or hemorrhage  - Monitor labs  - Administer supportive blood products/factors as ordered and appropriate  Outcome: Progressing     Problem: METABOLIC, FLUID AND ELECTROLYTES - ADULT  Goal: Fluid balance maintained  Description: INTERVENTIONS:  - Monitor labs   - Monitor I/O and WT  - Instruct patient on fluid and nutrition as appropriate  - Assess for signs & symptoms of volume excess or deficit  Outcome: Progressing

## 2024-04-14 NOTE — RESPIRATORY THERAPY NOTE
"RT Protocol Note  Kulwinder Hernandez 78 y.o. male MRN: [id number]  Unit/Bed#: -01 Encounter: [id number]    Assessment    Principal Problem:    SBO (small bowel obstruction) ([facility])  Active Problems:    Essential hypertension    PVD (peripheral vascular disease) ([facility])    CKD (chronic kidney disease)    Lactic acidosis    Leukocytosis    Pneumonia of lower lobe due to infectious organism    Acute on chronic diastolic CHF (congestive heart failure) ([facility])    Acute respiratory failure with hypoxia ([facility])    Lung nodule  Physical Exam:   Assessment Type: Assess only  General Appearance: Awake, Alert  Respiratory Pattern: Assisted  Chest Assessment: Chest expansion symmetrical  Bilateral Breath Sounds: Diminished  O2 Device: cpap    Vitals:  Blood pressure 164/66, pulse 91, temperature 97.6 °F (36.4 °C), resp. rate 18, height 5' 8\" (1.727 m), weight (!) 136 kg (300 lb 4.3 oz), SpO2 94%.        O2 Device: cpap     Plan    Resp Comments: placed pt on cpap at this time, patient requesting for naps.   "

## 2024-04-14 NOTE — PROGRESS NOTES
[facility]  Progress Note  Name: Kulwinder Hernandez I  MRN: [id number]  Unit/Bed#: -01 I Date of Admission: 4/8/2024   Date of Service: 4/14/2024 I Hospital Day: 5    Assessment/Plan   Acute respiratory failure with hypoxia (HCC)  Assessment & Plan  Etiology multifactorial acute on chronic heart failure with preserved ejection fraction/pneumonia  Patient saturations dropped when he lies flat in the bed dropped down to 86% on 6 L of oxygen and trended up to 99 to 2% when he wakes up or sit upright.  Patient is probably having severe obstructive sleep apnea/obesity hypoventilation syndrome  Patient has been encouraged to start using CPAP.  He is agreeable to use CPAP.  CPAP during naps and at night  Consulted IR for bilateral thoracentesis as patient has more pleural effusion on the right side which could also contribute in the worsening of his hypoxemia  High flow also ordered if patient started requiring more oxygen to keep SpO2 92 and above  Consulted pulmonary  Encourage out of bed to chair, incentive spirometry  Continue IV ceftriaxone for total of 7 days    Acute on chronic diastolic CHF (congestive heart failure) ([facility])  Assessment & Plan  Wt Readings from Last 3 Encounters:   04/14/24 (!) 136 kg (300 lb 4.3 oz)   06/30/23 (!) 138 kg (305 lb 5.4 oz)   06/28/22 135 kg (298 lb 11.6 oz)       Patient has history of chronic diastolic CHF.  home regimen of Lasix, spironolactone, telmisartan were held due to SBO and patient being NPO.  Patient developed shortness of breath, hypoxia yesterday likely from holding home regimen of diuretics and giving IV fluids due to HARVINDER and SBO.  Repeat troponin showed negative delta.  Procalcitonin has trended down.  Repeat echo did not show any significant interval change.  Today's update and plan:  IV Lasix 60 mg 3 times daily        PVD (peripheral vascular disease) ([facility])  Assessment & Plan  History of peripheral vascular disease.  History of aorto bifemoral  bypass graft.  Son at the bedside assisting with history for the most part.  Son reports that patient has been complaining of having right lower extremity pain.    CT abdomen pelvis result Status post aortobifemoral bypass graft with occlusion of the right side of the graft along its length, with at least focal reconstitution at the right femoral artery anastomosis  LEAD 4/9/2024 demonstrates occluded right limb graft with occlusion of the mid and distal SFA.  Vascular surgery recommended no urgent surgical intervention.    Current on encounter patient appears comfortable not in distress.  No significant pain at rest without signs of tissue loss.  No urgent intervention recommended repeat respiratory recommendation, once current medical issue resolves vascular surgery will have discussion with patient and family to proceed with vascular intervention and attempt to recanalize occluded right limb graft likely on outpatient basis.  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  Vascular surgery recommendation appreciated.  Today's update and plan:   aspirin with Eliquis as per the vascular surgery and general surgery recommendation for the occluded peripheral artery disease.  Appreciate vascular surgery no urgent intervention needed    Lung nodule  Assessment & Plan  Outpatient follow-up with CT scan and PET/CT in 3 months    Pneumonia of lower lobe due to infectious organism  Assessment & Plan  Procal wnl  WBC improving.   On iv ceftriaxone and doxy day #6  Continue with Flonase Symbicort      CKD (chronic kidney disease)  Assessment & Plan  Lab Results   Component Value Date    EGFR 37 04/14/2024    EGFR 33 04/13/2024    EGFR 33 04/12/2024    CREATININE 1.71 (H) 04/14/2024    CREATININE 1.88 (H) 04/13/2024    CREATININE 1.88 (H) 04/12/2024     Patient has a wide range of baseline creatinine which is like 1.6-1.9 current is 1.8   IV Lasix 60 mg 3 times daily, I's and O's, daily weight as patient is  fluid overloaded    * SBO (small bowel obstruction) (HCC)  Assessment & Plan  78-year-old male patient with past medical history of recurrent SBO due to ventral hernia repaired with mesh.  Resolved    Leukocytosis  Assessment & Plan  Abdominal CT shows a obstruction but no signs of infection.  1/2 blood culture growing coag negative staph likely contaminant.  Procalcitonin trended down  Multifactorial could be in the setting of pneumonia/heart failure      Lactic acidosis  Assessment & Plan  Lactic acidosis resolved.      Essential hypertension  Assessment & Plan  Patient blood pressure stable  Continue with amlodipine, started Aldactone 25 mg             VTE Pharmacologic Prophylaxis: VTE Score: 3 Moderate Risk (Score 3-4) - Pharmacological DVT Prophylaxis Ordered: apixaban (Eliquis).    Mobility:   Basic Mobility Inpatient Raw Score: 18  JH-HLM Goal: 6: Walk 10 steps or more  JH-HLM Achieved: 6: Walk 10 steps or more  JH-HLM Goal achieved. Continue to encourage appropriate mobility.    Patient Centered Rounds: I performed bedside rounds with nursing staff today.   Discussions with Specialists or Other Care Team Provider: Nursing, pulmonary    Education and Discussions with Family / Patient: Updated  (son) at bedside.      Current Length of Stay: 5 day(s)  Current Patient Status: Inpatient   Certification Statement: The patient will continue to require additional inpatient hospital stay due to persistent hypoxemic respiratory failure needs thoracentesis and completion of antibiotics  Discharge Plan: Anticipate discharge in 48-72 hrs to home.    Code Status: Level 1 - Full Code    Subjective:   Patient is feeling better than before.  But still hypoxemic on exertion as well as when he lies flat in the bed    Objective:     Vitals:   Temp (24hrs), Av °F (36.7 °C), Min:97.6 °F (36.4 °C), Max:98.3 °F (36.8 °C)    Temp:  [97.6 °F (36.4 °C)-98.3 °F (36.8 °C)] 97.6 °F (36.4 °C)  HR:  [] 91  BP:  (110-175)/(64-67) 164/66  SpO2:  [89 %-94 %] 94 %  Body mass index is 45.66 kg/m².     Input and Output Summary (last 24 hours):     Intake/Output Summary (Last 24 hours) at 4/14/2024 1249  Last data filed at 4/13/2024 1945  Gross per 24 hour   Intake 120 ml   Output --   Net 120 ml       Physical Exam:   Constitutional: Requiring 6 L of oxygen, morbid obesity  HEENT: Pallor or icterus  CVS: S1 plus S2 2+ bilateral pedal edema  Respiratory: Decreased breath sounds bilaterally in the bases and crackles  Gastroenterology: Soft nontender without any palpable mass  Skin: No bruises or ecchymosis  Neurology: No focal logical deficit      Additional Data:     Labs:  Results from last 7 days   Lab Units 04/14/24  0517 04/13/24  1002 04/12/24  0628   WBC Thousand/uL 14.30*   < > 16.59*   HEMOGLOBIN g/dL 11.5*   < > 11.6*   HEMATOCRIT % 35.3*   < > 36.9   PLATELETS Thousands/uL 272   < > 240   SEGS PCT %  --   --  76*   LYMPHO PCT %  --   --  10*   MONO PCT %  --   --  11   EOS PCT %  --   --  1    < > = values in this interval not displayed.     Results from last 7 days   Lab Units 04/14/24  0517 04/10/24  0459 04/09/24  0503   SODIUM mmol/L 139   < > 137   POTASSIUM mmol/L 4.1   < > 4.8   CHLORIDE mmol/L 101   < > 100   CO2 mmol/L 31   < > 27   BUN mg/dL 34*   < > 41*   CREATININE mg/dL 1.71*   < > 1.94*   ANION GAP mmol/L 7   < > 10   CALCIUM mg/dL 8.7   < > 8.9   ALBUMIN g/dL  --   --  3.8   TOTAL BILIRUBIN mg/dL  --   --  0.75   ALK PHOS U/L  --   --  78   ALT U/L  --   --  48   AST U/L  --   --  35   GLUCOSE RANDOM mg/dL 111   < > 155*    < > = values in this interval not displayed.         Results from last 7 days   Lab Units 04/10/24  0807   POC GLUCOSE mg/dl 125         Results from last 7 days   Lab Units 04/12/24  0628 04/11/24  0450 04/10/24  0459 04/09/24  2250 04/09/24  1900 04/09/24  0503 04/08/24  2156 04/08/24  1814   LACTIC ACID mmol/L  --   --  0.6 2.3* 2.5* 2.2*   < >  --    PROCALCITONIN ng/ml 0.32*  0.43*  --   --   --   --   --  0.12    < > = values in this interval not displayed.       Lines/Drains:  Invasive Devices       Peripheral Intravenous Line  Duration             Peripheral IV 04/09/24 Dorsal (posterior);Left Hand 4 days    Peripheral IV 04/11/24 Dorsal (posterior);Right Forearm 3 days                          Imaging: No pertinent imaging reviewed.    Recent Cultures (last 7 days):   Results from last 7 days   Lab Units 04/12/24  1207 04/08/24  2156   BLOOD CULTURE  No Growth at 24 hrs.  No Growth at 24 hrs. No Growth After 5 Days.  Staphylococcus coagulase negative*   GRAM STAIN RESULT   --  Gram positive cocci in clusters*       Last 24 Hours Medication List:   Current Facility-Administered Medications   Medication Dose Route Frequency Provider Last Rate    acetaminophen  1,000 mg Intravenous Q6H PRN Kiko Aparicio MD      amLODIPine  5 mg Oral Daily Kiko Aparicio MD      apixaban  5 mg Oral BID Deepak Riggs MD      aspirin  81 mg Oral Daily Deepak Riggs MD      budesonide-formoterol  2 puff Inhalation BID Kiko Aparicio MD      cefTRIAXone  2,000 mg Intravenous Q24H Deepak Riggs MD 2,000 mg (04/14/24 0920)    fluticasone  1 spray Each Nare Daily Walt BERGER MD      furosemide  60 mg Intravenous TID (diuretic) Deepak Riggs MD      HYDROmorphone  0.5 mg Intravenous Q4H PRN Deepak Riggs MD      lidocaine  1 Application Urethral Once Kiko Aparicio MD      oxyCODONE  5 mg Oral Q6H PRN Deepak Riggs MD      pneumococcal 20-annmarie conj vacc  0.5 mL Intramuscular Prior to discharge Walt BERGER MD      spironolactone  25 mg Oral Daily Deepak Riggs MD      trimethobenzamide  100 mg Intramuscular Q8H PRN Deepak Riggs MD          Today, Patient Was Seen By: Deepak Riggs MD    **Please Note: This note may have been constructed using a voice recognition system.**

## 2024-04-14 NOTE — RESPIRATORY THERAPY NOTE
Went to assess pt for new orders by provider for HFNC and CPAP HS, upon arrival pt  consistently 93% on 6L, no distress noted. CPAP setup for HS/ PRN use. Pt was resting in chair. Will put pt on CPAP when pt is ready for nap. No indication for HFNC at this time

## 2024-04-14 NOTE — ASSESSMENT & PLAN NOTE
Wt Readings from Last 3 Encounters:   04/14/24 (!) 136 kg (300 lb 4.3 oz)   06/30/23 (!) 138 kg (305 lb 5.4 oz)   06/28/22 135 kg (298 lb 11.6 oz)       Patient has history of chronic diastolic CHF.  home regimen of Lasix, spironolactone, telmisartan were held due to SBO and patient being NPO.  Patient developed shortness of breath, hypoxia yesterday likely from holding home regimen of diuretics and giving IV fluids due to HARVINDER and SBO.  Repeat troponin showed negative delta.  Procalcitonin has trended down.  Repeat echo did not show any significant interval change.  Today's update and plan:  IV Lasix 60 mg 3 times daily

## 2024-04-15 ENCOUNTER — APPOINTMENT (OUTPATIENT)
Dept: NON INVASIVE DIAGNOSTICS | Facility: HOSPITAL | Age: 79
DRG: 388 | End: 2024-04-15
Attending: STUDENT IN AN ORGANIZED HEALTH CARE EDUCATION/TRAINING PROGRAM
Payer: MEDICARE

## 2024-04-15 LAB
ANION GAP SERPL CALCULATED.3IONS-SCNC: 6 MMOL/L (ref 4–13)
ATRIAL RATE: 91 BPM
BUN SERPL-MCNC: 36 MG/DL (ref 5–25)
CALCIUM SERPL-MCNC: 9 MG/DL (ref 8.4–10.2)
CHLORIDE SERPL-SCNC: 101 MMOL/L (ref 96–108)
CO2 SERPL-SCNC: 34 MMOL/L (ref 21–32)
CREAT SERPL-MCNC: 1.61 MG/DL (ref 0.6–1.3)
ERYTHROCYTE [DISTWIDTH] IN BLOOD BY AUTOMATED COUNT: 13.4 % (ref 11.6–15.1)
GFR SERPL CREATININE-BSD FRML MDRD: 40 ML/MIN/1.73SQ M
GLUCOSE SERPL-MCNC: 118 MG/DL (ref 65–140)
HCT VFR BLD AUTO: 33 % (ref 36.5–49.3)
HGB BLD-MCNC: 10.4 G/DL (ref 12–17)
INR PPP: 1.68 (ref 0.84–1.19)
MAGNESIUM SERPL-MCNC: 1.8 MG/DL (ref 1.9–2.7)
MCH RBC QN AUTO: 30.1 PG (ref 26.8–34.3)
MCHC RBC AUTO-ENTMCNC: 31.5 G/DL (ref 31.4–37.4)
MCV RBC AUTO: 95 FL (ref 82–98)
P AXIS: 76 DEGREES
PLATELET # BLD AUTO: 264 THOUSANDS/UL (ref 149–390)
PMV BLD AUTO: 11.1 FL (ref 8.9–12.7)
POTASSIUM SERPL-SCNC: 4.2 MMOL/L (ref 3.5–5.3)
PR INTERVAL: 236 MS
PROTHROMBIN TIME: 20.6 SECONDS (ref 11.6–14.5)
QRS AXIS: 70 DEGREES
QRSD INTERVAL: 144 MS
QT INTERVAL: 410 MS
QTC INTERVAL: 504 MS
RBC # BLD AUTO: 3.46 MILLION/UL (ref 3.88–5.62)
SODIUM SERPL-SCNC: 141 MMOL/L (ref 135–147)
T WAVE AXIS: 52 DEGREES
VENTRICULAR RATE: 91 BPM
WBC # BLD AUTO: 15.29 THOUSAND/UL (ref 4.31–10.16)

## 2024-04-15 PROCEDURE — 99223 1ST HOSP IP/OBS HIGH 75: CPT | Performed by: INTERNAL MEDICINE

## 2024-04-15 PROCEDURE — 83735 ASSAY OF MAGNESIUM: CPT | Performed by: STUDENT IN AN ORGANIZED HEALTH CARE EDUCATION/TRAINING PROGRAM

## 2024-04-15 PROCEDURE — 80048 BASIC METABOLIC PNL TOTAL CA: CPT | Performed by: STUDENT IN AN ORGANIZED HEALTH CARE EDUCATION/TRAINING PROGRAM

## 2024-04-15 PROCEDURE — 99232 SBSQ HOSP IP/OBS MODERATE 35: CPT | Performed by: STUDENT IN AN ORGANIZED HEALTH CARE EDUCATION/TRAINING PROGRAM

## 2024-04-15 PROCEDURE — 76604 US EXAM CHEST: CPT | Performed by: NURSE PRACTITIONER

## 2024-04-15 PROCEDURE — 76705 ECHO EXAM OF ABDOMEN: CPT

## 2024-04-15 PROCEDURE — 85027 COMPLETE CBC AUTOMATED: CPT | Performed by: STUDENT IN AN ORGANIZED HEALTH CARE EDUCATION/TRAINING PROGRAM

## 2024-04-15 PROCEDURE — 85610 PROTHROMBIN TIME: CPT | Performed by: NURSE PRACTITIONER

## 2024-04-15 PROCEDURE — 93010 ELECTROCARDIOGRAM REPORT: CPT | Performed by: INTERNAL MEDICINE

## 2024-04-15 RX ORDER — MAGNESIUM SULFATE 1 G/100ML
1 INJECTION INTRAVENOUS ONCE
Qty: 100 ML | Refills: 0 | Status: COMPLETED | OUTPATIENT
Start: 2024-04-15 | End: 2024-04-15

## 2024-04-15 RX ADMIN — FUROSEMIDE 60 MG: 10 INJECTION, SOLUTION INTRAMUSCULAR; INTRAVENOUS at 05:50

## 2024-04-15 RX ADMIN — APIXABAN 5 MG: 5 TABLET, FILM COATED ORAL at 18:04

## 2024-04-15 RX ADMIN — ASPIRIN 81 MG: 81 TABLET, CHEWABLE ORAL at 09:23

## 2024-04-15 RX ADMIN — FLUTICASONE PROPIONATE 1 SPRAY: 50 SPRAY, METERED NASAL at 09:37

## 2024-04-15 RX ADMIN — AMLODIPINE BESYLATE 5 MG: 5 TABLET ORAL at 09:23

## 2024-04-15 RX ADMIN — CEFTRIAXONE SODIUM 2000 MG: 10 INJECTION, POWDER, FOR SOLUTION INTRAVENOUS at 09:31

## 2024-04-15 RX ADMIN — CHLOROTHIAZIDE SODIUM 500 MG: 500 INJECTION, POWDER, LYOPHILIZED, FOR SOLUTION INTRAVENOUS at 10:45

## 2024-04-15 RX ADMIN — FUROSEMIDE 60 MG: 10 INJECTION, SOLUTION INTRAMUSCULAR; INTRAVENOUS at 17:12

## 2024-04-15 RX ADMIN — BUDESONIDE AND FORMOTEROL FUMARATE DIHYDRATE 2 PUFF: 80; 4.5 AEROSOL RESPIRATORY (INHALATION) at 09:26

## 2024-04-15 RX ADMIN — BUDESONIDE AND FORMOTEROL FUMARATE DIHYDRATE 2 PUFF: 80; 4.5 AEROSOL RESPIRATORY (INHALATION) at 18:05

## 2024-04-15 RX ADMIN — SPIRONOLACTONE 25 MG: 25 TABLET ORAL at 09:23

## 2024-04-15 RX ADMIN — MAGNESIUM SULFATE HEPTAHYDRATE 1 G: 1 INJECTION, SOLUTION INTRAVENOUS at 11:52

## 2024-04-15 RX ADMIN — APIXABAN 5 MG: 5 TABLET, FILM COATED ORAL at 09:23

## 2024-04-15 RX ADMIN — FUROSEMIDE 60 MG: 10 INJECTION, SOLUTION INTRAMUSCULAR; INTRAVENOUS at 12:00

## 2024-04-15 NOTE — CONSULTS
Consultation - Pulmonary Medicine   Kulwinder Hernandez 78 y.o. male MRN: [id number]  Unit/Bed#: -01 Encounter: [id number]      Assessment:  Acute on chronic hypoxemic respiratory failure  Acute on chronic diastolic CHF  Abnormal CT scan of the chest with likely pneumonia, possibly aspiration  Bilateral pleural effusions  Small bowel obstruction, resolved  TIANA not currently on CPAP  Emphysema/COPD without acute exacerbation    Plan:   Acute on chronic hypoxemic respiratory failure secondary to acute on chronic diastolic CHF, also what appears to be pneumonia, possibly aspiration in the setting of recent nausea/vomiting  Currently requiring up to 6 L nasal cannula, titrate to maintain O2 sats greater than 89%.  At baseline he uses 2 L  Diuresis per primary service, BNP has been normal although he does look overtly fluid overloaded  Has received 7 days of antibiotics, can discontinue at this point  He does have bilateral pleural effusions which are to be drained today with IR, will follow-up pleural fluid studies  CT scan with 9 mm nodule in the left upper lobe with smaller lingular nodules as well as mild mediastinal lymphadenopathy-possibly all in the setting of pneumonia/aspiration though given his smoking history, will need short-term follow-up in 3 months to follow-up the lung nodule as well as adenopathy  He does have a history of TIANA but has not used CPAP in many years, no longer has a CPAP at home.  We do not have any PFTs on file  He would need a repeat sleep study to qualify for equipment at home  Continue Symbicort, as needed albuterol.  Would need outpatient PFTs  Will continue to follow    History of Present Illness   Physician Requesting Consult: Deepak Riggs MD  Reason for Consult / Principal Problem: Pneumonia, acute respiratory failure  Hx and PE limited by: None  HPI: Kulwinder Hernandez is a 78 y.o. year old male former smoker who quit about 15 years ago with past medical history of diabetes, hypertension,  COPD, CKD, TIANA, ventral hernia with recurrent small bowel obstructions who presents with complaint of nausea, vomiting and abdominal pain which was found to be secondary to small bowel obstruction which has been recurrent for patient.  Small bowel obstruction has since resolved.  He and his son note that he has been feeling more short of breath the past couple of weeks.  He was noted to be fluid overloaded and is currently receiving diuresis.  He is feeling improved from prior to admission.    He was diagnosed with COPD 7 years ago but does not use inhalers regularly.  He has been on oxygen 2 L which is supposed to be all the time although he only uses it as needed.  He had been on CPAP but has not used this in several years as his wife did not like the sound of the machine.  He no longer has a CPAP at home.  He does not have frequent exacerbations or episodes of bronchitis.    Inpatient consult to Pulmonology  Consult performed by: Marlo Zheng PA-C  Consult ordered by: Deepak Riggs MD          Review of Systems   Constitutional: Negative.    HENT: Negative.     Respiratory:  Positive for shortness of breath.    Cardiovascular:  Positive for leg swelling.   Gastrointestinal: Negative.    Genitourinary: Negative.    Musculoskeletal: Negative.    Skin: Negative.    Allergic/Immunologic: Negative.    Neurological: Negative.    Psychiatric/Behavioral: Negative.         Historical Information   Past Medical History:   Diagnosis Date    CKD (chronic kidney disease) 11/08/2018    COPD (chronic obstructive pulmonary disease) (HCC)     Diabetes mellitus (HCC)     Hyperlipidemia     Hypertension     PVD (peripheral vascular disease) (HCC)     Sleep apnea     Small bowel obstruction (HCC)     Ventral hernia      Past Surgical History:   Procedure Laterality Date    ABDOMINAL HERNIA REPAIR      ABDOMINAL SURGERY      CHOLECYSTECTOMY      open    FEMORAL BYPASS Right      Social History   Social History     Substance and  Sexual Activity   Alcohol Use Never     Social History     Substance and Sexual Activity   Drug Use Never     E-Cigarette/Vaping    E-Cigarette Use Never User      E-Cigarette/Vaping Substances     Social History     Tobacco Use   Smoking Status Former    Current packs/day: 3.00    Average packs/day: 3.0 packs/day for 14.3 years (42.9 ttl pk-yrs)    Types: Cigarettes    Start date: 2010   Smokeless Tobacco Never     Occupational History:     Family History: History reviewed. No pertinent family history.    Meds/Allergies   all current active meds have been reviewed, pertinent pulmonary meds have been reviewed, and current meds:   Current Facility-Administered Medications   Medication Dose Route Frequency    acetaminophen (Ofirmev) injection 1,000 mg  1,000 mg Intravenous Q6H PRN    amLODIPine (NORVASC) tablet 5 mg  5 mg Oral Daily    apixaban (ELIQUIS) tablet 5 mg  5 mg Oral BID    aspirin chewable tablet 81 mg  81 mg Oral Daily    budesonide-formoterol (SYMBICORT) 80-4.5 MCG/ACT inhaler 2 puff  2 puff Inhalation BID    chlorothiazide (DIURIL) 500 mg in sodium chloride 0.9 % 100 mL IV  500 mg Intravenous Once    fluticasone (FLONASE) 50 mcg/act nasal spray 1 spray  1 spray Each Nare Daily    furosemide (LASIX) injection 60 mg  60 mg Intravenous TID (diuretic)    HYDROmorphone (DILAUDID) injection 0.5 mg  0.5 mg Intravenous Q4H PRN    lidocaine (URO-JET) 2 % urethral/mucosal gel 1 Application  1 Application Urethral Once    magnesium sulfate IVPB (premix) SOLN 1 g  1 g Intravenous Once    oxyCODONE (ROXICODONE) IR tablet 5 mg  5 mg Oral Q6H PRN    pneumococcal 20-annmarie conj vacc (PREVNAR 20) IM Injection 0.5 mL  0.5 mL Intramuscular Prior to discharge    spironolactone (ALDACTONE) tablet 25 mg  25 mg Oral Daily    trimethobenzamide (TIGAN) IM injection 100 mg  100 mg Intramuscular Q8H PRN       No Known Allergies    Objective   Vitals: Blood pressure 136/64, pulse 93, temperature 98.7 °F (37.1 °C), resp. rate 18,  "height 5' 8\" (1.727 m), weight (!) 136 kg (300 lb 0.7 oz), SpO2 93%.,Body mass index is 45.62 kg/m².    Intake/Output Summary (Last 24 hours) at 4/15/2024 1100  Last data filed at 4/15/2024 0830  Gross per 24 hour   Intake 120 ml   Output 2100 ml   Net -1980 ml     Invasive Devices       Peripheral Intravenous Line  Duration             Peripheral IV 04/11/24 Dorsal (posterior);Right Forearm 4 days                    Physical Exam  Vitals reviewed.   Constitutional:       Appearance: Normal appearance.   HENT:      Head: Normocephalic and atraumatic.      Mouth/Throat:      Pharynx: Oropharynx is clear.   Eyes:      Conjunctiva/sclera: Conjunctivae normal.   Cardiovascular:      Rate and Rhythm: Normal rate and regular rhythm.   Pulmonary:      Effort: Pulmonary effort is normal. No respiratory distress.      Breath sounds: Examination of the right-lower field reveals rales. Examination of the left-lower field reveals rales. Rales present. No wheezing or rhonchi.   Abdominal:      General: Abdomen is flat. There is no distension.   Musculoskeletal:         General: Normal range of motion.      Cervical back: Normal range of motion.      Right lower leg: Edema present.      Left lower leg: Edema present.   Skin:     General: Skin is warm and dry.   Neurological:      Mental Status: He is alert and oriented to person, place, and time.   Psychiatric:         Mood and Affect: Mood normal.         Behavior: Behavior normal.         Lab Results: I have personally reviewed pertinent lab results., CBC:   Lab Results   Component Value Date    WBC 15.29 (H) 04/15/2024    HGB 10.4 (L) 04/15/2024    HCT 33.0 (L) 04/15/2024    MCV 95 04/15/2024     04/15/2024    RBC 3.46 (L) 04/15/2024    MCH 30.1 04/15/2024    MCHC 31.5 04/15/2024    RDW 13.4 04/15/2024    MPV 11.1 04/15/2024   , CMP:   Lab Results   Component Value Date    SODIUM 141 04/15/2024    K 4.2 04/15/2024     04/15/2024    CO2 34 (H) 04/15/2024    BUN 36 " (H) 04/15/2024    CREATININE 1.61 (H) 04/15/2024    CALCIUM 9.0 04/15/2024    EGFR 40 04/15/2024     Imaging Studies: I have personally reviewed pertinent reports.   and I have personally reviewed pertinent films in PACS  EKG, Pathology, and Other Studies: I have personally reviewed pertinent reports.    VTE Prophylaxis: Sequential compression device (Venodyne)     Code Status: Level 1 - Full Code  Advance Directive and Living Will:      Power of :    POLST:

## 2024-04-15 NOTE — ASSESSMENT & PLAN NOTE
Wt Readings from Last 3 Encounters:   04/15/24 (!) 136 kg (300 lb 0.7 oz)   06/30/23 (!) 138 kg (305 lb 5.4 oz)   06/28/22 135 kg (298 lb 11.6 oz)       Patient has history of chronic diastolic CHF.  home regimen of Lasix, spironolactone, telmisartan were held due to SBO and patient being NPO.  Patient developed shortness of breath, hypoxia yesterday likely from holding home regimen of diuretics and giving IV fluids due to HARVINDER and SBO.  Repeat troponin showed negative delta.  Procalcitonin has trended down.  Repeat echo did not show any significant interval change.  Today's update and plan:  IV Lasix 60 mg 3 times daily  Will give 1 dose of Diuril

## 2024-04-15 NOTE — CASE MANAGEMENT
Case Management Discharge Planning Note    Patient name Kulwinder Hernandez  Location /-01 MRN [id number]  : 1945 Date 4/15/2024       Current Admission Date: 2024  Current Admission Diagnosis:SBO (small bowel obstruction) (HCC)   Patient Active Problem List    Diagnosis Date Noted    Acute respiratory failure with hypoxia (HCC) 2024    Lung nodule 2024    Acute on chronic diastolic CHF (congestive heart failure) (HCC) 2024    Pneumonia of lower lobe due to infectious organism 2024    Leukocytosis 2023    Morbid obesity (HCC) 2022    Lactic acidosis 2021    CKD (chronic kidney disease) 2018    Dyslipidemia 2018    History of CVA (cerebrovascular accident) 2018    Renal cyst, left 2018    Acute kidney injury superimposed on chronic kidney disease  ([facility]) 2018    Dementia ([facility]) 2018    Elevated random blood glucose level 2018    Essential hypertension     Hyperlipidemia     Ventral hernia     SBO (small bowel obstruction) ([facility])     PVD (peripheral vascular disease) ([facility])       LOS (days): 6  Geometric Mean LOS (GMLOS) (days): 4.6  Days to GMLOS:-1.3     OBJECTIVE:  Risk of Unplanned Readmission Score: 13.52         Current admission status: Inpatient   Preferred Pharmacy:   [id number] - [place] - [address]  [address] [address]  Phone: [phone] Fax: [phone]    Primary Care Provider: Piyush Elizondo DO    Primary Insurance: MEDICARE  Secondary Insurance: [employer]    DISCHARGE DETAILS:    Discharge planning discussed with:: pt and aaron Savage at bedside  Freedom of Choice: Yes  Comments - Freedom of Choice: Son is requesting a hospital bed and HHC.  [facility] accepted and reserved in [place].  Hospital bed ordered via [facility].  Aaron Savage also asking about previously ordered w/c.  States that someone already came in and measured pt's  abd to determine w/c size, but CM  noted that order was cx in [place].  CM contacted John from [employer] and he will look into this.  Pt is a tentative d/c in 72 hrs per SLIM during rounds.  Pt will need thoracentesis and continued diuresis.  CM inquired about a C-pap machine, but son explains that his father had one in the past, but not anymore.  SLIM informed of this.  CM contacted family/caregiver?: Yes  Were Treatment Team discharge recommendations reviewed with patient/caregiver?: Yes  Did patient/caregiver verbalize understanding of patient care needs?: Yes  Were patient/caregiver advised of the risks associated with not following Treatment Team discharge recommendations?: Yes    Contacts  Patient Contacts: Hussein  Relationship to Patient:: Family  Contact Method: In Person  Reason/Outcome: Discharge Planning    Requested Home Health Care         Is the patient interested in HHC at discharge?: Yes  Home Health Discipline requested:: Nursing, Home Health Aide, Occupational Therapy, Physical Therapy  [facility] External Referral Reason (only applicable if external HHA name selected): Patient has established relationship with provider  Home Health Follow-Up Provider:: PCP  Home Health Services Needed:: Evaluate Functional Status and Safety, Gait/ADL Training, Strengthening/Theraputic Exercises to Improve Function, COPD Management, Wound/Ostomy Care  Homebound Criteria Met:: Uses an Assist Device (i.e. cane, walker, etc), Requires the Assistance of Another Person for Safe Ambulation or to Leave the Home  Supporting Clincal Findings:: Dyspnea with Exertion, Fatigues Easliy in Short Distances, Limited Endurance    DME Referral Provided  Referral made for DME?: Yes  DME referral completed for the following items:: Hospital Bed  DME Supplier Name:: [facility]    Other Referral/Resources/Interventions Provided:  Interventions: HHC, DME  Referral Comments: [facility] accepted and reserved.  Son requesting hospital bed and  this was ordered via [place] to [facility].  Son also inquiring about status of previous ordered w/c and John from [facility] is looking into this because it looks like the order was cx.  Pt currently is not using a Cpap at home    Would you like to participate in our [facility] service program?  : No - Declined    Treatment Team Recommendation: Home with Home Health Care  Discharge Destination Plan:: Home with Home Health Care

## 2024-04-15 NOTE — PROGRESS NOTES
[facility]  Progress Note  Name: Kulwinder Hernandez I  MRN: [id number]  Unit/Bed#: -01 I Date of Admission: 4/8/2024   Date of Service: 4/15/2024 I Hospital Day: 6    Assessment/Plan   Acute respiratory failure with hypoxia (HCC)  Assessment & Plan  Etiology multifactorial acute on chronic heart failure with preserved ejection fraction/pneumonia  Patient saturations dropped when he lies flat in the bed dropped down to 86% on 6 L of oxygen and trended up to 99 to 2% when he wakes up or sit upright.  Patient is probably having severe obstructive sleep apnea/obesity hypoventilation syndrome  Patient has been encouraged to start using CPAP.  He is agreeable to use CPAP.  CPAP during naps and at night  Consulted IR for bilateral thoracentesis as patient has more pleural effusion on the right side which could also contribute in the worsening of his hypoxemia  High flow also ordered if patient started requiring more oxygen to keep SpO2 92 and above  Consulted pulmonary  Encourage out of bed to chair, incentive spirometry  Continue IV ceftriaxone for total of 7 days    Acute on chronic diastolic CHF (congestive heart failure) ([facility])  Assessment & Plan  Wt Readings from Last 3 Encounters:   04/15/24 (!) 136 kg (300 lb 0.7 oz)   06/30/23 (!) 138 kg (305 lb 5.4 oz)   06/28/22 135 kg (298 lb 11.6 oz)       Patient has history of chronic diastolic CHF.  home regimen of Lasix, spironolactone, telmisartan were held due to SBO and patient being NPO.  Patient developed shortness of breath, hypoxia yesterday likely from holding home regimen of diuretics and giving IV fluids due to HARVINDER and SBO.  Repeat troponin showed negative delta.  Procalcitonin has trended down.  Repeat echo did not show any significant interval change.  Today's update and plan:  IV Lasix 60 mg 3 times daily  Will give 1 dose of Diuril        PVD (peripheral vascular disease) ([facility])  Assessment & Plan  History of peripheral vascular disease.   History of aorto bifemoral bypass graft.  Son at the bedside assisting with history for the most part.  Son reports that patient has been complaining of having right lower extremity pain.    CT abdomen pelvis result Status post aortobifemoral bypass graft with occlusion of the right side of the graft along its length, with at least focal reconstitution at the right femoral artery anastomosis  LEAD 4/9/2024 demonstrates occluded right limb graft with occlusion of the mid and distal SFA.  Vascular surgery recommended no urgent surgical intervention.    Current on encounter patient appears comfortable not in distress.  No significant pain at rest without signs of tissue loss.  No urgent intervention recommended repeat respiratory recommendation, once current medical issue resolves vascular surgery will have discussion with patient and family to proceed with vascular intervention and attempt to recanalize occluded right limb graft likely on outpatient basis.  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  Vascular surgery recommendation appreciated.  aspirin with Eliquis as per the vascular surgery and general surgery recommendation for the occluded peripheral artery disease.  Appreciate vascular surgery no urgent intervention needed    Lung nodule  Assessment & Plan  Outpatient follow-up with CT scan and PET/CT in 3 months    Pneumonia of lower lobe due to infectious organism  Assessment & Plan  Procal wnl  WBC improving.   On iv ceftriaxone and doxy day #7  Continue with Flonase Symbicort      CKD (chronic kidney disease)  Assessment & Plan  Lab Results   Component Value Date    EGFR 40 04/15/2024    EGFR 37 04/14/2024    EGFR 33 04/13/2024    CREATININE 1.61 (H) 04/15/2024    CREATININE 1.71 (H) 04/14/2024    CREATININE 1.88 (H) 04/13/2024     Patient has a wide range of baseline creatinine which is like 1.6-1.9 current is 1.6   IV Lasix 60 mg 3 times daily, I's and O's, daily weight as patient is  fluid overloaded    * SBO (small bowel obstruction) (HCC)  Assessment & Plan  78-year-old male patient with past medical history of recurrent SBO due to ventral hernia repaired with mesh.  Resolved    Leukocytosis  Assessment & Plan  Abdominal CT shows a obstruction but no signs of infection.  1/2 blood culture growing coag negative staph likely contaminant.  Procalcitonin trended down  Multifactorial could be in the setting of pneumonia/heart failure  Today's update and plan:  Bumped up a little without any fever, tachycardia, worsening of shortness of breath, dysuric symptoms      Lactic acidosis  Assessment & Plan  Lactic acidosis resolved.      Essential hypertension  Assessment & Plan  Patient blood pressure stable  Continue with amlodipine,  Aldactone 25 mg             VTE Pharmacologic Prophylaxis: VTE Score: 3 Moderate Risk (Score 3-4) - Pharmacological DVT Prophylaxis Ordered: apixaban (Eliquis).    Mobility:   Basic Mobility Inpatient Raw Score: 21  JH-HLM Goal: 6: Walk 10 steps or more  JH-HLM Achieved: 7: Walk 25 feet or more  JH-HLM Goal achieved. Continue to encourage appropriate mobility.    Patient Centered Rounds: I performed bedside rounds with nursing staff today.   Discussions with Specialists or Other Care Team Provider: Pulmonology, care manager    Education and Discussions with Family / Patient: Updated  (son) at bedside.      Current Length of Stay: 6 day(s)  Current Patient Status: Inpatient   Certification Statement: The patient will continue to require additional inpatient hospital stay due to IV Lasix  Discharge Plan: Anticipate discharge in 48 hrs to yet to be decided    Code Status: Level 1 - Full Code    Subjective:   Breathing improved compliant with CPAP at night    Objective:     Vitals:   Temp (24hrs), Av.9 °F (37.2 °C), Min:98.5 °F (36.9 °C), Max:99.6 °F (37.6 °C)    Temp:  [98.5 °F (36.9 °C)-99.6 °F (37.6 °C)] 98.7 °F (37.1 °C)  HR:  [82-93] 93  BP:  (133-148)/(62-63) 133/63  SpO2:  [91 %-96 %] 92 %  Body mass index is 45.62 kg/m².     Input and Output Summary (last 24 hours):     Intake/Output Summary (Last 24 hours) at 4/15/2024 0840  Last data filed at 4/15/2024 0627  Gross per 24 hour   Intake 120 ml   Output 1400 ml   Net -1280 ml       Physical Exam:   Constitutional: Morbid obesity  HEENT: Pallor or icterus negative  CVS: S1 plus S2 2+ bilateral pedal edema  Respiratory: Decreased breath sounds on the bases bilaterally with crackles  Gastroenterology: Soft nontender without any palpable mass  Skin: No bruises or ecchymosis  Neurology: No focal logical deficit      Additional Data:     Labs:  Results from last 7 days   Lab Units 04/15/24  0528 04/13/24  1002 04/12/24  0628   WBC Thousand/uL 15.29*   < > 16.59*   HEMOGLOBIN g/dL 10.4*   < > 11.6*   HEMATOCRIT % 33.0*   < > 36.9   PLATELETS Thousands/uL 264   < > 240   SEGS PCT %  --   --  76*   LYMPHO PCT %  --   --  10*   MONO PCT %  --   --  11   EOS PCT %  --   --  1    < > = values in this interval not displayed.     Results from last 7 days   Lab Units 04/15/24  0528 04/10/24  0459 04/09/24  0503   SODIUM mmol/L 141   < > 137   POTASSIUM mmol/L 4.2   < > 4.8   CHLORIDE mmol/L 101   < > 100   CO2 mmol/L 34*   < > 27   BUN mg/dL 36*   < > 41*   CREATININE mg/dL 1.61*   < > 1.94*   ANION GAP mmol/L 6   < > 10   CALCIUM mg/dL 9.0   < > 8.9   ALBUMIN g/dL  --   --  3.8   TOTAL BILIRUBIN mg/dL  --   --  0.75   ALK PHOS U/L  --   --  78   ALT U/L  --   --  48   AST U/L  --   --  35   GLUCOSE RANDOM mg/dL 118   < > 155*    < > = values in this interval not displayed.         Results from last 7 days   Lab Units 04/10/24  0807   POC GLUCOSE mg/dl 125         Results from last 7 days   Lab Units 04/12/24  0628 04/11/24  0450 04/10/24  0459 04/09/24  2250 04/09/24  1900 04/09/24  0503 04/08/24  2156 04/08/24  1814   LACTIC ACID mmol/L  --   --  0.6 2.3* 2.5* 2.2*   < >  --    PROCALCITONIN ng/ml 0.32* 0.43*  --    --   --   --   --  0.12    < > = values in this interval not displayed.       Lines/Drains:  Invasive Devices       Peripheral Intravenous Line  Duration             Peripheral IV 04/11/24 Dorsal (posterior);Right Forearm 4 days                          Imaging: No pertinent imaging reviewed.    Recent Cultures (last 7 days):   Results from last 7 days   Lab Units 04/12/24  1207 04/08/24  2156   BLOOD CULTURE  No Growth at 48 hrs.  No Growth at 48 hrs. No Growth After 5 Days.  Staphylococcus coagulase negative*   GRAM STAIN RESULT   --  Gram positive cocci in clusters*       Last 24 Hours Medication List:   Current Facility-Administered Medications   Medication Dose Route Frequency Provider Last Rate    acetaminophen  1,000 mg Intravenous Q6H PRN Kiko Aparicio MD      amLODIPine  5 mg Oral Daily Kiko Aparicio MD      apixaban  5 mg Oral BID Deepak Riggs MD      aspirin  81 mg Oral Daily Deepak Riggs MD      budesonide-formoterol  2 puff Inhalation BID Kiko Aparicio MD      cefTRIAXone  2,000 mg Intravenous Q24H Deepak Riggs MD 2,000 mg (04/14/24 0920)    fluticasone  1 spray Each Nare Daily Walt BERGER MD      furosemide  60 mg Intravenous TID (diuretic) Deepak Riggs MD      HYDROmorphone  0.5 mg Intravenous Q4H PRN Deepak Riggs MD      lidocaine  1 Application Urethral Once Kiko Aparicio MD      oxyCODONE  5 mg Oral Q6H PRN Deepak Riggs MD      pneumococcal 20-annmarie conj vacc  0.5 mL Intramuscular Prior to discharge Walt BERGER MD      spironolactone  25 mg Oral Daily Deepak Riggs MD      trimethobenzamide  100 mg Intramuscular Q8H PRN Deepak Riggs MD          Today, Patient Was Seen By: Deepak Riggs MD    **Please Note: This note may have been constructed using a voice recognition system.**

## 2024-04-15 NOTE — BRIEF OP NOTE (RAD/CATH)
IR THORACENTESIS Procedure Note- PROCEDURE NOT PERFORMED    PATIENT NAME: Kulwinder Hernandez  : 1945  MRN: [id number]    Pre-op Diagnosis:   1. Stage 3 chronic kidney disease, unspecified whether stage 3a or 3b CKD (HCC)    2. History of CVA (cerebrovascular accident)    3. Mixed hyperlipidemia    4. Essential hypertension    5. Pneumonia of right lower lobe due to infectious organism    6. SBO (small bowel obstruction) (HCC)    7. Right leg pain    8. Acute respiratory failure with hypoxia (HCC)      Post-op Diagnosis:   1. Stage 3 chronic kidney disease, unspecified whether stage 3a or 3b CKD (HCC)    2. History of CVA (cerebrovascular accident)    3. Mixed hyperlipidemia    4. Essential hypertension    5. Pneumonia of right lower lobe due to infectious organism    6. SBO (small bowel obstruction) (HCC)    7. Right leg pain    8. Acute respiratory failure with hypoxia (HCC)        Provider:   ZACHARIAH Damico  Assistants:     No qualified resident was available, Resident is only observing    Estimated Blood Loss: None     Findings: Cursory ultrasound imaging performed on Bilateral thoraces with no identifiable fluid to safely access and drain for thoracentesis. Procedure not performed    Specimens: None     Complications:  None immediate     Anesthesia: none    ZACHARIAH Damico     Date: 4/15/2024  Time: 12:31 PM

## 2024-04-15 NOTE — ASSESSMENT & PLAN NOTE
History of peripheral vascular disease.  History of aorto bifemoral bypass graft.  Son at the bedside assisting with history for the most part.  Son reports that patient has been complaining of having right lower extremity pain.    CT abdomen pelvis result Status post aortobifemoral bypass graft with occlusion of the right side of the graft along its length, with at least focal reconstitution at the right femoral artery anastomosis  LEAD 4/9/2024 demonstrates occluded right limb graft with occlusion of the mid and distal SFA.  Vascular surgery recommended no urgent surgical intervention.    Current on encounter patient appears comfortable not in distress.  No significant pain at rest without signs of tissue loss.  No urgent intervention recommended repeat respiratory recommendation, once current medical issue resolves vascular surgery will have discussion with patient and family to proceed with vascular intervention and attempt to recanalize occluded right limb graft likely on outpatient basis.  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  Vascular surgery recommendation appreciated.  aspirin with Eliquis as per the vascular surgery and general surgery recommendation for the occluded peripheral artery disease.  Appreciate vascular surgery no urgent intervention needed

## 2024-04-15 NOTE — CASE MANAGEMENT
Case Management Discharge Planning Note    Patient name Kulwinder Hernandez  Location /-01 MRN [id number]  : 1945 Date 4/15/2024       Current Admission Date: 2024  Current Admission Diagnosis:SBO (small bowel obstruction) (HCC)   Patient Active Problem List    Diagnosis Date Noted    Acute respiratory failure with hypoxia (HCC) 2024    Lung nodule 2024    Acute on chronic diastolic CHF (congestive heart failure) (HCC) 2024    Pneumonia of lower lobe due to infectious organism 2024    Leukocytosis 2023    Morbid obesity (HCC) 2022    Lactic acidosis 2021    CKD (chronic kidney disease) 2018    Dyslipidemia 2018    History of CVA (cerebrovascular accident) 2018    Renal cyst, left 2018    Acute kidney injury superimposed on chronic kidney disease  (HCC) 2018    Dementia ([facility]) 2018    Elevated random blood glucose level 2018    Essential hypertension     Hyperlipidemia     Ventral hernia     SBO (small bowel obstruction) ([facility])     PVD (peripheral vascular disease) ([facility])       LOS (days): 6  Geometric Mean LOS (GMLOS) (days): 4.6  Days to GMLOS:-1.3     OBJECTIVE:  Risk of Unplanned Readmission Score: 13.52         Current admission status: Inpatient   Preferred Pharmacy:   [id number] [place] - [address]  [address] [address]  Phone: [phone] Fax: [phone]    Primary Care Provider: Piyush Elizondo DO    Primary Insurance: MEDICARE  Secondary Insurance: CIGNA    DISCHARGE DETAILS:    Discharge planning discussed with:: pt and aaron Savage at bedside  Freedom of Choice: Yes  Comments - Freedom of Choice: Son is requesting a hospital bed and [facility].  [facility] accepted and reserved in [place].  Hospital bed ordered via [place].  Aaron Savage also asking about previously ordered w/c.  States that someone already came in and measured pt's  abd to determine w/c size, but CM  noted that order was cx in [place].  CM contacted John from [employer] and he will look into this.  Pt is a tentative d/c in 72 hrs per SLIM during rounds.  Pt will need thoracentesis and continued diuresis.  CM inquired about a C-pap machine, but son explains that his father had one in the past, but not anymore.  SLIM informed of this.  CM contacted family/caregiver?: Yes  Were Treatment Team discharge recommendations reviewed with patient/caregiver?: Yes  Did patient/caregiver verbalize understanding of patient care needs?: Yes  Were patient/caregiver advised of the risks associated with not following Treatment Team discharge recommendations?: Yes    Contacts  Patient Contacts: Hussein  Relationship to Patient:: Family  Contact Method: In Person  Reason/Outcome: Discharge Planning    Requested Home Health Care         Is the patient interested in HHC at discharge?: Yes  Home Health Discipline requested:: Nursing, Home Health Aide, Occupational Therapy, Physical Therapy  Home Health Agency Name:: [facility] External Referral Reason (only applicable if external HHA name selected): Patient has established relationship with provider  Home Health Follow-Up Provider:: PCP  Home Health Services Needed:: Evaluate Functional Status and Safety, Gait/ADL Training, Strengthening/Theraputic Exercises to Improve Function, COPD Management, Wound/Ostomy Care  Homebound Criteria Met:: Uses an Assist Device (i.e. cane, walker, etc), Requires the Assistance of Another Person for Safe Ambulation or to Leave the Home  Supporting Clincal Findings:: Dyspnea with Exertion, Fatigues Easliy in Short Distances, Limited Endurance    DME Referral Provided  Referral made for DME?: Yes  DME referral completed for the following items:: Hospital Bed  DME Supplier Name:: [facility]    Other Referral/Resources/Interventions Provided:  Interventions: HHC, DME  Referral Comments: [facility] accepted and reserved.  Son requesting hospital bed and  this was ordered via [place] to [facility].  Son also inquiring about status of previous ordered w/c and John from [facility] is looking into this because it looks like the order was cx.  Pt currently is not using a Cpap at home    Would you like to participate in our [facility] service program?  : No - Declined    Treatment Team Recommendation: Home with Home Health Care  Discharge Destination Plan:: Home with Home Health Care

## 2024-04-15 NOTE — PLAN OF CARE
Problem: Potential for Falls  Goal: Patient will remain free of falls  Description: INTERVENTIONS:  - Educate patient/family on patient safety including physical limitations  - Instruct patient to call for assistance with activity   - Consult OT/PT to assist with strengthening/mobility   - Keep Call bell within reach  - Keep bed low and locked with side rails adjusted as appropriate  - Keep care items and personal belongings within reach  - Initiate and maintain comfort rounds  - Make Fall Risk Sign visible to staff  - Apply yellow socks and bracelet for high fall risk patients  - Consider moving patient to room near nurses station  Outcome: Progressing     Problem: Prexisting or High Potential for Compromised Skin Integrity  Goal: Skin integrity is maintained or improved  Description: INTERVENTIONS:  - Identify patients at risk for skin breakdown  - Assess and monitor skin integrity  - Assess and monitor nutrition and hydration status  - Monitor labs   - Assess for incontinence   - Turn and reposition patient  - Assist with mobility/ambulation  - Relieve pressure over bony prominences  - Avoid friction and shearing  - Provide appropriate hygiene as needed including keeping skin clean and dry  - Evaluate need for skin moisturizer/barrier cream  - Collaborate with interdisciplinary team   - Patient/family teaching  - Consider wound care consult   Outcome: Progressing     Problem: PAIN - ADULT  Goal: Verbalizes/displays adequate comfort level or baseline comfort level  Description: Interventions:  - Encourage patient to monitor pain and request assistance  - Assess pain using appropriate pain scale  - Administer analgesics based on type and severity of pain and evaluate response  - Implement non-pharmacological measures as appropriate and evaluate response  - Consider cultural and social influences on pain and pain management  - Notify physician/advanced practitioner if interventions unsuccessful or patient reports  new pain  Outcome: Progressing     Problem: INFECTION - ADULT  Goal: Absence or prevention of progression during hospitalization  Description: INTERVENTIONS:  - Assess and monitor for signs and symptoms of infection  - Monitor lab/diagnostic results  - Monitor all insertion sites, i.e. indwelling lines, tubes, and drains  - Monitor endotracheal if appropriate and nasal secretions for changes in amount and color  - Kennan appropriate cooling/warming therapies per order  - Administer medications as ordered  - Instruct and encourage patient and family to use good hand hygiene technique  - Identify and instruct in appropriate isolation precautions for identified infection/condition  Outcome: Progressing     Problem: SAFETY ADULT  Goal: Patient will remain free of falls  Description: INTERVENTIONS:  - Educate patient/family on patient safety including physical limitations  - Instruct patient to call for assistance with activity   - Consult OT/PT to assist with strengthening/mobility   - Keep Call bell within reach  - Keep bed low and locked with side rails adjusted as appropriate  - Keep care items and personal belongings within reach  - Initiate and maintain comfort rounds  - Make Fall Risk Sign visible to staff  - Apply yellow socks and bracelet for high fall risk patients  - Consider moving patient to room near nurses station  Outcome: Progressing  Goal: Maintain or return to baseline ADL function  Description: INTERVENTIONS:  -  Assess patient's ability to carry out ADLs; assess patient's baseline for ADL function and identify physical deficits which impact ability to perform ADLs (bathing, care of mouth/teeth, toileting, grooming, dressing, etc.)  - Assess/evaluate cause of self-care deficits   - Assess range of motion  - Assess patient's mobility; develop plan if impaired  - Assess patient's need for assistive devices and provide as appropriate  - Encourage maximum independence but intervene and supervise when  necessary  - Involve family in performance of ADLs  - Assess for home care needs following discharge   - Consider OT consult to assist with ADL evaluation and planning for discharge  - Provide patient education as appropriate  Outcome: Progressing  Goal: Maintains/Returns to pre admission functional level  Description: INTERVENTIONS:  - Perform AM-PAC 6 Click Basic Mobility/ Daily Activity assessment daily.  - Set and communicate daily mobility goal to care team and patient/family/caregiver.   - Collaborate with rehabilitation services on mobility goals if consulted  - Out of bed for toileting  - Record patient progress and toleration of activity level   Outcome: Progressing     Problem: DISCHARGE PLANNING  Goal: Discharge to home or other facility with appropriate resources  Description: INTERVENTIONS:  - Identify barriers to discharge w/patient and caregiver  - Arrange for needed discharge resources and transportation as appropriate  - Identify discharge learning needs (meds, wound care, etc.)  - Arrange for interpretive services to assist at discharge as needed  - Refer to Case Management Department for coordinating discharge planning if the patient needs post-hospital services based on physician/advanced practitioner order or complex needs related to functional status, cognitive ability, or social support system  Outcome: Progressing     Problem: Knowledge Deficit  Goal: Patient/family/caregiver demonstrates understanding of disease process, treatment plan, medications, and discharge instructions  Description: Complete learning assessment and assess knowledge base.  Interventions:  - Provide teaching at level of understanding  - Provide teaching via preferred learning methods  Outcome: Progressing     Problem: RESPIRATORY - ADULT  Goal: Achieves optimal ventilation and oxygenation  Description: INTERVENTIONS:  - Assess for changes in respiratory status  - Assess for changes in mentation and behavior  - Position  to facilitate oxygenation and minimize respiratory effort  - Oxygen administered by appropriate delivery if ordered  - Initiate smoking cessation education as indicated  - Encourage broncho-pulmonary hygiene including cough, deep breathe, Incentive Spirometry  - Assess the need for suctioning and aspirate as needed  - Assess and instruct to report SOB or any respiratory difficulty  - Respiratory Therapy support as indicated  Outcome: Progressing     Problem: GASTROINTESTINAL - ADULT  Goal: Minimal or absence of nausea and/or vomiting  Description: INTERVENTIONS:  - Administer IV fluids if ordered to ensure adequate hydration  - Maintain NPO status until nausea and vomiting are resolved  - Nasogastric tube if ordered  - Administer ordered antiemetic medications as needed  - Provide nonpharmacologic comfort measures as appropriate  - Advance diet as tolerated, if ordered  - Consider nutrition services referral to assist patient with adequate nutrition and appropriate food choices  Outcome: Progressing  Goal: Maintains or returns to baseline bowel function  Description: INTERVENTIONS:  - Assess bowel function  - Encourage oral fluids to ensure adequate hydration  - Administer IV fluids if ordered to ensure adequate hydration  - Administer ordered medications as needed  - Encourage mobilization and activity  - Consider nutritional services referral to assist patient with adequate nutrition and appropriate food choices  Outcome: Progressing     Problem: Nutrition/Hydration-ADULT  Goal: Nutrient/Hydration intake appropriate for improving, restoring or maintaining nutritional needs  Description: Monitor and assess patient's nutrition/hydration status for malnutrition. Collaborate with interdisciplinary team and initiate plan and interventions as ordered.  Monitor patient's weight and dietary intake as ordered or per policy. Utilize nutrition screening tool and intervene as necessary. Determine patient's food preferences  and provide high-protein, high-caloric foods as appropriate.     INTERVENTIONS:  - Monitor oral intake, urinary output, labs, and treatment plans  - Assess nutrition and hydration status and recommend course of action  - Evaluate amount of meals eaten  - Assist patient with eating if necessary   - Allow adequate time for meals  - Recommend/ encourage appropriate diets, oral nutritional supplements, and vitamin/mineral supplements  - Order, calculate, and assess calorie counts as needed  - Recommend, monitor, and adjust tube feedings and TPN/PPN based on assessed needs  - Assess need for intravenous fluids  - Provide specific nutrition/hydration education as appropriate  - Include patient/family/caregiver in decisions related to nutrition  Outcome: Progressing     Problem: HEMATOLOGIC - ADULT  Goal: Maintains hematologic stability  Description: INTERVENTIONS  - Assess for signs and symptoms of bleeding or hemorrhage  - Monitor labs  - Administer supportive blood products/factors as ordered and appropriate  Outcome: Progressing     Problem: METABOLIC, FLUID AND ELECTROLYTES - ADULT  Goal: Fluid balance maintained  Description: INTERVENTIONS:  - Monitor labs   - Monitor I/O and WT  - Instruct patient on fluid and nutrition as appropriate  - Assess for signs & symptoms of volume excess or deficit  Outcome: Progressing

## 2024-04-15 NOTE — CASE MANAGEMENT
Case Management Discharge Planning Note    Patient name Kulwinder Hernandez  Location /-01 MRN [id number]  : 1945 Date 4/15/2024       Current Admission Date: 2024  Current Admission Diagnosis:SBO (small bowel obstruction) (HCC)   Patient Active Problem List    Diagnosis Date Noted    Acute respiratory failure with hypoxia (HCC) 2024    Lung nodule 2024    Acute on chronic diastolic CHF (congestive heart failure) (HCC) 2024    Pneumonia of lower lobe due to infectious organism 2024    Leukocytosis 2023    Morbid obesity (HCC) 2022    Lactic acidosis 2021    CKD (chronic kidney disease) 2018    Dyslipidemia 2018    History of CVA (cerebrovascular accident) 2018    Renal cyst, left 2018    Acute kidney injury superimposed on chronic kidney disease  ([facility]) 2018    Dementia ([facility]) 2018    Elevated random blood glucose level 2018    Essential hypertension     Hyperlipidemia     Ventral hernia     SBO (small bowel obstruction) ([facility])     PVD (peripheral vascular disease) ([facility])       LOS (days): 6  Geometric Mean LOS (GMLOS) (days): 4.6  Days to GMLOS:-1.4     OBJECTIVE:  Risk of Unplanned Readmission Score: 13.52         Current admission status: Inpatient   Preferred Pharmacy:   [id number] [place] - [address]  [address] [facility] [address] [facility] [address]  Phone: [phone] Fax: [phone]    Primary Care Provider: Piyush Elizondo DO    Primary Insurance: MEDICARE  Secondary Insurance: CIGNA    DISCHARGE DETAILS:    Discharge planning discussed with:: patient  Freedom of Choice: Yes  Comments - Freedom of Choice: IMM reviewed and signed by pt.  Copy to pt and copy to MR for scanning                                                                             IMM Given (Date):: 04/15/24  IMM Given to:: Patient

## 2024-04-15 NOTE — ASSESSMENT & PLAN NOTE
Abdominal CT shows a obstruction but no signs of infection.  1/2 blood culture growing coag negative staph likely contaminant.  Procalcitonin trended down  Multifactorial could be in the setting of pneumonia/heart failure  Today's update and plan:  Bumped up a little without any fever, tachycardia, worsening of shortness of breath, dysuric symptoms

## 2024-04-15 NOTE — ASSESSMENT & PLAN NOTE
Lab Results   Component Value Date    EGFR 40 04/15/2024    EGFR 37 04/14/2024    EGFR 33 04/13/2024    CREATININE 1.61 (H) 04/15/2024    CREATININE 1.71 (H) 04/14/2024    CREATININE 1.88 (H) 04/13/2024     Patient has a wide range of baseline creatinine which is like 1.6-1.9 current is 1.6   IV Lasix 60 mg 3 times daily, I's and O's, daily weight as patient is fluid overloaded

## 2024-04-15 NOTE — NURSING NOTE
"IV access expiring. Patient refusing IV site change at this time, stating \"I do not want to be stuck anymore, I am hoping to go home tomorrow so it can last one more day.\" Current IV access is patent. Site is clean, dry and intact.   "

## 2024-04-15 NOTE — RESPIRATORY THERAPY NOTE
04/14/24 2126   Respiratory Assessment   Resp Comments placed pt on cpap for HS use   O2 Device v30   Non-Invasive Information   O2 Interface Device Face mask  (size medium, chnaged from a large)   Non-Invasive Ventilation Mode CPAP   SpO2 95 %   $ Pulse Oximetry Spot Check Charge Completed   Non-Invasive Settings   FiO2 (%)   (6L o2 bleed in)   PEEP/CPAP (cm H2O)   (auto 8-18 cmH2o)   Rise Time 2   Humidification   (n/a, dry circuit)   Non-Invasive Readings   Skin Intervention Skin intact   Total Rate 20   Spontaneous Vt (mL) 765   Spontaneous MV (mL) 13.9   Auto PEEP Observed (cm H2O) 10   I/E Ratio (Obs) 1:3.5   Leak (lpm) 24

## 2024-04-16 VITALS
HEART RATE: 86 BPM | DIASTOLIC BLOOD PRESSURE: 70 MMHG | HEIGHT: 68 IN | WEIGHT: 299.83 LBS | BODY MASS INDEX: 45.44 KG/M2 | SYSTOLIC BLOOD PRESSURE: 154 MMHG | OXYGEN SATURATION: 95 % | RESPIRATION RATE: 18 BRPM | TEMPERATURE: 97.7 F

## 2024-04-16 PROCEDURE — 99232 SBSQ HOSP IP/OBS MODERATE 35: CPT | Performed by: INTERNAL MEDICINE

## 2024-04-16 PROCEDURE — 99239 HOSP IP/OBS DSCHRG MGMT >30: CPT | Performed by: STUDENT IN AN ORGANIZED HEALTH CARE EDUCATION/TRAINING PROGRAM

## 2024-04-16 RX ORDER — ASPIRIN 81 MG/1
81 TABLET, CHEWABLE ORAL DAILY
Qty: 30 TABLET | Refills: 0 | Status: SHIPPED | OUTPATIENT
Start: 2024-04-17

## 2024-04-16 RX ORDER — MAGNESIUM SULFATE HEPTAHYDRATE 40 MG/ML
2 INJECTION, SOLUTION INTRAVENOUS ONCE
Status: COMPLETED | OUTPATIENT
Start: 2024-04-16 | End: 2024-04-16

## 2024-04-16 RX ORDER — FUROSEMIDE 40 MG/1
40 TABLET ORAL 2 TIMES DAILY
Qty: 60 TABLET | Refills: 0 | Status: SHIPPED | OUTPATIENT
Start: 2024-04-16

## 2024-04-16 RX ADMIN — FUROSEMIDE 60 MG: 10 INJECTION, SOLUTION INTRAMUSCULAR; INTRAVENOUS at 05:19

## 2024-04-16 RX ADMIN — APIXABAN 5 MG: 5 TABLET, FILM COATED ORAL at 08:16

## 2024-04-16 RX ADMIN — MAGNESIUM SULFATE HEPTAHYDRATE 2 G: 40 INJECTION, SOLUTION INTRAVENOUS at 10:25

## 2024-04-16 RX ADMIN — BUDESONIDE AND FORMOTEROL FUMARATE DIHYDRATE 2 PUFF: 80; 4.5 AEROSOL RESPIRATORY (INHALATION) at 08:16

## 2024-04-16 RX ADMIN — SPIRONOLACTONE 25 MG: 25 TABLET ORAL at 08:16

## 2024-04-16 RX ADMIN — FLUTICASONE PROPIONATE 1 SPRAY: 50 SPRAY, METERED NASAL at 08:16

## 2024-04-16 RX ADMIN — ASPIRIN 81 MG: 81 TABLET, CHEWABLE ORAL at 08:16

## 2024-04-16 RX ADMIN — AMLODIPINE BESYLATE 5 MG: 5 TABLET ORAL at 08:16

## 2024-04-16 NOTE — ASSESSMENT & PLAN NOTE
Lab Results   Component Value Date    EGFR 40 04/15/2024    EGFR 37 04/14/2024    EGFR 33 04/13/2024    CREATININE 1.61 (H) 04/15/2024    CREATININE 1.71 (H) 04/14/2024    CREATININE 1.88 (H) 04/13/2024     Patient has a wide range of baseline creatinine which is like 1.6-1.9 current is 1.6   He was receiving IV Lasix 60 mg 3 times daily, I's and O's   Appears euvolemic now- patient and family requesting discharged - will transition to oral lasix

## 2024-04-16 NOTE — DISCHARGE SUMMARY
[facility]  Discharge- Kulwinder Hernandez 1945, 78 y.o. male MRN: [id number]  Unit/Bed#: [facility] [place]-01 Encounter: [id number]  Primary Care Provider: Piyush Elizondo DO   Date and time admitted to hospital: 4/8/2024  5:49 PM    Acute respiratory failure with hypoxia (HCC)  Assessment & Plan  -Etiology multifactorial acute on chronic heart failure with preserved ejection fraction/pneumonia  -Patient saturations dropped when he lies flat in the bed dropped down to 86% on 6 L of oxygen and trended up to 99 to 2% when he wakes up or sit upright.  Patient is probably having severe obstructive sleep apnea/obesity hypoventilation syndrome  -Patient has been encouraged to start using CPAP.  He is agreeable to use CPAP.  -CPAP during naps and at night  -Consulted IR for bilateral thoracentesis as patient has more pleural effusion on the right side which could also contribute in the worsening of his hypoxemia however they were unable to find safe window  -High flow also ordered if patient started requiring more oxygen to keep SpO2 92 and above  -Consulted pulmonary  -Encourage out of bed to chair, incentive spirometry  -Completed IV ceftriaxone for total of 7 days    Pneumonia of lower lobe due to infectious organism  Assessment & Plan  Procal wnl  WBC improving.   Completed ceftriaxone and doxy    Continue with Flonase Symbicort      Acute on chronic diastolic CHF (congestive heart failure) (HCC)  Assessment & Plan  Wt Readings from Last 3 Encounters:   04/16/24 136 kg (299 lb 13.2 oz)   06/30/23 (!) 138 kg (305 lb 5.4 oz)   06/28/22 135 kg (298 lb 11.6 oz)     -Patient has history of chronic diastolic CHF.  -home regimen of Lasix, spironolactone, telmisartan were held due to SBO and patient being NPO.  -Patient subsequently developed shortness of breath, hypoxia   -Repeat troponin showed negative delta.  Procalcitonin has trended down.  -Repeat echo did not show any significant interval change.  -He was on IV  Lasix 60 mg 3 times daily  -Received 1 dose of Diuril  -Weaned down to 2L NC - home o2 requirement   -Discussed with Pulm cleared for DC         Lung nodule  Assessment & Plan  Outpatient follow-up with CT scan and PET/CT in 3 months    Leukocytosis  Assessment & Plan  Abdominal CT shows a obstruction but no signs of infection.  1/2 blood culture growing coag negative staph likely contaminant.  Procalcitonin trended down  Multifactorial could be in the setting of pneumonia/heart failure  Today's update and plan:  Bumped up a little without any fever, tachycardia, worsening of shortness of breath, dysuric symptoms      Lactic acidosis  Assessment & Plan  Lactic acidosis resolved.      CKD (chronic kidney disease)  Assessment & Plan  Lab Results   Component Value Date    EGFR 40 04/15/2024    EGFR 37 04/14/2024    EGFR 33 04/13/2024    CREATININE 1.61 (H) 04/15/2024    CREATININE 1.71 (H) 04/14/2024    CREATININE 1.88 (H) 04/13/2024     Patient has a wide range of baseline creatinine which is like 1.6-1.9 current is 1.6   He was receiving IV Lasix 60 mg 3 times daily, I's and O's   Appears euvolemic now- patient and family requesting discharged - will transition to oral lasix     PVD (peripheral vascular disease) ([facility])  Assessment & Plan  History of peripheral vascular disease.  History of aorto bifemoral bypass graft.  Son at the bedside assisting with history for the most part.  Son reports that patient has been complaining of having right lower extremity pain.    CT abdomen pelvis result Status post aortobifemoral bypass graft with occlusion of the right side of the graft along its length, with at least focal reconstitution at the right femoral artery anastomosis  LEAD 4/9/2024 demonstrates occluded right limb graft with occlusion of the mid and distal SFA.  Vascular surgery recommended no urgent surgical intervention.    Current on encounter patient appears comfortable not in distress.  No significant pain at rest  without signs of tissue loss.  No urgent intervention recommended repeat respiratory recommendation, once current medical issue resolves vascular surgery will have discussion with patient and family to proceed with vascular intervention and attempt to recanalize occluded right limb graft likely on outpatient basis.  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  Vascular surgery recommendation appreciated.  aspirin with Eliquis as per the vascular surgery and general surgery recommendation for the occluded peripheral artery disease.  Appreciate vascular surgery no urgent intervention needed    Essential hypertension  Assessment & Plan  Patient blood pressure stable  Continue with amlodipine,  Aldactone 25 mg    * SBO (small bowel obstruction) (HCC)  Assessment & Plan  78-year-old male patient with past medical history of recurrent SBO due to ventral hernia repaired with mesh.  Resolved      Medical Problems       Resolved Problems  Date Reviewed: 4/11/2024   None       Discharging Physician / Practitioner: Joel Nieto MD  PCP: Piyush Elizondo DO  Admission Date:   Admission Orders (From admission, onward)       Ordered        04/09/24 1558  INPATIENT ADMISSION  Once            04/08/24 2144  Place in Observation  Once                          Discharge Date: 04/16/24    Consultations During Hospital Stay:  Pulmonology   IR  Vascular surgery     Procedures Performed:   none    Significant Findings / Test Results:   CT chest-showed evidence of CHF with diffuse pulmonary edema, septal thickening and bilateral pleural effusions.  9 mm nodule in the left upper lobe.  Mild mediastinal lymphadenopathy, likely reactive.    Incidental Findings:   none     Test Results Pending at Discharge (will require follow up):   none     Outpatient Tests Requested:  none    Complications:  none    Reason for Admission: abd pain, n/v    Hospital Course:   Kulwinder Hernandez is a 78 y.o. male patient who originally presented to  the hospital on 4/8/2024 due to abd pain, n/v and CTAP showed small bowel obstruction with transition point in the anterior mid abdomen.  He was treated empirically with NGT, IVF and NPO. SBO resolved and he subsequently had a CHF exacerbation for which she was treated with IV diuresis.  Initially was on up to 4 to 6 L of oxygen which was weaned down to his 2 L home O2 requirement.  Pulmonology was consulted.  We did consult IR for possible thoracentesis however appropriate windows were not obtained and so he did not undergo the procedure.  He symptomatically improved and requested discharge on 4/16.  His son was present at bedside. Of note, he was also seen by vascular surgery for aortobifem occlusion and he was on hep gtt and transitioned to eliquis + aspirin. He will need outpatient vascular and pulmonology follow up. We also recommended he follow up with his PCP next week for repeat lab testing.       Please see above list of diagnoses and related plan for additional information.     Condition at Discharge: fair    Discharge Day Visit / Exam:   * Please refer to separate progress note for these details *    Discussion with Family: Updated  (son) at bedside.    Discharge instructions/Information to patient and family:   See after visit summary for information provided to patient and family.      Provisions for Follow-Up Care:  See after visit summary for information related to follow-up care and any pertinent home health orders.      Mobility at time of Discharge:   Basic Mobility Inpatient Raw Score: 21  JH-HLM Goal: 6: Walk 10 steps or more  JH-HLM Achieved: 7: Walk 25 feet or more  HLM Goal achieved. Continue to encourage appropriate mobility.     Disposition:   Home with VNA Services (Reminder: Complete face to face encounter)    Planned Readmission: none     Discharge Statement:  I spent 55 minutes discharging the patient. This time was spent on the day of discharge. I had direct contact with  the patient on the day of discharge. Greater than 50% of the total time was spent examining patient, answering all patient questions, arranging and discussing plan of care with patient as well as directly providing post-discharge instructions.  Additional time then spent on discharge activities.    Discharge Medications:  See after visit summary for reconciled discharge medications provided to patient and/or family.      **Please Note: This note may have been constructed using a voice recognition system**

## 2024-04-16 NOTE — PLAN OF CARE
Problem: INFECTION - ADULT  Goal: Absence or prevention of progression during hospitalization  Description: INTERVENTIONS:  - Assess and monitor for signs and symptoms of infection  - Monitor lab/diagnostic results  - Monitor all insertion sites, i.e. indwelling lines, tubes, and drains  - Monitor endotracheal if appropriate and nasal secretions for changes in amount and color  - [place] appropriate cooling/warming therapies per order  - Administer medications as ordered  - Instruct and encourage patient and family to use good hand hygiene technique  - Identify and instruct in appropriate isolation precautions for identified infection/condition  Outcome: Progressing     Problem: DISCHARGE PLANNING  Goal: Discharge to home or other facility with appropriate resources  Description: INTERVENTIONS:  - Identify barriers to discharge w/patient and caregiver  - Arrange for needed discharge resources and transportation as appropriate  - Identify discharge learning needs (meds, wound care, etc.)  - Arrange for interpretive services to assist at discharge as needed  - Refer to Case Management Department for coordinating discharge planning if the patient needs post-hospital services based on physician/advanced practitioner order or complex needs related to functional status, cognitive ability, or social support system  Outcome: Progressing     Problem: Knowledge Deficit  Goal: Patient/family/caregiver demonstrates understanding of disease process, treatment plan, medications, and discharge instructions  Description: Complete learning assessment and assess knowledge base.  Interventions:  - Provide teaching at level of understanding  - Provide teaching via preferred learning methods  Outcome: Progressing

## 2024-04-16 NOTE — CASE MANAGEMENT
Case Management Discharge Planning Note    Patient name Kulwinder Hernandez  Location /-01 MRN [id number]  : 1945 Date 2024       Current Admission Date: 2024  Current Admission Diagnosis:SBO (small bowel obstruction) (HCC)   Patient Active Problem List    Diagnosis Date Noted    Acute respiratory failure with hypoxia (HCC) 2024    Lung nodule 2024    Acute on chronic diastolic CHF (congestive heart failure) (HCC) 2024    Pneumonia of lower lobe due to infectious organism 2024    Leukocytosis 2023    Morbid obesity (HCC) 2022    Lactic acidosis 2021    CKD (chronic kidney disease) 2018    Dyslipidemia 2018    History of CVA (cerebrovascular accident) 2018    Renal cyst, left 2018    Acute kidney injury superimposed on chronic kidney disease  (HCC) 2018    Dementia ([facility]) 2018    Elevated random blood glucose level 2018    Essential hypertension     Hyperlipidemia     Ventral hernia     SBO (small bowel obstruction) (HCC)     PVD (peripheral vascular disease) ([facility])       LOS (days): 7  Geometric Mean LOS (GMLOS) (days): 4.6  Days to GMLOS:-2.3     OBJECTIVE:  Risk of Unplanned Readmission Score: 13.43         Current admission status: Inpatient   Preferred Pharmacy:   [id number] [place] - [address]  [address] [address]  Phone: [phone] Fax: [phone]    Primary Care Provider: Piyush Elizondo DO    Primary Insurance: MEDICARE  Secondary Insurance: [employer]    DISCHARGE DETAILS:    Discharge planning discussed with:: Patient and son at bedside  Freedom of Choice: Yes  Comments - Freedom of Choice: CM discussed freedom of choice as it pertains to discharge planning. CM informed son and pt that hospital bed was approved by provider. [facility] will follow up with pt and son if pt is approved by insurance. CHERYL provided patients son and pt with [facility], signed dme  delivery ticket placed in medical records. CM provided son with [facility] phone number to schedule delivery of wheel chair. CM was given patients NP at [facility] [phone]. CM called and left a voicemail, cm was called back by Jhoana. CM was informed that they would be able to get the pt proper referrals for a . CM asked if pt needed any more medical equipment would they be able to order it. Jhoana stated they have been having difficulty getting equipment for pt. CM provided them with [employer] who will be getting patients medical equipment. Patients son stated he would follow up with cpap with pulmonary as pt never gave back the cpap he has at home.  CM contacted family/caregiver?: Yes  Were Treatment Team discharge recommendations reviewed with patient/caregiver?: Yes  Did patient/caregiver verbalize understanding of patient care needs?: Yes  Were patient/caregiver advised of the risks associated with not following Treatment Team discharge recommendations?: Yes    Contacts  Patient Contacts: Hussein  Relationship to Patient:: Family  Contact Method: In Person  Phone Number: [phone]    Requested Home Health Care         Is the patient interested in HHC at discharge?: Yes  Home Health Discipline requested:: Nursing, Occupational Therapy, Physical Therapy  [facility]  Home Health Follow-Up Provider:: PCP  Home Health Services Needed:: Evaluate Functional Status and Safety, Oxygen Via Nasal Cannula, Strengthening/Theraputic Exercises to Improve Function, COPD Management, Gait/ADL Training, Heart Failure Management  Oxygen LPM Ordered (if applicable based on home health services needed):: 2 LPM  Homebound Criteria Met:: Requires the Assistance of Another Person for Safe Ambulation or to Leave the Home, Uses an Assist Device (i.e. cane, walker, etc)  Supporting Clincal Findings:: Limited Endurance, Fatigues Easliy in Short Distances, Requires  Oxygen    DME Referral Provided  Referral made for DME?: Yes  DME referral completed for the following items:: Wheelchair, Bedside Commode, Hospital Bed  DME Supplier Name:: [facility]    Other Referral/Resources/Interventions Provided:  Interventions: [facility]  Referral Comments: [facility] care reserved

## 2024-04-16 NOTE — PROGRESS NOTES
"Progress Note - Pulmonary   Kulwinder Hernandez 78 y.o. male MRN: [id number]  Unit/Bed#: -01 Encounter: [id number]    Assessment:  Acute on chronic hypoxemic respiratory failure  Acute on chronic diastolic CHF  Abnormal CT scan of the chest with likely pneumonia, possibly aspiration  Bilateral pleural effusions  Small bowel obstruction, resolved  TIANA not currently on CPAP  Emphysema/COPD without acute exacerbation    Plan:  Acute on chronic hypoxemic respiratory failure secondary to acute on chronic diastolic CHF, also appears to be pneumonia possibly aspiration in the setting of recent nausea/vomiting  O2 requirement has come down to 2 L which had been his baseline  Diuresis per primary service  He has received 7 days of antibiotics, no need for further antibiotics  IR attempted to drain effusions yesterday although there was not enough fluid to be drained  He does have a CT scan with 9 mm nodule left upper lobe with smaller lingular nodules as well as mild mediastinal lymphadenopathy-possibly on the setting of pneumonia/aspiration though will need short-term follow-up in 3 months to assess the lung nodules and the mediastinal adenopathy  Will need a new sleep study to obtain a new CPAP, had been diagnosed with sleep apnea many years ago but no longer has a CPAP  Continue Symbicort, as needed albuterol, would need outpatient PFTs  He is stable for discharge from pulmonary standpoint, can follow-up with us in the office in 1 week    Subjective:   Patient resting in the chair. Feeling well this morning and ready to go home.     Objective:     Vitals: Blood pressure 154/70, pulse 86, temperature 97.7 °F (36.5 °C), resp. rate 18, height 5' 8\" (1.727 m), weight 136 kg (299 lb 13.2 oz), SpO2 96%.,Body mass index is 45.59 kg/m².      Intake/Output Summary (Last 24 hours) at 4/16/2024 1039  Last data filed at 4/15/2024 1716  Gross per 24 hour   Intake --   Output 1560 ml   Net -1560 ml       Invasive Devices       Peripheral " "Intravenous Line  Duration             Peripheral IV 04/11/24 Dorsal (posterior);Right Forearm 5 days                    Physical Exam: /70   Pulse 86   Temp 97.7 °F (36.5 °C)   Resp 18   Ht 5' 8\" (1.727 m)   Wt 136 kg (299 lb 13.2 oz)   SpO2 96%   BMI 45.59 kg/m²   General appearance: alert and oriented, in no acute distress  Head: Normocephalic, without obvious abnormality, atraumatic  Eyes: negative findings: conjunctivae and sclerae normal  Lungs: diminished breath sounds  Heart: regular rate and rhythm  Abdomen: normal findings: soft, non-tender  Extremities:  bilateral LE edema  Skin:  warm and dry  Neurologic: Mental status: Alert, oriented, thought content appropriate     Labs: I have personally reviewed pertinent lab results., CBC: No results found for: \"WBC\", \"HGB\", \"HCT\", \"MCV\", \"PLT\", \"ADJUSTEDWBC\", \"RBC\", \"MCH\", \"MCHC\", \"RDW\", \"MPV\", \"NRBC\", CMP: No results found for: \"SODIUM\", \"K\", \"CL\", \"CO2\", \"ANIONGAP\", \"BUN\", \"CREATININE\", \"GLUCOSE\", \"CALCIUM\", \"AST\", \"ALT\", \"ALKPHOS\", \"PROT\", \"BILITOT\", \"EGFR\"  Imaging and other studies: I have personally reviewed pertinent reports.   and I have personally reviewed pertinent films in PACS    "

## 2024-04-16 NOTE — ASSESSMENT & PLAN NOTE
-Etiology multifactorial acute on chronic heart failure with preserved ejection fraction/pneumonia  -Patient saturations dropped when he lies flat in the bed dropped down to 86% on 6 L of oxygen and trended up to 99 to 2% when he wakes up or sit upright.  Patient is probably having severe obstructive sleep apnea/obesity hypoventilation syndrome  -Patient has been encouraged to start using CPAP.  He is agreeable to use CPAP.  -CPAP during naps and at night  -Consulted IR for bilateral thoracentesis as patient has more pleural effusion on the right side which could also contribute in the worsening of his hypoxemia however they were unable to find safe window  -High flow also ordered if patient started requiring more oxygen to keep SpO2 92 and above  -Consulted pulmonary  -Encourage out of bed to chair, incentive spirometry  -Completed IV ceftriaxone for total of 7 days

## 2024-04-16 NOTE — DISCHARGE INSTR - AVS FIRST PAGE
- New Prescriptions for lasix 40 mg twice a day, Eliquis 5 mg twice a daily and aspirin 81 mg daily were sent to [facility]   - Please follow-up with outpatient vascular surgery, pulmonology (referrals placed)  - Please follow up with PCP within 1 week for repeat Lab work and lasix dosing

## 2024-04-16 NOTE — ASSESSMENT & PLAN NOTE
Wt Readings from Last 3 Encounters:   04/16/24 136 kg (299 lb 13.2 oz)   06/30/23 (!) 138 kg (305 lb 5.4 oz)   06/28/22 135 kg (298 lb 11.6 oz)     -Patient has history of chronic diastolic CHF.  -home regimen of Lasix, spironolactone, telmisartan were held due to SBO and patient being NPO.  -Patient subsequently developed shortness of breath, hypoxia   -Repeat troponin showed negative delta.  Procalcitonin has trended down.  -Repeat echo did not show any significant interval change.  -He was on IV Lasix 60 mg 3 times daily  -Received 1 dose of Diuril  -Weaned down to 2L NC - home o2 requirement   -Discussed with Pulm cleared for DC

## 2024-04-17 LAB
BACTERIA BLD CULT: NORMAL
BACTERIA BLD CULT: NORMAL
DME PARACHUTE DELIVERY DATE ACTUAL: NORMAL
DME PARACHUTE DELIVERY DATE EXPECTED: NORMAL
DME PARACHUTE DELIVERY DATE EXPECTED: NORMAL
DME PARACHUTE DELIVERY DATE REQUESTED: NORMAL
DME PARACHUTE ITEM DESCRIPTION: NORMAL
DME PARACHUTE ORDER STATUS: NORMAL
DME PARACHUTE SUPPLIER NAME: NORMAL
DME PARACHUTE SUPPLIER PHONE: NORMAL

## 2024-04-17 NOTE — CASE MANAGEMENT
Case Management Discharge Planning Note    Patient name Kulwinder Hernandez  Location /-01 MRN [id number]  : 1945 Date 2024       Current Admission Date: 2024  Current Admission Diagnosis:SBO (small bowel obstruction) ([facility])   Patient Active Problem List    Diagnosis Date Noted    Acute respiratory failure with hypoxia (HCC) 2024    Lung nodule 2024    Acute on chronic diastolic CHF (congestive heart failure) ([facility]) 2024    Pneumonia of lower lobe due to infectious organism 2024    Leukocytosis 2023    Morbid obesity ([facility]) 2022    Lactic acidosis 2021    CKD (chronic kidney disease) 2018    Dyslipidemia 2018    History of CVA (cerebrovascular accident) 2018    Renal cyst, left 2018    Acute kidney injury superimposed on chronic kidney disease  ([facility]) 2018    Dementia ([facility]) 2018    Elevated random blood glucose level 2018    Essential hypertension     Hyperlipidemia     Ventral hernia     SBO (small bowel obstruction) ([facility])     PVD (peripheral vascular disease) ([facility])       LOS (days): 7  Geometric Mean LOS (GMLOS) (days): 4.6  Days to GMLOS:-2.3     OBJECTIVE:  Risk of Unplanned Readmission Score: 13.6         Current admission status: Inpatient   Preferred Pharmacy:   [id number] - [place], [place] - [address]  [address] [address]  Phone: [phone] Fax: [phone]    Primary Care Provider: Piyush Elizondo DO    Primary Insurance: MEDICARE  Secondary Insurance: CIGNA    DISCHARGE DETAILS:     CM contacted by bk from [facility] and informed patient is going with [facility] as their agency.

## 2024-04-29 ENCOUNTER — OFFICE VISIT (OUTPATIENT)
Age: 79
End: 2024-04-29
Payer: MEDICARE

## 2024-04-29 VITALS
OXYGEN SATURATION: 96 % | DIASTOLIC BLOOD PRESSURE: 82 MMHG | BODY MASS INDEX: 43.65 KG/M2 | HEIGHT: 68 IN | SYSTOLIC BLOOD PRESSURE: 139 MMHG | RESPIRATION RATE: 18 BRPM | HEART RATE: 92 BPM | WEIGHT: 288 LBS

## 2024-04-29 DIAGNOSIS — R91.1 LUNG NODULE: ICD-10-CM

## 2024-04-29 DIAGNOSIS — J18.9 PNEUMONIA OF LEFT LOWER LOBE DUE TO INFECTIOUS ORGANISM: ICD-10-CM

## 2024-04-29 DIAGNOSIS — J96.11 CHRONIC RESPIRATORY FAILURE WITH HYPOXIA (HCC): ICD-10-CM

## 2024-04-29 DIAGNOSIS — J44.9 CHRONIC OBSTRUCTIVE PULMONARY DISEASE, UNSPECIFIED COPD TYPE (HCC): Primary | ICD-10-CM

## 2024-04-29 PROCEDURE — 99214 OFFICE O/P EST MOD 30 MIN: CPT

## 2024-04-29 NOTE — PROGRESS NOTES
Pulmonary Follow Up Note  Kulwinder Hernandez 78 y.o. male MRN: [id number]  4/30/2024    Assessment/Plan:    Pneumonia of lower lobe due to infectious organism  -CT chest 4/13 with right lung opacities, treated for pnuemonia, suspected to be 2/2 vomiting  -Currently asymptomatic  -Can evaluate for resolution on next CT chest in 3 months    Lung nodule  -9 mm ADAM nodule as well as smaller lingular nodules found on CT chest during hospitalization  -Given patient's smoking history, he is at increased risk for malignancy  -Will repeat CT chest in 3 months, if unchanged and or increase in size, consider PET/CT    Chronic respiratory failure with hypoxia (HCC)  -Likely multifactorial from chronic diastolic CHF, alveolar hypoventilation, COPD of unknown severity  -Was on home oxygen PRN prior to hospitalization, now wearing 3L continuously  -Would benefit from CPAP at night given his history of TIANA, however, he is not interested in restarting PAP therapy    Plan:  -Continue 3L supplemental oxygen  -Check six minute walk test with PFTs  -May benefit from Pulmonary Rehab, discuss at next visit    Chronic obstructive pulmonary disease (HCC)  -Unknown severity. Previously diagnosed 7 years ago, no prior PFTs for review  -Carries a 43 pack year smoking history, quit 15 years ago  -There is moderate underlying centrilobular and paraseptal emphysema on CT imaging  -Has Symbicort at home, but not using. He does not feel he has daily pulmonary symptoms requiring maintenance inhaler    Plan:  -Check PFTs w/ Six minute walk to evaluate severity of suspected COPD  -Continue supplemental oxygen to maintain SPO2 above 88%  -Monitor symptoms off maintenance inhaler, if increasing symptoms consider starting LAMA or LAMA/LABA  -Would benefit from Pulmonary rehab, discuss at next visit  -Follow up in 3 months or sooner if needed      Diagnoses and all orders for this visit:    Chronic obstructive pulmonary disease, unspecified COPD type (HCC)  -      Complete PFT with post Bronchodilator and Six Minute walk; Future    Lung nodule  -     CT chest wo contrast; Future    Pneumonia of left lower lobe due to infectious organism  -     CT chest wo contrast; Future    Chronic respiratory failure with hypoxia (HCC)      Return in about 3 months (around 7/29/2024).      History of Present Illness     Chief Complaint:   Chief Complaint   Patient presents with    Follow-up       Patient ID: Kulwinder is a 78 y.o. y.o. male has a past medical history of CKD (chronic kidney disease) (11/08/2018), COPD (chronic obstructive pulmonary disease) (HCC), Diabetes mellitus (HCC), Hyperlipidemia, Hypertension, PVD (peripheral vascular disease) (HCC), Sleep apnea, Small bowel obstruction (HCC), and Ventral hernia.      4/29/2024  HPI: Kulwinder Hernandez is a 78 y.o. male with a PMH of diabetes, hypertension, COPD, CKD, TIANA, and ventral hernia with recurrent small bowel obstruction who is here today for a hospital follow-up visit.  He is accompanied by his son.  He was admitted 4/9-4/16 initially presenting with nausea, vomiting, abdominal pain and was found to have a small bowel obstruction.  During his stay, he also experienced CHF exacerbation. Suspected to have aspiration pneumonia versus pneumonitis, he completed antibiotics. Patient was noted to have bilateral pleural effusions, not tappable by IR.  CT chest also discovered a 9 mm ADAM nodule and mediastinal lymphadenopathy.  He was discharged on Symbicort inhaler and baseline home O2.    Patient is doing well with his breathing today.  Only complaint is cough with clear productive mucus.  Does not feel he is experiencing significant shortness of breath.  Able to walk to the bathroom and back multiple times per day due to his diuretics without issues.  He is not using his Symbicort inhaler given at discharge.  He is on 3 L nasal cannula continuously.  Denies any chest pain or increase in lower extremity swelling.    Prior to hospitalization,  he was not on inhalers regularly.  Diagnosed with COPD 7 years ago.  Patient was a previous CPAP user, but has not used in several years.  He has a very old machine at home.  He is not interested in restarting.      Review of Systems   Constitutional:  Negative for activity change, chills, fever and unexpected weight change.   HENT:  Negative for congestion, postnasal drip, rhinorrhea, sore throat and trouble swallowing.    Respiratory:  Positive for cough. Negative for chest tightness, shortness of breath and wheezing.    Cardiovascular:  Negative for chest pain, palpitations and leg swelling.   Allergic/Immunologic: Negative.        Historical Information   Past Medical History:   Diagnosis Date    CKD (chronic kidney disease) 11/08/2018    COPD (chronic obstructive pulmonary disease) (HCC)     Diabetes mellitus (HCC)     Hyperlipidemia     Hypertension     PVD (peripheral vascular disease) (HCC)     Sleep apnea     Small bowel obstruction (HCC)     Ventral hernia      Past Surgical History:   Procedure Laterality Date    ABDOMINAL HERNIA REPAIR      ABDOMINAL SURGERY      CHOLECYSTECTOMY      open    FEMORAL BYPASS Right     IR THORACENTESIS  4/15/2024     History reviewed. No pertinent family history.    Smoking history: He reports that he has quit smoking. His smoking use included cigarettes. He started smoking about 14 years ago. He has a 43 pack-year smoking history. He has never used smokeless tobacco.    Occupational History:     Immunization History   Administered Date(s) Administered    COVID-19 PFIZER VACCINE 0.3 ML IM 12/23/2021, 01/13/2022       Meds/Allergies     Current Outpatient Medications:     amLODIPine (NORVASC) 5 mg tablet, TK 1 T PO D, Disp: , Rfl:     apixaban (ELIQUIS) 5 mg, Take 1 tablet (5 mg total) by mouth 2 (two) times a day, Disp: 60 tablet, Rfl: 0    aspirin 81 mg chewable tablet, Chew 1 tablet (81 mg total) daily, Disp: 30 tablet, Rfl: 0    atorvastatin (LIPITOR)  "10 mg tablet, TK 1 T PO D, Disp: , Rfl:     furosemide (LASIX) 40 mg tablet, Take 1 tablet (40 mg total) by mouth 2 (two) times a day, Disp: 60 tablet, Rfl: 0    meloxicam (MOBIC) 7.5 mg tablet, Take 7.5 mg by mouth daily, Disp: , Rfl:     spironolactone (ALDACTONE) 25 mg tablet, Take 12.5 mg by mouth 2 (two) times a day , Disp: , Rfl:     telmisartan (MICARDIS) 40 mg tablet, Take 40 mg by mouth daily, Disp: , Rfl:     budesonide-formoterol (SYMBICORT) 80-4.5 MCG/ACT inhaler, Inhale 2 puffs 2 (two) times a day Rinse mouth after use., Disp: , Rfl:     donepezil (ARICEPT) 10 mg tablet, TK 1 T PO HS (Patient not taking: Reported on 2024), Disp: , Rfl:     Allergies: No Known Allergies      Vitals:  Vitals:    24 1512   BP: 139/82   BP Location: Right arm   Patient Position: Sitting   Cuff Size: Large   Pulse: 92   Resp: 18   SpO2: 96%   Weight: 131 kg (288 lb)   Height: 5' 8\" (1.727 m)   Oxygen Therapy  SpO2: 96 %  Oxygen Therapy: Supplemental oxygen  O2 Delivery Method: Nasal cannula  O2 Flow Rate (L/min): 3 L/min  .  Wt Readings from Last 3 Encounters:   24 131 kg (288 lb)   24 136 kg (299 lb 13.2 oz)   23 (!) 138 kg (305 lb 5.4 oz)     Body mass index is 43.79 kg/m².    Physical Exam  Vitals and nursing note reviewed.   Constitutional:       General: He is not in acute distress.     Appearance: Normal appearance. He is well-developed. He is morbidly obese.   Cardiovascular:      Rate and Rhythm: Normal rate and regular rhythm.   Pulmonary:      Effort: Pulmonary effort is normal. No respiratory distress.      Breath sounds: Examination of the left-lower field reveals rales. Rales present. No decreased breath sounds, wheezing or rhonchi.   Musculoskeletal:         General: No swelling.      Right lower le+ Pitting Edema present.      Left lower le+ Pitting Edema present.   Psychiatric:         Mood and Affect: Mood and affect normal.         Behavior: Behavior normal. Behavior is " "cooperative.           Labs: I have personally reviewed pertinent lab results.  Lab Results   Component Value Date    WBC 15.29 (H) 04/15/2024    HGB 10.4 (L) 04/15/2024    HCT 33.0 (L) 04/15/2024    MCV 95 04/15/2024     04/15/2024     Lab Results   Component Value Date    CALCIUM 9.0 04/15/2024    K 4.2 04/15/2024    CO2 34 (H) 04/15/2024     04/15/2024    BUN 36 (H) 04/15/2024    CREATININE 1.61 (H) 04/15/2024     No results found for: \"IGE\"  Lab Results   Component Value Date    ALT 48 04/09/2024    AST 35 04/09/2024    ALKPHOS 78 04/09/2024       Studies:    Imaging and other studies: I have personally reviewed pertinent reports and I have personally reviewed pertinent films in PACS     CT Chest 4/13/24  IMPRESSION:  1.  Evidence of congestive heart failure with diffuse pulmonary edema, septal thickening, and bilateral pleural effusions. More focal patchy opacities throughout the right lung also likely represent pulmonary edema with superimposed pneumonia less   likely.  2.  9 mm nodule in the left upper lobe with smaller lingular nodules and underlying emphysema. Based on current Fleischner Society 2017 Guidelines on incidental pulmonary nodule, either PET/CT scan evaluation, tissue sampling or short-term interval   follow-up non-contrast CT follow-up (initially in 3 months) may be considered appropriate.  3.  Mild mediastinal lymphadenopathy, likely reactive though attention on follow-up PET/CT recommended.  4.  Diffuse hepatic steatosis      EKG, Pathology, and Other Studies: I have personally reviewed pertinent reports.        Pulmonary function testing: none prior for review  "

## 2024-04-30 PROBLEM — J96.11 CHRONIC RESPIRATORY FAILURE WITH HYPOXIA (HCC): Status: ACTIVE | Noted: 2024-04-13

## 2024-04-30 PROBLEM — J44.9 CHRONIC OBSTRUCTIVE PULMONARY DISEASE (HCC): Status: ACTIVE | Noted: 2024-04-30

## 2024-04-30 NOTE — ASSESSMENT & PLAN NOTE
-Unknown severity. Previously diagnosed 7 years ago, no prior PFTs for review  -Carries a 43 pack year smoking history, quit 15 years ago  -There is moderate underlying centrilobular and paraseptal emphysema on CT imaging  -Has Symbicort at home, but not using. He does not feel he has daily pulmonary symptoms requiring maintenance inhaler    Plan:  -Check PFTs w/ Six minute walk to evaluate severity of suspected COPD  -Continue supplemental oxygen to maintain SPO2 above 88%  -Monitor symptoms off maintenance inhaler, if increasing symptoms consider starting LAMA or LAMA/LABA  -Would benefit from Pulmonary rehab, discuss at next visit  -Follow up in 3 months or sooner if needed

## 2024-04-30 NOTE — ASSESSMENT & PLAN NOTE
-9 mm ADAM nodule as well as smaller lingular nodules found on CT chest during hospitalization  -Given patient's smoking history, he is at increased risk for malignancy  -Will repeat CT chest in 3 months, if unchanged and or increase in size, consider PET/CT

## 2024-04-30 NOTE — ASSESSMENT & PLAN NOTE
-CT chest 4/13 with right lung opacities, treated for pnuemonia, suspected to be 2/2 vomiting  -Currently asymptomatic  -Can evaluate for resolution on next CT chest in 3 months

## 2024-04-30 NOTE — ASSESSMENT & PLAN NOTE
-Likely multifactorial from chronic diastolic CHF, alveolar hypoventilation, COPD of unknown severity  -Was on home oxygen PRN prior to hospitalization, now wearing 3L continuously  -Would benefit from CPAP at night given his history of TIANA, however, he is not interested in restarting PAP therapy    Plan:  -Continue 3L supplemental oxygen  -Check six minute walk test with PFTs  -May benefit from Pulmonary Rehab, discuss at next visit

## 2024-05-10 ENCOUNTER — CONSULT (OUTPATIENT)
Dept: VASCULAR SURGERY | Facility: CLINIC | Age: 79
End: 2024-05-10
Payer: MEDICARE

## 2024-05-10 VITALS
OXYGEN SATURATION: 95 % | BODY MASS INDEX: 42.74 KG/M2 | WEIGHT: 282 LBS | SYSTOLIC BLOOD PRESSURE: 122 MMHG | HEIGHT: 68 IN | HEART RATE: 88 BPM | RESPIRATION RATE: 18 BRPM | DIASTOLIC BLOOD PRESSURE: 64 MMHG

## 2024-05-10 DIAGNOSIS — E66.01 MORBID (SEVERE) OBESITY DUE TO EXCESS CALORIES (HCC): ICD-10-CM

## 2024-05-10 DIAGNOSIS — N18.32 CHRONIC KIDNEY DISEASE, STAGE 3B (HCC): Primary | ICD-10-CM

## 2024-05-10 DIAGNOSIS — I73.9 PVD (PERIPHERAL VASCULAR DISEASE) (HCC): ICD-10-CM

## 2024-05-10 PROCEDURE — 99215 OFFICE O/P EST HI 40 MIN: CPT | Performed by: SURGERY

## 2024-05-10 RX ORDER — CHLORHEXIDINE GLUCONATE ORAL RINSE 1.2 MG/ML
15 SOLUTION DENTAL ONCE
OUTPATIENT
Start: 2024-05-10 | End: 2024-05-10

## 2024-05-10 NOTE — PATIENT INSTRUCTIONS
1. Chronic kidney disease, stage 3b ([facility])    2. PVD (peripheral vascular disease) ([facility])  Assessment & Plan:  Rest pain and extreme short distance claudication due to occlusion of right aortofemoral limb with an DEE of 0.13.  No current tissue loss.  Discussed many options at length with the patient.  Femorofemoral bypass risks reexposure of aortobifemoral graft.  He is a set up for infection due to his obesity and diabetes that puts the groin incisions at risk.  Ultimately we decided on inflow from his right axillary artery to his right profunda femoris to avoid redo groin surgery and exposure of the previous graft.  He will need cardiac clearance prior to the surgery.    Orders:  -     Ambulatory Referral to Vascular Surgery  -     Case request operating room: Right axillofemoral bypass with 8mm ringed PTFE; Standing  -     Case request operating room: Right axillofemoral bypass with 8mm ringed PTFE  -     Ambulatory Referral to Cardiology; Future    3. Morbid (severe) obesity due to excess calories ([facility])

## 2024-05-10 NOTE — PROGRESS NOTES
Assessment/Plan:    PVD (peripheral vascular disease) ([facility])  Rest pain and extreme short distance claudication due to occlusion of right aortofemoral limb with an DEE of 0.13.  No current tissue loss.  Discussed many options at length with the patient.  Femorofemoral bypass risks reexposure of aortobifemoral graft.  He is a set up for infection due to his obesity and diabetes that puts the groin incisions at risk.  Ultimately we decided on inflow from his right axillary artery to his right profunda femoris to avoid redo groin surgery and exposure of the previous graft.  He will need cardiac clearance prior to the surgery.    Subjective:      Patient ID: Kulwinder Hernandez is a 78 y.o. male.    Patient is new to our practice and was referred by Dr. Nieto. Pt had a TAYLOR on 4/9/24 and CT abd/ pel 4/8/24. Pt c/o numbness in the RLE. Pt can walk about 10 steps before needing to rest. Pt has leg pain at night. Pt denies non-healing well.     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  He is barely able to walk several steps without debilitating claudication.  He currently has no tissue loss.  He was recently admitted to the hospital with a bowel obstruction and has since recovered.  He has chronic kidney disease with a baseline creatinine of 1.6.  I looked at his older CT scans which demonstrates patency of his profunda femoris at its origin.  His duplex studies also demonstrate profunda patency.  He has a pulse in his right wrist.  He is a diabetic man with truncal obesity and prior groin incisions that cover a prosthetic aortobifemoral bypass.  He has elevated risk of infection as well as the presence of prosthetic in his groin makes reoperative surgery on the groin extremely risky.  He also has a history of stroke has a left carotid artery occlusion and history of coronary disease.  He will need to see  "cardiology prior to the surgery.  He has chronic congestive heart failure and in an effort to prehydrated him for any endovascular attempt or CTA he could develop worsening of his CHF.  I suggested to him that this is a very high risk procedure.  He states that his life is hard to live at this point with how debilitated he is from his pain.  His son is in agreement.  Informed consent was obtained for the procedure while in the office.    Review of Systems   Constitutional: Negative.    HENT: Negative.     Eyes: Negative.    Respiratory:  Positive for shortness of breath.    Cardiovascular: Negative.    Gastrointestinal: Negative.    Endocrine: Negative.    Genitourinary: Negative.    Musculoskeletal:  Positive for arthralgias, back pain and gait problem.        BLE pain and weakness when walking   Skin: Negative.    Allergic/Immunologic: Negative.    Neurological:  Positive for numbness.   Hematological: Negative.    Psychiatric/Behavioral: Negative.           Objective:      /64 (BP Location: Left arm, Patient Position: Sitting)   Pulse 88   Resp 18   Ht 5' 8\" (1.727 m)   Wt 128 kg (282 lb)   SpO2 95%   BMI 42.88 kg/m²          Physical Exam  Constitutional:       Appearance: Normal appearance.   HENT:      Head: Normocephalic and atraumatic.      Nose: Nose normal. No rhinorrhea.   Eyes:      Extraocular Movements: Extraocular movements intact.      Pupils: Pupils are equal, round, and reactive to light.   Cardiovascular:      Rate and Rhythm: Normal rate and regular rhythm.      Pulses:           Radial pulses are 2+ on the right side.        Dorsalis pedis pulses are 0 on the right side and detected w/ Doppler on the left side.        Posterior tibial pulses are 0 on the right side and detected w/ Doppler on the left side.      Comments: Hyperemic and swollen right foot.  Minimal sensory.  No tissue loss.  Pulmonary:      Effort: Pulmonary effort is normal.      Breath sounds: No stridor. "   Abdominal:      General: There is no distension.      Tenderness: There is no abdominal tenderness.   Musculoskeletal:         General: No tenderness. Normal range of motion.      Cervical back: Normal range of motion and neck supple.   Skin:     General: Skin is warm.      Capillary Refill: Capillary refill takes less than 2 seconds.      Coloration: Skin is not jaundiced.   Neurological:      General: No focal deficit present.      Mental Status: He is alert and oriented to person, place, and time.   Psychiatric:         Mood and Affect: Mood normal.         Behavior: Behavior normal.     Operative Scheduling Information:    Hospital:  [facility]    Physician:  Me    Surgery: Right axillofemoral bypass    Urgency:  Standard    Level:  Level 3: Outpatients to be scheduled for elective surgery than can be delayed up to 4 weeks without reasonable expectation of detriment to patient    Case Length:  5 hours    Post-op Bed:  ICU    OR Table:  Standard    Equipment Needs:  Product/Implant: 8mm ringed PTFE conduit    Medication Instructions:  Aspirin:   Continue (do not hold)  Eliquis:  Hold for 2 days prior to procedure    Hydration:  No

## 2024-05-10 NOTE — LETTER
May 10, 2024     ZACHARIAH Bustos  [address] [address]    Patient: Kulwinder Hernandez   YOB: 1945   Date of Visit: 5/10/2024       Dear Dr. Reveles:    Thank you for referring Kulwinder Hernandez to me for evaluation. Below are my notes for this consultation.    If you have questions, please do not hesitate to call me. I look forward to following your patient along with you.         Sincerely,        Wayne Campos MD        CC: Joel Nieto MD  Kulwinder Hernandez    Wayne Campos MD  5/10/2024  6:40 PM  Sign when Signing Visit  Assessment/Plan:    PVD (peripheral vascular disease) (HCC)  Rest pain and extreme short distance claudication due to occlusion of right aortofemoral limb with an DEE of 0.13.  No current tissue loss.  Discussed many options at length with the patient.  Femorofemoral bypass risks reexposure of aortobifemoral graft.  He is a set up for infection due to his obesity and diabetes that puts the groin incisions at risk.  Ultimately we decided on inflow from his right axillary artery to his right profunda femoris to avoid redo groin surgery and exposure of the previous graft.  He will need cardiac clearance prior to the surgery.    Subjective:      Patient ID: Kulwinder Hernandez is a 78 y.o. male.    Patient is new to our practice and was referred by Dr. Nieto. Pt had a TAYLOR on 4/9/24 and CT abd/ pel 4/8/24. Pt c/o numbness in the RLE. Pt can walk about 10 steps before needing to rest. Pt has leg pain at night. Pt denies non-healing well.     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  He is barely able to walk several steps without debilitating claudication.  He currently has no tissue loss.  He was recently admitted to the hospital with a bowel obstruction and has since recovered.  He has chronic kidney disease with a baseline creatinine of 1.6.  I  "looked at his older CT scans which demonstrates patency of his profunda femoris at its origin.  His duplex studies also demonstrate profunda patency.  He has a pulse in his right wrist.  He is a diabetic man with truncal obesity and prior groin incisions that cover a prosthetic aortobifemoral bypass.  He has elevated risk of infection as well as the presence of prosthetic in his groin makes reoperative surgery on the groin extremely risky.  He also has a history of stroke has a left carotid artery occlusion and history of coronary disease.  He will need to see cardiology prior to the surgery.  He has chronic congestive heart failure and in an effort to prehydrated him for any endovascular attempt or CTA he could develop worsening of his CHF.  I suggested to him that this is a very high risk procedure.  He states that his life is hard to live at this point with how debilitated he is from his pain.  His son is in agreement.  Informed consent was obtained for the procedure while in the office.    Review of Systems   Constitutional: Negative.    HENT: Negative.     Eyes: Negative.    Respiratory:  Positive for shortness of breath.    Cardiovascular: Negative.    Gastrointestinal: Negative.    Endocrine: Negative.    Genitourinary: Negative.    Musculoskeletal:  Positive for arthralgias, back pain and gait problem.        BLE pain and weakness when walking   Skin: Negative.    Allergic/Immunologic: Negative.    Neurological:  Positive for numbness.   Hematological: Negative.    Psychiatric/Behavioral: Negative.           Objective:      /64 (BP Location: Left arm, Patient Position: Sitting)   Pulse 88   Resp 18   Ht 5' 8\" (1.727 m)   Wt 128 kg (282 lb)   SpO2 95%   BMI 42.88 kg/m²          Physical Exam  Constitutional:       Appearance: Normal appearance.   HENT:      Head: Normocephalic and atraumatic.      Nose: Nose normal. No rhinorrhea.   Eyes:      Extraocular Movements: Extraocular movements intact. "      Pupils: Pupils are equal, round, and reactive to light.   Cardiovascular:      Rate and Rhythm: Normal rate and regular rhythm.      Pulses:           Radial pulses are 2+ on the right side.        Dorsalis pedis pulses are 0 on the right side and detected w/ Doppler on the left side.        Posterior tibial pulses are 0 on the right side and detected w/ Doppler on the left side.      Comments: Hyperemic and swollen right foot.  Minimal sensory.  No tissue loss.  Pulmonary:      Effort: Pulmonary effort is normal.      Breath sounds: No stridor.   Abdominal:      General: There is no distension.      Tenderness: There is no abdominal tenderness.   Musculoskeletal:         General: No tenderness. Normal range of motion.      Cervical back: Normal range of motion and neck supple.   Skin:     General: Skin is warm.      Capillary Refill: Capillary refill takes less than 2 seconds.      Coloration: Skin is not jaundiced.   Neurological:      General: No focal deficit present.      Mental Status: He is alert and oriented to person, place, and time.   Psychiatric:         Mood and Affect: Mood normal.         Behavior: Behavior normal.     Operative Scheduling Information:    Hospital:  [facility]    Physician:  Me    Surgery: Right axillofemoral bypass    Urgency:  Standard    Level:  Level 3: Outpatients to be scheduled for elective surgery than can be delayed up to 4 weeks without reasonable expectation of detriment to patient    Case Length:  5 hours    Post-op Bed:  ICU    OR Table:  Standard    Equipment Needs:  Product/Implant: 8mm ringed PTFE conduit    Medication Instructions:  Aspirin:   Continue (do not hold)  Eliquis:  Hold for 2 days prior to procedure    Hydration:  No

## 2024-05-10 NOTE — ASSESSMENT & PLAN NOTE
Rest pain and extreme short distance claudication due to occlusion of right aortofemoral limb with an DEE of 0.13.  No current tissue loss.  Discussed many options at length with the patient.  Femorofemoral bypass risks reexposure of aortobifemoral graft.  He is a set up for infection due to his obesity and diabetes that puts the groin incisions at risk.  Ultimately we decided on inflow from his right axillary artery to his right profunda femoris to avoid redo groin surgery and exposure of the previous graft.  He will need cardiac clearance prior to the surgery.

## 2024-05-13 PROBLEM — J18.9 PNEUMONIA OF LOWER LOBE DUE TO INFECTIOUS ORGANISM: Status: RESOLVED | Noted: 2024-04-08 | Resolved: 2024-05-13

## 2024-05-14 ENCOUNTER — TELEPHONE (OUTPATIENT)
Dept: VASCULAR SURGERY | Facility: CLINIC | Age: 79
End: 2024-05-14

## 2024-05-14 NOTE — TELEPHONE ENCOUNTER
Operative Scheduling Information:     Hospital:  [facility]     Physician:  Me     Surgery: Right axillofemoral bypass     Urgency:  Standard     Level:  Level 3: Outpatients to be scheduled for elective surgery than can be delayed up to 4 weeks without reasonable expectation of detriment to patient     Case Length:  5 hours     Post-op Bed:  ICU     OR Table:  Standard     Equipment Needs:  Product/Implant: 8mm ringed PTFE conduit     Medication Instructions:  Aspirin:   Continue (do not hold)  Eliquis:  Hold for 2 days prior to procedure     Hydration:  No

## 2024-05-22 ENCOUNTER — OFFICE VISIT (OUTPATIENT)
Dept: CARDIOLOGY CLINIC | Facility: CLINIC | Age: 79
End: 2024-05-22
Payer: MEDICARE

## 2024-05-22 VITALS
DIASTOLIC BLOOD PRESSURE: 50 MMHG | HEART RATE: 92 BPM | TEMPERATURE: 96.6 F | OXYGEN SATURATION: 95 % | BODY MASS INDEX: 43.83 KG/M2 | SYSTOLIC BLOOD PRESSURE: 130 MMHG | HEIGHT: 68 IN | WEIGHT: 289.2 LBS

## 2024-05-22 DIAGNOSIS — I10 ESSENTIAL HYPERTENSION: ICD-10-CM

## 2024-05-22 DIAGNOSIS — I73.9 PVD (PERIPHERAL VASCULAR DISEASE) (HCC): ICD-10-CM

## 2024-05-22 DIAGNOSIS — I50.32 CHRONIC DIASTOLIC HEART FAILURE (HCC): ICD-10-CM

## 2024-05-22 DIAGNOSIS — I45.10 RIGHT BUNDLE BRANCH BLOCK (RBBB): ICD-10-CM

## 2024-05-22 DIAGNOSIS — Z01.810 PRE-OPERATIVE CARDIOVASCULAR EXAMINATION: Primary | ICD-10-CM

## 2024-05-22 DIAGNOSIS — E66.01 MORBID OBESITY (HCC): ICD-10-CM

## 2024-05-22 DIAGNOSIS — R06.02 SHORTNESS OF BREATH: ICD-10-CM

## 2024-05-22 PROCEDURE — 99204 OFFICE O/P NEW MOD 45 MIN: CPT | Performed by: INTERNAL MEDICINE

## 2024-05-22 PROCEDURE — 93000 ELECTROCARDIOGRAM COMPLETE: CPT | Performed by: INTERNAL MEDICINE

## 2024-05-22 NOTE — PROGRESS NOTES
[facility] [place]   [address] [id number] [phone]                                            Cardiology Office Consult  Kulwinder Hernandez, 78 y.o. male  YOB: 1945  MRN: [id number] Encounter: [id number]      PCP - Nova Reveles  Referring Provider - Wayne Campos*    No chief complaint on file.      Assessment  Pre-operative cardiovascular examination  Shortness of breath  Chronic diastolic heart failure  Right bundle branch block  Morbid Obesity, Body mass index is 43.97 kg/m².     Plan  Pre-operative cardiovascular evaluation  Planned procedure: Right axillary artery-Right profunda femoris bypass (, TBD)  Active cardiac complaints: Shortness of breath, recent heart failure during hospitalization  Cardiac co-morbidities: Chronic diastolic heart failure  Functional status: Limited by leg pain and PVD. Has had chronic shortness of breath with recent worsening HF exacerbation in 4/2024  No known h/o CAD  Vitals - reviewed and stable  ECG - NSR, RBBB  Echo -LVEF 55%, grade 1 diastolic dysfunction, no significant valvular abnormalities  With his limited functional capacity, high risk vascular surgery, and multiple risk factors, recommend further evaluation with stress testing before proceeding  Check nuclear Lexiscan stress test  If no significant ischemia on the stress test then he would be okay to proceed with planned vascular surgery as moderate cardiac risk.  Bella-operative cardiac medication management  Anticoagulation/anti-platelets  Aspirin 81, eliquis 5 mg bid for PVD --> defer to vascular surgical team on same. No known cardiac indication for anticoagulation    Chronic diastolic heart failure, Hypertension  Recently had heart failure exacerbation with weight up to 301 pounds and he received IV diuresis in the hospital  Currently on Lasix 40 mg twice daily, spironolactone 12.5 mg twice daily which she reports to be fairly  longstanding  Currently appears near euvolemic on exam and he denies any orthopnea, PND  SpO2 95% on room air today in the office  Continue current diuresis  Counseled regarding low-salt diet and need to optimize blood pressure control  Continue Lasix, spironolactone, telmisartan    Right bundle branch block  Noted on ECG since at least 2018  No current dizziness lightheadedness, near-syncope or syncope  Counseled regarding the natural history of the same and need to monitor  Follow-up annually on EKG      He should be established for continued cardiac care and I have made this clear to the with the patient and his son today during the visit --> he is from the [place] and as a result I have offered him a follow-up but brought as well.  Else he can establish with a cardiologist in the [place], if [facility] is too far for him    If no ischemia on stress test, then he can follow-up in about 4 months with cardiology    Results for orders placed or performed in visit on 05/22/24   POCT ECG    Impression    Normal sinus rhythm  Right bundle branch block       Orders Placed This Encounter   Procedures   • POCT ECG       Return in about 4 months (around 9/22/2024), or if symptoms worsen or fail to improve.      History of Present Illness   78 y.o. male comes in as a new patient for consultation regarding preoperative cardiovascular evaluation prior to upcoming vascular bypass surgery.    He reports no active complaints of chest pain, palpitations, dizziness or lightheadedness.  He reports having very limited activity at present due to having leg pain with minimal activity.  Few months ago he could walk around shorter distances, but now he has difficulty even with the same.  He does note that he has had chronic shortness of breath and also has COPD.  He apparently has oxygen available but only uses it as needed.  In April 2024, he was admitted to the hospital and was noted to have heart failure exacerbation,  pneumonia and bilateral pleural effusions.  He was treated with IV diuresis, bronchodilators and a thoracocentesis was attempted, but there was inadequate fluid for thoracocentesis.      Historical Information   Past Medical History:   Diagnosis Date   • CKD (chronic kidney disease) 11/08/2018   • COPD (chronic obstructive pulmonary disease) (HCC)    • Diabetes mellitus (HCC)    • Hyperlipidemia    • Hypertension    • PVD (peripheral vascular disease) (HCC)    • Sleep apnea    • Small bowel obstruction (HCC)    • Ventral hernia      Past Surgical History:   Procedure Laterality Date   • ABDOMINAL HERNIA REPAIR     • ABDOMINAL SURGERY     • CHOLECYSTECTOMY      open   • FEMORAL BYPASS Right    • IR THORACENTESIS  4/15/2024     No family history on file.  Current Outpatient Medications   Medication Instructions   • apixaban (ELIQUIS) 5 mg, Oral, 2 times daily   • aspirin 81 mg, Oral, Daily   • atorvastatin (LIPITOR) 10 mg tablet TK 1 T PO D   • donepezil (ARICEPT) 10 mg tablet TK 1 T PO HS   • furosemide (LASIX) 40 mg, Oral, 2 times daily   • meloxicam (MOBIC) 7.5 mg, Oral, Daily   • spironolactone (ALDACTONE) 12.5 mg, Oral, 2 times daily   • telmisartan (MICARDIS) 40 mg, Oral, Daily      No Known Allergies  Social History     Socioeconomic History   • Marital status:      Spouse name: Not on file   • Number of children: Not on file   • Years of education: Not on file   • Highest education level: Not on file   Occupational History   • Not on file   Tobacco Use   • Smoking status: Former     Current packs/day: 3.00     Average packs/day: 3.0 packs/day for 14.4 years (43.2 ttl pk-yrs)     Types: Cigarettes     Start date: 1/1/2010   • Smokeless tobacco: Never   Vaping Use   • Vaping status: Never Used   Substance and Sexual Activity   • Alcohol use: Never   • Drug use: Never   • Sexual activity: Not Currently     Partners: Female   Other Topics Concern   • Not on file   Social History Narrative   • Not on file  "    Social Determinants of Health     Financial Resource Strain: Not on file   Food Insecurity: No Food Insecurity (4/10/2024)    Hunger Vital Sign    • Worried About Running Out of Food in the Last Year: Never true    • Ran Out of Food in the Last Year: Never true   Transportation Needs: No Transportation Needs (4/10/2024)    PRAPARE - Transportation    • Lack of Transportation (Medical): No    • Lack of Transportation (Non-Medical): No   Physical Activity: Not on file   Stress: Not on file   Social Connections: Not on file   Intimate Partner Violence: Not on file   Housing Stability: Low Risk  (4/10/2024)    Housing Stability Vital Sign    • Unable to Pay for Housing in the Last Year: No    • Number of Places Lived in the Last Year: 1    • Unstable Housing in the Last Year: No        Review of Systems   All other systems reviewed and are negative.        Vitals:  Vitals:    05/22/24 1013   BP: 130/50   BP Location: Left arm   Patient Position: Sitting   Cuff Size: Adult   Pulse: 92   Temp: (!) 96.6 °F (35.9 °C)   SpO2: 95%   Weight: 131 kg (289 lb 3.2 oz)   Height: 5' 8\" (1.727 m)     BMI - Body mass index is 43.97 kg/m².  Wt Readings from Last 7 Encounters:   05/22/24 131 kg (289 lb 3.2 oz)   05/10/24 128 kg (282 lb)   04/29/24 131 kg (288 lb)   04/16/24 136 kg (299 lb 13.2 oz)   06/30/23 (!) 138 kg (305 lb 5.4 oz)   06/28/22 135 kg (298 lb 11.6 oz)   08/13/21 135 kg (297 lb 9.9 oz)       Physical Exam  Vitals and nursing note reviewed.   Constitutional:       General: He is not in acute distress.     Appearance: Normal appearance. He is well-developed. He is obese. He is not ill-appearing or diaphoretic.   HENT:      Head: Normocephalic and atraumatic.      Nose: No congestion.   Eyes:      General: No scleral icterus.     Conjunctiva/sclera: Conjunctivae normal.   Neck:      Vascular: No carotid bruit or JVD.   Cardiovascular:      Rate and Rhythm: Normal rate and regular rhythm.      Heart sounds: Murmur " "heard.      Systolic murmur is present with a grade of 2/6.      No friction rub. No gallop.   Pulmonary:      Effort: Pulmonary effort is normal. No respiratory distress.      Breath sounds: Normal breath sounds. No wheezing or rales.   Chest:      Chest wall: No tenderness.   Abdominal:      General: Abdomen is protuberant. There is no distension.      Palpations: Abdomen is soft.      Tenderness: There is no abdominal tenderness.   Musculoskeletal:         General: No swelling, tenderness or deformity.      Cervical back: Neck supple. No muscular tenderness.      Right lower leg: Edema present.      Left lower leg: Edema present.      Comments: Trace-1+ edema   Skin:     General: Skin is warm.      Findings: Lesion present.   Neurological:      General: No focal deficit present.      Mental Status: He is alert and oriented to person, place, and time. Mental status is at baseline.   Psychiatric:         Mood and Affect: Mood normal.         Behavior: Behavior normal.         Thought Content: Thought content normal.           Labs:  CBC:   Lab Results   Component Value Date    WBC 15.29 (H) 04/15/2024    RBC 3.46 (L) 04/15/2024    HGB 10.4 (L) 04/15/2024    HCT 33.0 (L) 04/15/2024    MCV 95 04/15/2024     04/15/2024    RDW 13.4 04/15/2024       CMP:   Lab Results   Component Value Date    K 4.2 04/15/2024     04/15/2024    CO2 34 (H) 04/15/2024    BUN 36 (H) 04/15/2024    CREATININE 1.61 (H) 04/15/2024    EGFR 40 04/15/2024    CALCIUM 9.0 04/15/2024    AST 35 04/09/2024    ALT 48 04/09/2024    ALKPHOS 78 04/09/2024       Magnesium:  Lab Results   Component Value Date    MG 1.8 (L) 04/15/2024       Lipid Profile:   No results found for: \"CHOL\", \"HDL\", \"TRIG\", \"LDLCALC\"    Thyroid Studies: No results found for: \"YUP2QTCBTQUJ\", \"T3FREE\", \"FREET4\", \"T8SRPOR\", \"Y2QDWPM\"    A1c:  No components found for: \"HGA1C\"    INR:  Lab Results   Component Value Date    INR 1.68 (H) 04/15/2024    INR 1.02 06/28/2022 "   5    Cardiac testing:   Results for orders placed during the hospital encounter of 21    Echo complete with contrast if indicated    Narrative  [address] [address] [phone]    Transthoracic Echocardiogram  2D, M-mode, Doppler, and Color Doppler    Study date:  15-Aug-2021    Patient: NABIL PENA  MR number: [id number]  Account number: [id number]  : 1945  Age: 76 years  Gender: Male  Status: Inpatient  Location: Bedside  Height: 70 in  Weight: 297 lb  BP: 145/ 60 mmHg    Indications: Hypertensive Renal Disease.    Diagnoses: I12.9 - Hypertensive chronic kidney disease with stage 1 through stage 4 chronic kidney disease, or unspecif    Sonographer:  SIMONE Stringer  Referring Physician:  Baron Diamond MD  Group:  [facility]  Interpreting Physician:  Tayler Toney MD    IMPRESSIONS:  Technically difficult study, poor endocardial resolution,    SUMMARY    LEFT VENTRICLE:  Systolic function was normal. LVEF 55-60 percent  This study was inadequate for the evaluation of regional wall motion.  Wall thickness was mildly increased.  There was mild concentric hypertrophy.  Doppler parameters were consistent with abnormal left ventricular relaxation (grade 1 diastolic dysfunction).    MITRAL VALVE:  There was mild annular calcification.  There was mild regurgitation.    TRICUSPID VALVE:  There was mild regurgitation.    HISTORY: PRIOR HISTORY: Hypertension, Hyperlipidemia, PVD, CVA, CKD.    PROCEDURE: The procedure was performed at the bedside. This was a routine study. The transthoracic approach was used. The study included complete 2D imaging, M-mode, complete spectral Doppler, and color Doppler. The heart rate was 89 bpm,  at the start of the study. Images were obtained from the parasternal, apical, subcostal, and suprasternal notch acoustic windows. Echocardiographic views were limited due to poor acoustic  window availability, decreased penetration, and  lung interference. This was a technically difficult study.    LEFT VENTRICLE: Size was normal. Systolic function was normal. LVEF 55-60 percent This study was inadequate for the evaluation of regional wall motion. Wall thickness was mildly increased. There was mild concentric hypertrophy. DOPPLER:  Doppler parameters were consistent with abnormal left ventricular relaxation (grade 1 diastolic dysfunction).    RIGHT VENTRICLE: The size was normal. Systolic function was normal.    LEFT ATRIUM: Size was at the upper limits of normal.    RIGHT ATRIUM: Size was normal.    MITRAL VALVE: There was mild annular calcification. Not well visualized. DOPPLER: There was no evidence for stenosis. There was mild regurgitation.    AORTIC VALVE: The valve was not well visualized. DOPPLER: There was no evidence for stenosis. There was no regurgitation.    TRICUSPID VALVE: DOPPLER: There was mild regurgitation.    PULMONIC VALVE: Not well visualized.    PERICARDIUM: There was no pericardial effusion.    AORTA: The root exhibited normal size.    SYSTEM MEASUREMENT TABLES    2D  %FS: 28 %  Ao Diam: 3.6 cm  EDV(Teich): 79.4 ml  EF(Teich): 54.5 %  ESV(Teich): 36.1 ml  IVSd: 1.3 cm  LA Area: 9.2 cm2  LA Diam: 4.5 cm  LVEDV MOD A4C: 113 ml  LVEF MOD A4C: 69.3 %  LVESV MOD A4C: 34.7 ml  LVIDd: 4.2 cm  LVIDs: 3 cm  LVLd A4C: 8.4 cm  LVLs A4C: 6.8 cm  LVPWd: 1.2 cm  RA Area: 10 cm2  RVIDd: 3.4 cm  SV MOD A4C: 78.3 ml  SV(Teich): 43.3 ml    CW  AV Vmax: 1.8 m/s  AV maxP.5 mmHg  MV PHT: 70.9 ms  MV VTI: 49.1 cm  MV Vmax: 1.6 m/s  MV Vmean: 1.1 m/s  MV maxPG: 10.1 mmHg  MV meanP.3 mmHg  MVA By PHT: 3.1 cm2    MM  TAPSE: 2.3 cm    PW  E' Sept: 0.1 m/s  E/E' Sept: 12.8  LVOT Env.Ti: 315.9 ms  LVOT VTI: 30.4 cm  LVOT Vmax: 1.2 m/s  LVOT Vmean: 1 m/s  LVOT maxP.1 mmHg  LVOT meanP.1 mmHg  MV A Ambrose: 1.2 m/s  MV Dec Meade: 5.1 m/s2  MV DecT: 241.5 ms  MV E Ambrose: 1.2 m/s  MV E/A Ratio:  1    [facility] Accredited Echocardiography Laboratory    Prepared and electronically signed by    Tayler Toney MD  Signed 15-Aug-2021 14:34:46    No results found for this or any previous visit.    No results found for this or any previous visit.    No results found for this or any previous visit.      IR IN-Patient Thoracentesis  Narrative: PROCEDURE:  Limited ultrasound to evaluate for pleural effusions    NUMBER OF IMAGES:  Multiple    INDICATION:  Concern for pleural effusions.    PROCEDURE:  Limited bilateral thorax ultrasound was performed to evaluate for pleural effusion.    FINDINGS:  Limited bilateral thorax ultrasound failed to identify a significant pleural fluid collection suitable for aspiration.  Impression: Limited ultrasound demonstrating absence of significant pleural suitable for aspiration.    Signed, performed, and dictated by ZACHARIAH Zamora  under the supervision of Dr. Bernstein    Workstation performed: [id number]

## 2024-05-23 ENCOUNTER — HOSPITAL ENCOUNTER (OUTPATIENT)
Dept: NON INVASIVE DIAGNOSTICS | Facility: CLINIC | Age: 79
Discharge: HOME | End: 2024-05-23
Payer: MEDICARE

## 2024-05-23 VITALS
HEIGHT: 68 IN | HEART RATE: 93 BPM | DIASTOLIC BLOOD PRESSURE: 60 MMHG | SYSTOLIC BLOOD PRESSURE: 154 MMHG | BODY MASS INDEX: 43.8 KG/M2 | WEIGHT: 289 LBS | OXYGEN SATURATION: 97 %

## 2024-05-23 DIAGNOSIS — Z01.810 PRE-OPERATIVE CARDIOVASCULAR EXAMINATION: ICD-10-CM

## 2024-05-23 DIAGNOSIS — R06.02 SHORTNESS OF BREATH: ICD-10-CM

## 2024-05-23 LAB
MAX HR PERCENT: 78 %
MAX HR: 112 BPM
NUC STRESS EJECTION FRACTION: 70 %
RATE PRESSURE PRODUCT: NORMAL
SL CV REST NUCLEAR ISOTOPE DOSE: 15.92 MCI
SL CV STRESS NUCLEAR ISOTOPE DOSE: 47.7 MCI
SL CV STRESS RECOVERY BP: NORMAL MMHG
SL CV STRESS RECOVERY HR: 104 BPM
SL CV STRESS RECOVERY O2 SAT: 99 %
STRESS ANGINA INDEX: 0
STRESS BASELINE BP: NORMAL MMHG
STRESS BASELINE HR: 93 BPM
STRESS O2 SAT REST: 97 %
STRESS PEAK HR: 112 BPM
STRESS POST EXERCISE DUR MIN: 3 MIN
STRESS POST EXERCISE DUR SEC: 0 SEC
STRESS POST O2 SAT PEAK: 95 %
STRESS POST PEAK BP: 158 MMHG
STRESS/REST PERFUSION RATIO: 1.04

## 2024-05-23 PROCEDURE — 93016 CV STRESS TEST SUPVJ ONLY: CPT | Performed by: INTERNAL MEDICINE

## 2024-05-23 PROCEDURE — 93018 CV STRESS TEST I&R ONLY: CPT | Performed by: INTERNAL MEDICINE

## 2024-05-23 PROCEDURE — 93017 CV STRESS TEST TRACING ONLY: CPT

## 2024-05-23 PROCEDURE — 78452 HT MUSCLE IMAGE SPECT MULT: CPT | Performed by: INTERNAL MEDICINE

## 2024-05-23 PROCEDURE — A9502 TC99M TETROFOSMIN: HCPCS

## 2024-05-23 PROCEDURE — 78452 HT MUSCLE IMAGE SPECT MULT: CPT

## 2024-05-23 RX ORDER — REGADENOSON 0.08 MG/ML
0.4 INJECTION, SOLUTION INTRAVENOUS ONCE
Status: COMPLETED | OUTPATIENT
Start: 2024-05-23 | End: 2024-05-23

## 2024-05-23 RX ADMIN — REGADENOSON 0.4 MG: 0.08 INJECTION, SOLUTION INTRAVENOUS at 10:03

## 2024-05-24 ENCOUNTER — PREP FOR PROCEDURE (OUTPATIENT)
Dept: VASCULAR SURGERY | Facility: CLINIC | Age: 79
End: 2024-05-24

## 2024-05-24 DIAGNOSIS — I73.9 PVD (PERIPHERAL VASCULAR DISEASE) (HCC): Primary | ICD-10-CM

## 2024-05-24 DIAGNOSIS — N18.32 CHRONIC KIDNEY DISEASE, STAGE 3B (HCC): ICD-10-CM

## 2024-05-24 NOTE — TELEPHONE ENCOUNTER
Verified patient's insurance   CONFIRMED - Patient's insurance is Medicare  Is patient requesting a call when authorization has been obtained? Patient did not request a call.    Surgery Date: 6/18/24  Primary Surgeon: CAP // Wayne Campos (NPI: [id number])  Assisting Surgeon: Not Applicable (N/A)  Facility: [facility] (Tax: [id number] / NPI: [id number])  Inpatient / Outpatient: Inpatient  Level: 3    Clearance Received: Yes, Cardiology . Dr. Malik 5/23/24  Consent Received: Yes, scanned into Epic on 5/10/24.  Medication Hold / Last Dose:  No hold on ASA, hold Eliquis 2 days prior, last dose 6/15/24  IR Notified: Not Applicable (N/A)  Rep. Notified: Not Applicable (N/A)  Equipment Needs: Product/Implant: 8mm ringed PTFE conduit   Vas Lab Requested: Not Applicable (N/A)  Patient Contacted: 5/24/24 spoke to son    Diagnosis: I73.9  Procedure/ CPT Code(s): BYPASS - Axillary-Femoral // CPT: 19451    For varicose vein related procedures:   Last LEVDR: Not Applicable (N/A)  CEAP Classification: Not Applicable (N/A)  VCSS: Not Applicable (N/A)    Post Operative Date/ Time: 7/3/24 , 330pm Rukhsana with Brianna Castellon (NPI: [id number])     *Please review medication hold(s), PATs, and check H&P with patient.*  PATIENT WAS MAILED SURGERY/SHOWERING/DISCHARGE/COVID INSTRUCTIONS AFTER REVIEWING WITH THEM VIA PHONE CALL.

## 2024-05-24 NOTE — RESULT ENCOUNTER NOTE
Called pt and left message to see if he has any questions about his test results since he already saw them on mychart. Left office call back number.

## 2024-05-25 LAB
CHEST PAIN STATEMENT: NORMAL
MAX DIASTOLIC BP: 54 MMHG
MAX PREDICTED HEART RATE: 142 BPM
PROTOCOL NAME: NORMAL
REASON FOR TERMINATION: NORMAL
STRESS POST EXERCISE DUR MIN: 3 MIN
STRESS POST EXERCISE DUR SEC: 0 SEC
STRESS POST PEAK HR: 112 BPM
STRESS POST PEAK SYSTOLIC BP: 158 MMHG
TARGET HR FORMULA: NORMAL
TEST INDICATION: NORMAL

## 2024-06-03 PROBLEM — R65.10 SIRS (SYSTEMIC INFLAMMATORY RESPONSE SYNDROME) (HCC): Status: ACTIVE | Noted: 2024-01-01

## 2024-06-03 NOTE — ASSESSMENT & PLAN NOTE
PAD s/p lmpgz-ct-buxrhoc bypass 9/27/2011 presents with worsening RLE rest pain in setting of occlude R aortofemoral limb with planned upcoming intervention (R AX-fem bypass w/ PTFE).   Continue heparin infusion, ASA 81mg qd and atorvastatin. Eliquis held.  Vascular surgery consult  Serial neurovascular checks  Cardiology consult.  History of CKD3, CHF and CVA. Patient is at high risk for perioperative complications. He had recent TTE and ischemic evaluation with pharmacological stress test which was reassuring. Need cardiology and nephrology clearance prior to proceed with procedure. If urgent surgery required then benefits and risks to be discussed with family at discretion of vascular surgery.

## 2024-06-03 NOTE — EMTALA/ACUTE CARE TRANSFER
[facility] EMERGENCY DEPARTMENT  [address]  [place] [address]  Dept: [phone]      EMTALA TRANSFER CONSENT    NAME Kulwinder MEANS 1945                              MRN [id number]    I have been informed of my rights regarding examination, treatment, and transfer   by Dr. Delfina Aleman, DO    Benefits: Specialized equipment and/or services available at the receiving facility (Include comment)________________________, Continuity of care    Risks: Potential for delay in receiving treatment, Potential deterioration of medical condition, Loss of IV, Increased discomfort during transfer      Transfer Request   I acknowledge that my medical condition has been evaluated and explained to me by the emergency department physician or other qualified medical person and/or my attending physician who has recommended and offered to me further medical examination and treatment. I understand the Hospital's obligation with respect to the treatment and stabilization of my emergency medical condition. I nevertheless request to be transferred. I release the Hospital, the doctor, and any other persons caring for me from all responsibility or liability for any injury or ill effects that may result from my transfer and agree to accept all responsibility for the consequences of my choice to transfer, rather than receive stabilizing treatment at the Hospital. I understand that because the transfer is my request, my insurance may not provide reimbursement for the services.  The Hospital will assist and direct me and my family in how to make arrangements for transfer, but the hospital is not liable for any fees charged by the transport service.  In spite of this understanding, I refuse to consent to further medical examination and treatment which has been offered to me, and request transfer to  . I authorize the performance of emergency medical procedures and  treatments upon me in both transit and upon arrival at the receiving facility.  Additionally, I authorize the release of any and all medical records to the receiving facility and request they be transported with me, if possible.    I authorize the performance of emergency medical procedures and treatments upon me in both transit and upon arrival at the receiving facility.  Additionally, I authorize the release of any and all medical records to the receiving facility and request they be transported with me, if possible.  I understand that the safest mode of transportation during a medical emergency is an ambulance and that the Hospital advocates the use of this mode of transport. Risks of traveling to the receiving facility by car, including absence of medical control, life sustaining equipment, such as oxygen, and medical personnel has been explained to me and I fully understand them.    (PENNY CORRECT BOX BELOW)  [  ]  I consent to the stated transfer and to be transported by ambulance/helicopter.  [  ]  I consent to the stated transfer, but refuse transportation by ambulance and accept full responsibility for my transportation by car.  I understand the risks of non-ambulance transfers and I exonerate the Hospital and its staff from any deterioration in my condition that results from this refusal.    X___________________________________________    DATE  24  TIME________  Signature of patient or legally responsible individual signing on patient behalf           RELATIONSHIP TO PATIENT_________________________          Provider Certification    NAME Kulwinder Hernandez                                        [facility] 1945                              MRN [id number]    A medical screening exam was performed on the above named patient.  Based on the examination:    Condition Necessitating Transfer The primary encounter diagnosis was Right leg pain. Diagnoses of Ischemic leg, HARVINDER (acute kidney injury) (HCC), and Anemia were  also pertinent to this visit.    Patient Condition: The patient has been stabilized such that within reasonable medical probability, no material deterioration of the patient condition or the condition of the unborn child(eliana) is likely to result from the transfer    Reason for Transfer: Level of Care needed not available at this facility    Transfer Requirements: Facility     Space available and qualified personnel available for treatment as acknowledged by    Agreed to accept transfer and to provide appropriate medical treatment as acknowledged by       [place]  Appropriate medical records of the examination and treatment of the patient are provided at the time of transfer   STAFF INITIAL WHEN COMPLETED _______  Transfer will be performed by qualified personnel from    and appropriate transfer equipment as required, including the use of necessary and appropriate life support measures.    Provider Certification: I have examined the patient and explained the following risks and benefits of being transferred/refusing transfer to the patient/family:         Based on these reasonable risks and benefits to the patient and/or the unborn child(eliana), and based upon the information available at the time of the patient’s examination, I certify that the medical benefits reasonably to be expected from the provision of appropriate medical treatments at another medical facility outweigh the increasing risks, if any, to the individual’s medical condition, and in the case of labor to the unborn child, from effecting the transfer.    X____________________________________________ DATE 06/03/24        TIME_______      ORIGINAL - SEND TO MEDICAL RECORDS   COPY - SEND WITH PATIENT DURING TRANSFER

## 2024-06-03 NOTE — ASSESSMENT & PLAN NOTE
Patient is volume overloaded on exam. Reported hypoxia and decreased urine output at home. CXR with congestive changes concerning for interstitial edema. Patient himself denies sob while on 2L NC.   TTE 4/2024: EF 55%, G1DD, normal wall motions.  Home regimen: oral lasix 40 mg bid and spirolactone 25 mg qd  Start IV Lasix 40mg TID. Will give 1 dose of albumin given soft BP.   Monitor I/Os, daily weights and BMP  Cardiology consult.

## 2024-06-03 NOTE — ASSESSMENT & PLAN NOTE
Baseline 1.5-2. On presentation 3.22  Suspect multifactorial secondary to cardiorenal etiology, ARBs use, urinary retention, soft BP, PAD  Monitor with IV diuresis  Consult nephrology.   Urinary retention protocol. Noted >800cc on bladder scan. Rowland cathter will be placed. Discussed with family and RN.   Obtain Kidney/bladder US

## 2024-06-03 NOTE — ED PROVIDER NOTES
History  Chief Complaint   Patient presents with    Leg Pain     Right leg pain that began yesterday, patient poor historian, referring hx to son that is not present at this time, however denies falling.      HPI    Patient is a 78-year-old male present emerged department for increased swelling in his right lower extremity.  Patient is also had some increase in his other extremity.  Family reports some abnormal lab work performed a few days ago and was sent in by the primary care physician.  Of note patient started requiring his nasal cannula last evening.  His pulse ox dropped at home and has been on 2 L since.  Patient reports decreased urine output.  Denies any fevers chills, dysuria or abdominal discomfort.  Patient scheduled to have an axillofemoral bypass.  Additional history includes hypertension, hyperlipidemia, COPD and diabetes.    Prior to Admission Medications   Prescriptions Last Dose Informant Patient Reported? Taking?   apixaban (ELIQUIS) 5 mg  Self No No   Sig: Take 1 tablet (5 mg total) by mouth 2 (two) times a day   aspirin 81 mg chewable tablet  Self No No   Sig: Chew 1 tablet (81 mg total) daily   atorvastatin (LIPITOR) 10 mg tablet  Self Yes No   Sig: TK 1 T PO D   donepezil (ARICEPT) 10 mg tablet  Self Yes No   Sig: TK 1 T PO HS   Patient not taking: Reported on 4/9/2024   furosemide (LASIX) 40 mg tablet  Self No No   Sig: Take 1 tablet (40 mg total) by mouth 2 (two) times a day   meloxicam (MOBIC) 7.5 mg tablet  Self Yes No   Sig: Take 7.5 mg by mouth daily   spironolactone (ALDACTONE) 25 mg tablet  Self Yes No   Sig: Take 12.5 mg by mouth 2 (two) times a day    telmisartan (MICARDIS) 40 mg tablet  Self Yes No   Sig: Take 40 mg by mouth daily      Facility-Administered Medications: None       Past Medical History:   Diagnosis Date    CKD (chronic kidney disease) 11/08/2018    COPD (chronic obstructive pulmonary disease) (HCC)     Diabetes mellitus (HCC)     Hyperlipidemia     Hypertension      PVD (peripheral vascular disease) (HCC)     Sleep apnea     Small bowel obstruction (HCC)     Ventral hernia        Past Surgical History:   Procedure Laterality Date    ABDOMINAL HERNIA REPAIR      ABDOMINAL SURGERY      CHOLECYSTECTOMY      open    FEMORAL BYPASS Right     IR THORACENTESIS  4/15/2024       History reviewed. No pertinent family history.  I have reviewed and agree with the history as documented.    E-Cigarette/Vaping    E-Cigarette Use Never User      E-Cigarette/Vaping Substances     Social History     Tobacco Use    Smoking status: Former     Current packs/day: 3.00     Average packs/day: 3.0 packs/day for 14.4 years (43.3 ttl pk-yrs)     Types: Cigarettes     Start date: 1/1/2010    Smokeless tobacco: Never   Vaping Use    Vaping status: Never Used   Substance Use Topics    Alcohol use: Never    Drug use: Never       Review of Systems   Constitutional:  Negative for chills and fever.   HENT:  Negative for ear pain and sore throat.    Eyes:  Negative for pain and visual disturbance.   Respiratory:  Positive for shortness of breath. Negative for cough.    Cardiovascular:  Negative for chest pain and palpitations.   Gastrointestinal:  Negative for abdominal pain and vomiting.   Genitourinary:  Negative for dysuria and hematuria.   Musculoskeletal:  Negative for arthralgias and back pain.   Skin:  Positive for color change. Negative for rash.        Intermittent mottling on the right lower extremity   Neurological:  Negative for seizures and syncope.   All other systems reviewed and are negative.      Physical Exam  Physical Exam  Vitals and nursing note reviewed.   Constitutional:       General: He is not in acute distress.     Appearance: He is well-developed.   HENT:      Head: Normocephalic and atraumatic.      Mouth/Throat:      Mouth: Mucous membranes are dry.      Pharynx: Oropharynx is clear.   Eyes:      Extraocular Movements: Extraocular movements intact.      Conjunctiva/sclera:  Conjunctivae normal.      Pupils: Pupils are equal, round, and reactive to light.   Cardiovascular:      Rate and Rhythm: Normal rate.      Heart sounds: No murmur heard.  Pulmonary:      Effort: Pulmonary effort is normal. No respiratory distress.      Breath sounds: Normal breath sounds.   Abdominal:      Palpations: Abdomen is soft.      Tenderness: There is no abdominal tenderness.   Musculoskeletal:         General: No swelling.      Cervical back: Neck supple.      Comments: +4 pitting edema bilateral lower extremities   Skin:     General: Skin is warm and dry.      Capillary Refill: Capillary refill takes less than 2 seconds.      Comments: Positional mottling right lower extremity   Neurological:      General: No focal deficit present.      Mental Status: He is alert and oriented to person, place, and time.   Psychiatric:         Mood and Affect: Mood normal.         Vital Signs  ED Triage Vitals [06/03/24 1400]   Temperature Pulse Respirations Blood Pressure SpO2   98.5 °F (36.9 °C) 95 18 115/51 93 %      Temp Source Heart Rate Source Patient Position - Orthostatic VS BP Location FiO2 (%)   Oral -- Lying Right arm --      Pain Score       --           Vitals:    06/03/24 1400   BP: 115/51   Pulse: 95   Patient Position - Orthostatic VS: Lying         Visual Acuity      ED Medications  Medications - No data to display    Diagnostic Studies  Results Reviewed       Procedure Component Value Units Date/Time    APTT [[id number]] Collected: 06/03/24 1447    Lab Status: No result Specimen: Blood from Arm, Right     Protime-INR [[id number]] Collected: 06/03/24 1447    Lab Status: No result Specimen: Blood from Arm, Right     CBC and differential [[id number]]  (Abnormal) Collected: 06/03/24 1437    Lab Status: Final result Specimen: Blood from Arm, Right Updated: 06/03/24 1445     WBC 12.66 Thousand/uL      RBC 2.99 Million/uL      Hemoglobin 9.3 g/dL      Hematocrit 28.2 %      MCV 94 fL      MCH 31.1 pg      MCHC  33.0 g/dL      RDW 14.3 %      MPV 10.0 fL      Platelets 258 Thousands/uL      nRBC 0 /100 WBCs      Segmented % 73 %      Immature Grans % 1 %      Lymphocytes % 14 %      Monocytes % 8 %      Eosinophils Relative 4 %      Basophils Relative 0 %      Absolute Neutrophils 9.36 Thousands/µL      Absolute Immature Grans 0.08 Thousand/uL      Absolute Lymphocytes 1.71 Thousands/µL      Absolute Monocytes 1.03 Thousand/µL      Eosinophils Absolute 0.45 Thousand/µL      Basophils Absolute 0.03 Thousands/µL     HS Troponin 0hr (reflex protocol) [[id number]] Collected: 06/03/24 1437    Lab Status: In process Specimen: Blood from Arm, Right Updated: 06/03/24 1443    Comprehensive metabolic panel [[id number]] Collected: 06/03/24 1437    Lab Status: In process Specimen: Blood from Arm, Right Updated: 06/03/24 1443    COVID/FLU/RSV [[id number]] Collected: 06/03/24 1437    Lab Status: In process Specimen: Nares from Nose Updated: 06/03/24 1443    UA w Reflex to Microscopic w Reflex to Culture [[id number]]     Lab Status: No result Specimen: Urine                    XR chest 2 views    (Results Pending)   VAS ARTERIAL DUPLEX- LOWER LIMB BILATERAL    (Results Pending)   CT head without contrast    (Results Pending)              Procedures  Procedures         ED Course                               SBIRT 20yo+      Flowsheet Row Most Recent Value   Initial Alcohol Screen: US AUDIT-C     1. How often do you have a drink containing alcohol? 0 Filed at: 06/03/2024 1444   2. How many drinks containing alcohol do you have on a typical day you are drinking?  0 Filed at: 06/03/2024 1444   3a. Male UNDER 65: How often do you have five or more drinks on one occasion? 0 Filed at: 06/03/2024 1444   Audit-C Score 0 Filed at: 06/03/2024 1444   LANCE: How many times in the past year have you...    Used an illegal drug or used a prescription medication for non-medical reasons? Never Filed at: 06/03/2024 1444                      Medical Decision  Making  EKG: rate 103. Sinus tachycardia with rbbb. Similar to prior 6/24    Amount and/or Complexity of Data Reviewed  Labs: ordered.  Radiology: ordered.  ECG/medicine tests: ordered and independent interpretation performed.    Risk  Prescription drug management.             Disposition  Final diagnoses:   None     ED Disposition       None          Follow-up Information    None         Patient's Medications   Discharge Prescriptions    No medications on file       No discharge procedures on file.    PDMP Review         Value Time User    PDMP Reviewed  Yes 6/28/2022  8:52 AM Atul Barrow MD            ED Provider  Electronically Signed by           Basophils Absolute 0.03 Thousands/µL                    VAS ARTERIAL DUPLEX- LOWER LIMB BILATERAL   Final Result by Coco Clark MD (06/03 2114)      CT head without contrast   Final Result by Kiko Cook MD (06/03 1543)      No acute intracranial abnormality.                  Workstation performed: [id number]         XR chest 2 views   Final Result by Hussein Miranda MD (06/03 1659)   1.  Interstitial edema.   2.  Please refer to prior CT chest report for follow-up recommendations regarding the left upper lobe pulmonary nodule.            Workstation performed: [id number]                    Procedures  Procedures         ED Course                               SBIRT 22yo+      Flowsheet Row Most Recent Value   Initial Alcohol Screen: US AUDIT-C     1. How often do you have a drink containing alcohol? 0 Filed at: 06/03/2024 1444   2. How many drinks containing alcohol do you have on a typical day you are drinking?  0 Filed at: 06/03/2024 1444   3a. Male UNDER 65: How often do you have five or more drinks on one occasion? 0 Filed at: 06/03/2024 1444   Audit-C Score 0 Filed at: 06/03/2024 1444   LANCE: How many times in the past year have you...    Used an illegal drug or used a prescription medication for non-medical reasons? Never Filed at: 06/03/2024 1444                      Medical Decision Making  EKG: rate 103. Sinus tachycardia with rbbb. Similar to prior 6/24    Differential ischemic leg, DVT, weakness.    Patient is a 78-year-old male present emerged department no acute respiratory distress and no signs of overall unremarkable.    On physical exam patient has an intermittent mottling of the right lower extremity.  The right extremity is cool to touch.  Patient has decreased ability to move this extremity.  Concern for worsening of peripheral vascular disease.    Had discussion with me vascular team member on call.  After discussion would like to transfer patient for further care.    Patient  was started on heparin.  Renal function impaired to get any additional CT imaging.  Vascular recommends holding for now.  Doppler obtained.  Discussion with patient and family at bedside need for transfer and agreeable disposition at this time.  Disposition transfer.    Amount and/or Complexity of Data Reviewed  Labs: ordered.  Radiology: ordered.  ECG/medicine tests: ordered and independent interpretation performed.    Risk  Prescription drug management.             Disposition  Final diagnoses:   Right leg pain   Ischemic leg   HARVINDER (acute kidney injury) (HCC)   Anemia     Time reflects when diagnosis was documented in both MDM as applicable and the Disposition within this note       Time User Action Codes Description Comment    6/3/2024  4:00 PM Delfina lAeman Add [M79.604] Right leg pain     6/3/2024  4:02 PM Delfina Aleman Add [I99.8] Ischemic leg     6/3/2024  4:03 PM Delfina Aleman Add [N17.9] HARVINDER (acute kidney injury) (HCC)     6/3/2024  4:03 PM Delfina Aleman Add [D64.9] Anemia           ED Disposition       ED Disposition   Transfer to Another Facility-In Network    Condition   --    Date/Time   Mon [place] 3, 2024 1600    Comment   Kulwinder Hernandez should be transferred out to [facility].               MD Documentation      Flowsheet Row Most Recent Value   Patient Condition The patient has been stabilized such that within reasonable medical probability, no material deterioration of the patient condition or the condition of the unborn child(eliana) is likely to result from the transfer   Reason for Transfer Level of Care needed not available at this facility   Benefits of Transfer Specialized equipment and/or services available at the receiving facility (Include comment)________________________, Continuity of care   Risks of Transfer Potential for delay in receiving treatment, Potential deterioration of medical condition, Loss of IV, Increased discomfort during transfer   Accepting Physician Jordan Aleman           Follow-up Information    None       Date, Time and Cause of Death    Date of Death: 6/5/24  Time of Death: 0123  Preliminary Cause of Death: Critical limb ischemia of right lower extremity (HCC)  Entered by: Antoinette Shah PA-C[AF1.1]       Attribution       AF1.1 Antoinette Shah PA-C 06/05/24 01:31          Discharge Medication List as of 6/3/2024  6:56 PM        CONTINUE these medications which have NOT CHANGED    Details   apixaban (ELIQUIS) 5 mg Take 1 tablet (5 mg total) by mouth 2 (two) times a day, Starting Tue 4/16/2024, Normal      aspirin 81 mg chewable tablet Chew 1 tablet (81 mg total) daily, Starting Wed 4/17/2024, Normal      atorvastatin (LIPITOR) 10 mg tablet TK 1 T PO D, Historical Med      donepezil (ARICEPT) 10 mg tablet TK 1 T PO HS, Historical Med      furosemide (LASIX) 40 mg tablet Take 1 tablet (40 mg total) by mouth 2 (two) times a day, Starting Tue 4/16/2024, Normal      meloxicam (MOBIC) 7.5 mg tablet Take 7.5 mg by mouth daily, Historical Med      spironolactone (ALDACTONE) 25 mg tablet Take 12.5 mg by mouth 2 (two) times a day , Historical Med      telmisartan (MICARDIS) 40 mg tablet Take 40 mg by mouth daily, Historical Med             No discharge procedures on file.    PDMP Review         Value Time User    PDMP Reviewed  Yes 6/28/2022  8:52 AM Atul Barrow MD            ED Provider  Electronically Signed by             Delfina Aleman DO  06/19/24 9524

## 2024-06-03 NOTE — PROGRESS NOTES
Vascular Quick Note    77 yo male with morbid obesity, HLD, HTN, CVA x2 (ischemic and hemorrhagic), MCI/memory issues, known L carotid occlusion, R asymptomatic carotid artery stenosis, back pain, chronic dCHF, CKD, PAD s/p [id number] bypass 9/27/2011 (OSH in [place]) presents with worsening RLE rest pain 2/2 occlude R aortofemoral limb with planned upcoming intervention (R AX-fem bypass w/ PTFE).    Presents with worsening RLE pain and BLE edema, unable to bare weight. R anterior shin superficial scabbing  - +2 BLE edema  -R foot cool as compared to L,  -RLE mottled (positionally)  -motor/ sensory deficits  -Denies CP/SOB    +2 R radial pulse  Non-palp R DP/PT    Labs:  -sCr  3.22/ eGFR 17  -WBC 12.66  -Hgb 9.3      Recommendations:  - Medicine admit  - Tx to B for optimization and planned R Ax-fem bypass w/ PTFE 6/7/24  - Nephrology consult HARVINDER on CKD  - Cardiology consult  - Heparin gtt  - HOLD Eliquis  - Continue ASA and statin  - Pain control  - Communicated recs with [facility] ED  - D/w [facility] vascular team.    ZACHARIAH Dennis  Vascular Surgery   [phone]

## 2024-06-03 NOTE — H&P
[facility]  H&P  Name: Kulwinder Hernandez 78 y.o. male I MRN: [id number]  [facility] I Date of Admission: 6/3/2024   Date of Service: 6/3/2024 I Hospital Day: 0      Assessment & Plan   * PVD (peripheral vascular disease) (HCC)  Assessment & Plan  PAD s/p imliy-cg-tlnhiow bypass 9/27/2011 presents with worsening RLE rest pain in setting of occlude R aortofemoral limb with planned upcoming intervention (R AX-fem bypass w/ PTFE).   Continue heparin infusion, ASA 81mg qd and atorvastatin. Eliquis held.  Vascular surgery consult  Serial neurovascular checks  Cardiology consult.  History of CKD3, CHF and CVA. Patient is at high risk for perioperative complications. He had recent TTE and ischemic evaluation with pharmacological stress test which was reassuring. Need cardiology and nephrology clearance prior to proceed with procedure. If urgent surgery required then benefits and risks to be discussed with family at discretion of vascular surgery.     Acute on chronic diastolic CHF (congestive heart failure) (HCC)  Assessment & Plan  Patient is volume overloaded on exam. Reported hypoxia and decreased urine output at home. CXR with congestive changes concerning for interstitial edema. Patient himself denies sob while on 2L NC.   TTE 4/2024: EF 55%, G1DD, normal wall motions.  Home regimen: oral lasix 40 mg bid and spirolactone 25 mg qd  Start IV Lasix 40mg TID. Will give 1 dose of albumin given soft BP.   Monitor I/Os, daily weights and BMP  Cardiology consult.            Acute kidney injury superimposed on chronic kidney disease  (HCC)  Assessment & Plan  Baseline 1.5-2. On presentation 3.22  Suspect multifactorial secondary to cardiorenal etiology, ARBs use, urinary retention, soft BP, PAD  Monitor with IV diuresis  Consult nephrology.   Urinary retention protocol. Noted >800cc on bladder scan. [place] cathter will be placed. Discussed with family and RN.   Obtain Kidney/bladder  US    SIRS (systemic inflammatory response syndrome) (Former[facility])  Assessment & Plan  Leukocytosis and tachycardia secondary to PAD/pain. Chronic leukocytosis noted.   Obtain blood cultures and UA  CXR negative for infiltrate.   Afebrile. No suspicion for acute infection at this time. Will monitor off antibiotics pending workup  If patient clinically deteriorate then will start broad spectrum antibiotics.     Chronic obstructive pulmonary disease (HCC)  Assessment & Plan  No signs of exacerbation  Prn albuterol.     Dementia ([facility])  Assessment & Plan  No longer on Aricept  Supportive care  Delirium precautions.     Essential hypertension  Assessment & Plan  Monitor with IV diuretics  Hold spironolactone and telmisartnan given HARVINDER           VTE Pharmacologic Prophylaxis: VTE Score: 3 Moderate Risk (Score 3-4) - Pharmacological DVT Prophylaxis Ordered: heparin drip.      Anticipated Length of Stay: Patient will be admitted on an inpatient basis with an anticipated length of stay of greater than 2 midnights secondary to vascular eval.    Total Time Spent on Date of Encounter in care of patient: 75 mins. This time was spent on one or more of the following: performing physical exam; counseling and coordination of care; obtaining or reviewing history; documenting in the medical record; reviewing/ordering tests, medications or procedures; communicating with other healthcare professionals and discussing with patient's family/caregivers.    Chief Complaint: RLE pain    History of Present Illness:  Kulwinder Hernandez is a 78 y.o. male with a PMH of chronic diastolic CHF, chronic hypoxic respiratory failure 2L NC, COPD, hypertension, SBO, CVA, carotid artery stenosis, CKD 3, PAD s/p aaffm-ss-ubdacqv bypass 9/27/2011 who presents with worsening RLE pain.    Patient was seen by vascular surgery during recent hospitalization in 4/2024. His CTA A/P showed occluded right limb of the arterial bifemoral graft. LEAD showed SFA occlusion and then DEE  is 0.13.  He was seen again in May 2024 with a plan for upcoming right Ax-Fem bypass w/PTFE.    Patient now presents with worsening right lower extremity pain and edema.  Clinical exam is notable for cold and mottled right lower extremity with impalpable pulses. He is transferred to [facility] for vascular surgery evaluation.    On evaluation, patient reports significant RLE pain. He denies shortness of breath or chest pain. Son at bedside reports that his urine output has decreased in last 24 hours. Patient is chronically on 2L NC but son states that he has not been needing it for the last 2 weeks until yesterday when his saturations dropped to high 80%.     Bladder scan done by RN at bedside and notable for urinary retention of >800cc.     Review of Systems:  Review of Systems negative apart from HPI    Past Medical and Surgical History:   Past Medical History:   Diagnosis Date    CKD (chronic kidney disease) 11/08/2018    COPD (chronic obstructive pulmonary disease) (HCC)     Diabetes mellitus (HCC)     Hyperlipidemia     Hypertension     PVD (peripheral vascular disease) (HCC)     Sleep apnea     Small bowel obstruction (HCC)     Ventral hernia        Past Surgical History:   Procedure Laterality Date    ABDOMINAL HERNIA REPAIR      ABDOMINAL SURGERY      CHOLECYSTECTOMY      open    FEMORAL BYPASS Right     IR THORACENTESIS  4/15/2024       Meds/Allergies:  Prior to Admission medications    Medication Sig Start Date End Date Taking? Authorizing Provider   apixaban (ELIQUIS) 5 mg Take 1 tablet (5 mg total) by mouth 2 (two) times a day 4/16/24   Joel Nieto MD   aspirin 81 mg chewable tablet Chew 1 tablet (81 mg total) daily 4/17/24   Joel Nieto MD   atorvastatin (LIPITOR) 10 mg tablet TK 1 T PO D 7/11/18   Historical Provider, MD   donepezil (ARICEPT) 10 mg tablet TK 1 T PO HS  Patient not taking: Reported on 4/9/2024 8/28/18   Historical Provider, MD   furosemide (LASIX) 40 mg tablet Take 1 tablet (40 mg total) by  mouth 2 (two) times a day 4/16/24   Joel Nieto MD   meloxicam (MOBIC) 7.5 mg tablet Take 7.5 mg by mouth daily    Historical Provider, MD   spironolactone (ALDACTONE) 25 mg tablet Take 12.5 mg by mouth 2 (two) times a day     Historical Provider, MD   telmisartan (MICARDIS) 40 mg tablet Take 40 mg by mouth daily    Historical Provider, MD PICKENS have reviewed home medications using recent Epic encounter.    Allergies: No Known Allergies    Social History:  Marital Status:      Substance Use History:   Social History     Substance and Sexual Activity   Alcohol Use Never     Social History     Tobacco Use   Smoking Status Former    Current packs/day: 3.00    Average packs/day: 3.0 packs/day for 14.4 years (43.3 ttl pk-yrs)    Types: Cigarettes    Start date: 1/1/2010   Smokeless Tobacco Never     Social History     Substance and Sexual Activity   Drug Use Never       Family History:  No family history on file.    Physical Exam:     Vitals:   Blood Pressure: 97/68 (06/03/24 2001)  Pulse: 101 (06/03/24 2001)  Temperature: 98.4 °F (36.9 °C) (06/03/24 2001)  Respirations: 18 (06/03/24 1958)  Weight - Scale: 133 kg (292 lb 5.3 oz) (06/03/24 2001)  SpO2: 94 % (06/03/24 2001)    Physical Exam  Constitutional:       General: He is in acute distress (from pain).      Appearance: He is ill-appearing.   Cardiovascular:      Rate and Rhythm: Regular rhythm. Tachycardia present.      Comments: Non palpabke DPA  Pulmonary:      Effort: No respiratory distress.      Breath sounds: Rales present.      Comments: Diminished breath sounds  On 2L NC  Abdominal:      General: Bowel sounds are normal. There is distension.      Tenderness: There is no abdominal tenderness.   Musculoskeletal:      Right lower leg: Edema present.      Left lower leg: Edema present.      Comments: Mottled skin RLE          Additional Data:     Lab Results:  Results from last 7 days   Lab Units 06/03/24  1437   WBC Thousand/uL 12.66*   HEMOGLOBIN g/dL  9.3*   HEMATOCRIT % 28.2*   PLATELETS Thousands/uL 258   SEGS PCT % 73   LYMPHO PCT % 14   MONO PCT % 8   EOS PCT % 4     Results from last 7 days   Lab Units 06/03/24  1437   SODIUM mmol/L 135   POTASSIUM mmol/L 5.3   CHLORIDE mmol/L 97   CO2 mmol/L 26   BUN mg/dL 77*   CREATININE mg/dL 3.22*   ANION GAP mmol/L 12   CALCIUM mg/dL 9.6   ALBUMIN g/dL 3.7   TOTAL BILIRUBIN mg/dL 0.66   ALK PHOS U/L 86   ALT U/L 28   AST U/L 28   GLUCOSE RANDOM mg/dL 122     Results from last 7 days   Lab Units 06/03/24  1447   INR  1.39*                   Lines/Drains:  Invasive Devices       Peripheral Intravenous Line  Duration             Peripheral IV 06/03/24 Left;Ventral (anterior) Forearm <1 day    Peripheral IV 06/03/24 Right Antecubital <1 day                        Imaging: Reviewed radiology reports from this admission including: chest xray  US kidney and bladder    (Results Pending)       EKG and Other Studies Reviewed on Admission:   EKG:  ordered.    ** Please Note: This note has been constructed using a voice recognition system. **

## 2024-06-04 PROBLEM — Z51.5 PALLIATIVE CARE ENCOUNTER: Status: ACTIVE | Noted: 2024-01-01

## 2024-06-04 PROBLEM — Z71.89 GOALS OF CARE, COUNSELING/DISCUSSION: Status: ACTIVE | Noted: 2024-01-01

## 2024-06-04 NOTE — NURSING NOTE
Pt is high risk for falls. He is unable to move his feet to safely ambulate as both feet are edematous. Pt has been non compliant all night with fall protocol and demands to get out of bed.He is bed alarmed and was found leaning over his commode unable to move but still refuses to comply with staying in bed. I told the patient he has to use the bedpan and he opted to hold his bowels instead. This morning I attempted to wake patient and he pretended to be unarousable. When I walked out the room he peeked through one eye and turned over to lower his bed. Pt displayed the same behavior when the Vascular team came to round on him this morning.. They were not able to communicate with him because he refused to interact then turned to adjust his bed when the team walked out of the room.

## 2024-06-04 NOTE — ASSESSMENT & PLAN NOTE
Leukocytosis and tachycardia secondary to PAD/pain. Chronic leukocytosis noted.   Obtain blood cultures and pending UA  CXR negative for infiltrate.   COVID/flu/RSV negative  Afebrile. No suspicion for acute infection at this time. Will monitor off antibiotics pending workup  If patient clinically deteriorate then will start broad spectrum antibiotics.

## 2024-06-04 NOTE — CASE MANAGEMENT
Case Management Discharge Planning Note    Patient name Kulwinder Hernandez  Location [facility] 805/[facility] 805-01 MRN [id number]  : 1945 Date 2024       Current Admission Date: 6/3/2024  Current Admission Diagnosis:PVD (peripheral vascular disease) ([facility])   Patient Active Problem List    Diagnosis Date Noted Date Diagnosed    Goals of care, counseling/discussion 2024     SIRS (systemic inflammatory response syndrome) ([facility]) 2024     Chronic kidney disease, stage 3b ([facility]) 05/10/2024     Chronic obstructive pulmonary disease ([facility]) 2024     Chronic respiratory failure with hypoxia ([facility]) 2024     Lung nodule 2024     Acute on chronic diastolic CHF (congestive heart failure) ([facility]) 2024     Leukocytosis 2023     Morbid obesity ([facility]) 2022     Lactic acidosis 2021     CKD (chronic kidney disease) 2018     Dyslipidemia 2018     History of CVA (cerebrovascular accident) 2018     Renal cyst, left 2018     Acute kidney injury superimposed on chronic kidney disease  ([facility]) 2018     Dementia ([facility]) 2018     Elevated random blood glucose level 2018     Essential hypertension      Hyperlipidemia      Ventral hernia      SBO (small bowel obstruction) ([facility])      PVD (peripheral vascular disease) ([facility])        LOS (days): 1  Geometric Mean LOS (GMLOS) (days): 4  Days to GMLOS:3.2     OBJECTIVE:  Risk of Unplanned Readmission Score: 15.08         Current admission status: Inpatient   Preferred Pharmacy:   [facility]/pharmacy #[id number] - [place], [place] - [address]  [address] [place] [address]  Phone: [phone] Fax: [phone]    Primary Care Provider: Nova Reveles    Primary Insurance: MEDICARE  Secondary Insurance: [employer]    DISCHARGE DETAILS:    CM consult for hospice    Spoke with son Hussein.  Would like to see what assistance is available with home hospice vs IP hospice.  No preference for agency.  [place] referraks  placed in [facility]    Son chose  Hospice

## 2024-06-04 NOTE — PLAN OF CARE
Problem: PAIN - ADULT  Goal: Verbalizes/displays adequate comfort level or baseline comfort level  Description: Interventions:  - Encourage patient to monitor pain and request assistance  - Assess pain using appropriate pain scale  - Administer analgesics based on type and severity of pain and evaluate response  - Implement non-pharmacological measures as appropriate and evaluate response  - Consider cultural and social influences on pain and pain management  - Notify physician/advanced practitioner if interventions unsuccessful or patient reports new pain  Outcome: Progressing     Problem: INFECTION - ADULT  Goal: Absence or prevention of progression during hospitalization  Description: INTERVENTIONS:  - Assess and monitor for signs and symptoms of infection  - Monitor lab/diagnostic results  - Monitor all insertion sites, i.e. indwelling lines, tubes, and drains  - Monitor endotracheal if appropriate and nasal secretions for changes in amount and color  - [place] appropriate cooling/warming therapies per order  - Administer medications as ordered  - Instruct and encourage patient and family to use good hand hygiene technique  - Identify and instruct in appropriate isolation precautions for identified infection/condition  Outcome: Progressing  Goal: Absence of fever/infection during neutropenic period  Description: INTERVENTIONS:  - Monitor WBC    Outcome: Progressing     Problem: DISCHARGE PLANNING  Goal: Discharge to home or other facility with appropriate resources  Description: INTERVENTIONS:  - Identify barriers to discharge w/patient and caregiver  - Arrange for needed discharge resources and transportation as appropriate  - Identify discharge learning needs (meds, wound care, etc.)  - Arrange for interpretive services to assist at discharge as needed  - Refer to Case Management Department for coordinating discharge planning if the patient needs post-hospital services based on physician/advanced  practitioner order or complex needs related to functional status, cognitive ability, or social support system  Outcome: Progressing     Problem: Knowledge Deficit  Goal: Patient/family/caregiver demonstrates understanding of disease process, treatment plan, medications, and discharge instructions  Description: Complete learning assessment and assess knowledge base.  Interventions:  - Provide teaching at level of understanding  - Provide teaching via preferred learning methods  Outcome: Progressing     Problem: Nutrition/Hydration-ADULT  Goal: Nutrient/Hydration intake appropriate for improving, restoring or maintaining nutritional needs  Description: Monitor and assess patient's nutrition/hydration status for malnutrition. Collaborate with interdisciplinary team and initiate plan and interventions as ordered.  Monitor patient's weight and dietary intake as ordered or per policy. Utilize nutrition screening tool and intervene as necessary. Determine patient's food preferences and provide high-protein, high-caloric foods as appropriate.     INTERVENTIONS:  - Monitor oral intake, urinary output, labs, and treatment plans  - Assess nutrition and hydration status and recommend course of action  - Evaluate amount of meals eaten  - Assist patient with eating if necessary   - Allow adequate time for meals  - Recommend/ encourage appropriate diets, oral nutritional supplements, and vitamin/mineral supplements  - Order, calculate, and assess calorie counts as needed  - Recommend, monitor, and adjust tube feedings and TPN/PPN based on assessed needs  - Assess need for intravenous fluids  - Provide specific nutrition/hydration education as appropriate  - Include patient/family/caregiver in decisions related to nutrition  Outcome: Progressing

## 2024-06-04 NOTE — ASSESSMENT & PLAN NOTE
Goals:  Confirmed level 4 Comfort Care Status   Comfort Focused  Pending possible transition to either home Hospice or Hospice facility  Will continue to support patient, Son and Family during this time   Son reached out to their home spiritual care to come in

## 2024-06-04 NOTE — ASSESSMENT & PLAN NOTE
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

## 2024-06-04 NOTE — PLAN OF CARE
Problem: Prexisting or High Potential for Compromised Skin Integrity  Goal: Skin integrity is maintained or improved  Description: INTERVENTIONS:  - Identify patients at risk for skin breakdown  - Assess and monitor skin integrity  - Assess and monitor nutrition and hydration status  - Monitor labs   - Assess for incontinence   - Turn and reposition patient  - Assist with mobility/ambulation  - Relieve pressure over bony prominences  - Avoid friction and shearing  - Provide appropriate hygiene as needed including keeping skin clean and dry  - Evaluate need for skin moisturizer/barrier cream  - Collaborate with interdisciplinary team   - Patient/family teaching  - Consider wound care consult   Outcome: Progressing     Problem: PAIN - ADULT  Goal: Verbalizes/displays adequate comfort level or baseline comfort level  Description: Interventions:  - Encourage patient to monitor pain and request assistance  - Assess pain using appropriate pain scale  - Administer analgesics based on type and severity of pain and evaluate response  - Implement non-pharmacological measures as appropriate and evaluate response  - Consider cultural and social influences on pain and pain management  - Notify physician/advanced practitioner if interventions unsuccessful or patient reports new pain  Outcome: Progressing     Problem: INFECTION - ADULT  Goal: Absence or prevention of progression during hospitalization  Description: INTERVENTIONS:  - Assess and monitor for signs and symptoms of infection  - Monitor lab/diagnostic results  - Monitor all insertion sites, i.e. indwelling lines, tubes, and drains  - Monitor endotracheal if appropriate and nasal secretions for changes in amount and color  - [place] appropriate cooling/warming therapies per order  - Administer medications as ordered  - Instruct and encourage patient and family to use good hand hygiene technique  - Identify and instruct in appropriate isolation precautions for  identified infection/condition  Outcome: Progressing  Goal: Absence of fever/infection during neutropenic period  Description: INTERVENTIONS:  - Monitor WBC    Outcome: Progressing     Problem: SAFETY ADULT  Goal: Patient will remain free of falls  Description: INTERVENTIONS:  - Educate patient/family on patient safety including physical limitations  - Instruct patient to call for assistance with activity   - Consult OT/PT to assist with strengthening/mobility   - Keep Call bell within reach  - Keep bed low and locked with side rails adjusted as appropriate  - Keep care items and personal belongings within reach  - Initiate and maintain comfort rounds  - Make Fall Risk Sign visible to staff  - Apply yellow socks and bracelet for high fall risk patients  - Consider moving patient to room near nurses station  Outcome: Progressing  Goal: Maintain or return to baseline ADL function  Description: INTERVENTIONS:  -  Assess patient's ability to carry out ADLs; assess patient's baseline for ADL function and identify physical deficits which impact ability to perform ADLs (bathing, care of mouth/teeth, toileting, grooming, dressing, etc.)  - Assess/evaluate cause of self-care deficits   - Assess range of motion  - Assess patient's mobility; develop plan if impaired  - Assess patient's need for assistive devices and provide as appropriate  - Encourage maximum independence but intervene and supervise when necessary  - Involve family in performance of ADLs  - Assess for home care needs following discharge   - Consider OT consult to assist with ADL evaluation and planning for discharge  - Provide patient education as appropriate  Outcome: Progressing  Goal: Maintains/Returns to pre admission functional level  Description: INTERVENTIONS:  - Perform AM-PAC 6 Click Basic Mobility/ Daily Activity assessment daily.  - Set and communicate daily mobility goal to care team and patient/family/caregiver.   - Collaborate with rehabilitation  services on mobility goals if consulted  - Out of bed for toileting  - Record patient progress and toleration of activity level   Outcome: Progressing     Problem: DISCHARGE PLANNING  Goal: Discharge to home or other facility with appropriate resources  Description: INTERVENTIONS:  - Identify barriers to discharge w/patient and caregiver  - Arrange for needed discharge resources and transportation as appropriate  - Identify discharge learning needs (meds, wound care, etc.)  - Arrange for interpretive services to assist at discharge as needed  - Refer to Case Management Department for coordinating discharge planning if the patient needs post-hospital services based on physician/advanced practitioner order or complex needs related to functional status, cognitive ability, or social support system  Outcome: Progressing     Problem: Knowledge Deficit  Goal: Patient/family/caregiver demonstrates understanding of disease process, treatment plan, medications, and discharge instructions  Description: Complete learning assessment and assess knowledge base.  Interventions:  - Provide teaching at level of understanding  - Provide teaching via preferred learning methods  Outcome: Progressing

## 2024-06-04 NOTE — ASSESSMENT & PLAN NOTE
No signs of exacerbation  Prn albuterol.   Wean O2 as tolerated for sats greater than or equal to 88%

## 2024-06-04 NOTE — HOSPICE NOTE
I met with pt's son Hussein to discuss hospice services.  Hospice benefits reviewed per Medicare guidelines and all questions answered.  Dr Short approved for home hospice LOC.  Discussed with Hussein that he may be appropriate for IPU tomorrow. Will meet with Hussein tomorrow morning to re eval. CHERYL An updated.

## 2024-06-04 NOTE — ASSESSMENT & PLAN NOTE
Social support provided for patient and family:  Supportive listening provided  Normalized experience of patient and family  Provided anxiety containment  Provided anticipatory guidance  Encouraged self care  Son agreeable to ongoing support     Follow up  Palliative Care will continue to follow for support. Please reach out via [facility] if questions or concerns arise.     Care Coordination  Reviewed decision for level 4 comfort care with Primary team and Vascular Surgery

## 2024-06-04 NOTE — CONSULTS
Consultation - Cardiology Team One  Kulwinder Hernandez 78 y.o. male MRN: [id number]  Unit/Bed#: [facility] 805-01 Encounter: [id number]    Inpatient consult to Cardiology  Consult performed by: ZACHARIAH See  Consult ordered by: Jomar Israel MD          Physician Requesting Consult: Haritha Bojorquez MD  Reason for Consult / Principal Problem: CHF and pre operative risk assessment       Assessment/ Plan    Acute on chronic HFpEF  On furosemide 40 mg IV TID  Recommend continue to diurese   He takes furosemide 40 mg PO BID and spirolactone 12.5 mg PO BID at home   Monitor I/Os -1.6 L in 24 hours   Daily weights 292 lbs yesterday. No weight today documented. Weight in office 5/22 was 289 lbs   Continue 2 gram Na diet with fluid restriction     2. HARVINDER on CKD   Baseline creatinine 1.5-2  Creatinine on admission was 3.22. Creatinine today: 2.87  Creatinine improving with diuretics   Check BMP in am     3. PVD   Vascular surgery following   scheduled for Rt Ax-profunda bypass with PTFE 6/7  On heparin gtt for due to symptomatic PAD   Continue aspirin and statin     4. Pre operative risk assessment for #3   Nuclear stress test 5/23/2024 showed no evidence of ischemia or infarction by pharmacologic vasodilatory nuclear stress testing.   Echocardiogram 4/12/2024 showed EF 55%, grade I diastolic dysfunction and no significant valvular disease.   Will continue to diurese as noted above to optimize prior to OR tentatively on Friday     5. COPD   On 2 L NC currently  He wears O2 PRN at home       History of Present Illness   HPI: Kulwinder Hernandez is a 78 y.o. year old male who has chronic diastolic heart failure, PVD, CVA x 2 (ischemic and hemorrhagic), CKD, COPD on home O2 PRN, SBO, RBBB and morbid obesity. He follows with cardiologist Dr. Malik.             He presents to [facility] ER 6/3/2024 with worsening RLE rest pain and lower extremity edema. He was tentatively scheduled for Rt Ax-profunda bypass with PTFE 6/7 but needs to be optimized  prior.     Cardiology consulted for CHF and pre operative risk assessment. Patient lives at home with his son. Son is at bedside and provided history. Son reports over the weekend his O2 saturation started to drop into the high 80s so he placed him on O2. Prior to that he was on RA. He also noticed swelling in his legs. He takes furosemide 40 mg PO BID at home and has been compliant.     He uses a walker occasionally at home but most recently his activity is very limited due to RLE pain. He has no complaint of chest pain at rest or with exertion. Son reports patient has not been complaining of SOB or orthopnea.     Nuclear stress test 5/23/2024 showed no evidence of ischemia or infarction by pharmacologic vasodilatory nuclear stress testing.     Echocardiogram 4/12/2024 showed EF 55%, grade I diastolic dysfunction and no significant valvular disease.     EKG reviewed personally:  Sinus tachycardia   Ventricular rate 103 bpm   KY Interval 192 ms  QRSD 144 ms  QT interval 366 ms  QTc interval 479 ms     Telemetry reviewed personally:   No telemetry     Review of Systems   Constitutional: Positive for malaise/fatigue. Negative for chills and fever.   HENT:  Negative for congestion.    Cardiovascular:  Positive for dyspnea on exertion and leg swelling. Negative for chest pain, orthopnea and palpitations.   Respiratory:  Negative for shortness of breath.    Musculoskeletal:  Negative for falls.   Gastrointestinal:  Negative for bloating, nausea and vomiting.   Neurological:  Negative for dizziness and light-headedness.   Psychiatric/Behavioral:  Negative for altered mental status.    All other systems reviewed and are negative.    Historical Information   Past Medical History:   Diagnosis Date    CKD (chronic kidney disease) 11/08/2018    COPD (chronic obstructive pulmonary disease) ([facility])     Diabetes mellitus (HCC)     Hyperlipidemia     Hypertension     PVD (peripheral vascular disease) ([facility])     Sleep apnea     Small  bowel obstruction (HCC)     Ventral hernia      Past Surgical History:   Procedure Laterality Date    ABDOMINAL HERNIA REPAIR      ABDOMINAL SURGERY      CHOLECYSTECTOMY      open    FEMORAL BYPASS Right     IR THORACENTESIS  4/15/2024     Social History     Substance and Sexual Activity   Alcohol Use Never     Social History     Substance and Sexual Activity   Drug Use Never     Social History     Tobacco Use   Smoking Status Former    Current packs/day: 3.00    Average packs/day: 3.0 packs/day for 14.4 years (43.3 ttl pk-yrs)    Types: Cigarettes    Start date: 1/1/2010   Smokeless Tobacco Never     Family History: No family history on file.    Meds/Allergies   all current active meds have been reviewed and current meds:   Current Facility-Administered Medications   Medication Dose Route Frequency    acetaminophen (TYLENOL) tablet 975 mg  975 mg Oral Q8H    aspirin chewable tablet 81 mg  81 mg Oral Daily    atorvastatin (LIPITOR) tablet 40 mg  40 mg Oral Daily With Dinner    furosemide (LASIX) injection 40 mg  40 mg Intravenous TID (diuretic)    heparin (porcine) 25,000 units in 0.45% NaCl 250 mL infusion (premix)  3-20 Units/kg/hr (Order-Specific) Intravenous Titrated    heparin (porcine) injection 2,000 Units  2,000 Units Intravenous Q6H PRN    heparin (porcine) injection 4,000 Units  4,000 Units Intravenous Q6H PRN    HYDROmorphone HCl (DILAUDID) injection 0.2 mg  0.2 mg Intravenous Q4H PRN    ondansetron (ZOFRAN) injection 4 mg  4 mg Intravenous Q8H PRN    oxyCODONE (ROXICODONE) split tablet 2.5 mg  2.5 mg Oral Q4H PRN    Or    oxyCODONE (ROXICODONE) IR tablet 5 mg  5 mg Oral Q4H PRN    polyethylene glycol (MIRALAX) packet 17 g  17 g Oral Daily PRN     heparin (porcine), 3-20 Units/kg/hr (Order-Specific), Last Rate: 10 Units/kg/hr (06/04/24 0818)        No Known Allergies    Objective   Vitals: Blood pressure (!) 142/48, pulse (!) 107, temperature 98.4 °F (36.9 °C), resp. rate 17, weight 133 kg (292 lb 5.3  oz), SpO2 97%.,     Body mass index is 44.45 kg/m².,     Systolic (24hrs), Av , Min:95 , Max:145     Diastolic (24hrs), Av, Min:48, Max:69      Intake/Output Summary (Last 24 hours) at 2024 0904  Last data filed at 2024 0325  Gross per 24 hour   Intake --   Output 1650 ml   Net -1650 ml     Weight (last 2 days)       Date/Time Weight    24 20:01:33 133 (292.33)          Invasive Devices       Peripheral Intravenous Line  Duration             Peripheral IV 24 Left;Ventral (anterior) Forearm <1 day    Peripheral IV 24 Right Antecubital <1 day              Drain  Duration             Urethral Catheter 18 Fr. <1 day                  Physical Exam  Constitutional:       General: He is not in acute distress.     Appearance: He is obese.   HENT:      Head: Normocephalic.      Mouth/Throat:      Mouth: Mucous membranes are moist.   Cardiovascular:      Rate and Rhythm: Normal rate and regular rhythm.      Pulses: Normal pulses.   Pulmonary:      Effort: Pulmonary effort is normal. No respiratory distress.      Breath sounds: Normal breath sounds.   Abdominal:      General: Bowel sounds are normal. There is distension.      Palpations: Abdomen is soft.      Hernia: A hernia is present.   Musculoskeletal:         General: Swelling present. Normal range of motion.      Cervical back: Neck supple.      Comments: + 1 edema bilateral LE    Skin:     General: Skin is dry.      Capillary Refill: Capillary refill takes less than 2 seconds.      Comments: RLE cool and mottled    Neurological:      Mental Status: He is alert and oriented to person, place, and time.   Psychiatric:         Mood and Affect: Mood normal.         LABORATORY RESULTS:      CBC with diff:   Results from last 7 days   Lab Units 24  0000 24  1437 24  0801   WBC Thousand/uL 14.84* 12.66* 14.55*   HEMOGLOBIN g/dL 9.5* 9.3* 10.7*   HEMATOCRIT % 29.0* 28.2* 33.2*   MCV fL 94 94 95   PLATELETS Thousands/uL 260 258  "334   RBC Million/uL 3.09* 2.99* 3.48*   MCH pg 30.7 31.1 30.7   MCHC g/dL 32.8 33.0 32.2   RDW % 14.3 14.3 13.9   MPV fL 10.5 10.0 12.2   NRBC AUTO /100 WBCs  --  0  --        CMP:  Results from last 7 days   Lab Units 24  0334 24  1437 24  0801   POTASSIUM mmol/L 5.2 5.3 5.3   CHLORIDE mmol/L 96 97 97   CO2 mmol/L 27 26 29   BUN mg/dL 71* 77* 57*   CREATININE mg/dL 2.87* 3.22* 2.32*   CALCIUM mg/dL 9.2 9.6 9.9   AST U/L  --  28  --    ALT U/L  --  28  --    ALK PHOS U/L  --  86  --    EGFR ml/min/1.73sq m 20 17 25       BMP:  Results from last 7 days   Lab Units 24  0334 24  1437 24  0801   POTASSIUM mmol/L 5.2 5.3 5.3   CHLORIDE mmol/L 96 97 97   CO2 mmol/L 27 26 29   BUN mg/dL 71* 77* 57*   CREATININE mg/dL 2.87* 3.22* 2.32*   CALCIUM mg/dL 9.2 9.6 9.9          Lab Results   Component Value Date    NTBNP 296 (H) 2018       Results from last 7 days   Lab Units 24  1447 24  0801   INR  1.39* 1.39*     Lipid Profile:   No results found for: \"CHOL\"  No results found for: \"HDL\"  No results found for: \"LDLCALC\"  No results found for: \"TRIG\"      Cardiac testing:   Results for orders placed during the hospital encounter of 21    Echo complete with contrast if indicated    Narrative  [address] [place] [address] [phone]    Transthoracic Echocardiogram  2D, M-mode, Doppler, and Color Doppler    Study date:  15-Aug-2021    Patient: NABIL PENA  MR number: [id number]  Account number: [id number]  : 1945  Age: 76 years  Gender: Male  Status: Inpatient  Location: Bedside  Height: 70 in  Weight: 297 lb  BP: 145/ 60 mmHg    Indications: Hypertensive Renal Disease.    Diagnoses: I12.9 - Hypertensive chronic kidney disease with stage 1 through stage 4 chronic kidney disease, or unspecif    Sonographer:  SIMONE Stringer  Referring Physician:  Baron Diamond MD  Group:  [facility]  Interpreting Physician:  Tayler Toney MD    IMPRESSIONS:  Technically difficult study, poor endocardial resolution,    SUMMARY    LEFT VENTRICLE:  Systolic function was normal. LVEF 55-60 percent  This study was inadequate for the evaluation of regional wall motion.  Wall thickness was mildly increased.  There was mild concentric hypertrophy.  Doppler parameters were consistent with abnormal left ventricular relaxation (grade 1 diastolic dysfunction).    MITRAL VALVE:  There was mild annular calcification.  There was mild regurgitation.    TRICUSPID VALVE:  There was mild regurgitation.    HISTORY: PRIOR HISTORY: Hypertension, Hyperlipidemia, PVD, CVA, CKD.    PROCEDURE: The procedure was performed at the bedside. This was a routine study. The transthoracic approach was used. The study included complete 2D imaging, M-mode, complete spectral Doppler, and color Doppler. The heart rate was 89 bpm,  at the start of the study. Images were obtained from the parasternal, apical, subcostal, and suprasternal notch acoustic windows. Echocardiographic views were limited due to poor acoustic window availability, decreased penetration, and  lung interference. This was a technically difficult study.    LEFT VENTRICLE: Size was normal. Systolic function was normal. LVEF 55-60 percent This study was inadequate for the evaluation of regional wall motion. Wall thickness was mildly increased. There was mild concentric hypertrophy. DOPPLER:  Doppler parameters were consistent with abnormal left ventricular relaxation (grade 1 diastolic dysfunction).    RIGHT VENTRICLE: The size was normal. Systolic function was normal.    LEFT ATRIUM: Size was at the upper limits of normal.    RIGHT ATRIUM: Size was normal.    MITRAL VALVE: There was mild annular calcification. Not well visualized. DOPPLER: There was no evidence for stenosis. There was mild regurgitation.    AORTIC VALVE: The valve was not well visualized. DOPPLER:  There was no evidence for stenosis. There was no regurgitation.    TRICUSPID VALVE: DOPPLER: There was mild regurgitation.    PULMONIC VALVE: Not well visualized.    PERICARDIUM: There was no pericardial effusion.    AORTA: The root exhibited normal size.    SYSTEM MEASUREMENT TABLES    2D  %FS: 28 %  Ao Diam: 3.6 cm  EDV(Teich): 79.4 ml  EF(Teich): 54.5 %  ESV(Teich): 36.1 ml  IVSd: 1.3 cm  LA Area: 9.2 cm2  LA Diam: 4.5 cm  LVEDV MOD A4C: 113 ml  LVEF MOD A4C: 69.3 %  LVESV MOD A4C: 34.7 ml  LVIDd: 4.2 cm  LVIDs: 3 cm  LVLd A4C: 8.4 cm  LVLs A4C: 6.8 cm  LVPWd: 1.2 cm  RA Area: 10 cm2  RVIDd: 3.4 cm  SV MOD A4C: 78.3 ml  SV(Teich): 43.3 ml    CW  AV Vmax: 1.8 m/s  AV maxP.5 mmHg  MV PHT: 70.9 ms  MV VTI: 49.1 cm  MV Vmax: 1.6 m/s  MV Vmean: 1.1 m/s  MV maxPG: 10.1 mmHg  MV meanP.3 mmHg  MVA By PHT: 3.1 cm2    MM  TAPSE: 2.3 cm    PW  E' Sept: 0.1 m/s  E/E' Sept: 12.8  LVOT Env.Ti: 315.9 ms  LVOT VTI: 30.4 cm  LVOT Vmax: 1.2 m/s  LVOT Vmean: 1 m/s  LVOT maxP.1 mmHg  LVOT meanP.1 mmHg  MV A [place]: 1.2 m/s  MV Dec [place]: 5.1 m/s2  MV DecT: 241.5 ms  MV E [place]: 1.2 m/s  MV E/A Ratio: 1    [employer] Commission Accredited Echocardiography Laboratory    Prepared and electronically signed by    Tayler Toney MD  Signed 15-Aug-2021 14:34:46    No results found for this or any previous visit.    No valid procedures specified.  No results found for this or any previous visit.    Imaging:   VAS ARTERIAL DUPLEX- LOWER LIMB BILATERAL    Result Date: 6/3/2024  Narrative:  [facility] REPORT CLINICAL: Indications: Patient presents with known PAD with an increase in right lower extremity swelling and a mottled complexion. Operative History: Aorta-bi-iliac bypass graft Risk Factors The patient has history of Obesity and HTN. Clinical Right Pressure:  IV, Left Pressure:  116/ mm Hg.  FINDINGS:  Right                  Impression      PSV (cm/s)  EDV  Prox Profunda                                  26   21   Prox SFA               Occluded                         Mid SFA                Occluded                         Dist SFA               Occluded                         Distal Pop                                      8    7  Tibioperoneal                                   9    8  Dist Post Tibial       Occluded                         Dist. Ant. Tibial      Not visualized                    Left                   Impression      PSV (cm/s)  EDV  Common Femoral Artery  >75%                   403  107  Prox Profunda          50-75%                 110   32  Prox SFA               Occluded                 9    0  Mid SFA                                        18    6  Dist SFA                                       28   12  Proximal Pop                                   26   12  Distal Pop                                     19    9  Tibioperoneal          Not visualized                   Dist Post Tibial                               18    8  Dist. Ant. Tibial                              15    6     CONCLUSION: Impression: RIGHT LOWER LIMB: The evaluation shows occlusion of the right limb of the Aorta-bi-iliac. There is evidence of occlusion of the proximal distal superficial femoral artery. There is continuous arterial flow from popliteal artery to the ankle with no measurable peak. Ankle/Brachial index inaudible PVR/ PPG tracings are absent. Metatarsal pressure of 0 mmHg due to non visualized waveforms Great toe pressure of 0 mmHg, due to non visualized waveforms  LEFT LOWER LIMB: There is evidence of occlusion of the proximal  superficial femoral artery. >70% stenosis of the common femoral artery. 50-75% stenosis noted in the profunda femoral artery. Ankle/Brachial index: 0.28 which is in the rest pain category (Prior 0.39 ) PVR/ PPG tracings are absent. Metatarsal pressure of 0 mmHg Due to non visualized waveforms Great toe pressure of 0 mmHg, due to non visualized waveforms  Compared to previous study on 4/9/24 ,  there is a decrease in the Abis Bilaterally with new continuous flow noted in the right lower extremity. Technical findings were given to Delfina Aleman on the floor.  SIGNATURE: Electronically Signed by: RONY ARRIOLA MD, RPVI on 2024-06-03 09:14:29 PM    XR chest 2 views    Result Date: 6/3/2024  Narrative: XR CHEST PA & LATERAL INDICATION: weakness. COMPARISON: Chest radiograph April 11, 2024. Correlation CT chest April 13, 2024 FINDINGS: Bilateral reticular opacities. No definite pleural effusion. No pneumothorax. Bones are unremarkable for age. Normal upper abdomen.     Impression: 1.  Interstitial edema. 2.  Please refer to prior CT chest report for follow-up recommendations regarding the left upper lobe pulmonary nodule. Workstation performed: [id number]     CT head without contrast    Result Date: 6/3/2024  Narrative: CT BRAIN - WITHOUT CONTRAST INDICATION:   weakness. COMPARISON: 4/10/2024 TECHNIQUE:  CT examination of the brain was performed.  Multiplanar 2D reformatted images were created from the source data. Radiation dose length product (DLP) for this visit:  873 mGy-cm .  This examination, like all CT scans performed in the [facility], was performed utilizing techniques to minimize radiation dose exposure, including the use of iterative reconstruction and automated exposure control. IMAGE QUALITY:  Diagnostic. FINDINGS: PARENCHYMA: Decreased attenuation is noted in periventricular and subcortical white matter demonstrating an appearance that is statistically most likely to represent mild microangiopathic change. No CT signs of acute infarction.  No intracranial mass, mass effect or midline shift.  No acute parenchymal hemorrhage. VENTRICLES AND EXTRA-AXIAL SPACES:  Normal for the patient's age. VISUALIZED ORBITS: Normal visualized orbits. PARANASAL SINUSES: Normal visualized paranasal sinuses. CALVARIUM AND EXTRACRANIAL SOFT TISSUES:  Normal.     Impression: No acute intracranial  abnormality. Workstation performed: [id number]     Stress strip    Result Date: 5/25/2024  Narrative: Confirmed by ELLEN MYERS ([id number]),  Lyric Barrett ([id number]) on 5/24/2024 12:05:49 PM    NM myocardial perfusion spect (rx stress and/or rest)    Result Date: 5/23/2024  Narrative:   Stress ECG: No ST deviation is noted. The ECG was negative for ischemia. The stress ECG is negative for ischemia after pharmacologic vasodilation, without reproduction of symptoms.   Perfusion: There are no perfusion defects suggestive of ischemia or infarction on reprocessed images, inferior diaphragmatic attenuation is present.   Stress Function: Left ventricular function post-stress is normal. Stress ejection fraction is over 70%. No evidence of ischemia or infarction by pharmacologic vasodilatory nuclear stress testing.     Thank you for allowing us to participate in this patient's care.     Counseling / Coordination of Care  Total floor / unit time spent today 45 minutes.  Greater than 50% of total time was spent with the patient and / or family counseling and / or coordination of care.  A description of the counseling / coordination of care: Review of history, current assessment, development of a plan.      Code Status: Level 1 - Full Code    ** Please Note: Dragon 360 Dictation voice to text software may have been used in the creation of this document. **

## 2024-06-04 NOTE — RESPIRATORY THERAPY NOTE
RT Protocol Note  Kulwinder Hernandez 78 y.o. male MRN: [id number]  [facility] Encounter: [id number]    Assessment    Principal Problem:    PVD (peripheral vascular disease) (HCC)  Active Problems:    Essential hypertension    Acute kidney injury superimposed on chronic kidney disease  (HCC)    Dementia (HCC)    Acute on chronic diastolic CHF (congestive heart failure) (HCC)    Chronic obstructive pulmonary disease (HCC)    SIRS (systemic inflammatory response syndrome) (HCC)      Home Pulmonary Medications:  None        Past Medical History:   Diagnosis Date    CKD (chronic kidney disease) 11/08/2018    COPD (chronic obstructive pulmonary disease) (HCC)     Diabetes mellitus (HCC)     Hyperlipidemia     Hypertension     PVD (peripheral vascular disease) (HCC)     Sleep apnea     Small bowel obstruction (HCC)     Ventral hernia      Social History     Socioeconomic History    Marital status:      Spouse name: Not on file    Number of children: Not on file    Years of education: Not on file    Highest education level: Not on file   Occupational History    Not on file   Tobacco Use    Smoking status: Former     Current packs/day: 3.00     Average packs/day: 3.0 packs/day for 14.4 years (43.3 ttl pk-yrs)     Types: Cigarettes     Start date: 1/1/2010    Smokeless tobacco: Never   Vaping Use    Vaping status: Never Used   Substance and Sexual Activity    Alcohol use: Never    Drug use: Never    Sexual activity: Not Currently     Partners: Female   Other Topics Concern    Not on file   Social History Narrative    Not on file     Social Determinants of Health     Financial Resource Strain: Not on file   Food Insecurity: No Food Insecurity (4/10/2024)    Hunger Vital Sign     Worried About Running Out of Food in the Last Year: Never true     Ran Out of Food in the Last Year: Never true   Transportation Needs: No Transportation Needs (4/10/2024)    PRAPARE - Transportation     Lack of Transportation (Medical): No      Lack of Transportation (Non-Medical): No   Physical Activity: Not on file   Stress: Not on file   Social Connections: Not on file   Intimate Partner Violence: Not on file   Housing Stability: Low Risk  (4/10/2024)    Housing Stability Vital Sign     Unable to Pay for Housing in the Last Year: No     Number of Places Lived in the Last Year: 1     Unstable Housing in the Last Year: No       Subjective         Objective    Physical Exam:   Assessment Type: Assess only  General Appearance: Drowsy  Respiratory Pattern: Normal  Chest Assessment: Chest expansion symmetrical  Bilateral Breath Sounds: Diminished    Vitals:  Blood pressure 118/52, pulse 99, temperature 98.4 °F (36.9 °C), resp. rate 18, weight 133 kg (292 lb 5.3 oz), SpO2 95%.          Imaging and other studies:           Plan    Respiratory Plan: Discontinue Protocol, No distress/Pulmonary history        Resp Comments: (P) Pt. presents with complaints of lower extremity pain. Pt. denies any pulm hx/medications. Pt. denies any SOB/RD. Will D/C resp protocol. Will D/C albuterol udn

## 2024-06-04 NOTE — CASE MANAGEMENT
Case Management Assessment & Discharge Planning Note    Patient name Kulwinder Hernandez  [facility] MRN [id number]  : 1945 Date 2024       Current Admission Date: 6/3/2024  Current Admission Diagnosis:PVD (peripheral vascular disease) (HCC)   Patient Active Problem List    Diagnosis Date Noted Date Diagnosed    SIRS (systemic inflammatory response syndrome) ([facility]) 2024     Chronic kidney disease, stage 3b (HCC) 05/10/2024     Chronic obstructive pulmonary disease (HCC) 2024     Chronic respiratory failure with hypoxia ([facility]) 2024     Lung nodule 2024     Acute on chronic diastolic CHF (congestive heart failure) ([facility]) 2024     Leukocytosis 2023     Morbid obesity ([facility]) 2022     Lactic acidosis 2021     CKD (chronic kidney disease) 2018     Dyslipidemia 2018     History of CVA (cerebrovascular accident) 2018     Renal cyst, left 2018     Acute kidney injury superimposed on chronic kidney disease  (HCC) 2018     Dementia ([facility]) 2018     Elevated random blood glucose level 2018     Essential hypertension      Hyperlipidemia      Ventral hernia      SBO (small bowel obstruction) ([facility])      PVD (peripheral vascular disease) ([facility])        LOS (days): 1  Geometric Mean LOS (GMLOS) (days): 4  Days to GMLOS:3.4     OBJECTIVE:    Risk of Unplanned Readmission Score: 14.89         Current admission status: Inpatient       Preferred Pharmacy:   [facility]/pharmacy #[id number] - [place], [place] - [address] [address] [facility] [place] [address]  Phone: [phone] Fax: [phone]    Primary Care Provider: Nova Reveles    Primary Insurance: MEDICARE  Secondary Insurance: [employer]    ASSESSMENT:  Active Health Care Proxies    There are no active Health Care Proxies on file.       Advance Directives  Does patient have a Health Care POA?: No  Does patient have Advance Directives?: No  Primary Contact: son  Hussein              Patient Information  Admitted from:: Home  Mental Status: Alert  During Assessment patient was accompanied by: Son  Assessment information provided by:: Son  Primary Caregiver: Self  Support Systems: Son  [place]  Home entry access options. Select all that apply.: Stairs  Number of steps to enter home.: 2  Do the steps have railings?: Yes  Type of Current Residence: 2 story home  Upon entering residence, is there a bedroom on the main floor (no further steps)?: No  A bedroom is located on the following floor levels of residence (select all that apply):: 2nd Floor  Upon entering residence, is there a bathroom on the main floor (no further steps)?: Yes  Number of steps to 2nd floor from main floor: One Flight  Living Arrangements: Lives w/ Son    Activities of Daily Living Prior to Admission  Functional Status: Independent  Completes ADLs independently?: Yes  Ambulates independently?: Yes  Does patient use assisted devices?: No  Does patient currently own DME?: Yes  What DME does the patient currently own?: Bedside Commode, Wheelchair, Hospital Bed  Does patient have a history of Outpatient Therapy (PT/OT)?: No  Does the patient have a history of Short-Term Rehab?: Yes ([facility])  Does patient have a history of HHC?: Yes ([facility])  Does patient currently have HHC?: Yes    Current Home Health Care  Type of Current Home Care Services: Home health aide  Current Home Health Agency:: Other (please enter name in comment) ([facility])  Current Home Health Follow-Up Provider:: PCP    Patient Information Continued  Income Source: Pension/nursing home  Does patient have prescription coverage?: Yes  Does patient receive dialysis treatments?: No  Does patient have a history of substance abuse?: No  Does patient have a history of Mental Health Diagnosis?: No         Means of Transportation  Means of Transport to Appts:: Drives Self      Social Determinants of Health (SDOH)      Flowsheet  Row Most Recent Value   Housing Stability    In the last 12 months, was there a time when you were not able to pay the mortgage or rent on time? N   In the past 12 months, how many times have you moved where you were living? 1   At any time in the past 12 months, were you homeless or living in a shelter (including now)? N   Transportation Needs    In the past 12 months, has lack of transportation kept you from medical appointments or from getting medications? no   In the past 12 months, has lack of transportation kept you from meetings, work, or from getting things needed for daily living? No   Food Insecurity    Within the past 12 months, you worried that your food would run out before you got the money to buy more. Never true   Within the past 12 months, the food you bought just didn't last and you didn't have money to get more. Never true   Utilities    In the past 12 months has the electric, gas, oil, or water company threatened to shut off services in your home? No            DISCHARGE DETAILS:    Discharge planning discussed with:: son  Freedom of Choice: Yes  Comments - Freedom of Choice: pt is open to [facility]                Contacts  Patient Contacts: Hussein  Relationship to Patient:: Family  Contact Method: Phone, In Person  Phone Number: [phone]  Reason/Outcome: Continuity of Care, Emergency Contact, Discharge Planning                   Would you like to participate in our [facility] service program?  : No - Declined                                                 Additional Comments: pt is open to [facility]--son states father wants to be DNR

## 2024-06-04 NOTE — ASSESSMENT & PLAN NOTE
Baseline 1.5-2. On presentation 3.22--> Creatinine today is at 2.87   Suspect multifactoria secondary to some component of obstructive uropathy (urinary retention greater than 800 cc) plus cardiorenal syndrome with volume overload plus ischemic injury from hemodynamic perturbations in the presence of ARB and NSAIDs plus increased susceptibility acute kidney injury due to recent episode of acute kidney injury in April 2024.   Nephrology consult appreciated  Initially started on Lasix 20 mg IV 3 times daily will transition to Lasix 40 mg IV twice daily today and likely transition to p.o. diuretics in a.m.  Hold home telmisartan and spironolactone  Low potassium diet  Urinary retention protocol. Noted >800cc on bladder scan. Rowland cathter placed, add Flomax  Kidney/bladder US unremarkable

## 2024-06-04 NOTE — ASSESSMENT & PLAN NOTE
Vascular present at the bedside, long discussion regarding goals of care with patient and his son   From surgical standpoint they would recommend a AKA along with a bypass given the severity of his current condition and ischemia versus recommending palliative/hospice care if surgery was not desired  Patient and son are leaning towards decision of hospice at this point but are still having ongoing discussions  ACP note documented  Will consult palliative care for further guidance on continuation of goals of care conversation and pain management

## 2024-06-04 NOTE — ACP (ADVANCE CARE PLANNING)
Advanced Care Planning Progress Note    Serious Illness Conversation    1. What is your understanding now of where you are with your illness?  Prognostic Understanding: appropriate understanding of prognosis     2. How much information about what is likely to be ahead with your illness would you like to have?  Information: patient wants to be fully informed     3. What did you (clinician) communicate to the patient?  Prognostic Communication: Time - I wish we were not in this situation, but I am worried that time may be as short as days to weeks depending on your decision of pursuing surgery versus hospice care. (express as a range, e.g. days to weeks, weeks to months, months to a year).     4. If your health situation worsens, what are your most important goals?  Goals: be physically comfortable     5. What are the biggest fears and worries about the future and your health?  Patient reports he has no fears and has a strong tayler in God.      6. What abilities are so critical to your life that you cannot imagine living without them?  Unacceptable Function: being in chronic severe pain     7. What gives you strength as you think about the future with your illness?  His tayler and family.      8. If you become sicker, how much are you willing to go through for the possibility of gaining more time?   Have a feeding tube: No   Be on a ventilator: No    Patient is still trying to decide what his plans will be. He is leaning towards hospice and palliative treatment but is still in ongoing discussions with son about his desire to undergo aggressive procedures and tests such as AKA and bypass surgery, also discussing if he would consider HD in the future. Per son he would likely want to return to home but realistically he would suspect he would need to be in a SNF if he decides on hospice or if he decides on surgery as he doesn't know if he could care for him at home in his condition.   9. How much does your proxy and family  know about your priorities and wishes?  Discussion Discussion: extensive discussion with family about goals and wishes     I’ve heard you say that controlling your pain is really important to you. Keeping that in mind, and what we know about your illness, I recommend that we consult palliative care to assist with pain management and ongoing discussions regarding goals of care. This will help us make sure that your treatment plans reflect what’s important to you.     How does this plan sound to you? I will do everything I can to help you through this.  Patient verbalized understanding of the plan     I have spent 60minutes speaking with my patient on advanced care planning today or during this visit     Advanced directives  Five Wishes: Patient does not have Five Wishes- would not like information         ZACHARIAH Ferro

## 2024-06-04 NOTE — CONSULTS
Consultation - Palliative and Supportive Care   Kulwinder Hernandez 78 y.o. male [id number]      Active issues specifically addressed today include:   Palliative care encounter  Assessment & Plan  Social support provided for patient and family:  Supportive listening provided  Normalized experience of patient and family  Provided anxiety containment  Provided anticipatory guidance  Encouraged self care  Son agreeable to ongoing support     Follow up  Palliative Care will continue to follow for support. Please reach out via [facility] if questions or concerns arise.     Care Coordination  Reviewed decision for level 4 comfort care with Primary team and Vascular Surgery        Goals of care, counseling/discussion  Assessment & Plan  Goals:  Confirmed level 4 Comfort Care Status   Comfort Focused  Pending possible transition to either home Hospice or Hospice facility  Will continue to support patient, Son and Family during this time   Son reached out to their home spiritual care to come in    * PVD (peripheral vascular disease) ([facility])  Assessment & Plan  -Patient presented for RLE intractable pain with difficulty ambulating  -Known occlusion of R aortofemoral limb with prior plan for R AX-fem bypass w/ PTFE on 6/7  -Possibility of above knee amputation in setting of recent findings   -Patient desires Hospice care vs surgical intervention at this time           Plan:  1. Symptom management - Pain Control   - Comfort care focused pain control at this time    - Pain regimen: Tylenol 975 q8h scheduled, Dilaudid 1mg q1h prn for severe pain, Dilaudid 2mg q1h prn for breakthrough pain, Ativan 1mg q1h prn anxiety/agitation   - Will adjust as needed     2. Goals - Level 4 Comfort Care   - Patient and oldest son, Hussein, determined they do not want to escalate care with any surgical intervention. In agreement to Comfort Care measures until transition to Hospice.    - Son Hussein prefers home hospice at this time. He is open to  re-evaluating that option each day   - Case Management following    - Level 4 Comfort Care Orders Initiated      Code Status:  Level 4 Comfort Care    Decisional apparatus:  Patient  has limited capacity on my exam today.  If competence is lost, patient's substitute decision maker would default to SonHussein  by [place] Act 169.   Advance Directive / Living Will / POLST:  Not on file      I have reviewed the patient's controlled substance dispensing history in the Prescription Drug Monitoring Program in compliance with the [facility] regulations before prescribing any controlled substances.         We appreciate the invitation to be involved in this patient's care.  We will continue to follow while patient is inpatient.  Please do not hesitate to reach our on call provider through our clinic answering service at [phone] should you have acute symptom control concerns.    Alisha Quintero MD  Palliative and Supportive Care  Clinic/Answering Service: [phone]  You can find me on [employer]!       IDENTIFICATION:  Inpatient consult to Palliative Care  Consult performed by: Alisha Quintero MD  Consult ordered by: ZACHARIAH Ferro        Physician Requesting Consult: Haritha Bojorquez MD  Reason for Consult / Principal Problem: Progressing RLE Ischemia. Vascular intervention vs Hospice measures   Hx and PE limited by: Patient's willingness to participate in history and physical     NARRATIVE AND INTERVAL HISTORY:       Kulwinder Hernandez is a 78 y.o. male with PMH of CKD3, CHF, CVA who presents with worsening RLE pain and b/l LE edema in the setting of a occluded R aortofemoral limb. Patient was scheduled for R AX-fem bypass with PTFE on 6/7. On arrival to [facility] patient was noted to have a cold and mottled RLE with impalpable pulses as well as urinary retention. Patient was having pain at rest and unable to bear weight on RLE. Above knee amputation of RLE was discussed in setting of recent findings.     Patient's son Hussein was at  "bedside and is patient's POA. Patient has limited capacity on exam and was unwilling and uncooperative with history taking and exam. Son and patient did not want to have initial Goals of Care discussion with Palliative team in the room, they requested to speak to each other one and one. Son and patient came to the decision that they do not want to escalate care further and that they would like to transition to comfort care with goals for home hospice at this time. Son states that 4 years ago his mom, patient's wife, spent 8 months on Hospice so that family is well aware of what Hospice entails. In the setting of patient's Hx of 2 CVAs it is difficult to fully asses patient's capacity as he has issues with word finding. However patient made  stated \"want Hospice\". When it was explained that would mean no further escalation of care and a comfort focused approach patient stated \"yes\".  Son Hussein was in full agreement.     Son stated they are [employer] and that they reached out to their home  to come speak with his dad. Son declined in house spiritual care at this time. Emphasized Palliative team support.       Past Medical History:   Diagnosis Date    CKD (chronic kidney disease) 11/08/2018    COPD (chronic obstructive pulmonary disease) (HCC)     Diabetes mellitus (HCC)     Hyperlipidemia     Hypertension     PVD (peripheral vascular disease) (HCC)     Sleep apnea     Small bowel obstruction (HCC)     Ventral hernia      Past Surgical History:   Procedure Laterality Date    ABDOMINAL HERNIA REPAIR      ABDOMINAL SURGERY      CHOLECYSTECTOMY      open    FEMORAL BYPASS Right     IR THORACENTESIS  4/15/2024     Social History     Socioeconomic History    Marital status:      Spouse name: Not on file    Number of children: Not on file    Years of education: Not on file    Highest education level: Not on file   Occupational History    Not on file   Tobacco Use    Smoking status: Former     Current packs/day: " 3.00     Average packs/day: 3.0 packs/day for 14.4 years (43.3 ttl pk-yrs)     Types: Cigarettes     Start date: 1/1/2010    Smokeless tobacco: Never   Vaping Use    Vaping status: Never Used   Substance and Sexual Activity    Alcohol use: Never    Drug use: Never    Sexual activity: Not Currently     Partners: Female   Other Topics Concern    Not on file   Social History Narrative    Not on file     Social Determinants of Health     Financial Resource Strain: Not on file   Food Insecurity: No Food Insecurity (6/4/2024)    Hunger Vital Sign     Worried About Running Out of Food in the Last Year: Never true     Ran Out of Food in the Last Year: Never true   Transportation Needs: No Transportation Needs (6/4/2024)    PRAPARE - Transportation     Lack of Transportation (Medical): No     Lack of Transportation (Non-Medical): No   Physical Activity: Not on file   Stress: Not on file   Social Connections: Not on file   Intimate Partner Violence: Not on file   Housing Stability: Low Risk  (6/4/2024)    Housing Stability Vital Sign     Unable to Pay for Housing in the Last Year: No     Number of Times Moved in the Last Year: 1     Homeless in the Last Year: No     No family history on file.    MEDICATIONS / ALLERGIES:    all current active meds have been reviewed and current meds:   Current Facility-Administered Medications   Medication Dose Route Frequency    acetaminophen (TYLENOL) tablet 975 mg  975 mg Oral Q8H    bisacodyl (DULCOLAX) rectal suppository 10 mg  10 mg Rectal Daily PRN    glycopyrrolate (ROBINUL) injection 0.1 mg  0.1 mg Intravenous Q4H PRN    haloperidol lactate (HALDOL) injection 0.5 mg  0.5 mg Intravenous Q2H PRN    HYDROmorphone (DILAUDID) injection 1 mg  1 mg Intravenous Q1H PRN    HYDROmorphone (DILAUDID) injection 2 mg  2 mg Intravenous Q1H PRN    LORazepam (ATIVAN) injection 1 mg  1 mg Intravenous Q1H PRN    ondansetron (ZOFRAN) injection 4 mg  4 mg Intravenous Q8H PRN    polyethylene glycol  (MIRALAX) packet 17 g  17 g Oral Daily PRN       No Known Allergies    OBJECTIVE:    Physical Exam  Physical Exam  Constitutional:       General: He is in acute distress.      Appearance: He is obese.      Comments: Endorsing pain and uncooperative with much of exam   HENT:      Head: Normocephalic and atraumatic.      Right Ear: External ear normal.      Left Ear: External ear normal.      Nose: Nose normal.   Eyes:      Comments: Patient unwilling to open his eyes on exam   Cardiovascular:      Comments: Unwilling to participate in full heart exam  Pulmonary:      Comments: Unwilling to participate in full lung exam  Abdominal:      Comments: Patient deferred abdominal exam   Musculoskeletal:      Right lower leg: Edema present.      Left lower leg: Edema present.      Comments: Cold and mottled RLE    Skin:     General: Skin is warm and dry.         Lab Results: I have personally reviewed pertinent labs.  Imaging Studies: I have reviewed pertinent reports   EKG, Pathology, and Other Studies: I have reviewed pertinent reports    I have spent a total time of 45 minutes on 06/04/24 in caring for this patient including Prognosis, Risks and benefits of tx options, Patient and family education, Impressions, Counseling / Coordination of care, Documenting in the medical record, Reviewing / ordering tests, medicine, procedures  , Obtaining or reviewing history  , and Communicating with other healthcare professionals .      Alisha Quintero MD   PGY-1, Family Medicine  [facility]

## 2024-06-04 NOTE — ASSESSMENT & PLAN NOTE
Leukocytosis and tachycardia secondary to PAD/pain. Chronic leukocytosis noted.   Obtain blood cultures and UA  CXR negative for infiltrate.   Afebrile. No suspicion for acute infection at this time. Will monitor off antibiotics pending workup  If patient clinically deteriorate then will start broad spectrum antibiotics.

## 2024-06-04 NOTE — OCCUPATIONAL THERAPY NOTE
OT CANCEL NOTE    OT orders received. Chart reviewed. Pt pending discussions/decisions regarding potential need for amputation vs. Hospice care. Will hold initial OT evaluation until further clarity given regarding POC. Will continue to follow pt on caseload and see pt when medically stable and as clinically appropriate.         06/04/24 1249   OT Last Visit   OT Visit Date 06/04/24   Note Type   Note type Evaluation   Cancel Reasons Medical status       Stephanie Khan MS, OTR/L

## 2024-06-04 NOTE — CONSULTS
Consultation - Nephrology   Kulwinder Hernandez 78 y.o. male MRN: [id number]  Unit/Bed#: [place] 805-01 Encounter: [id number]      Assessment & Plan     Assessment:  #HARVINDER on chronic kidney disease   #Acute on chronic HF  #PVD (s/p Eehyj-nq-slebowv bypass in 2011)  #Occlusion of bi-femoral graft.  #HTN    -Baseline creatinine 1.6-1.9.  -Chronically raised Wbcs.  -Patient takes PO Lasix 40 mg BID at home.  -Also takes telmisartan  40 mg once daily at home.      HARVINDER could be multifactorial most likely 2/2 Acute decompensated HF, urinary retention, PAD.    Plan:    -Creatinine has improved from 3.22 to 2.87.  -Will transition IV Lasix 40 mg BID. Will try to switch to home PO Lasix dose.  -Will hold Telmisartan 2/2 HARVINDER and soft BP.  -Continue monitor I/Os.  -Continue monitor daily weights.   -Consider lo-phos and lo-potassium diet.      History of Present Illness   Physician Requesting Consult: Haritha Bojorquez MD  Reason for Consult / Principal Problem: HARVINDER on CKD IV  Hx and PE limited by:   HPI: Kulwinder Hernandez is a 78 y.o. year old male with PMH of HEFpEF, CKD IV, chronic hypoxic respiratory failure, CVA, PAD s/p qjmju-jk-dkwukox bypass (2011) who presents with worsening RLE pain. No other complaints of fever, chest pain, SOB.  Recent CTA/P showed occluded right limb of arterial bifemoral graft.   History obtained from chart review and the patient    Inpatient consult to Nephrology  Consult performed by: Angi Montalvo MD  Consult ordered by: Jomar Israel MD          Review of Systems   Unable to perform ROS: Other       Historical Information   Past Medical History:   Diagnosis Date    CKD (chronic kidney disease) 11/08/2018    COPD (chronic obstructive pulmonary disease) (HCC)     Diabetes mellitus (HCC)     Hyperlipidemia     Hypertension     PVD (peripheral vascular disease) (HCC)     Sleep apnea     Small bowel obstruction (HCC)     Ventral hernia      Past Surgical History:   Procedure Laterality Date    ABDOMINAL HERNIA REPAIR       ABDOMINAL SURGERY      CHOLECYSTECTOMY      open    FEMORAL BYPASS Right     IR THORACENTESIS  4/15/2024     Social History   Social History     Substance and Sexual Activity   Alcohol Use Never     Social History     Substance and Sexual Activity   Drug Use Never     E-Cigarette/Vaping    E-Cigarette Use Never User      E-Cigarette/Vaping Substances     Social History     Tobacco Use   Smoking Status Former    Current packs/day: 3.00    Average packs/day: 3.0 packs/day for 14.4 years (43.3 ttl pk-yrs)    Types: Cigarettes    Start date: 1/1/2010   Smokeless Tobacco Never     No family history on file.    Meds/Allergies   all current active meds have been reviewed, current meds:   Current Facility-Administered Medications   Medication Dose Route Frequency    acetaminophen (TYLENOL) tablet 975 mg  975 mg Oral Q8H    aspirin chewable tablet 81 mg  81 mg Oral Daily    atorvastatin (LIPITOR) tablet 40 mg  40 mg Oral Daily With Dinner    furosemide (LASIX) injection 40 mg  40 mg Intravenous TID (diuretic)    heparin (porcine) 25,000 units in 0.45% NaCl 250 mL infusion (premix)  3-20 Units/kg/hr (Order-Specific) Intravenous Titrated    heparin (porcine) injection 2,000 Units  2,000 Units Intravenous Q6H PRN    heparin (porcine) injection 4,000 Units  4,000 Units Intravenous Q6H PRN    HYDROmorphone HCl (DILAUDID) injection 0.2 mg  0.2 mg Intravenous Q4H PRN    ondansetron (ZOFRAN) injection 4 mg  4 mg Intravenous Q8H PRN    oxyCODONE (ROXICODONE) split tablet 2.5 mg  2.5 mg Oral Q4H PRN    Or    oxyCODONE (ROXICODONE) IR tablet 5 mg  5 mg Oral Q4H PRN    polyethylene glycol (MIRALAX) packet 17 g  17 g Oral Daily PRN   ,     No Known Allergies    Objective     Intake/Output Summary (Last 24 hours) at 6/4/2024 0815  Last data filed at 6/4/2024 0325  Gross per 24 hour   Intake --   Output 1650 ml   Net -1650 ml       Invasive Devices:   Urethral Catheter 18 Fr. (Active)   Output (mL) 900 mL 06/04/24 0325       Physical  "Exam  Vitals and nursing note reviewed.   Constitutional:       General: He is not in acute distress.     Appearance: He is obese.      Comments: Drowsy   HENT:      Mouth/Throat:      Mouth: Mucous membranes are dry.   Cardiovascular:      Rate and Rhythm: Regular rhythm. Tachycardia present.      Pulses: Normal pulses.      Heart sounds: Normal heart sounds.   Pulmonary:      Effort: Pulmonary effort is normal.      Breath sounds: Normal breath sounds.   Abdominal:      Palpations: Abdomen is soft.   Musculoskeletal:         General: Swelling present.      Left lower leg: No edema.      Comments: Right LE cold, mottled.   Skin:     General: Skin is warm.         Current Weight: Weight - Scale: 133 kg (292 lb 5.3 oz)  First Weight: Weight - Scale: 133 kg (292 lb 5.3 oz)    Lab Results:  CBC:   Lab Results   Component Value Date    WBC 14.84 (H) 06/04/2024    HGB 9.5 (L) 06/04/2024    HCT 29.0 (L) 06/04/2024    MCV 94 06/04/2024     06/04/2024    RBC 3.09 (L) 06/04/2024    MCH 30.7 06/04/2024    MCHC 32.8 06/04/2024    RDW 14.3 06/04/2024    MPV 10.5 06/04/2024    NRBC 0 06/03/2024     CMP:   Lab Results   Component Value Date    SODIUM 135 06/04/2024    K 5.2 06/04/2024    CL 96 06/04/2024    CO2 27 06/04/2024    BUN 71 (H) 06/04/2024    CREATININE 2.87 (H) 06/04/2024    CALCIUM 9.2 06/04/2024    AST 28 06/03/2024    ALT 28 06/03/2024    ALKPHOS 86 06/03/2024    EGFR 20 06/04/2024     Phosphorus: No results found for: \"PHOS\"  Magnesium: No results found for: \"MG\"  Urinalysis: No results found for: \"COLORU\", \"CLARITYU\", \"SPECGRAV\", \"PHUR\", \"LEUKOCYTESUR\", \"NITRITE\", \"PROTEINUA\", \"GLUCOSEU\", \"KETONESU\", \"BILIRUBINUR\", \"BLOODU\"  Ionized Calcium: No results found for: \"CAION\"  Coagulation:   Lab Results   Component Value Date    INR 1.39 (H) 06/03/2024     Troponin: No results found for: \"TROPONINI\"  ABG: No results found for: \"PHART\", \"SYT4NKC\", \"PO2ART\", \"SPA0PAL\", \"A1DRCDHW\", \"BEART\", " "\"SOURCE\"        Counseling / Coordination of Care  Total floor / unit time spent today 30 minutes. Greater than 50% of total time was spent with the patient and / or family counseling and / or coordination of care. A description of the counseling / coordination of care:   "

## 2024-06-04 NOTE — PHYSICAL THERAPY NOTE
Physical Therapy Cancellation Note    Patient's Name: Kulwinder Hernandez       06/04/24 1300   PT Last Visit   PT Visit Date 06/04/24   Note Type   Note type Cancelled Session   Cancel Reasons Medical status   Additional Comments Pt pending GOC - potential amputation vs hospice. Will follow peripherally for need for PT evaluation vs screen pending pt's decision.       Milagro Narayan, PT, DPT

## 2024-06-04 NOTE — PROGRESS NOTES
[facility]  Progress Note  Name: Kulwinder Hernandez I  MRN: [id number]  Unit/Bed#: [facility] 805-01 I Date of Admission: 6/3/2024   Date of Service: 6/4/2024 I Hospital Day: 1    Assessment & Plan   * PVD (peripheral vascular disease) ([facility])  Assessment & Plan  PAD s/p axvjh-qd-wedcggn bypass 9/27/2011 presents with worsening RLE rest pain in setting of occlude R aortofemoral limb with planned upcoming intervention (R AX-fem bypass w/ PTFE).   Continue heparin infusion, ASA 81mg qd and atorvastatin. Eliquis held.  Vascular surgery consult appreciated-at this point would be recommending an AKA and bypass if surgery was desired by the patient and family.  Otherwise would recommend palliative or hospice care moving forward if he does not want to proceed with surgery  Serial neurovascular checks  Cardiology consult for possible preop clearance    Goals of care, counseling/discussion  Assessment & Plan  Vascular present at the bedside, long discussion regarding goals of care with patient and his son   From surgical standpoint they would recommend a AKA along with a bypass given the severity of his current condition and ischemia versus recommending palliative/hospice care if surgery was not desired  Patient and son are leaning towards decision of hospice at this point but are still having ongoing discussions  ACP note documented  Will consult palliative care for further guidance on continuation of goals of care conversation and pain management    SIRS (systemic inflammatory response syndrome) ([facility])  Assessment & Plan  Leukocytosis and tachycardia secondary to PAD/pain. Chronic leukocytosis noted.   Obtain blood cultures and pending UA  CXR negative for infiltrate.   COVID/flu/RSV negative  Afebrile. No suspicion for acute infection at this time. Will monitor off antibiotics pending workup  If patient clinically deteriorate then will start broad spectrum antibiotics.     Acute on chronic diastolic  CHF (congestive heart failure) (HCC)  Assessment & Plan  Patient presented to the emergency room with volume overload. Reported hypoxia and decreased urine output at home. CXR with congestive changes concerning for interstitial edema.  Per son patient restarted his oxygen therapy hours prior to presentation to the hospital  TTE 4/2024: EF 55%, G1DD, normal wall motions.  Home regimen: oral lasix 40 mg bid and spirolactone 25 mg qd  Started IV Lasix 40mg TID admission, per nephrology transition to Lasix 40 mg IV twice daily today and likely back to oral diuretics in a.m   Monitor I/Os, daily weights and BMP  Cardiology and nephrology consults appreciated    Acute kidney injury superimposed on chronic kidney disease  (HCC)  Assessment & Plan  Baseline 1.5-2. On presentation 3.22--> Creatinine today is at 2.87   Suspect multifactoria secondary to some component of obstructive uropathy (urinary retention greater than 800 cc) plus cardiorenal syndrome with volume overload plus ischemic injury from hemodynamic perturbations in the presence of ARB and NSAIDs plus increased susceptibility acute kidney injury due to recent episode of acute kidney injury in April 2024.   Nephrology consult appreciated  Initially started on Lasix 20 mg IV 3 times daily will transition to Lasix 40 mg IV twice daily today and likely transition to p.o. diuretics in a.m.  Hold home telmisartan and spironolactone  Low potassium diet  Urinary retention protocol. Noted >800cc on bladder scan. Rowland cathter placed, add Flomax  Kidney/bladder US unremarkable    Chronic obstructive pulmonary disease (HCC)  Assessment & Plan  No signs of exacerbation  Prn albuterol.   Wean O2 as tolerated for sats greater than or equal to 88%    Morbid obesity (HCC)  Assessment & Plan  BMI 44.45  Affecting all facets of life    Dementia ([facility])  Assessment & Plan  No longer on Aricept  Supportive care  Delirium precautions.     Essential hypertension  Assessment &  Plan  Monitor with IV diuretics  Hold spironolactone and telmisartnan given HARVINDER           VTE Pharmacologic Prophylaxis: VTE Score: 3 Moderate Risk (Score 3-4) - Pharmacological DVT Prophylaxis Ordered: heparin drip.    Mobility:   Basic Mobility Inpatient Raw Score: 12  JH-HLM Goal: 4: Move to chair/commode  JH-HLM Achieved: 3: Sit at edge of bed  JH-HLM Goal NOT achieved. Continue with multidisciplinary rounding and encourage appropriate mobility to improve upon JH-HLM goals.    Patient Centered Rounds: I performed bedside rounds with nursing staff today.   Discussions with Specialists or Other Care Team Provider: d/w RN,Jessica BLANKENSHIP with vascular, Dr. España, and Angi sher with nephrology     Education and Discussions with Family / Patient: Updated  (son) at bedside.    Total Time Spent on Date of Encounter in care of patient: 60 mins. This time was spent on one or more of the following: performing physical exam; counseling and coordination of care; obtaining or reviewing history; documenting in the medical record; reviewing/ordering tests, medications or procedures; communicating with other healthcare professionals and discussing with patient's family/caregivers.    Current Length of Stay: 1 day(s)  Current Patient Status: Inpatient   Certification Statement: The patient will continue to require additional inpatient hospital stay due to given ongoing ischemia and goals of care   Discharge Plan: Anticipate discharge in 48-72 hrs to to be determined based on ongoing goals of care conversations     Code Status: Level 1 - Full Code    Subjective:   Pt lying in bed with eyes closed. When asked if he can open his eyes he smiles and opens them slowly. Reports he is having pain in his right leg 5/10 currently. Denies any other complaints. No events per RN     Objective:     Vitals:   Temp (24hrs), Av.4 °F (36.9 °C), Min:98.4 °F (36.9 °C), Max:98.5 °F (36.9 °C)    Temp:  [98.4 °F (36.9 °C)-98.5 °F  (36.9 °C)] 98.4 °F (36.9 °C)  HR:  [] 107  Resp:  [11-21] 17  BP: ()/(48-69) 142/48  SpO2:  [93 %-97 %] 97 %  Body mass index is 44.45 kg/m².     Input and Output Summary (last 24 hours):     Intake/Output Summary (Last 24 hours) at 6/4/2024 1236  Last data filed at 6/4/2024 1201  Gross per 24 hour   Intake --   Output 2325 ml   Net -2325 ml       Physical Exam:   Physical Exam  Constitutional:       General: He is in acute distress.      Appearance: He is morbidly obese. He is ill-appearing.   Cardiovascular:      Rate and Rhythm: Normal rate and regular rhythm.      Pulses: Normal pulses.      Heart sounds: No murmur heard.     Comments: Distant heart sounds given body habitus  Pulmonary:      Effort: No respiratory distress.      Breath sounds: No wheezing or rales.      Comments: 2 L nasal cannula diminished lung sounds bilateral lower lobes, poor effort  Abdominal:      General: Bowel sounds are normal. There is distension.      Palpations: Abdomen is soft.      Tenderness: There is no abdominal tenderness.   Genitourinary:     Comments: Rowland draining yellow urine  Musculoskeletal:         General: Swelling and tenderness (Right lower extremity) present.      Right lower leg: Edema present.      Left lower leg: Edema present.   Skin:     General: Skin is cool.      Coloration: Skin is cyanotic and mottled.      Comments: Right lower extremity   Neurological:      Mental Status: He is oriented to person, place, and time. Mental status is at baseline.   Psychiatric:         Attention and Perception: Perception normal.         Mood and Affect: Mood normal.         Behavior: Behavior normal. Behavior is cooperative.         Thought Content: Thought content normal.         Judgment: Judgment normal.          Additional Data:     Labs:  Results from last 7 days   Lab Units 06/04/24  0000 06/03/24  1437   WBC Thousand/uL 14.84* 12.66*   HEMOGLOBIN g/dL 9.5* 9.3*   HEMATOCRIT % 29.0* 28.2*   PLATELETS  Thousands/uL 260 258   SEGS PCT %  --  73   LYMPHO PCT %  --  14   MONO PCT %  --  8   EOS PCT %  --  4     Results from last 7 days   Lab Units 06/04/24  0334 06/03/24  1437   SODIUM mmol/L 135 135   POTASSIUM mmol/L 5.2 5.3   CHLORIDE mmol/L 96 97   CO2 mmol/L 27 26   BUN mg/dL 71* 77*   CREATININE mg/dL 2.87* 3.22*   ANION GAP mmol/L 12 12   CALCIUM mg/dL 9.2 9.6   ALBUMIN g/dL  --  3.7   TOTAL BILIRUBIN mg/dL  --  0.66   ALK PHOS U/L  --  86   ALT U/L  --  28   AST U/L  --  28   GLUCOSE RANDOM mg/dL 150* 122     Results from last 7 days   Lab Units 06/03/24  1447   INR  1.39*             Results from last 7 days   Lab Units 06/03/24 2031   LACTIC ACID mmol/L 1.3       Lines/Drains:  Invasive Devices       Peripheral Intravenous Line  Duration             Peripheral IV 06/03/24 Left;Ventral (anterior) Forearm <1 day    Peripheral IV 06/03/24 Right Antecubital <1 day              Drain  Duration             Urethral Catheter 18 Fr. <1 day                  Urinary Catheter:  Goal for removal: Voiding trial when ambulation improves               Imaging: Reviewed radiology reports from this admission including: CT head, xray(s), and ultrasound(s)    Recent Cultures (last 7 days):   Results from last 7 days   Lab Units 06/03/24 2031   BLOOD CULTURE  Received in Microbiology Lab. Culture in Progress.  Received in Microbiology Lab. Culture in Progress.       Last 24 Hours Medication List:   Current Facility-Administered Medications   Medication Dose Route Frequency Provider Last Rate    acetaminophen  975 mg Oral Q8H Jomar Israel MD      aspirin  81 mg Oral Daily Jomar Israel MD      atorvastatin  40 mg Oral Daily With Dinner Jomar Israel MD      furosemide  40 mg Intravenous BID (diuretic) Angi Montalvo MD      heparin (porcine)  3-20 Units/kg/hr (Order-Specific) Intravenous Titrated Jomar Israel MD 10 Units/kg/hr (06/04/24 0818)    heparin (porcine)  2,000 Units Intravenous Q6H PRN Jomar Israel MD       heparin (porcine)  4,000 Units Intravenous Q6H PRN Jomar Israel MD      HYDROmorphone  0.5 mg Intravenous Q4H PRN ZACHARIAH Ferro      ondansetron  4 mg Intravenous Q8H PRN Jomar Israel MD      oxyCODONE  5 mg Oral Q4H PRN ZACHARIAH Ferro      Or    oxyCODONE  10 mg Oral Q4H PRN ZACHARIAH Ferro      polyethylene glycol  17 g Oral Daily PRN Jomar Israel MD      tamsulosin  0.4 mg Oral Daily With Dinner Berenice Crockett MD          Today, Patient Was Seen By: ZACHARIAH Ferro    **Please Note: This note may have been constructed using a voice recognition system.**

## 2024-06-04 NOTE — ASSESSMENT & PLAN NOTE
PAD s/p muvjk-co-biuvdow bypass 9/27/2011 presents with worsening RLE rest pain in setting of occlude R aortofemoral limb with planned upcoming intervention (R AX-fem bypass w/ PTFE).   Continue heparin infusion, ASA 81mg qd and atorvastatin. Eliquis held.  Vascular surgery consult appreciated-at this point would be recommending an AKA and bypass if surgery was desired by the patient and family.  Otherwise would recommend palliative or hospice care moving forward if he does not want to proceed with surgery  Serial neurovascular checks  Cardiology consult for possible preop clearance

## 2024-06-04 NOTE — ASSESSMENT & PLAN NOTE
Patient presented to the emergency room with volume overload. Reported hypoxia and decreased urine output at home. CXR with congestive changes concerning for interstitial edema.  Per son patient restarted his oxygen therapy hours prior to presentation to the hospital  TTE 4/2024: EF 55%, G1DD, normal wall motions.  Home regimen: oral lasix 40 mg bid and spirolactone 25 mg qd  Started IV Lasix 40mg TID admission, per nephrology transition to Lasix 40 mg IV twice daily today and likely back to oral diuretics in a.m   Monitor I/Os, daily weights and BMP  Cardiology and nephrology consults appreciated

## 2024-06-04 NOTE — CONSULTS
Consultation - Vascular Surgery   Kulwinder Hernandez 78 y.o. male MRN: [id number]  Unit/Bed#: [facility] 805-01 Encounter: [id number]      Assessment:  Kulwinder Hernandez is a 78 y.o. male with worsening RLE pain and edema..  Vascular consulted for Chronic progressing ischemia..     PMHx significant for  HLD, HTN, CVA x2 (ischemic and hemorrhagic), MCI/memory issues, known L carotid occlusion,  chronic dCHF, CKD, PAD     Vascular Hx:   9/27/2011 [id number] bypass (OSH in [place])  with known occl of right Aortofemoral limb     Plan:  Patient presenting with worsening RLE rest pain and signifiant BL LE Edema. Known hx of dCHF and appears to be fluid overloaded. Tentative vascular intervention on 6/7 with Rt Ax-profunda bypass c PTFE however will need medical optimization prior.  Currently patient has ischemic rest pain which is positional. S/M intact but diminished. NO signals distally.   Recommend continue Hep gtt and symptomatic management with pain control. Continue holding eliquis and transition to hep gtt.   Continue ASA/Statin  Rest of care per primary team, please wait for attending attestation for final recommendations.   _______________________________________________________________  Physician Requesting Consult: Jomar Israel MD    Additional consultants: SLIM    Reason for Consult / Principal Problem: RLE rest pain and edema.      HPI: Kulwinder Hernandez is a 78 y.o. year old male who was admitted on 6/3/2024 with complaints of worsening right lower extremity pain and bilateral lower extremity edema.  Patient states that he was able weight and tolerate his pain in regards to his bilateral lower extremities earlier this week however the past 2 days patient has noticed his bilateral lower extremities becoming significantly swollen and painful right lower extremity.  Patient states that he is unable to bear weight in regards to his right foot.  Patient does report numbness and tingling to his right foot.  Patient denies any  fevers chills or shortness of breath at this time.    Historical Information   Past Medical History:   Diagnosis Date    CKD (chronic kidney disease) 11/08/2018    COPD (chronic obstructive pulmonary disease) (HCC)     Diabetes mellitus (HCC)     Hyperlipidemia     Hypertension     PVD (peripheral vascular disease) (HCC)     Sleep apnea     Small bowel obstruction (HCC)     Ventral hernia      Past Surgical History:   Procedure Laterality Date    ABDOMINAL HERNIA REPAIR      ABDOMINAL SURGERY      CHOLECYSTECTOMY      open    FEMORAL BYPASS Right     IR THORACENTESIS  4/15/2024     Social History   Social History     Substance and Sexual Activity   Alcohol Use Never     Social History     Substance and Sexual Activity   Drug Use Never     Social History     Tobacco Use   Smoking Status Former    Current packs/day: 3.00    Average packs/day: 3.0 packs/day for 14.4 years (43.3 ttl pk-yrs)    Types: Cigarettes    Start date: 1/1/2010   Smokeless Tobacco Never     Family History: non-contributory}    Meds/Allergies     Home meds:   Prior to Admission medications    Medication Sig Start Date End Date Taking? Authorizing Provider   apixaban (ELIQUIS) 5 mg Take 1 tablet (5 mg total) by mouth 2 (two) times a day 4/16/24   Joel Nieto MD   aspirin 81 mg chewable tablet Chew 1 tablet (81 mg total) daily 4/17/24   Joel Nieto MD   atorvastatin (LIPITOR) 10 mg tablet TK 1 T PO D 7/11/18   Historical Provider, MD   donepezil (ARICEPT) 10 mg tablet TK 1 T PO HS  Patient not taking: Reported on 4/9/2024 8/28/18   Historical Provider, MD   furosemide (LASIX) 40 mg tablet Take 1 tablet (40 mg total) by mouth 2 (two) times a day 4/16/24   Joel Nieto MD   meloxicam (MOBIC) 7.5 mg tablet Take 7.5 mg by mouth daily    Historical Provider, MD   spironolactone (ALDACTONE) 25 mg tablet Take 12.5 mg by mouth 2 (two) times a day     Historical Provider, MD   telmisartan (MICARDIS) 40 mg tablet Take 40 mg by mouth daily    Historical Provider,  MD     Scheduled Meds:  Current Facility-Administered Medications   Medication Dose Route Frequency Provider Last Rate    acetaminophen  975 mg Oral Q8H Jomar Israel MD      Albumin 5%  25 g Intravenous Once Jomar Israel MD      [START ON 6/4/2024] aspirin  81 mg Oral Daily Jomar Israel MD      [START ON 6/4/2024] atorvastatin  40 mg Oral Daily With Dinner Jomar Israel MD      furosemide  40 mg Intravenous TID (diuretic) Jomar Israel MD      heparin (porcine)  3-20 Units/kg/hr (Order-Specific) Intravenous Titrated Jomar Israel MD      heparin (porcine)  2,000 Units Intravenous Q6H PRN Jomar Israel MD      heparin (porcine)  4,000 Units Intravenous Q6H PRN Jomar Israel MD      HYDROmorphone  0.2 mg Intravenous Q4H PRN Jomar Israel MD      ondansetron  4 mg Intravenous Q8H PRN Jomar Israel MD      oxyCODONE  2.5 mg Oral Q4H PRN Jomar Israel MD      Or    oxyCODONE  5 mg Oral Q4H PRN Jomar Israel MD       Continuous Infusions:heparin (porcine), 3-20 Units/kg/hr (Order-Specific)      PRN Meds:    heparin (porcine)    heparin (porcine)    HYDROmorphone    ondansetron    oxyCODONE **OR** oxyCODONE    ALLERGIES: No Known Allergies    Review of Systems:  General: negative  Cardiovascular: no chest pain or dyspnea on exertion  Respiratory: no cough, shortness of breath, or wheezing  Gastrointestinal: no abdominal pain, change in bowel habits, or black or bloody stools  Genitourinary: no dysuria, trouble voiding, or hematuria  Musculoskeletal: negative  Neurological: no TIA or stroke symptoms  Hematological and Lymphatic: negative  Dermatological : negative  Psychological: negative  Ophthalmic: negative  ENT: negative      Objective   Vitals:  Blood pressure 97/68, pulse 101, temperature 98.4 °F (36.9 °C), resp. rate 18, weight 133 kg (292 lb 5.3 oz), SpO2 94%.  Body mass index is 44.45 kg/m².    SpO2: SpO2: 95 %, SpO2 Activity:  , SpO2 Device:      I/Os:   I/O       None           No intake or output data in the  24 hours ending 06/03/24 2019     Invasive Lines/Tubes:  Invasive Devices       Peripheral Intravenous Line  Duration             Peripheral IV 06/03/24 Left;Ventral (anterior) Forearm <1 day    Peripheral IV 06/03/24 Right Antecubital <1 day                    Physical Exam  General appearance: alert, icteric, and cooperative  Head: Normocephalic, without obvious abnormality, atraumatic  Eyes: conjunctivae/corneas clear, EOM's intact.  Throat: lips, mucosa, and tongue normal; teeth and gums normal  Neck: supple, symmetrical, trachea midline  Lungs: clear to auscultation bilaterally, Non-labored breathing   Chest wall: no tenderness  Heart: regular rate and rhythm  Abdomen: soft, non-tender; bowel sounds normal; no masses,  no organomegaly  Extremities: extremities normal, atraumatic, no cyanosis or edema  Skin: Skin color, texture, turgor normal. No rashes or lesions  Neurologic: Grossly normal    Vascular Exam:    Focused evaluation of right lower extremity revealed mottling from the knee down.  This was noted on my initial evaluation when patient was sitting up in bed with foot off the side.  On reevaluation, patient was laying in bed monitor had decreased however foot was still noted to be ruborous.  Gross sensation appear to be intact however patient reported decrease in sensation compared to the contralateral foot to light touch.  Examination was diminished with patient able to move his right toes and right ankle minimally.  Capillary refill was delayed greater than 3 seconds    Pulse exam:  Right: No signal in femoral, DP/PT.  Palpable radial    Left: Dopplerable fem, DP/PT    Invasive Lines/Tubes:  Invasive Devices       Peripheral Intravenous Line  Duration             Peripheral IV 06/03/24 Left;Ventral (anterior) Forearm <1 day    Peripheral IV 06/03/24 Right Antecubital <1 day                    Lab Results and Cultures:   COVID:   Last COVID19 Screening Values       None           CBC:   Results from  "last 7 days   Lab Units 06/03/24  1437 05/29/24  0801   WBC Thousand/uL 12.66* 14.55*   HEMOGLOBIN g/dL 9.3* 10.7*   HEMATOCRIT % 28.2* 33.2*   PLATELETS Thousands/uL 258 334     BMP/CMP:  Results from last 7 days   Lab Units 06/03/24  1437 05/29/24  0801   POTASSIUM mmol/L 5.3 5.3   CHLORIDE mmol/L 97 97   CO2 mmol/L 26 29   BUN mg/dL 77* 57*   CREATININE mg/dL 3.22* 2.32*   CALCIUM mg/dL 9.6 9.9     Coags:   Results from last 7 days   Lab Units 06/03/24  1447 05/29/24  0801   INR  1.39* 1.39*   PTT seconds 56*  --      Lipid panel:     HgbA1c: No results found for: \"HGBA1C\"    Urinalysis:   Lab Results   Component Value Date    COLORU Yellow 07/01/2023    CLARITYU Extra Turbid 07/01/2023    SPECGRAV 1.022 07/01/2023    PHUR 8.5 (A) 07/01/2023    PHUR 6.0 11/08/2018    LEUKOCYTESUR Negative 07/01/2023    NITRITE Negative 07/01/2023    GLUCOSEU Negative 07/01/2023    KETONESU Negative 07/01/2023    BILIRUBINUR Negative 07/01/2023    BLOODU Negative 07/01/2023   ,   Urine Culture: No results found for: \"URINECX\"  Wound Culure: No results found for: \"WOUNDCULT\"  Blood Culture:   Lab Results   Component Value Date    BLOODCX No Growth After 5 Days. 04/12/2024    BLOODCX No Growth After 5 Days. 04/12/2024         Imaging Studies: I have personally reviewed pertinent reports.    EKG, Pathology, and Other Studies: I have personally reviewed pertinent reports.    VTE Prophylaxis: Heparin  No CTA results available for this patient.   No CTA results available for this patient.       Code Status: Level 1 - Full Code  Advance Directive and Living Will:      Power of :    POLST:      Counseling / Coordination of Care  Counseling/Coordination of Care: Total floor / unit time spent today 25 minutes. Greater than 50% of total time was spent with the patient and / or family counseling and / or coordination of care. A description of the counseling / coordination of care: Treatment plan        Philip Boyer, " ANGEL  6/3/2024

## 2024-06-05 NOTE — ASSESSMENT & PLAN NOTE
Vascular present at the bedside, long discussion regarding goals of care with patient and his son   From surgical standpoint they would recommend a AKA along with a bypass given the severity of his current condition and ischemia versus recommending palliative/hospice care if surgery was not desired  Patient and son are leaning towards decision of hospice at this point but are still having ongoing discussions  ACP note documented  After discussing with palliative care patient and family opted for hospice

## 2024-06-05 NOTE — PLAN OF CARE
Problem: Prexisting or High Potential for Compromised Skin Integrity  Goal: Skin integrity is maintained or improved  Description: INTERVENTIONS:  - Identify patients at risk for skin breakdown  - Assess and monitor skin integrity  - Assess and monitor nutrition and hydration status  - Monitor labs   - Assess for incontinence   - Turn and reposition patient  - Assist with mobility/ambulation  - Relieve pressure over bony prominences  - Avoid friction and shearing  - Provide appropriate hygiene as needed including keeping skin clean and dry  - Evaluate need for skin moisturizer/barrier cream  - Collaborate with interdisciplinary team   - Patient/family teaching  - Consider wound care consult   Outcome: Progressing     Problem: PAIN - ADULT  Goal: Verbalizes/displays adequate comfort level or baseline comfort level  Description: Interventions:  - Encourage patient to monitor pain and request assistance  - Assess pain using appropriate pain scale  - Administer analgesics based on type and severity of pain and evaluate response  - Implement non-pharmacological measures as appropriate and evaluate response  - Consider cultural and social influences on pain and pain management  - Notify physician/advanced practitioner if interventions unsuccessful or patient reports new pain  Outcome: Progressing     Problem: INFECTION - ADULT  Goal: Absence or prevention of progression during hospitalization  Description: INTERVENTIONS:  - Assess and monitor for signs and symptoms of infection  - Monitor lab/diagnostic results  - Monitor all insertion sites, i.e. indwelling lines, tubes, and drains  - Monitor endotracheal if appropriate and nasal secretions for changes in amount and color  - [place] appropriate cooling/warming therapies per order  - Administer medications as ordered  - Instruct and encourage patient and family to use good hand hygiene technique  - Identify and instruct in appropriate isolation precautions for  identified infection/condition  Outcome: Progressing  Goal: Absence of fever/infection during neutropenic period  Description: INTERVENTIONS:  - Monitor WBC    Outcome: Progressing     Problem: SAFETY ADULT  Goal: Patient will remain free of falls  Description: INTERVENTIONS:  - Educate patient/family on patient safety including physical limitations  - Instruct patient to call for assistance with activity   - Consult OT/PT to assist with strengthening/mobility   - Keep Call bell within reach  - Keep bed low and locked with side rails adjusted as appropriate  - Keep care items and personal belongings within reach  - Initiate and maintain comfort rounds  - Make Fall Risk Sign visible to staff  - Offer Toileting every 2 Hours, in advance of need  - Initiate/Maintain bed/chair alarm  - Apply yellow socks and bracelet for high fall risk patients  - Consider moving patient to room near nurses station  Outcome: Progressing  Goal: Maintain or return to baseline ADL function  Description: INTERVENTIONS:  -  Assess patient's ability to carry out ADLs; assess patient's baseline for ADL function and identify physical deficits which impact ability to perform ADLs (bathing, care of mouth/teeth, toileting, grooming, dressing, etc.)  - Assess/evaluate cause of self-care deficits   - Assess range of motion  - Assess patient's mobility; develop plan if impaired  - Assess patient's need for assistive devices and provide as appropriate  - Encourage maximum independence but intervene and supervise when necessary  - Involve family in performance of ADLs  - Assess for home care needs following discharge   - Consider OT consult to assist with ADL evaluation and planning for discharge  - Provide patient education as appropriate  Outcome: Progressing  Goal: Maintains/Returns to pre admission functional level  Description: INTERVENTIONS:  - Perform AM-PAC 6 Click Basic Mobility/ Daily Activity assessment daily.  - Set and communicate daily  mobility goal to care team and patient/family/caregiver.   - Collaborate with rehabilitation services on mobility goals if consulted  - Reposition patient every 2 hours.  - Out of bed for toileting  - Record patient progress and toleration of activity level   Outcome: Progressing     Problem: DISCHARGE PLANNING  Goal: Discharge to home or other facility with appropriate resources  Description: INTERVENTIONS:  - Identify barriers to discharge w/patient and caregiver  - Arrange for needed discharge resources and transportation as appropriate  - Identify discharge learning needs (meds, wound care, etc.)  - Arrange for interpretive services to assist at discharge as needed  - Refer to Case Management Department for coordinating discharge planning if the patient needs post-hospital services based on physician/advanced practitioner order or complex needs related to functional status, cognitive ability, or social support system  Outcome: Progressing     Problem: Knowledge Deficit  Goal: Patient/family/caregiver demonstrates understanding of disease process, treatment plan, medications, and discharge instructions  Description: Complete learning assessment and assess knowledge base.  Interventions:  - Provide teaching at level of understanding  - Provide teaching via preferred learning methods  Outcome: Progressing     Problem: Nutrition/Hydration-ADULT  Goal: Nutrient/Hydration intake appropriate for improving, restoring or maintaining nutritional needs  Description: Monitor and assess patient's nutrition/hydration status for malnutrition. Collaborate with interdisciplinary team and initiate plan and interventions as ordered.  Monitor patient's weight and dietary intake as ordered or per policy. Utilize nutrition screening tool and intervene as necessary. Determine patient's food preferences and provide high-protein, high-caloric foods as appropriate.     INTERVENTIONS:  - Monitor oral intake, urinary output, labs, and  treatment plans  - Assess nutrition and hydration status and recommend course of action  - Evaluate amount of meals eaten  - Assist patient with eating if necessary   - Allow adequate time for meals  - Recommend/ encourage appropriate diets, oral nutritional supplements, and vitamin/mineral supplements  - Order, calculate, and assess calorie counts as needed  - Recommend, monitor, and adjust tube feedings and TPN/PPN based on assessed needs  - Assess need for intravenous fluids  - Provide specific nutrition/hydration education as appropriate  - Include patient/family/caregiver in decisions related to nutrition  Outcome: Progressing

## 2024-06-05 NOTE — DEATH NOTE
PRONOUNCEMENT OF DEATH     DOA: 6/3/2024    DOD: 2024    TOD: 0123    CODE STATUS AT TOD: Level 4 Comfort Care    PATIENT ON HOSPICE?: yes    FAMILY NOTIFIED?: yes, son    AUTOPSY DESIRED?: no    SUSPECTED COD: critical limb ischemia of [facility] PROBLEM LIST:   Patient Active Problem List   Diagnosis    Essential hypertension    Hyperlipidemia    Ventral hernia    SBO (small bowel obstruction) (HCC)    PVD (peripheral vascular disease) (HCC)    Acute kidney injury superimposed on chronic kidney disease  (HCC)    Dementia (HCC)    Elevated random blood glucose level    Renal cyst, left    Dyslipidemia    History of CVA (cerebrovascular accident)    CKD (chronic kidney disease)    Lactic acidosis    Morbid obesity (HCC)    Leukocytosis    Acute on chronic diastolic CHF (congestive heart failure) (HCC)    Chronic respiratory failure with hypoxia (HCC)    Lung nodule    Chronic obstructive pulmonary disease (HCC)    Chronic kidney disease, stage 3b (HCC)    SIRS (systemic inflammatory response syndrome) ([facility])    Goals of care, counseling/discussion    Palliative care encounter       PRONOUNCEMENT OF DEATH    I was contacted by nursing staff to evaluate the patient found without vital signs. Patient was identified visually and identification was confirmed by hospital ID bracelet. Patient was found laying in bed with nursing at bedside. Personally examined the patient without heart sounds auscultated on exam nor were pulses detected x 2. No breath sounds were appreciated after 2 minutes of auscultation. Pupils were fixed and non-reactive to light. Patient was pronounced dead at this date and time.      home is TBD by family later today.    Please kindly review hospital chart for all details of this hospitalization and circumstances leading to death.     Death certificate will be signed my attending physician at a later time.

## 2024-06-05 NOTE — ASSESSMENT & PLAN NOTE
PAD s/p [id number] bypass 9/27/2011 presents with worsening RLE rest pain in setting of occlude R aortofemoral limb with planned upcoming intervention (R AX-fem bypass w/ PTFE).   Continue heparin infusion, ASA 81mg qd and atorvastatin. Eliquis held.  Vascular surgery consult appreciated-at this point would be recommending an AKA and bypass if surgery was desired by the patient and family.  Otherwise would recommend palliative or hospice care moving forward if he does not want to proceed with surgery  Serial neurovascular checks  Patient opted for hospice

## 2024-06-05 NOTE — DISCHARGE SUMMARY
[facility]  Discharge- Kulwinder Hernandez 1945, 78 y.o. male MRN: [id number]  Unit/Bed#: [facility] 805-01 Encounter: [id number]  Primary Care Provider: Nova Reveles   Date and time admitted to hospital: 6/3/2024  7:52 PM    * PVD (peripheral vascular disease) ([facility])  Assessment & Plan  PAD s/p [id number] bypass 9/27/2011 presents with worsening RLE rest pain in setting of occlude R aortofemoral limb with planned upcoming intervention (R AX-fem bypass w/ PTFE).   Continue heparin infusion, ASA 81mg qd and atorvastatin. Eliquis held.  Vascular surgery consult appreciated-at this point would be recommending an AKA and bypass if surgery was desired by the patient and family.  Otherwise would recommend palliative or hospice care moving forward if he does not want to proceed with surgery  Serial neurovascular checks  Patient opted for hospice    Goals of care, counseling/discussion  Assessment & Plan  Vascular present at the bedside, long discussion regarding goals of care with patient and his son   From surgical standpoint they would recommend a AKA along with a bypass given the severity of his current condition and ischemia versus recommending palliative/hospice care if surgery was not desired  Patient and son are leaning towards decision of hospice at this point but are still having ongoing discussions  ACP note documented  After discussing with palliative care patient and family opted for hospice    Acute on chronic diastolic CHF (congestive heart failure) ([facility])  Assessment & Plan  Patient presented to the emergency room with volume overload. Reported hypoxia and decreased urine output at home. CXR with congestive changes concerning for interstitial edema.  Per son patient restarted his oxygen therapy hours prior to presentation to the hospital  TTE 4/2024: EF 55%, G1DD, normal wall motions.  Home regimen: oral lasix 40 mg bid and spirolactone 25 mg qd  Started IV Lasix  "40mg TID admission, per nephrology transition to Lasix 40 mg IV twice daily today and likely back to oral diuretics  Cardiology and nephrology following    Acute kidney injury superimposed on chronic kidney disease  (HCC)  Assessment & Plan  Baseline 1.5-2. On presentation 3.22--> Most recent Cr 2.87   Suspect multifactoria secondary to some component of obstructive uropathy (urinary retention greater than 800 cc) plus cardiorenal syndrome with volume overload plus ischemic injury from hemodynamic perturbations in the presence of ARB and NSAIDs plus increased susceptibility acute kidney injury due to recent episode of acute kidney injury in April 2024  Nephrology following    Dementia (HCC)  Assessment & Plan  No longer on Aricept            Medical Problems       Resolved Problems  Date Reviewed: 6/5/2024   None         Admission Date:   Admission Orders (From admission, onward)       Ordered        06/03/24 1954  INPATIENT ADMISSION  Once                            Admitting Diagnosis: Ischemic RLL    HPI: From H+P 6/3:\"Kulwinder Hernandez is a 78 y.o. male with a PMH of chronic diastolic CHF, chronic hypoxic respiratory failure 2L NC, COPD, hypertension, SBO, CVA, carotid artery stenosis, CKD 3, PAD s/p [id number] bypass 9/27/2011 who presents with worsening RLE pain.     Patient was seen by vascular surgery during recent hospitalization in 4/2024. His CTA A/P showed occluded right limb of the arterial bifemoral graft. LEAD showed SFA occlusion and then DEE is 0.13.  He was seen again in May 2024 with a plan for upcoming right Ax-Fem bypass w/PTFE.     Patient now presents with worsening right lower extremity pain and edema.  Clinical exam is notable for cold and mottled right lower extremity with impalpable pulses. He is transferred to [facility] for vascular surgery evaluation.\"    Procedures Performed: No orders of the defined types were placed in this encounter.      Summary of Hospital Course: Patient was seen by " vascular surgery who recommended AKA. He was seen by nephrology who managed his HARVINDER, as well as cardiology for acute on chronic CHF. He was seen by palliative care for goals of care discussion. Ultimately after multiple discussions patient and family opted for hospice/comfort measures over surgical intervention for RLE ischemia.     Significant Findings, Care, Treatment and Services Provided: nephrology, cardiology, vascular surgery      Final Diagnosis: critical limb ischemia of RLE    Date, Time and Cause of Death    Date of Death: 24  Time of Death:  1:23 AM  Preliminary Cause of Death: Critical limb ischemia of right lower extremity (HCC)  Entered by: Antoinette Shah PA-C[AF1.1]       Attribution       AF1.1 Antoinette Shah PA-C 24 01:31            PCP: Nova Reveles    Disposition:

## 2024-06-05 NOTE — ASSESSMENT & PLAN NOTE
Patient presented to the emergency room with volume overload. Reported hypoxia and decreased urine output at home. CXR with congestive changes concerning for interstitial edema.  Per son patient restarted his oxygen therapy hours prior to presentation to the hospital  TTE 4/2024: EF 55%, G1DD, normal wall motions.  Home regimen: oral lasix 40 mg bid and spirolactone 25 mg qd  Started IV Lasix 40mg TID admission, per nephrology transition to Lasix 40 mg IV twice daily today and likely back to oral diuretics  Cardiology and nephrology following

## 2024-06-08 LAB
BACTERIA BLD CULT: NORMAL
BACTERIA BLD CULT: NORMAL

## 2024-07-17 NOTE — ASSESSMENT & PLAN NOTE
Baseline 1.5-2. On presentation 3.22--> Most recent Cr 2.87   Suspect multifactoria secondary to some component of obstructive uropathy (urinary retention greater than 800 cc) plus cardiorenal syndrome with volume overload plus ischemic injury from hemodynamic perturbations in the presence of ARB and NSAIDs plus increased susceptibility acute kidney injury due to recent episode of acute kidney injury in April 2024  Nephrology following   Tdap; 15-Jul-2024 18:39; Flora Junior (RN); [employer]; [id number] (Exp. Date: 10-Feb-2026); IntraMuscular; Deltoid Right.; 0.5 milliLiter(s); VIS (VIS Published: 09-May-2013, VIS Presented: 15-Jul-2024);